# Patient Record
Sex: MALE | Race: WHITE | Employment: OTHER | URBAN - NONMETROPOLITAN AREA
[De-identification: names, ages, dates, MRNs, and addresses within clinical notes are randomized per-mention and may not be internally consistent; named-entity substitution may affect disease eponyms.]

---

## 2017-01-09 RX ORDER — MELOXICAM 15 MG/1
TABLET ORAL
Qty: 90 TABLET | Refills: 1 | Status: SHIPPED | OUTPATIENT
Start: 2017-01-09 | End: 2017-07-08 | Stop reason: SDUPTHER

## 2017-01-17 ENCOUNTER — OFFICE VISIT (OUTPATIENT)
Dept: PODIATRY | Age: 82
End: 2017-01-17

## 2017-01-17 VITALS
SYSTOLIC BLOOD PRESSURE: 138 MMHG | HEIGHT: 66 IN | WEIGHT: 179.4 LBS | HEART RATE: 70 BPM | BODY MASS INDEX: 28.83 KG/M2 | DIASTOLIC BLOOD PRESSURE: 70 MMHG

## 2017-01-17 DIAGNOSIS — B35.1 DERMATOPHYTOSIS OF NAIL: Primary | ICD-10-CM

## 2017-01-17 PROCEDURE — 1036F TOBACCO NON-USER: CPT | Performed by: PODIATRIST

## 2017-01-17 PROCEDURE — MISCFOOT6 MISC NON COVERED FOOT SVC 6 OR MORE: Performed by: PODIATRIST

## 2017-02-24 ENCOUNTER — OFFICE VISIT (OUTPATIENT)
Dept: FAMILY MEDICINE CLINIC | Age: 82
End: 2017-02-24

## 2017-02-24 VITALS
WEIGHT: 181 LBS | HEIGHT: 66 IN | SYSTOLIC BLOOD PRESSURE: 130 MMHG | HEART RATE: 68 BPM | DIASTOLIC BLOOD PRESSURE: 72 MMHG | BODY MASS INDEX: 29.09 KG/M2

## 2017-02-24 DIAGNOSIS — Z23 NEED FOR PNEUMOCOCCAL VACCINATION: Primary | ICD-10-CM

## 2017-02-24 DIAGNOSIS — M17.11 PRIMARY OSTEOARTHRITIS OF RIGHT KNEE: ICD-10-CM

## 2017-02-24 DIAGNOSIS — I87.2 VENOUS INSUFFICIENCY: ICD-10-CM

## 2017-02-24 DIAGNOSIS — I25.10 ATHEROSCLEROSIS OF NATIVE CORONARY ARTERY WITHOUT ANGINA PECTORIS, UNSPECIFIED WHETHER NATIVE OR TRANSPLANTED HEART: ICD-10-CM

## 2017-02-24 DIAGNOSIS — R97.20 ELEVATED PSA: ICD-10-CM

## 2017-02-24 DIAGNOSIS — N20.0 KIDNEY STONE: ICD-10-CM

## 2017-02-24 DIAGNOSIS — K22.70 BARRETT'S ESOPHAGUS WITHOUT DYSPLASIA: ICD-10-CM

## 2017-02-24 DIAGNOSIS — E78.5 HYPERLIPIDEMIA, UNSPECIFIED HYPERLIPIDEMIA TYPE: ICD-10-CM

## 2017-02-24 DIAGNOSIS — I10 ESSENTIAL HYPERTENSION, BENIGN: ICD-10-CM

## 2017-02-24 PROCEDURE — 4040F PNEUMOC VAC/ADMIN/RCVD: CPT | Performed by: FAMILY MEDICINE

## 2017-02-24 PROCEDURE — 99214 OFFICE O/P EST MOD 30 MIN: CPT | Performed by: FAMILY MEDICINE

## 2017-02-24 PROCEDURE — G8484 FLU IMMUNIZE NO ADMIN: HCPCS | Performed by: FAMILY MEDICINE

## 2017-02-24 PROCEDURE — 1123F ACP DISCUSS/DSCN MKR DOCD: CPT | Performed by: FAMILY MEDICINE

## 2017-02-24 PROCEDURE — G8427 DOCREV CUR MEDS BY ELIG CLIN: HCPCS | Performed by: FAMILY MEDICINE

## 2017-02-24 PROCEDURE — G8420 CALC BMI NORM PARAMETERS: HCPCS | Performed by: FAMILY MEDICINE

## 2017-02-24 PROCEDURE — G8598 ASA/ANTIPLAT THER USED: HCPCS | Performed by: FAMILY MEDICINE

## 2017-02-24 PROCEDURE — 1036F TOBACCO NON-USER: CPT | Performed by: FAMILY MEDICINE

## 2017-02-24 ASSESSMENT — PATIENT HEALTH QUESTIONNAIRE - PHQ9
SUM OF ALL RESPONSES TO PHQ9 QUESTIONS 1 & 2: 0
1. LITTLE INTEREST OR PLEASURE IN DOING THINGS: 0
SUM OF ALL RESPONSES TO PHQ QUESTIONS 1-9: 0
2. FEELING DOWN, DEPRESSED OR HOPELESS: 0

## 2017-02-24 ASSESSMENT — ENCOUNTER SYMPTOMS
GASTROINTESTINAL NEGATIVE: 1
EYES NEGATIVE: 1
ALLERGIC/IMMUNOLOGIC NEGATIVE: 1
RESPIRATORY NEGATIVE: 1

## 2017-03-07 ENCOUNTER — OFFICE VISIT (OUTPATIENT)
Dept: CARDIOLOGY | Age: 82
End: 2017-03-07

## 2017-03-07 VITALS
HEIGHT: 66 IN | DIASTOLIC BLOOD PRESSURE: 66 MMHG | WEIGHT: 180.4 LBS | HEART RATE: 65 BPM | BODY MASS INDEX: 28.99 KG/M2 | SYSTOLIC BLOOD PRESSURE: 124 MMHG

## 2017-03-07 DIAGNOSIS — I10 ESSENTIAL HYPERTENSION, BENIGN: Primary | ICD-10-CM

## 2017-03-07 DIAGNOSIS — I49.3 PVC (PREMATURE VENTRICULAR CONTRACTION): ICD-10-CM

## 2017-03-07 DIAGNOSIS — E66.01 MORBID OBESITY DUE TO EXCESS CALORIES (HCC): ICD-10-CM

## 2017-03-07 DIAGNOSIS — E78.5 HYPERLIPIDEMIA, UNSPECIFIED HYPERLIPIDEMIA TYPE: ICD-10-CM

## 2017-03-07 DIAGNOSIS — I25.10 CORONARY ARTERY DISEASE INVOLVING NATIVE CORONARY ARTERY OF NATIVE HEART WITHOUT ANGINA PECTORIS: ICD-10-CM

## 2017-03-07 DIAGNOSIS — I25.10 ATHEROSCLEROSIS OF NATIVE CORONARY ARTERY WITHOUT ANGINA PECTORIS, UNSPECIFIED WHETHER NATIVE OR TRANSPLANTED HEART: ICD-10-CM

## 2017-03-07 PROCEDURE — 1036F TOBACCO NON-USER: CPT | Performed by: INTERNAL MEDICINE

## 2017-03-07 PROCEDURE — G8420 CALC BMI NORM PARAMETERS: HCPCS | Performed by: INTERNAL MEDICINE

## 2017-03-07 PROCEDURE — 4040F PNEUMOC VAC/ADMIN/RCVD: CPT | Performed by: INTERNAL MEDICINE

## 2017-03-07 PROCEDURE — 1123F ACP DISCUSS/DSCN MKR DOCD: CPT | Performed by: INTERNAL MEDICINE

## 2017-03-07 PROCEDURE — G8598 ASA/ANTIPLAT THER USED: HCPCS | Performed by: INTERNAL MEDICINE

## 2017-03-07 PROCEDURE — G8427 DOCREV CUR MEDS BY ELIG CLIN: HCPCS | Performed by: INTERNAL MEDICINE

## 2017-03-07 PROCEDURE — G8484 FLU IMMUNIZE NO ADMIN: HCPCS | Performed by: INTERNAL MEDICINE

## 2017-03-07 PROCEDURE — 99213 OFFICE O/P EST LOW 20 MIN: CPT | Performed by: INTERNAL MEDICINE

## 2017-03-07 PROCEDURE — 93000 ELECTROCARDIOGRAM COMPLETE: CPT | Performed by: INTERNAL MEDICINE

## 2017-03-20 ENCOUNTER — HOSPITAL ENCOUNTER (INPATIENT)
Age: 82
LOS: 3 days | Discharge: NURSING, REHAB OR LONG-TERM CARE | DRG: 194 | End: 2017-03-23
Attending: EMERGENCY MEDICINE | Admitting: FAMILY MEDICINE
Payer: MEDICARE

## 2017-03-20 ENCOUNTER — APPOINTMENT (OUTPATIENT)
Dept: GENERAL RADIOLOGY | Age: 82
DRG: 194 | End: 2017-03-20
Payer: MEDICARE

## 2017-03-20 DIAGNOSIS — J18.9 PNEUMONIA DUE TO ORGANISM: Primary | ICD-10-CM

## 2017-03-20 LAB
-: ABNORMAL
ABSOLUTE EOS #: 0 K/UL (ref 0–0.4)
ABSOLUTE EOS #: 0.1 K/UL (ref 0–0.4)
ABSOLUTE LYMPH #: 0.4 K/UL (ref 1–4.8)
ABSOLUTE LYMPH #: 0.8 K/UL (ref 1–4.8)
ABSOLUTE MONO #: 0.7 K/UL (ref 0.1–1.2)
ABSOLUTE MONO #: 1.9 K/UL (ref 0.1–1.2)
AMORPHOUS: ABNORMAL
ANION GAP SERPL CALCULATED.3IONS-SCNC: 11 MMOL/L (ref 9–17)
ANION GAP SERPL CALCULATED.3IONS-SCNC: 13 MMOL/L (ref 9–17)
BACTERIA: ABNORMAL
BASOPHILS # BLD: 0 % (ref 0–2)
BASOPHILS # BLD: 1 % (ref 0–2)
BASOPHILS ABSOLUTE: 0 K/UL (ref 0–0.2)
BASOPHILS ABSOLUTE: 0.1 K/UL (ref 0–0.2)
BILIRUBIN URINE: NEGATIVE
BUN BLDV-MCNC: 25 MG/DL (ref 8–23)
BUN BLDV-MCNC: 26 MG/DL (ref 8–23)
BUN/CREAT BLD: 23 (ref 9–20)
BUN/CREAT BLD: 26 (ref 9–20)
CALCIUM SERPL-MCNC: 8.4 MG/DL (ref 8.6–10.4)
CALCIUM SERPL-MCNC: 8.8 MG/DL (ref 8.6–10.4)
CASTS UA: ABNORMAL /LPF (ref 0–2)
CHLORIDE BLD-SCNC: 103 MMOL/L (ref 98–107)
CHLORIDE BLD-SCNC: 104 MMOL/L (ref 98–107)
CO2: 23 MMOL/L (ref 20–31)
CO2: 25 MMOL/L (ref 20–31)
COLOR: ABNORMAL
COMMENT UA: ABNORMAL
CREAT SERPL-MCNC: 0.96 MG/DL (ref 0.7–1.2)
CREAT SERPL-MCNC: 1.13 MG/DL (ref 0.7–1.2)
CRYSTALS, UA: ABNORMAL /HPF
DIFFERENTIAL TYPE: ABNORMAL
DIFFERENTIAL TYPE: ABNORMAL
DIRECT EXAM: NORMAL
EKG ATRIAL RATE: 109 BPM
EKG P AXIS: 49 DEGREES
EKG P-R INTERVAL: 150 MS
EKG Q-T INTERVAL: 360 MS
EKG QRS DURATION: 130 MS
EKG QTC CALCULATION (BAZETT): 484 MS
EKG R AXIS: -55 DEGREES
EKG T AXIS: 15 DEGREES
EKG VENTRICULAR RATE: 109 BPM
EOSINOPHILS RELATIVE PERCENT: 0 % (ref 1–4)
EOSINOPHILS RELATIVE PERCENT: 1 % (ref 1–4)
EPITHELIAL CELLS UA: ABNORMAL /HPF (ref 0–5)
GFR AFRICAN AMERICAN: >60 ML/MIN
GFR AFRICAN AMERICAN: >60 ML/MIN
GFR NON-AFRICAN AMERICAN: >60 ML/MIN
GFR NON-AFRICAN AMERICAN: >60 ML/MIN
GFR SERPL CREATININE-BSD FRML MDRD: ABNORMAL ML/MIN/{1.73_M2}
GLUCOSE BLD-MCNC: 125 MG/DL (ref 70–99)
GLUCOSE BLD-MCNC: 132 MG/DL (ref 70–99)
GLUCOSE URINE: NEGATIVE
HCT VFR BLD CALC: 39.4 % (ref 41–53)
HCT VFR BLD CALC: 42.8 % (ref 41–53)
HEMOGLOBIN: 12.6 G/DL (ref 13.5–17.5)
HEMOGLOBIN: 13.8 G/DL (ref 13.5–17.5)
KETONES, URINE: ABNORMAL
LACTIC ACID: 1.5 MMOL/L (ref 0.5–2.2)
LEUKOCYTE ESTERASE, URINE: NEGATIVE
LIPASE: 28 U/L (ref 13–60)
LYMPHOCYTES # BLD: 4 % (ref 24–44)
LYMPHOCYTES # BLD: 5 % (ref 24–44)
Lab: NORMAL
MCH RBC QN AUTO: 28.4 PG (ref 26–34)
MCH RBC QN AUTO: 28.6 PG (ref 26–34)
MCHC RBC AUTO-ENTMCNC: 32 G/DL (ref 31–37)
MCHC RBC AUTO-ENTMCNC: 32.2 G/DL (ref 31–37)
MCV RBC AUTO: 88.8 FL (ref 80–100)
MCV RBC AUTO: 89 FL (ref 80–100)
MONOCYTES # BLD: 12 % (ref 1–7)
MONOCYTES # BLD: 8 % (ref 1–7)
MUCUS: ABNORMAL
NITRITE, URINE: NEGATIVE
OTHER OBSERVATIONS UA: ABNORMAL
PDW BLD-RTO: 15.5 % (ref 11–14.5)
PDW BLD-RTO: 15.6 % (ref 11–14.5)
PH UA: 6 (ref 5–6)
PLATELET # BLD: 155 K/UL (ref 140–450)
PLATELET # BLD: 158 K/UL (ref 140–450)
PLATELET ESTIMATE: ABNORMAL
PLATELET ESTIMATE: ABNORMAL
PMV BLD AUTO: 9.4 FL (ref 6–12)
PMV BLD AUTO: 9.4 FL (ref 6–12)
POC TROPONIN I: <0.02 NG/ML (ref 0–0.08)
POC TROPONIN INTERP: NORMAL
POTASSIUM SERPL-SCNC: 3.9 MMOL/L (ref 3.7–5.3)
POTASSIUM SERPL-SCNC: 4.4 MMOL/L (ref 3.7–5.3)
PROTEIN UA: NEGATIVE
RBC # BLD: 4.43 M/UL (ref 4.5–5.9)
RBC # BLD: 4.82 M/UL (ref 4.5–5.9)
RBC # BLD: ABNORMAL 10*6/UL
RBC # BLD: ABNORMAL 10*6/UL
RBC UA: ABNORMAL /HPF (ref 0–4)
RENAL EPITHELIAL, UA: ABNORMAL /HPF
SEG NEUTROPHILS: 83 % (ref 36–66)
SEG NEUTROPHILS: 86 % (ref 36–66)
SEGMENTED NEUTROPHILS ABSOLUTE COUNT: 12.7 K/UL (ref 1.8–7.7)
SEGMENTED NEUTROPHILS ABSOLUTE COUNT: 8.1 K/UL (ref 1.8–7.7)
SODIUM BLD-SCNC: 139 MMOL/L (ref 135–144)
SODIUM BLD-SCNC: 140 MMOL/L (ref 135–144)
SPECIFIC GRAVITY UA: 1.01 (ref 1.01–1.02)
SPECIMEN DESCRIPTION: NORMAL
STATUS: NORMAL
TRICHOMONAS: ABNORMAL
TURBIDITY: ABNORMAL
URINE HGB: ABNORMAL
UROBILINOGEN, URINE: NORMAL
WBC # BLD: 15.4 K/UL (ref 3.5–11)
WBC # BLD: 9.3 K/UL (ref 3.5–11)
WBC # BLD: ABNORMAL 10*3/UL
WBC # BLD: ABNORMAL 10*3/UL
WBC UA: ABNORMAL /HPF (ref 0–4)
YEAST: ABNORMAL

## 2017-03-20 PROCEDURE — 6360000002 HC RX W HCPCS: Performed by: EMERGENCY MEDICINE

## 2017-03-20 PROCEDURE — 71010 XR CHEST PORTABLE: CPT

## 2017-03-20 PROCEDURE — 87804 INFLUENZA ASSAY W/OPTIC: CPT

## 2017-03-20 PROCEDURE — 6370000000 HC RX 637 (ALT 250 FOR IP)

## 2017-03-20 PROCEDURE — 99285 EMERGENCY DEPT VISIT HI MDM: CPT

## 2017-03-20 PROCEDURE — 94640 AIRWAY INHALATION TREATMENT: CPT

## 2017-03-20 PROCEDURE — G8987 SELF CARE CURRENT STATUS: HCPCS | Performed by: OCCUPATIONAL THERAPIST

## 2017-03-20 PROCEDURE — 6370000000 HC RX 637 (ALT 250 FOR IP): Performed by: EMERGENCY MEDICINE

## 2017-03-20 PROCEDURE — 6360000002 HC RX W HCPCS: Performed by: INTERNAL MEDICINE

## 2017-03-20 PROCEDURE — 6360000002 HC RX W HCPCS

## 2017-03-20 PROCEDURE — 80048 BASIC METABOLIC PNL TOTAL CA: CPT

## 2017-03-20 PROCEDURE — 81001 URINALYSIS AUTO W/SCOPE: CPT

## 2017-03-20 PROCEDURE — 84484 ASSAY OF TROPONIN QUANT: CPT

## 2017-03-20 PROCEDURE — 2580000003 HC RX 258: Performed by: FAMILY MEDICINE

## 2017-03-20 PROCEDURE — G8979 MOBILITY GOAL STATUS: HCPCS | Performed by: PHYSICAL THERAPIST

## 2017-03-20 PROCEDURE — 6370000000 HC RX 637 (ALT 250 FOR IP): Performed by: INTERNAL MEDICINE

## 2017-03-20 PROCEDURE — 2060000000 HC ICU INTERMEDIATE R&B

## 2017-03-20 PROCEDURE — 97161 PT EVAL LOW COMPLEX 20 MIN: CPT | Performed by: PHYSICAL THERAPIST

## 2017-03-20 PROCEDURE — 85025 COMPLETE CBC W/AUTO DIFF WBC: CPT

## 2017-03-20 PROCEDURE — 36415 COLL VENOUS BLD VENIPUNCTURE: CPT

## 2017-03-20 PROCEDURE — 94761 N-INVAS EAR/PLS OXIMETRY MLT: CPT

## 2017-03-20 PROCEDURE — 71010 XR CHEST PORTABLE: CPT | Performed by: RADIOLOGY

## 2017-03-20 PROCEDURE — G8980 MOBILITY D/C STATUS: HCPCS | Performed by: PHYSICAL THERAPIST

## 2017-03-20 PROCEDURE — 2580000003 HC RX 258: Performed by: EMERGENCY MEDICINE

## 2017-03-20 PROCEDURE — 93005 ELECTROCARDIOGRAM TRACING: CPT

## 2017-03-20 PROCEDURE — 97165 OT EVAL LOW COMPLEX 30 MIN: CPT | Performed by: OCCUPATIONAL THERAPIST

## 2017-03-20 PROCEDURE — 94150 VITAL CAPACITY TEST: CPT

## 2017-03-20 PROCEDURE — 96374 THER/PROPH/DIAG INJ IV PUSH: CPT

## 2017-03-20 PROCEDURE — 83605 ASSAY OF LACTIC ACID: CPT

## 2017-03-20 PROCEDURE — 87086 URINE CULTURE/COLONY COUNT: CPT

## 2017-03-20 PROCEDURE — G8989 SELF CARE D/C STATUS: HCPCS | Performed by: OCCUPATIONAL THERAPIST

## 2017-03-20 PROCEDURE — 87040 BLOOD CULTURE FOR BACTERIA: CPT

## 2017-03-20 PROCEDURE — 99223 1ST HOSP IP/OBS HIGH 75: CPT | Performed by: INTERNAL MEDICINE

## 2017-03-20 PROCEDURE — G8978 MOBILITY CURRENT STATUS: HCPCS | Performed by: PHYSICAL THERAPIST

## 2017-03-20 PROCEDURE — 83690 ASSAY OF LIPASE: CPT

## 2017-03-20 RX ORDER — ACETAMINOPHEN 500 MG
1000 TABLET ORAL ONCE
Status: COMPLETED | OUTPATIENT
Start: 2017-03-20 | End: 2017-03-20

## 2017-03-20 RX ORDER — SIMVASTATIN 20 MG
20 TABLET ORAL NIGHTLY
Status: DISCONTINUED | OUTPATIENT
Start: 2017-03-20 | End: 2017-03-23 | Stop reason: HOSPADM

## 2017-03-20 RX ORDER — ASPIRIN/CALCIUM/MAG/ALUMINUM 325 MG
1 TABLET ORAL DAILY
Status: CANCELLED | OUTPATIENT
Start: 2017-03-20

## 2017-03-20 RX ORDER — ASPIRIN 325 MG
TABLET ORAL
Status: COMPLETED
Start: 2017-03-20 | End: 2017-03-20

## 2017-03-20 RX ORDER — MELOXICAM 15 MG/1
15 TABLET ORAL DAILY
Status: DISCONTINUED | OUTPATIENT
Start: 2017-03-21 | End: 2017-03-23 | Stop reason: HOSPADM

## 2017-03-20 RX ORDER — SUCRALFATE ORAL 1 G/10ML
1 SUSPENSION ORAL DAILY
Status: CANCELLED | OUTPATIENT
Start: 2017-03-20

## 2017-03-20 RX ORDER — METOPROLOL SUCCINATE 25 MG/1
25 TABLET, EXTENDED RELEASE ORAL DAILY
Status: CANCELLED | OUTPATIENT
Start: 2017-03-20

## 2017-03-20 RX ORDER — ONDANSETRON 2 MG/ML
4 INJECTION INTRAMUSCULAR; INTRAVENOUS ONCE
Status: COMPLETED | OUTPATIENT
Start: 2017-03-20 | End: 2017-03-20

## 2017-03-20 RX ORDER — SODIUM CHLORIDE 0.9 % (FLUSH) 0.9 %
10 SYRINGE (ML) INJECTION PRN
Status: DISCONTINUED | OUTPATIENT
Start: 2017-03-20 | End: 2017-03-23 | Stop reason: HOSPADM

## 2017-03-20 RX ORDER — ACETAMINOPHEN 325 MG/1
650 TABLET ORAL EVERY 4 HOURS PRN
Status: DISCONTINUED | OUTPATIENT
Start: 2017-03-20 | End: 2017-03-23 | Stop reason: HOSPADM

## 2017-03-20 RX ORDER — ALBUTEROL SULFATE 2.5 MG/3ML
2.5 SOLUTION RESPIRATORY (INHALATION) 4 TIMES DAILY
Status: DISCONTINUED | OUTPATIENT
Start: 2017-03-20 | End: 2017-03-23 | Stop reason: HOSPADM

## 2017-03-20 RX ORDER — PANTOPRAZOLE SODIUM 40 MG/1
1 TABLET, DELAYED RELEASE ORAL 2 TIMES DAILY
Status: CANCELLED | OUTPATIENT
Start: 2017-03-20

## 2017-03-20 RX ORDER — METOPROLOL SUCCINATE 25 MG/1
25 TABLET, EXTENDED RELEASE ORAL DAILY
Status: DISCONTINUED | OUTPATIENT
Start: 2017-03-20 | End: 2017-03-23 | Stop reason: HOSPADM

## 2017-03-20 RX ORDER — ALBUTEROL SULFATE 2.5 MG/3ML
2.5 SOLUTION RESPIRATORY (INHALATION)
Status: DISCONTINUED | OUTPATIENT
Start: 2017-03-20 | End: 2017-03-20 | Stop reason: SDUPTHER

## 2017-03-20 RX ORDER — SODIUM CHLORIDE FOR INHALATION 0.9 %
3 VIAL, NEBULIZER (ML) INHALATION
Status: DISCONTINUED | OUTPATIENT
Start: 2017-03-20 | End: 2017-03-23 | Stop reason: HOSPADM

## 2017-03-20 RX ORDER — LISINOPRIL 5 MG/1
5 TABLET ORAL DAILY
Status: DISCONTINUED | OUTPATIENT
Start: 2017-03-20 | End: 2017-03-23 | Stop reason: HOSPADM

## 2017-03-20 RX ORDER — LUTEIN 10 MG
1 TABLET ORAL 2 TIMES DAILY
Status: DISCONTINUED | OUTPATIENT
Start: 2017-03-20 | End: 2017-03-20 | Stop reason: RX

## 2017-03-20 RX ORDER — ALBUTEROL SULFATE 2.5 MG/3ML
2.5 SOLUTION RESPIRATORY (INHALATION)
Status: DISCONTINUED | OUTPATIENT
Start: 2017-03-20 | End: 2017-03-20

## 2017-03-20 RX ORDER — LISINOPRIL 5 MG/1
5 TABLET ORAL DAILY
Status: CANCELLED | OUTPATIENT
Start: 2017-03-20

## 2017-03-20 RX ORDER — SIMVASTATIN 20 MG
20 TABLET ORAL NIGHTLY
Status: CANCELLED | OUTPATIENT
Start: 2017-03-20

## 2017-03-20 RX ORDER — ALBUTEROL SULFATE 2.5 MG/3ML
2.5 SOLUTION RESPIRATORY (INHALATION) EVERY 6 HOURS PRN
Status: DISCONTINUED | OUTPATIENT
Start: 2017-03-20 | End: 2017-03-23 | Stop reason: HOSPADM

## 2017-03-20 RX ORDER — ONDANSETRON 2 MG/ML
4 INJECTION INTRAMUSCULAR; INTRAVENOUS EVERY 6 HOURS PRN
Status: DISCONTINUED | OUTPATIENT
Start: 2017-03-20 | End: 2017-03-23 | Stop reason: HOSPADM

## 2017-03-20 RX ORDER — SODIUM CHLORIDE 0.9 % (FLUSH) 0.9 %
10 SYRINGE (ML) INJECTION EVERY 12 HOURS SCHEDULED
Status: DISCONTINUED | OUTPATIENT
Start: 2017-03-20 | End: 2017-03-23 | Stop reason: HOSPADM

## 2017-03-20 RX ORDER — LUTEIN 10 MG
1 TABLET ORAL 2 TIMES DAILY
Status: CANCELLED | OUTPATIENT
Start: 2017-03-20

## 2017-03-20 RX ORDER — GUAIFENESIN 600 MG/1
600 TABLET, EXTENDED RELEASE ORAL 2 TIMES DAILY PRN
Status: DISCONTINUED | OUTPATIENT
Start: 2017-03-20 | End: 2017-03-22

## 2017-03-20 RX ORDER — ASPIRIN/CALCIUM/MAG/ALUMINUM 325 MG
1 TABLET ORAL DAILY
Status: DISCONTINUED | OUTPATIENT
Start: 2017-03-20 | End: 2017-03-22 | Stop reason: RX

## 2017-03-20 RX ORDER — PANTOPRAZOLE SODIUM 40 MG/1
40 TABLET, DELAYED RELEASE ORAL 2 TIMES DAILY
Status: DISCONTINUED | OUTPATIENT
Start: 2017-03-20 | End: 2017-03-23 | Stop reason: HOSPADM

## 2017-03-20 RX ORDER — SUCRALFATE ORAL 1 G/10ML
1 SUSPENSION ORAL DAILY
Status: DISCONTINUED | OUTPATIENT
Start: 2017-03-20 | End: 2017-03-23 | Stop reason: HOSPADM

## 2017-03-20 RX ORDER — MULTIVITAMIN WITH FOLIC ACID 400 MCG
1 TABLET ORAL DAILY
Status: DISCONTINUED | OUTPATIENT
Start: 2017-03-20 | End: 2017-03-23 | Stop reason: HOSPADM

## 2017-03-20 RX ADMIN — ASPIRIN 325 MG ORAL TABLET 325 MG: 325 PILL ORAL at 11:20

## 2017-03-20 RX ADMIN — ENOXAPARIN SODIUM 40 MG: 80 INJECTION SUBCUTANEOUS at 11:20

## 2017-03-20 RX ADMIN — AZITHROMYCIN MONOHYDRATE 500 MG: 500 INJECTION, POWDER, LYOPHILIZED, FOR SOLUTION INTRAVENOUS at 03:24

## 2017-03-20 RX ADMIN — CEFTRIAXONE 1 G: 1 INJECTION, POWDER, FOR SOLUTION INTRAMUSCULAR; INTRAVENOUS at 02:43

## 2017-03-20 RX ADMIN — SIMVASTATIN 20 MG: 20 TABLET, FILM COATED ORAL at 20:43

## 2017-03-20 RX ADMIN — ALBUTEROL SULFATE 2.5 MG: 2.5 SOLUTION RESPIRATORY (INHALATION) at 16:10

## 2017-03-20 RX ADMIN — Medication 10 ML: at 11:21

## 2017-03-20 RX ADMIN — LISINOPRIL 5 MG: 5 TABLET ORAL at 11:19

## 2017-03-20 RX ADMIN — METOPROLOL SUCCINATE 25 MG: 25 TABLET, EXTENDED RELEASE ORAL at 11:19

## 2017-03-20 RX ADMIN — THERA TABS 1 TABLET: TAB at 11:19

## 2017-03-20 RX ADMIN — ALBUTEROL SULFATE 2.5 MG: 2.5 SOLUTION RESPIRATORY (INHALATION) at 01:22

## 2017-03-20 RX ADMIN — PANTOPRAZOLE SODIUM 40 MG: 40 TABLET, DELAYED RELEASE ORAL at 20:43

## 2017-03-20 RX ADMIN — ENOXAPARIN SODIUM 40 MG: 40 INJECTION SUBCUTANEOUS at 11:20

## 2017-03-20 RX ADMIN — PANTOPRAZOLE SODIUM 40 MG: 40 TABLET, DELAYED RELEASE ORAL at 11:19

## 2017-03-20 RX ADMIN — Medication 1 G: at 11:19

## 2017-03-20 RX ADMIN — ACETAMINOPHEN 1000 MG: 500 TABLET ORAL at 01:17

## 2017-03-20 RX ADMIN — Medication 325 MG: at 11:21

## 2017-03-20 RX ADMIN — ONDANSETRON 4 MG: 2 INJECTION INTRAMUSCULAR; INTRAVENOUS at 01:17

## 2017-03-20 RX ADMIN — Medication 10 ML: at 20:43

## 2017-03-20 RX ADMIN — ALBUTEROL SULFATE 2.5 MG: 2.5 SOLUTION RESPIRATORY (INHALATION) at 19:55

## 2017-03-20 ASSESSMENT — ENCOUNTER SYMPTOMS
VOICE CHANGE: 0
CHEST TIGHTNESS: 1
RHINORRHEA: 0
ABDOMINAL PAIN: 0
SHORTNESS OF BREATH: 0
DIARRHEA: 0
BACK PAIN: 0
COLOR CHANGE: 0
COUGH: 1
NAUSEA: 1
TROUBLE SWALLOWING: 0
VOMITING: 0

## 2017-03-20 ASSESSMENT — PAIN SCALES - GENERAL
PAINLEVEL_OUTOF10: 0
PAINLEVEL_OUTOF10: 0

## 2017-03-21 ENCOUNTER — APPOINTMENT (OUTPATIENT)
Dept: GENERAL RADIOLOGY | Age: 82
DRG: 194 | End: 2017-03-21
Payer: MEDICARE

## 2017-03-21 LAB
ABSOLUTE EOS #: 0.2 K/UL (ref 0–0.4)
ABSOLUTE LYMPH #: 1.4 K/UL (ref 1–4.8)
ABSOLUTE MONO #: 0.9 K/UL (ref 0.1–1.2)
ANION GAP SERPL CALCULATED.3IONS-SCNC: 12 MMOL/L (ref 9–17)
BASOPHILS # BLD: 0 % (ref 0–2)
BASOPHILS ABSOLUTE: 0 K/UL (ref 0–0.2)
BUN BLDV-MCNC: 20 MG/DL (ref 8–23)
BUN/CREAT BLD: 24 (ref 9–20)
CALCIUM SERPL-MCNC: 8.6 MG/DL (ref 8.6–10.4)
CHLORIDE BLD-SCNC: 104 MMOL/L (ref 98–107)
CO2: 24 MMOL/L (ref 20–31)
CREAT SERPL-MCNC: 0.82 MG/DL (ref 0.7–1.2)
DIFFERENTIAL TYPE: ABNORMAL
EOSINOPHILS RELATIVE PERCENT: 3 % (ref 1–4)
GFR AFRICAN AMERICAN: >60 ML/MIN
GFR NON-AFRICAN AMERICAN: >60 ML/MIN
GFR SERPL CREATININE-BSD FRML MDRD: ABNORMAL ML/MIN/{1.73_M2}
GFR SERPL CREATININE-BSD FRML MDRD: ABNORMAL ML/MIN/{1.73_M2}
GLUCOSE BLD-MCNC: 108 MG/DL (ref 70–99)
HCT VFR BLD CALC: 37.6 % (ref 41–53)
HEMOGLOBIN: 12.3 G/DL (ref 13.5–17.5)
LYMPHOCYTES # BLD: 18 % (ref 24–44)
MCH RBC QN AUTO: 28.7 PG (ref 26–34)
MCHC RBC AUTO-ENTMCNC: 32.6 G/DL (ref 31–37)
MCV RBC AUTO: 88.2 FL (ref 80–100)
MONOCYTES # BLD: 13 % (ref 1–7)
PDW BLD-RTO: 15.9 % (ref 11–14.5)
PLATELET # BLD: 150 K/UL (ref 140–450)
PLATELET ESTIMATE: ABNORMAL
PMV BLD AUTO: 10 FL (ref 6–12)
POTASSIUM SERPL-SCNC: 3.8 MMOL/L (ref 3.7–5.3)
RBC # BLD: 4.27 M/UL (ref 4.5–5.9)
RBC # BLD: ABNORMAL 10*6/UL
SEG NEUTROPHILS: 66 % (ref 36–66)
SEGMENTED NEUTROPHILS ABSOLUTE COUNT: 5 K/UL (ref 1.8–7.7)
SODIUM BLD-SCNC: 140 MMOL/L (ref 135–144)
WBC # BLD: 7.6 K/UL (ref 3.5–11)
WBC # BLD: ABNORMAL 10*3/UL

## 2017-03-21 PROCEDURE — 94640 AIRWAY INHALATION TREATMENT: CPT

## 2017-03-21 PROCEDURE — 71020 XR CHEST STANDARD TWO VW: CPT | Performed by: RADIOLOGY

## 2017-03-21 PROCEDURE — 6370000000 HC RX 637 (ALT 250 FOR IP): Performed by: INTERNAL MEDICINE

## 2017-03-21 PROCEDURE — 80048 BASIC METABOLIC PNL TOTAL CA: CPT

## 2017-03-21 PROCEDURE — 2580000003 HC RX 258: Performed by: FAMILY MEDICINE

## 2017-03-21 PROCEDURE — 71020 XR CHEST STANDARD TWO VW: CPT

## 2017-03-21 PROCEDURE — 2060000000 HC ICU INTERMEDIATE R&B

## 2017-03-21 PROCEDURE — 6360000002 HC RX W HCPCS: Performed by: INTERNAL MEDICINE

## 2017-03-21 PROCEDURE — 36415 COLL VENOUS BLD VENIPUNCTURE: CPT

## 2017-03-21 PROCEDURE — 85025 COMPLETE CBC W/AUTO DIFF WBC: CPT

## 2017-03-21 PROCEDURE — 6370000000 HC RX 637 (ALT 250 FOR IP): Performed by: NURSE PRACTITIONER

## 2017-03-21 PROCEDURE — 6360000002 HC RX W HCPCS: Performed by: FAMILY MEDICINE

## 2017-03-21 PROCEDURE — 99232 SBSQ HOSP IP/OBS MODERATE 35: CPT | Performed by: INTERNAL MEDICINE

## 2017-03-21 RX ORDER — SENNA PLUS 8.6 MG/1
1 TABLET ORAL NIGHTLY PRN
Status: DISCONTINUED | OUTPATIENT
Start: 2017-03-21 | End: 2017-03-23 | Stop reason: HOSPADM

## 2017-03-21 RX ORDER — AZITHROMYCIN 250 MG/1
500 TABLET, FILM COATED ORAL DAILY
Status: DISCONTINUED | OUTPATIENT
Start: 2017-03-22 | End: 2017-03-23 | Stop reason: HOSPADM

## 2017-03-21 RX ADMIN — CEFTRIAXONE 1 G: 1 INJECTION, POWDER, FOR SOLUTION INTRAMUSCULAR; INTRAVENOUS at 02:51

## 2017-03-21 RX ADMIN — SIMVASTATIN 20 MG: 20 TABLET, FILM COATED ORAL at 20:49

## 2017-03-21 RX ADMIN — Medication 1 G: at 08:58

## 2017-03-21 RX ADMIN — ALBUTEROL SULFATE 2.5 MG: 2.5 SOLUTION RESPIRATORY (INHALATION) at 07:42

## 2017-03-21 RX ADMIN — THERA TABS 1 TABLET: TAB at 08:58

## 2017-03-21 RX ADMIN — ALBUTEROL SULFATE 2.5 MG: 2.5 SOLUTION RESPIRATORY (INHALATION) at 20:23

## 2017-03-21 RX ADMIN — PANTOPRAZOLE SODIUM 40 MG: 40 TABLET, DELAYED RELEASE ORAL at 08:58

## 2017-03-21 RX ADMIN — METOPROLOL SUCCINATE 25 MG: 25 TABLET, EXTENDED RELEASE ORAL at 08:58

## 2017-03-21 RX ADMIN — ALBUTEROL SULFATE 2.5 MG: 2.5 SOLUTION RESPIRATORY (INHALATION) at 11:41

## 2017-03-21 RX ADMIN — PANTOPRAZOLE SODIUM 40 MG: 40 TABLET, DELAYED RELEASE ORAL at 20:49

## 2017-03-21 RX ADMIN — Medication 10 ML: at 20:49

## 2017-03-21 RX ADMIN — LISINOPRIL 5 MG: 5 TABLET ORAL at 08:58

## 2017-03-21 RX ADMIN — ENOXAPARIN SODIUM 40 MG: 40 INJECTION SUBCUTANEOUS at 08:58

## 2017-03-21 RX ADMIN — AZITHROMYCIN MONOHYDRATE 500 MG: 500 INJECTION, POWDER, LYOPHILIZED, FOR SOLUTION INTRAVENOUS at 03:38

## 2017-03-21 RX ADMIN — GUAIFENESIN 600 MG: 600 TABLET, EXTENDED RELEASE ORAL at 11:03

## 2017-03-21 RX ADMIN — Medication 10 ML: at 08:58

## 2017-03-21 RX ADMIN — ALBUTEROL SULFATE 2.5 MG: 2.5 SOLUTION RESPIRATORY (INHALATION) at 15:38

## 2017-03-22 LAB
ABSOLUTE EOS #: 0.3 K/UL (ref 0–0.4)
ABSOLUTE LYMPH #: 1.4 K/UL (ref 1–4.8)
ABSOLUTE MONO #: 0.9 K/UL (ref 0.1–1.2)
ANION GAP SERPL CALCULATED.3IONS-SCNC: 13 MMOL/L (ref 9–17)
BASOPHILS # BLD: 0 % (ref 0–2)
BASOPHILS ABSOLUTE: 0 K/UL (ref 0–0.2)
BUN BLDV-MCNC: 19 MG/DL (ref 8–23)
BUN/CREAT BLD: 22 (ref 9–20)
CALCIUM SERPL-MCNC: 8.7 MG/DL (ref 8.6–10.4)
CHLORIDE BLD-SCNC: 105 MMOL/L (ref 98–107)
CO2: 26 MMOL/L (ref 20–31)
CREAT SERPL-MCNC: 0.86 MG/DL (ref 0.7–1.2)
DIFFERENTIAL TYPE: ABNORMAL
EOSINOPHILS RELATIVE PERCENT: 4 % (ref 1–4)
GFR AFRICAN AMERICAN: >60 ML/MIN
GFR NON-AFRICAN AMERICAN: >60 ML/MIN
GFR SERPL CREATININE-BSD FRML MDRD: ABNORMAL ML/MIN/{1.73_M2}
GFR SERPL CREATININE-BSD FRML MDRD: ABNORMAL ML/MIN/{1.73_M2}
GLUCOSE BLD-MCNC: 101 MG/DL (ref 70–99)
HCT VFR BLD CALC: 38.6 % (ref 41–53)
HEMOGLOBIN: 12.8 G/DL (ref 13.5–17.5)
LYMPHOCYTES # BLD: 17 % (ref 24–44)
MCH RBC QN AUTO: 29.2 PG (ref 26–34)
MCHC RBC AUTO-ENTMCNC: 33.1 G/DL (ref 31–37)
MCV RBC AUTO: 88 FL (ref 80–100)
MONOCYTES # BLD: 11 % (ref 1–7)
PDW BLD-RTO: 15.6 % (ref 11–14.5)
PLATELET # BLD: 160 K/UL (ref 140–450)
PLATELET ESTIMATE: ABNORMAL
PMV BLD AUTO: 9.8 FL (ref 6–12)
POTASSIUM SERPL-SCNC: 4.5 MMOL/L (ref 3.7–5.3)
RBC # BLD: 4.38 M/UL (ref 4.5–5.9)
RBC # BLD: ABNORMAL 10*6/UL
SEG NEUTROPHILS: 68 % (ref 36–66)
SEGMENTED NEUTROPHILS ABSOLUTE COUNT: 5.8 K/UL (ref 1.8–7.7)
SODIUM BLD-SCNC: 144 MMOL/L (ref 135–144)
WBC # BLD: 8.5 K/UL (ref 3.5–11)
WBC # BLD: ABNORMAL 10*3/UL

## 2017-03-22 PROCEDURE — 6360000002 HC RX W HCPCS: Performed by: FAMILY MEDICINE

## 2017-03-22 PROCEDURE — 6370000000 HC RX 637 (ALT 250 FOR IP): Performed by: NURSE PRACTITIONER

## 2017-03-22 PROCEDURE — 94760 N-INVAS EAR/PLS OXIMETRY 1: CPT

## 2017-03-22 PROCEDURE — 80048 BASIC METABOLIC PNL TOTAL CA: CPT

## 2017-03-22 PROCEDURE — 6370000000 HC RX 637 (ALT 250 FOR IP): Performed by: INTERNAL MEDICINE

## 2017-03-22 PROCEDURE — 36415 COLL VENOUS BLD VENIPUNCTURE: CPT

## 2017-03-22 PROCEDURE — 6360000002 HC RX W HCPCS: Performed by: INTERNAL MEDICINE

## 2017-03-22 PROCEDURE — 85025 COMPLETE CBC W/AUTO DIFF WBC: CPT

## 2017-03-22 PROCEDURE — 99232 SBSQ HOSP IP/OBS MODERATE 35: CPT | Performed by: INTERNAL MEDICINE

## 2017-03-22 PROCEDURE — 94640 AIRWAY INHALATION TREATMENT: CPT

## 2017-03-22 PROCEDURE — 2060000000 HC ICU INTERMEDIATE R&B

## 2017-03-22 PROCEDURE — 2580000003 HC RX 258: Performed by: FAMILY MEDICINE

## 2017-03-22 PROCEDURE — 6370000000 HC RX 637 (ALT 250 FOR IP)

## 2017-03-22 RX ORDER — GUAIFENESIN 600 MG/1
600 TABLET, EXTENDED RELEASE ORAL 2 TIMES DAILY
Status: DISCONTINUED | OUTPATIENT
Start: 2017-03-22 | End: 2017-03-23 | Stop reason: HOSPADM

## 2017-03-22 RX ORDER — ASPIRIN 325 MG
TABLET ORAL
Status: DISPENSED
Start: 2017-03-22 | End: 2017-03-22

## 2017-03-22 RX ORDER — DIPHENHYDRAMINE HCL 25 MG
50 TABLET ORAL NIGHTLY PRN
Status: DISCONTINUED | OUTPATIENT
Start: 2017-03-22 | End: 2017-03-23 | Stop reason: HOSPADM

## 2017-03-22 RX ADMIN — THERA TABS 1 TABLET: TAB at 08:38

## 2017-03-22 RX ADMIN — DIPHENHYDRAMINE HCL 50 MG: 25 TABLET ORAL at 02:30

## 2017-03-22 RX ADMIN — CEFTRIAXONE 1 G: 1 INJECTION, POWDER, FOR SOLUTION INTRAMUSCULAR; INTRAVENOUS at 01:47

## 2017-03-22 RX ADMIN — SIMVASTATIN 20 MG: 20 TABLET, FILM COATED ORAL at 20:18

## 2017-03-22 RX ADMIN — ENOXAPARIN SODIUM 40 MG: 40 INJECTION SUBCUTANEOUS at 08:38

## 2017-03-22 RX ADMIN — ASPIRIN 325 MG: 325 TABLET, DELAYED RELEASE ORAL at 10:08

## 2017-03-22 RX ADMIN — LISINOPRIL 5 MG: 5 TABLET ORAL at 08:38

## 2017-03-22 RX ADMIN — PANTOPRAZOLE SODIUM 40 MG: 40 TABLET, DELAYED RELEASE ORAL at 20:18

## 2017-03-22 RX ADMIN — ALBUTEROL SULFATE 2.5 MG: 2.5 SOLUTION RESPIRATORY (INHALATION) at 16:34

## 2017-03-22 RX ADMIN — GUAIFENESIN 600 MG: 600 TABLET, EXTENDED RELEASE ORAL at 20:18

## 2017-03-22 RX ADMIN — Medication 10 ML: at 20:18

## 2017-03-22 RX ADMIN — ALBUTEROL SULFATE 2.5 MG: 2.5 SOLUTION RESPIRATORY (INHALATION) at 19:50

## 2017-03-22 RX ADMIN — PANTOPRAZOLE SODIUM 40 MG: 40 TABLET, DELAYED RELEASE ORAL at 08:38

## 2017-03-22 RX ADMIN — Medication 10 ML: at 08:38

## 2017-03-22 RX ADMIN — Medication 1 G: at 08:38

## 2017-03-22 RX ADMIN — METOPROLOL SUCCINATE 25 MG: 25 TABLET, EXTENDED RELEASE ORAL at 08:38

## 2017-03-22 RX ADMIN — AZITHROMYCIN 500 MG: 250 TABLET, FILM COATED ORAL at 08:38

## 2017-03-22 RX ADMIN — ALBUTEROL SULFATE 2.5 MG: 2.5 SOLUTION RESPIRATORY (INHALATION) at 08:26

## 2017-03-22 RX ADMIN — ALBUTEROL SULFATE 2.5 MG: 2.5 SOLUTION RESPIRATORY (INHALATION) at 12:41

## 2017-03-22 ASSESSMENT — PAIN SCALES - GENERAL
PAINLEVEL_OUTOF10: 0

## 2017-03-23 VITALS
OXYGEN SATURATION: 94 % | DIASTOLIC BLOOD PRESSURE: 70 MMHG | RESPIRATION RATE: 20 BRPM | HEIGHT: 66 IN | BODY MASS INDEX: 27.88 KG/M2 | WEIGHT: 173.5 LBS | HEART RATE: 74 BPM | TEMPERATURE: 97.9 F | SYSTOLIC BLOOD PRESSURE: 142 MMHG

## 2017-03-23 LAB
ABSOLUTE EOS #: 0.3 K/UL (ref 0–0.4)
ABSOLUTE LYMPH #: 1.4 K/UL (ref 1–4.8)
ABSOLUTE MONO #: 1 K/UL (ref 0.1–1.2)
ANION GAP SERPL CALCULATED.3IONS-SCNC: 11 MMOL/L (ref 9–17)
BASOPHILS # BLD: 0 % (ref 0–2)
BASOPHILS ABSOLUTE: 0 K/UL (ref 0–0.2)
BUN BLDV-MCNC: 19 MG/DL (ref 8–23)
BUN/CREAT BLD: 21 (ref 9–20)
CALCIUM SERPL-MCNC: 9.2 MG/DL (ref 8.6–10.4)
CHLORIDE BLD-SCNC: 105 MMOL/L (ref 98–107)
CO2: 27 MMOL/L (ref 20–31)
CREAT SERPL-MCNC: 0.89 MG/DL (ref 0.7–1.2)
CULTURE: NORMAL
DIFFERENTIAL TYPE: ABNORMAL
EOSINOPHILS RELATIVE PERCENT: 4 % (ref 1–4)
GFR AFRICAN AMERICAN: >60 ML/MIN
GFR NON-AFRICAN AMERICAN: >60 ML/MIN
GFR SERPL CREATININE-BSD FRML MDRD: ABNORMAL ML/MIN/{1.73_M2}
GFR SERPL CREATININE-BSD FRML MDRD: ABNORMAL ML/MIN/{1.73_M2}
GLUCOSE BLD-MCNC: 104 MG/DL (ref 70–99)
HCT VFR BLD CALC: 39.2 % (ref 41–53)
HEMOGLOBIN: 12.7 G/DL (ref 13.5–17.5)
LYMPHOCYTES # BLD: 16 % (ref 24–44)
Lab: NORMAL
MCH RBC QN AUTO: 28.8 PG (ref 26–34)
MCHC RBC AUTO-ENTMCNC: 32.4 G/DL (ref 31–37)
MCV RBC AUTO: 88.7 FL (ref 80–100)
MONOCYTES # BLD: 12 % (ref 1–7)
PDW BLD-RTO: 15.7 % (ref 11–14.5)
PLATELET # BLD: 181 K/UL (ref 140–450)
PLATELET ESTIMATE: ABNORMAL
PMV BLD AUTO: 9.4 FL (ref 6–12)
POTASSIUM SERPL-SCNC: 4.3 MMOL/L (ref 3.7–5.3)
RBC # BLD: 4.42 M/UL (ref 4.5–5.9)
RBC # BLD: ABNORMAL 10*6/UL
SEG NEUTROPHILS: 68 % (ref 36–66)
SEGMENTED NEUTROPHILS ABSOLUTE COUNT: 5.9 K/UL (ref 1.8–7.7)
SODIUM BLD-SCNC: 143 MMOL/L (ref 135–144)
SPECIMEN DESCRIPTION: NORMAL
STATUS: NORMAL
WBC # BLD: 8.6 K/UL (ref 3.5–11)
WBC # BLD: ABNORMAL 10*3/UL

## 2017-03-23 PROCEDURE — 6360000002 HC RX W HCPCS: Performed by: INTERNAL MEDICINE

## 2017-03-23 PROCEDURE — 99238 HOSP IP/OBS DSCHRG MGMT 30/<: CPT | Performed by: INTERNAL MEDICINE

## 2017-03-23 PROCEDURE — 6360000002 HC RX W HCPCS: Performed by: FAMILY MEDICINE

## 2017-03-23 PROCEDURE — 36415 COLL VENOUS BLD VENIPUNCTURE: CPT

## 2017-03-23 PROCEDURE — 2580000003 HC RX 258: Performed by: FAMILY MEDICINE

## 2017-03-23 PROCEDURE — 6370000000 HC RX 637 (ALT 250 FOR IP): Performed by: INTERNAL MEDICINE

## 2017-03-23 PROCEDURE — 94760 N-INVAS EAR/PLS OXIMETRY 1: CPT

## 2017-03-23 PROCEDURE — 94640 AIRWAY INHALATION TREATMENT: CPT

## 2017-03-23 PROCEDURE — 80048 BASIC METABOLIC PNL TOTAL CA: CPT

## 2017-03-23 PROCEDURE — 85025 COMPLETE CBC W/AUTO DIFF WBC: CPT

## 2017-03-23 RX ORDER — CEFUROXIME AXETIL 500 MG/1
500 TABLET ORAL 2 TIMES DAILY
Qty: 8 TABLET | Refills: 0 | Status: SHIPPED | OUTPATIENT
Start: 2017-03-23 | End: 2017-03-27

## 2017-03-23 RX ORDER — IPRATROPIUM BROMIDE AND ALBUTEROL SULFATE 2.5; .5 MG/3ML; MG/3ML
1 SOLUTION RESPIRATORY (INHALATION) EVERY 4 HOURS
Qty: 360 ML | Refills: 0 | Status: SHIPPED | OUTPATIENT
Start: 2017-03-23 | End: 2017-09-12

## 2017-03-23 RX ORDER — AZITHROMYCIN 500 MG/1
500 TABLET, FILM COATED ORAL DAILY
Qty: 4 TABLET | Refills: 0 | Status: SHIPPED | OUTPATIENT
Start: 2017-03-23 | End: 2017-03-27

## 2017-03-23 RX ORDER — ACETAMINOPHEN 325 MG/1
650 TABLET ORAL EVERY 4 HOURS PRN
Qty: 120 TABLET | Refills: 0 | Status: ON HOLD | COMMUNITY
Start: 2017-03-23 | End: 2018-06-09

## 2017-03-23 RX ORDER — GUAIFENESIN 600 MG/1
600 TABLET, EXTENDED RELEASE ORAL 2 TIMES DAILY
Qty: 28 TABLET | Refills: 0 | Status: SHIPPED | OUTPATIENT
Start: 2017-03-23 | End: 2017-04-06

## 2017-03-23 RX ORDER — SENNA PLUS 8.6 MG/1
1 TABLET ORAL NIGHTLY PRN
Refills: 0 | COMMUNITY
Start: 2017-03-23 | End: 2017-04-22

## 2017-03-23 RX ADMIN — CEFTRIAXONE 1 G: 1 INJECTION, POWDER, FOR SOLUTION INTRAMUSCULAR; INTRAVENOUS at 02:08

## 2017-03-23 RX ADMIN — ENOXAPARIN SODIUM 40 MG: 40 INJECTION SUBCUTANEOUS at 09:02

## 2017-03-23 RX ADMIN — Medication 1 G: at 09:02

## 2017-03-23 RX ADMIN — AZITHROMYCIN 500 MG: 250 TABLET, FILM COATED ORAL at 09:02

## 2017-03-23 RX ADMIN — ALBUTEROL SULFATE 2.5 MG: 2.5 SOLUTION RESPIRATORY (INHALATION) at 12:18

## 2017-03-23 RX ADMIN — PANTOPRAZOLE SODIUM 40 MG: 40 TABLET, DELAYED RELEASE ORAL at 09:02

## 2017-03-23 RX ADMIN — THERA TABS 1 TABLET: TAB at 09:02

## 2017-03-23 RX ADMIN — METOPROLOL SUCCINATE 25 MG: 25 TABLET, EXTENDED RELEASE ORAL at 09:02

## 2017-03-23 RX ADMIN — LISINOPRIL 5 MG: 5 TABLET ORAL at 09:02

## 2017-03-23 RX ADMIN — Medication 10 ML: at 09:02

## 2017-03-23 RX ADMIN — ASPIRIN 325 MG: 325 TABLET, DELAYED RELEASE ORAL at 09:02

## 2017-03-23 RX ADMIN — GUAIFENESIN 600 MG: 600 TABLET, EXTENDED RELEASE ORAL at 09:02

## 2017-03-23 ASSESSMENT — PAIN SCALES - GENERAL: PAINLEVEL_OUTOF10: 0

## 2017-03-24 ENCOUNTER — CARE COORDINATION (OUTPATIENT)
Dept: FAMILY MEDICINE CLINIC | Age: 82
End: 2017-03-24

## 2017-03-26 LAB
CULTURE: NORMAL
Lab: NORMAL
Lab: NORMAL
SPECIMEN DESCRIPTION: NORMAL
STATUS: NORMAL
STATUS: NORMAL

## 2017-04-04 ENCOUNTER — OFFICE VISIT (OUTPATIENT)
Dept: PODIATRY | Age: 82
End: 2017-04-04
Payer: MEDICARE

## 2017-04-04 VITALS
BODY MASS INDEX: 28.73 KG/M2 | SYSTOLIC BLOOD PRESSURE: 122 MMHG | HEART RATE: 68 BPM | DIASTOLIC BLOOD PRESSURE: 70 MMHG | WEIGHT: 178 LBS

## 2017-04-04 DIAGNOSIS — B35.1 DERMATOPHYTOSIS OF NAIL: Primary | ICD-10-CM

## 2017-04-04 PROCEDURE — 1036F TOBACCO NON-USER: CPT | Performed by: PODIATRIST

## 2017-04-04 PROCEDURE — MISCFOOT6 MISC NON COVERED FOOT SVC 6 OR MORE: Performed by: PODIATRIST

## 2017-04-07 ENCOUNTER — TELEPHONE (OUTPATIENT)
Dept: FAMILY MEDICINE CLINIC | Age: 82
End: 2017-04-07

## 2017-04-10 RX ORDER — PANTOPRAZOLE SODIUM 40 MG/1
TABLET, DELAYED RELEASE ORAL
Qty: 180 TABLET | Refills: 1 | Status: SHIPPED | OUTPATIENT
Start: 2017-04-10 | End: 2017-10-07 | Stop reason: SDUPTHER

## 2017-04-11 ENCOUNTER — CARE COORDINATION (OUTPATIENT)
Dept: FAMILY MEDICINE CLINIC | Age: 82
End: 2017-04-11

## 2017-04-11 ENCOUNTER — CARE COORDINATION (OUTPATIENT)
Dept: CARE COORDINATION | Age: 82
End: 2017-04-11

## 2017-04-19 ENCOUNTER — OFFICE VISIT (OUTPATIENT)
Dept: FAMILY MEDICINE CLINIC | Age: 82
End: 2017-04-19
Payer: MEDICARE

## 2017-04-19 VITALS
HEART RATE: 56 BPM | RESPIRATION RATE: 16 BRPM | HEIGHT: 66 IN | DIASTOLIC BLOOD PRESSURE: 64 MMHG | WEIGHT: 176.2 LBS | BODY MASS INDEX: 28.32 KG/M2 | SYSTOLIC BLOOD PRESSURE: 126 MMHG

## 2017-04-19 DIAGNOSIS — J18.9 PNEUMONIA DUE TO ORGANISM: Primary | ICD-10-CM

## 2017-04-19 PROCEDURE — 4040F PNEUMOC VAC/ADMIN/RCVD: CPT | Performed by: FAMILY MEDICINE

## 2017-04-19 PROCEDURE — G8427 DOCREV CUR MEDS BY ELIG CLIN: HCPCS | Performed by: FAMILY MEDICINE

## 2017-04-19 PROCEDURE — 99214 OFFICE O/P EST MOD 30 MIN: CPT | Performed by: FAMILY MEDICINE

## 2017-04-19 PROCEDURE — G8420 CALC BMI NORM PARAMETERS: HCPCS | Performed by: FAMILY MEDICINE

## 2017-04-19 PROCEDURE — 1123F ACP DISCUSS/DSCN MKR DOCD: CPT | Performed by: FAMILY MEDICINE

## 2017-04-19 PROCEDURE — 1036F TOBACCO NON-USER: CPT | Performed by: FAMILY MEDICINE

## 2017-04-19 PROCEDURE — 1111F DSCHRG MED/CURRENT MED MERGE: CPT | Performed by: FAMILY MEDICINE

## 2017-04-19 PROCEDURE — G8598 ASA/ANTIPLAT THER USED: HCPCS | Performed by: FAMILY MEDICINE

## 2017-04-19 ASSESSMENT — ENCOUNTER SYMPTOMS
GASTROINTESTINAL NEGATIVE: 1
COUGH: 0
SHORTNESS OF BREATH: 0
RESPIRATORY NEGATIVE: 1
ALLERGIC/IMMUNOLOGIC NEGATIVE: 1
EYES NEGATIVE: 1

## 2017-04-27 ENCOUNTER — CARE COORDINATION (OUTPATIENT)
Dept: CARE COORDINATION | Age: 82
End: 2017-04-27

## 2017-05-09 RX ORDER — SIMVASTATIN 20 MG
TABLET ORAL
Qty: 90 TABLET | Refills: 3 | Status: SHIPPED | OUTPATIENT
Start: 2017-05-09 | End: 2017-09-12 | Stop reason: SDUPTHER

## 2017-05-15 ENCOUNTER — NURSE ONLY (OUTPATIENT)
Dept: SURGERY | Age: 82
End: 2017-05-15

## 2017-05-15 ENCOUNTER — TELEPHONE (OUTPATIENT)
Dept: SURGERY | Age: 82
End: 2017-05-15

## 2017-05-15 VITALS
SYSTOLIC BLOOD PRESSURE: 126 MMHG | DIASTOLIC BLOOD PRESSURE: 88 MMHG | WEIGHT: 180 LBS | HEART RATE: 58 BPM | BODY MASS INDEX: 28.93 KG/M2 | HEIGHT: 66 IN

## 2017-05-15 DIAGNOSIS — K22.70 BARRETT'S ESOPHAGUS WITHOUT DYSPLASIA: Primary | ICD-10-CM

## 2017-06-08 ENCOUNTER — CARE COORDINATION (OUTPATIENT)
Dept: CARE COORDINATION | Age: 82
End: 2017-06-08

## 2017-06-13 ENCOUNTER — OFFICE VISIT (OUTPATIENT)
Dept: PODIATRY | Age: 82
End: 2017-06-13

## 2017-06-13 VITALS
BODY MASS INDEX: 28.7 KG/M2 | HEART RATE: 68 BPM | SYSTOLIC BLOOD PRESSURE: 132 MMHG | DIASTOLIC BLOOD PRESSURE: 74 MMHG | HEIGHT: 66 IN | WEIGHT: 178.6 LBS

## 2017-06-13 DIAGNOSIS — B35.1 DERMATOPHYTOSIS OF NAIL: Primary | ICD-10-CM

## 2017-06-13 PROCEDURE — 1036F TOBACCO NON-USER: CPT | Performed by: PODIATRIST

## 2017-06-13 PROCEDURE — MISCFOOT6 MISC NON COVERED FOOT SVC 6 OR MORE: Performed by: PODIATRIST

## 2017-07-03 ENCOUNTER — ANESTHESIA (OUTPATIENT)
Dept: OPERATING ROOM | Age: 82
End: 2017-07-03
Payer: MEDICARE

## 2017-07-03 ENCOUNTER — ANESTHESIA EVENT (OUTPATIENT)
Dept: OPERATING ROOM | Age: 82
End: 2017-07-03
Payer: MEDICARE

## 2017-07-03 ENCOUNTER — HOSPITAL ENCOUNTER (OUTPATIENT)
Age: 82
Setting detail: OUTPATIENT SURGERY
Discharge: HOME | End: 2017-07-03
Attending: SURGERY | Admitting: SURGERY
Payer: MEDICARE

## 2017-07-03 VITALS
OXYGEN SATURATION: 99 % | SYSTOLIC BLOOD PRESSURE: 179 MMHG | DIASTOLIC BLOOD PRESSURE: 93 MMHG | RESPIRATION RATE: 19 BRPM

## 2017-07-03 VITALS
SYSTOLIC BLOOD PRESSURE: 151 MMHG | WEIGHT: 173 LBS | RESPIRATION RATE: 16 BRPM | TEMPERATURE: 97 F | OXYGEN SATURATION: 98 % | DIASTOLIC BLOOD PRESSURE: 75 MMHG | HEIGHT: 66 IN | HEART RATE: 59 BPM | BODY MASS INDEX: 27.8 KG/M2

## 2017-07-03 PROCEDURE — 7100000010 HC PHASE II RECOVERY - FIRST 15 MIN: Performed by: SURGERY

## 2017-07-03 PROCEDURE — 2580000003 HC RX 258: Performed by: SURGERY

## 2017-07-03 PROCEDURE — 3700000001 HC ADD 15 MINUTES (ANESTHESIA): Performed by: SURGERY

## 2017-07-03 PROCEDURE — 43239 EGD BIOPSY SINGLE/MULTIPLE: CPT | Performed by: SURGERY

## 2017-07-03 PROCEDURE — 7100000011 HC PHASE II RECOVERY - ADDTL 15 MIN: Performed by: SURGERY

## 2017-07-03 PROCEDURE — 88305 TISSUE EXAM BY PATHOLOGIST: CPT

## 2017-07-03 PROCEDURE — 6360000002 HC RX W HCPCS: Performed by: NURSE ANESTHETIST, CERTIFIED REGISTERED

## 2017-07-03 PROCEDURE — 00740 PR ANESTH,UGI ENDOSCOPY: CPT | Performed by: NURSE ANESTHETIST, CERTIFIED REGISTERED

## 2017-07-03 PROCEDURE — 3609012400 HC EGD TRANSORAL BIOPSY SINGLE/MULTIPLE: Performed by: SURGERY

## 2017-07-03 PROCEDURE — 3700000000 HC ANESTHESIA ATTENDED CARE: Performed by: SURGERY

## 2017-07-03 RX ORDER — SODIUM CHLORIDE, SODIUM LACTATE, POTASSIUM CHLORIDE, CALCIUM CHLORIDE 600; 310; 30; 20 MG/100ML; MG/100ML; MG/100ML; MG/100ML
INJECTION, SOLUTION INTRAVENOUS CONTINUOUS
Status: DISCONTINUED | OUTPATIENT
Start: 2017-07-03 | End: 2017-07-03 | Stop reason: HOSPADM

## 2017-07-03 RX ORDER — PROPOFOL 10 MG/ML
INJECTION, EMULSION INTRAVENOUS PRN
Status: DISCONTINUED | OUTPATIENT
Start: 2017-07-03 | End: 2017-07-03 | Stop reason: SDUPTHER

## 2017-07-03 RX ADMIN — PROPOFOL 200 MG: 10 INJECTION, EMULSION INTRAVENOUS at 10:20

## 2017-07-03 RX ADMIN — SODIUM CHLORIDE, POTASSIUM CHLORIDE, SODIUM LACTATE AND CALCIUM CHLORIDE: 600; 310; 30; 20 INJECTION, SOLUTION INTRAVENOUS at 08:33

## 2017-07-03 ASSESSMENT — PAIN SCALES - GENERAL
PAINLEVEL_OUTOF10: 0

## 2017-07-03 ASSESSMENT — PAIN - FUNCTIONAL ASSESSMENT: PAIN_FUNCTIONAL_ASSESSMENT: 0-10

## 2017-07-06 LAB — SURGICAL PATHOLOGY REPORT: NORMAL

## 2017-07-10 ENCOUNTER — CARE COORDINATION (OUTPATIENT)
Dept: CARE COORDINATION | Age: 82
End: 2017-07-10

## 2017-07-10 RX ORDER — MELOXICAM 15 MG/1
TABLET ORAL
Qty: 90 TABLET | Refills: 1 | Status: SHIPPED | OUTPATIENT
Start: 2017-07-10 | End: 2018-01-06 | Stop reason: SDUPTHER

## 2017-07-27 ENCOUNTER — CARE COORDINATION (OUTPATIENT)
Dept: CARE COORDINATION | Age: 82
End: 2017-07-27

## 2017-08-02 ENCOUNTER — TELEPHONE (OUTPATIENT)
Dept: SURGERY | Age: 82
End: 2017-08-02

## 2017-08-15 RX ORDER — METOPROLOL SUCCINATE 25 MG/1
25 TABLET, EXTENDED RELEASE ORAL DAILY
Qty: 90 TABLET | Refills: 3 | Status: SHIPPED | OUTPATIENT
Start: 2017-08-15 | End: 2018-04-09 | Stop reason: SDUPTHER

## 2017-08-17 ENCOUNTER — HOSPITAL ENCOUNTER (OUTPATIENT)
Dept: LAB | Age: 82
Discharge: HOME | End: 2017-08-17
Payer: MEDICARE

## 2017-08-17 DIAGNOSIS — I10 ESSENTIAL HYPERTENSION, BENIGN: ICD-10-CM

## 2017-08-17 DIAGNOSIS — E78.00 PURE HYPERCHOLESTEROLEMIA: ICD-10-CM

## 2017-08-17 DIAGNOSIS — E78.5 HYPERLIPIDEMIA, UNSPECIFIED HYPERLIPIDEMIA TYPE: ICD-10-CM

## 2017-08-17 LAB
ABSOLUTE EOS #: 0.3 K/UL (ref 0–0.4)
ABSOLUTE LYMPH #: 1.7 K/UL (ref 1–4.8)
ABSOLUTE MONO #: 1 K/UL (ref 0.1–1.2)
ANION GAP SERPL CALCULATED.3IONS-SCNC: 10 MMOL/L (ref 9–17)
BASOPHILS # BLD: 1 %
BASOPHILS ABSOLUTE: 0 K/UL (ref 0–0.2)
BUN BLDV-MCNC: 28 MG/DL (ref 8–23)
BUN/CREAT BLD: 31 (ref 9–20)
CALCIUM SERPL-MCNC: 9.3 MG/DL (ref 8.6–10.4)
CHLORIDE BLD-SCNC: 105 MMOL/L (ref 98–107)
CHOLESTEROL/HDL RATIO: 3.1
CHOLESTEROL: 121 MG/DL
CO2: 27 MMOL/L (ref 20–31)
CREAT SERPL-MCNC: 0.89 MG/DL (ref 0.7–1.2)
DIFFERENTIAL TYPE: ABNORMAL
EOSINOPHILS RELATIVE PERCENT: 4 %
GFR AFRICAN AMERICAN: >60 ML/MIN
GFR NON-AFRICAN AMERICAN: >60 ML/MIN
GFR SERPL CREATININE-BSD FRML MDRD: ABNORMAL ML/MIN/{1.73_M2}
GFR SERPL CREATININE-BSD FRML MDRD: ABNORMAL ML/MIN/{1.73_M2}
GLUCOSE BLD-MCNC: 100 MG/DL (ref 70–99)
HCT VFR BLD CALC: 42.8 % (ref 41–53)
HDLC SERPL-MCNC: 39 MG/DL
HEMOGLOBIN: 13.8 G/DL (ref 13.5–17.5)
LDL CHOLESTEROL: 53 MG/DL (ref 0–130)
LYMPHOCYTES # BLD: 25 %
MCH RBC QN AUTO: 28.9 PG (ref 26–34)
MCHC RBC AUTO-ENTMCNC: 32.4 G/DL (ref 31–37)
MCV RBC AUTO: 89.3 FL (ref 80–100)
MONOCYTES # BLD: 14 %
PDW BLD-RTO: 15.2 % (ref 11–14.5)
PLATELET # BLD: 182 K/UL (ref 140–450)
PLATELET ESTIMATE: ABNORMAL
PMV BLD AUTO: 8.9 FL (ref 6–12)
POTASSIUM SERPL-SCNC: 4.6 MMOL/L (ref 3.7–5.3)
RBC # BLD: 4.79 M/UL (ref 4.5–5.9)
RBC # BLD: ABNORMAL 10*6/UL
SEG NEUTROPHILS: 56 %
SEGMENTED NEUTROPHILS ABSOLUTE COUNT: 4.1 K/UL (ref 1.8–7.7)
SODIUM BLD-SCNC: 142 MMOL/L (ref 135–144)
TRIGL SERPL-MCNC: 147 MG/DL
VLDLC SERPL CALC-MCNC: ABNORMAL MG/DL (ref 1–30)
WBC # BLD: 7.1 K/UL (ref 3.5–11)
WBC # BLD: ABNORMAL 10*3/UL

## 2017-08-17 PROCEDURE — 85025 COMPLETE CBC W/AUTO DIFF WBC: CPT

## 2017-08-17 PROCEDURE — 80048 BASIC METABOLIC PNL TOTAL CA: CPT

## 2017-08-17 PROCEDURE — 36415 COLL VENOUS BLD VENIPUNCTURE: CPT

## 2017-08-17 PROCEDURE — 80061 LIPID PANEL: CPT

## 2017-08-22 ENCOUNTER — OFFICE VISIT (OUTPATIENT)
Dept: PODIATRY | Age: 82
End: 2017-08-22

## 2017-08-22 VITALS
BODY MASS INDEX: 28.96 KG/M2 | SYSTOLIC BLOOD PRESSURE: 128 MMHG | HEIGHT: 66 IN | DIASTOLIC BLOOD PRESSURE: 70 MMHG | RESPIRATION RATE: 20 BRPM | WEIGHT: 180.2 LBS | HEART RATE: 76 BPM

## 2017-08-22 DIAGNOSIS — B35.1 DERMATOPHYTOSIS OF NAIL: Primary | ICD-10-CM

## 2017-08-22 PROCEDURE — MISCFOOT6 MISC NON COVERED FOOT SVC 6 OR MORE: Performed by: PODIATRIST

## 2017-08-22 PROCEDURE — 99999 PR OFFICE/OUTPT VISIT,PROCEDURE ONLY: CPT | Performed by: PODIATRIST

## 2017-08-22 PROCEDURE — 1036F TOBACCO NON-USER: CPT | Performed by: PODIATRIST

## 2017-08-24 ENCOUNTER — CARE COORDINATOR VISIT (OUTPATIENT)
Dept: CARE COORDINATION | Age: 82
End: 2017-08-24

## 2017-08-24 ENCOUNTER — OFFICE VISIT (OUTPATIENT)
Dept: FAMILY MEDICINE CLINIC | Age: 82
End: 2017-08-24
Payer: MEDICARE

## 2017-08-24 VITALS
WEIGHT: 180 LBS | HEIGHT: 66 IN | HEART RATE: 72 BPM | BODY MASS INDEX: 28.93 KG/M2 | DIASTOLIC BLOOD PRESSURE: 68 MMHG | SYSTOLIC BLOOD PRESSURE: 138 MMHG

## 2017-08-24 DIAGNOSIS — I87.2 VENOUS INSUFFICIENCY: ICD-10-CM

## 2017-08-24 DIAGNOSIS — Z23 NEED FOR PNEUMOCOCCAL VACCINATION: Primary | ICD-10-CM

## 2017-08-24 DIAGNOSIS — I10 ESSENTIAL HYPERTENSION, BENIGN: ICD-10-CM

## 2017-08-24 DIAGNOSIS — E78.5 HYPERLIPIDEMIA, UNSPECIFIED HYPERLIPIDEMIA TYPE: ICD-10-CM

## 2017-08-24 DIAGNOSIS — K63.5 POLYP OF COLON, UNSPECIFIED PART OF COLON, UNSPECIFIED TYPE: ICD-10-CM

## 2017-08-24 DIAGNOSIS — M17.11 PRIMARY OSTEOARTHRITIS OF RIGHT KNEE: ICD-10-CM

## 2017-08-24 DIAGNOSIS — R97.20 ELEVATED PSA: ICD-10-CM

## 2017-08-24 DIAGNOSIS — I25.10 CORONARY ARTERY DISEASE INVOLVING NATIVE CORONARY ARTERY OF NATIVE HEART WITHOUT ANGINA PECTORIS: ICD-10-CM

## 2017-08-24 DIAGNOSIS — K22.70 BARRETT'S ESOPHAGUS WITHOUT DYSPLASIA: ICD-10-CM

## 2017-08-24 DIAGNOSIS — N20.0 KIDNEY STONE: ICD-10-CM

## 2017-08-24 PROCEDURE — 1036F TOBACCO NON-USER: CPT | Performed by: FAMILY MEDICINE

## 2017-08-24 PROCEDURE — G8598 ASA/ANTIPLAT THER USED: HCPCS | Performed by: FAMILY MEDICINE

## 2017-08-24 PROCEDURE — 4040F PNEUMOC VAC/ADMIN/RCVD: CPT | Performed by: FAMILY MEDICINE

## 2017-08-24 PROCEDURE — 1123F ACP DISCUSS/DSCN MKR DOCD: CPT | Performed by: FAMILY MEDICINE

## 2017-08-24 PROCEDURE — 99214 OFFICE O/P EST MOD 30 MIN: CPT | Performed by: FAMILY MEDICINE

## 2017-08-24 PROCEDURE — G8427 DOCREV CUR MEDS BY ELIG CLIN: HCPCS | Performed by: FAMILY MEDICINE

## 2017-08-24 PROCEDURE — G8419 CALC BMI OUT NRM PARAM NOF/U: HCPCS | Performed by: FAMILY MEDICINE

## 2017-08-24 ASSESSMENT — ENCOUNTER SYMPTOMS
RESPIRATORY NEGATIVE: 1
ALLERGIC/IMMUNOLOGIC NEGATIVE: 1
GASTROINTESTINAL NEGATIVE: 1
EYES NEGATIVE: 1

## 2017-08-29 ENCOUNTER — NURSE ONLY (OUTPATIENT)
Dept: LAB | Age: 82
End: 2017-08-29
Payer: MEDICARE

## 2017-08-29 DIAGNOSIS — Z23 NEED FOR VACCINATION: Primary | ICD-10-CM

## 2017-08-29 PROCEDURE — 90670 PCV13 VACCINE IM: CPT | Performed by: FAMILY MEDICINE

## 2017-08-29 PROCEDURE — G0009 ADMIN PNEUMOCOCCAL VACCINE: HCPCS | Performed by: FAMILY MEDICINE

## 2017-08-29 PROCEDURE — 99999 PR OFFICE/OUTPT VISIT,PROCEDURE ONLY: CPT | Performed by: FAMILY MEDICINE

## 2017-09-12 ENCOUNTER — OFFICE VISIT (OUTPATIENT)
Dept: CARDIOLOGY | Age: 82
End: 2017-09-12
Payer: MEDICARE

## 2017-09-12 VITALS
DIASTOLIC BLOOD PRESSURE: 62 MMHG | HEIGHT: 66 IN | WEIGHT: 180 LBS | HEART RATE: 73 BPM | SYSTOLIC BLOOD PRESSURE: 122 MMHG | BODY MASS INDEX: 28.93 KG/M2

## 2017-09-12 DIAGNOSIS — I10 ESSENTIAL HYPERTENSION, BENIGN: ICD-10-CM

## 2017-09-12 DIAGNOSIS — E78.5 HYPERLIPIDEMIA, UNSPECIFIED HYPERLIPIDEMIA TYPE: ICD-10-CM

## 2017-09-12 DIAGNOSIS — I25.10 ATHEROSCLEROSIS OF NATIVE CORONARY ARTERY WITHOUT ANGINA PECTORIS, UNSPECIFIED WHETHER NATIVE OR TRANSPLANTED HEART: Primary | ICD-10-CM

## 2017-09-12 DIAGNOSIS — I49.3 PVC (PREMATURE VENTRICULAR CONTRACTION): ICD-10-CM

## 2017-09-12 PROCEDURE — 99213 OFFICE O/P EST LOW 20 MIN: CPT | Performed by: INTERNAL MEDICINE

## 2017-09-12 PROCEDURE — 1123F ACP DISCUSS/DSCN MKR DOCD: CPT | Performed by: INTERNAL MEDICINE

## 2017-09-12 PROCEDURE — 1036F TOBACCO NON-USER: CPT | Performed by: INTERNAL MEDICINE

## 2017-09-12 PROCEDURE — G8427 DOCREV CUR MEDS BY ELIG CLIN: HCPCS | Performed by: INTERNAL MEDICINE

## 2017-09-12 PROCEDURE — 4040F PNEUMOC VAC/ADMIN/RCVD: CPT | Performed by: INTERNAL MEDICINE

## 2017-09-12 PROCEDURE — G8598 ASA/ANTIPLAT THER USED: HCPCS | Performed by: INTERNAL MEDICINE

## 2017-09-12 PROCEDURE — G8417 CALC BMI ABV UP PARAM F/U: HCPCS | Performed by: INTERNAL MEDICINE

## 2017-09-12 PROCEDURE — 93000 ELECTROCARDIOGRAM COMPLETE: CPT | Performed by: INTERNAL MEDICINE

## 2017-09-12 RX ORDER — SIMVASTATIN 20 MG
TABLET ORAL
Qty: 90 TABLET | Refills: 3 | Status: SHIPPED | OUTPATIENT
Start: 2017-09-12 | End: 2018-04-09 | Stop reason: SDUPTHER

## 2017-10-02 RX ORDER — LISINOPRIL 5 MG/1
TABLET ORAL
Qty: 90 TABLET | Refills: 3 | Status: SHIPPED | OUTPATIENT
Start: 2017-10-02 | End: 2018-04-09 | Stop reason: SDUPTHER

## 2017-10-03 ENCOUNTER — HOSPITAL ENCOUNTER (OUTPATIENT)
Dept: LAB | Age: 82
Setting detail: SPECIMEN
Discharge: HOME | End: 2017-10-03
Payer: MEDICARE

## 2017-10-03 DIAGNOSIS — N40.0 BENIGN NON-NODULAR PROSTATIC HYPERPLASIA WITHOUT LOWER URINARY TRACT SYMPTOMS: ICD-10-CM

## 2017-10-03 DIAGNOSIS — R97.20 ABNORMAL PSA: ICD-10-CM

## 2017-10-03 LAB — PROSTATE SPECIFIC ANTIGEN: 18.72 UG/L

## 2017-10-03 PROCEDURE — 36415 COLL VENOUS BLD VENIPUNCTURE: CPT

## 2017-10-03 PROCEDURE — 84153 ASSAY OF PSA TOTAL: CPT

## 2017-10-09 RX ORDER — PANTOPRAZOLE SODIUM 40 MG/1
TABLET, DELAYED RELEASE ORAL
Qty: 180 TABLET | Refills: 1 | Status: SHIPPED | OUTPATIENT
Start: 2017-10-09 | End: 2018-04-07 | Stop reason: SDUPTHER

## 2017-10-18 ENCOUNTER — TELEPHONE (OUTPATIENT)
Dept: UROLOGY | Age: 82
End: 2017-10-18

## 2017-10-18 NOTE — TELEPHONE ENCOUNTER
Called pt re:no show yesterday with Dr. Emory Vega yearly ck.  Pt forgot about appt and resched to 10-24-17

## 2017-10-24 ENCOUNTER — OFFICE VISIT (OUTPATIENT)
Dept: UROLOGY | Age: 82
End: 2017-10-24
Payer: MEDICARE

## 2017-10-24 VITALS
DIASTOLIC BLOOD PRESSURE: 80 MMHG | BODY MASS INDEX: 28.89 KG/M2 | HEART RATE: 64 BPM | WEIGHT: 179 LBS | SYSTOLIC BLOOD PRESSURE: 138 MMHG

## 2017-10-24 DIAGNOSIS — R97.20 ABNORMAL PSA: Primary | ICD-10-CM

## 2017-10-24 PROCEDURE — G8417 CALC BMI ABV UP PARAM F/U: HCPCS | Performed by: UROLOGY

## 2017-10-24 PROCEDURE — 4040F PNEUMOC VAC/ADMIN/RCVD: CPT | Performed by: UROLOGY

## 2017-10-24 PROCEDURE — 99213 OFFICE O/P EST LOW 20 MIN: CPT | Performed by: UROLOGY

## 2017-10-24 PROCEDURE — 1036F TOBACCO NON-USER: CPT | Performed by: UROLOGY

## 2017-10-24 PROCEDURE — G8484 FLU IMMUNIZE NO ADMIN: HCPCS | Performed by: UROLOGY

## 2017-10-24 PROCEDURE — G8427 DOCREV CUR MEDS BY ELIG CLIN: HCPCS | Performed by: UROLOGY

## 2017-10-24 PROCEDURE — G8598 ASA/ANTIPLAT THER USED: HCPCS | Performed by: UROLOGY

## 2017-10-24 PROCEDURE — 1123F ACP DISCUSS/DSCN MKR DOCD: CPT | Performed by: UROLOGY

## 2017-10-24 NOTE — PROGRESS NOTES
HPI:    Elevated PSA:  Patient is here today for history of an elevated PSA. His last several PSA values are as follows:   Lab Results   Component Value Date    PSA 18.72 (H) 10/03/2017    PSA 16.73 (H) 09/30/2016    PSA 14.61 (H) 10/02/2015     Overall the PSA level is: gradually increasing. Doubling time ~ 4 years  Recent history of urinary tract infection/prostatitis? no  Previous prostate biopsy? 2009 NEG  Lower urinary tract symptoms: no        PHYSICAL EXAM:    Patient is a 80 y.o. male in no acute distress. He is alert and oriented to person, place, and time. CONSTITUTIONAL: General appearance is a well nourished, well developed male in no distress. EYES:. Pupils are equal, round and react to light and accommodation. Inspection of the lids and conjunctiva are normal.        NOSE: The external appearance of the nose is normal        EARS:  The patient has adequate hearing, and the ears appear normal to external inspection. NECK:  The trachea is midline           RECTAL: normal anal tone. The prostate is 2+ firm on L      MUSCULOSKELETAL: normal gait        NEUROLOGIC: There is normal sensation to touch        LYMPHATICS: I do not appreciate any lymphadenopathy        SKIN: there are no obvious rashes noted        PSYCHIATRIC:  The patient is oriented in all spheres and has good insight and judgement as related to his current health issues. He has normal mood and affect. Impression: Abn PSA, prostate nodule    Plan: PSA 1 year per pt.

## 2017-10-31 ENCOUNTER — OFFICE VISIT (OUTPATIENT)
Dept: PODIATRY | Age: 82
End: 2017-10-31
Payer: MEDICARE

## 2017-10-31 VITALS
BODY MASS INDEX: 28.93 KG/M2 | SYSTOLIC BLOOD PRESSURE: 130 MMHG | WEIGHT: 180 LBS | HEIGHT: 66 IN | HEART RATE: 74 BPM | DIASTOLIC BLOOD PRESSURE: 80 MMHG

## 2017-10-31 DIAGNOSIS — B35.1 DERMATOPHYTOSIS OF NAIL: Primary | ICD-10-CM

## 2017-10-31 PROCEDURE — 11721 DEBRIDE NAIL 6 OR MORE: CPT | Performed by: PODIATRIST

## 2017-10-31 NOTE — PROGRESS NOTES
Subjective:  Patient presents to [facility] today for routine foot care. Patient denies any new problems with their feet. Patient denies any pain. Objective:  Vascular: DP and PT pulses palpable 2/4, bilateral.  CFT <3 seconds, bilateral.  Hair growth present to the level of the digits, bilateral.  Edema present, bilateral.  Varicosities absent, bilateral.    Neurological: Protective sensation intact. Integument:  Nails left 1, 2, 3, 4, 5 and right 1, 2, 3, 4, 5 thickened, dystrophic, crumbly, and discolored with subungual debris. Hyperkeratotic tissue is absent. Assessment:   Onychomycosis     Plan:  Nails as mentioned above debrided in length and thickness. Patient advised about OTC treatments for nails and callous. Patient will follow up in 10 weeks for routine foot care or PRN if any further problems arise.

## 2017-12-19 ENCOUNTER — NURSE ONLY (OUTPATIENT)
Dept: LAB | Age: 82
End: 2017-12-19
Payer: MEDICARE

## 2017-12-19 DIAGNOSIS — Z23 NEED FOR VACCINATION: Primary | ICD-10-CM

## 2017-12-19 PROCEDURE — G0008 ADMIN INFLUENZA VIRUS VAC: HCPCS | Performed by: FAMILY MEDICINE

## 2017-12-19 PROCEDURE — 90662 IIV NO PRSV INCREASED AG IM: CPT | Performed by: FAMILY MEDICINE

## 2017-12-19 NOTE — PROGRESS NOTES
Have you had an allergic reaction to the flu (influenza) shot? no  Are you allergic to eggs or any component of the flu vaccine? no  Do you have a history of Guillain-[place] Syndrome (GBS), which is paralysis after receiving the flu vaccine? no  Are you feeling well today? yes  Flu vaccine given as ordered. Patient tolerated it well. No questions re: VIS information.

## 2018-01-08 RX ORDER — MELOXICAM 15 MG/1
TABLET ORAL
Qty: 90 TABLET | Refills: 1 | Status: SHIPPED | OUTPATIENT
Start: 2018-01-08 | End: 2018-03-21

## 2018-01-10 ENCOUNTER — OFFICE VISIT (OUTPATIENT)
Dept: PODIATRY | Age: 83
End: 2018-01-10

## 2018-01-10 VITALS
SYSTOLIC BLOOD PRESSURE: 118 MMHG | HEIGHT: 66 IN | DIASTOLIC BLOOD PRESSURE: 74 MMHG | WEIGHT: 177.6 LBS | BODY MASS INDEX: 28.54 KG/M2 | HEART RATE: 78 BPM

## 2018-01-10 DIAGNOSIS — B35.1 DERMATOPHYTOSIS OF NAIL: Primary | ICD-10-CM

## 2018-01-10 PROCEDURE — MISCFOOT6 MISC NON COVERED FOOT SVC 6 OR MORE: Performed by: PODIATRIST

## 2018-01-10 NOTE — PROGRESS NOTES
Subjective:  Patient presents to [facility] today for routine foot care. Patient denies any new problems with their feet. Patient denies any pain. Last seen by PCP, 8/24/17    Objective:  Vascular: DP and PT pulses palpable 2/4, bilateral.  CFT <3 seconds, bilateral.  Hair growth present to the level of the digits, bilateral.  Edema present, bilateral.  Varicosities absent, bilateral.    Neurological: Protective sensation intact. Integument:  Nails left 1, 2, 3, 4, 5 and right 1, 2, 3, 4, 5 thickened, dystrophic, crumbly, and discolored with subungual debris. Hyperkeratotic tissue is absent. Assessment:   Onychomycosis     Plan:  Nails as mentioned above debrided in length and thickness. Patient advised about OTC treatments for nails and callous. Patient will follow up in 10 weeks for routine foot care or PRN if any further problems arise.

## 2018-01-11 DIAGNOSIS — M25.561 RIGHT KNEE PAIN, UNSPECIFIED CHRONICITY: Primary | ICD-10-CM

## 2018-01-16 ENCOUNTER — HOSPITAL ENCOUNTER (OUTPATIENT)
Dept: GENERAL RADIOLOGY | Age: 83
Discharge: HOME | End: 2018-01-16
Payer: MEDICARE

## 2018-01-16 ENCOUNTER — OFFICE VISIT (OUTPATIENT)
Dept: ORTHOPEDIC SURGERY | Age: 83
End: 2018-01-16
Payer: MEDICARE

## 2018-01-16 VITALS
DIASTOLIC BLOOD PRESSURE: 87 MMHG | WEIGHT: 177 LBS | SYSTOLIC BLOOD PRESSURE: 143 MMHG | HEIGHT: 66 IN | HEART RATE: 68 BPM | BODY MASS INDEX: 28.45 KG/M2

## 2018-01-16 DIAGNOSIS — M25.561 RIGHT KNEE PAIN, UNSPECIFIED CHRONICITY: Primary | ICD-10-CM

## 2018-01-16 DIAGNOSIS — M25.561 RIGHT KNEE PAIN, UNSPECIFIED CHRONICITY: ICD-10-CM

## 2018-01-16 PROCEDURE — 73562 X-RAY EXAM OF KNEE 3: CPT

## 2018-01-16 PROCEDURE — 99202 OFFICE O/P NEW SF 15 MIN: CPT | Performed by: PHYSICIAN ASSISTANT

## 2018-01-25 ENCOUNTER — TELEPHONE (OUTPATIENT)
Dept: ORTHOPEDIC SURGERY | Age: 83
End: 2018-01-25

## 2018-01-25 DIAGNOSIS — M17.11 PRIMARY OSTEOARTHRITIS OF RIGHT KNEE: Primary | ICD-10-CM

## 2018-01-25 NOTE — TELEPHONE ENCOUNTER
[employer] called and stated Georgie Cordova needs to go through physical therapy before they will approve visco supplementation. Order for therapy sent to patient. He wants to go to the [id number] [facility]. We will re submit once he completes therapy.

## 2018-02-13 DIAGNOSIS — M17.11 PRIMARY OSTEOARTHRITIS OF RIGHT KNEE: Primary | ICD-10-CM

## 2018-02-13 RX ORDER — TRAMADOL HYDROCHLORIDE 50 MG/1
50 TABLET ORAL EVERY 8 HOURS PRN
Qty: 50 TABLET | Refills: 0 | Status: SHIPPED | OUTPATIENT
Start: 2018-02-13 | End: 2018-03-13

## 2018-02-20 ENCOUNTER — TELEPHONE (OUTPATIENT)
Dept: FAMILY MEDICINE CLINIC | Age: 83
End: 2018-02-20

## 2018-02-20 NOTE — TELEPHONE ENCOUNTER
Pt. Came to window to change his appointment he has with Dr. Patrick Miller next Wednesday Feb. 28 th. His schedule has changed. Wanted Thurs or Friday same week. Also needs to know if the labs he is to get done is fasting. Please call him at [phone].

## 2018-02-22 ENCOUNTER — HOSPITAL ENCOUNTER (OUTPATIENT)
Dept: LAB | Age: 83
Setting detail: SPECIMEN
Discharge: HOME | End: 2018-02-22
Payer: MEDICARE

## 2018-02-22 DIAGNOSIS — I10 ESSENTIAL HYPERTENSION, BENIGN: ICD-10-CM

## 2018-02-22 DIAGNOSIS — E78.5 HYPERLIPIDEMIA, UNSPECIFIED HYPERLIPIDEMIA TYPE: ICD-10-CM

## 2018-02-22 LAB
ABSOLUTE EOS #: 0.3 K/UL (ref 0–0.4)
ABSOLUTE IMMATURE GRANULOCYTE: ABNORMAL K/UL (ref 0–0.3)
ABSOLUTE LYMPH #: 1.6 K/UL (ref 1–4.8)
ABSOLUTE MONO #: 0.8 K/UL (ref 0.1–1.2)
ANION GAP SERPL CALCULATED.3IONS-SCNC: 12 MMOL/L (ref 9–17)
BASOPHILS # BLD: 0 % (ref 0–2)
BASOPHILS ABSOLUTE: 0 K/UL (ref 0–0.2)
BUN BLDV-MCNC: 24 MG/DL (ref 8–23)
BUN/CREAT BLD: 26 (ref 9–20)
CALCIUM SERPL-MCNC: 9.5 MG/DL (ref 8.6–10.4)
CHLORIDE BLD-SCNC: 102 MMOL/L (ref 98–107)
CHOLESTEROL/HDL RATIO: 2.8
CHOLESTEROL: 117 MG/DL
CO2: 27 MMOL/L (ref 20–31)
CREAT SERPL-MCNC: 0.93 MG/DL (ref 0.7–1.2)
DIFFERENTIAL TYPE: ABNORMAL
EOSINOPHILS RELATIVE PERCENT: 4 % (ref 1–8)
GFR AFRICAN AMERICAN: >60 ML/MIN
GFR NON-AFRICAN AMERICAN: >60 ML/MIN
GFR SERPL CREATININE-BSD FRML MDRD: ABNORMAL ML/MIN/{1.73_M2}
GFR SERPL CREATININE-BSD FRML MDRD: ABNORMAL ML/MIN/{1.73_M2}
GLUCOSE BLD-MCNC: 108 MG/DL (ref 70–99)
HCT VFR BLD CALC: 42.3 % (ref 41–53)
HDLC SERPL-MCNC: 42 MG/DL
HEMOGLOBIN: 14 G/DL (ref 13.5–17.5)
IMMATURE GRANULOCYTES: ABNORMAL %
LDL CHOLESTEROL: 53 MG/DL (ref 0–130)
LYMPHOCYTES # BLD: 23 % (ref 15–43)
MCH RBC QN AUTO: 29.4 PG (ref 26–34)
MCHC RBC AUTO-ENTMCNC: 33 G/DL (ref 31–37)
MCV RBC AUTO: 88.9 FL (ref 80–100)
MONOCYTES # BLD: 12 % (ref 6–14)
NRBC AUTOMATED: ABNORMAL PER 100 WBC
PDW BLD-RTO: 15.2 % (ref 11–14.5)
PLATELET # BLD: 208 K/UL (ref 140–450)
PLATELET ESTIMATE: ABNORMAL
PMV BLD AUTO: 9.5 FL (ref 6–12)
POTASSIUM SERPL-SCNC: 4.4 MMOL/L (ref 3.7–5.3)
RBC # BLD: 4.76 M/UL (ref 4.5–5.9)
RBC # BLD: ABNORMAL 10*6/UL
SEG NEUTROPHILS: 61 % (ref 44–74)
SEGMENTED NEUTROPHILS ABSOLUTE COUNT: 4.2 K/UL (ref 1.8–7.7)
SODIUM BLD-SCNC: 141 MMOL/L (ref 135–144)
TRIGL SERPL-MCNC: 110 MG/DL
VLDLC SERPL CALC-MCNC: NORMAL MG/DL (ref 1–30)
WBC # BLD: 6.9 K/UL (ref 3.5–11)
WBC # BLD: ABNORMAL 10*3/UL

## 2018-02-22 PROCEDURE — 80048 BASIC METABOLIC PNL TOTAL CA: CPT

## 2018-02-22 PROCEDURE — 36415 COLL VENOUS BLD VENIPUNCTURE: CPT

## 2018-02-22 PROCEDURE — 85025 COMPLETE CBC W/AUTO DIFF WBC: CPT

## 2018-02-22 PROCEDURE — 80061 LIPID PANEL: CPT

## 2018-03-13 RX ORDER — SUCRALFATE 1 G/10ML
SUSPENSION ORAL
Qty: 1200 ML | Refills: 3 | Status: ON HOLD | OUTPATIENT
Start: 2018-03-13 | End: 2018-11-25

## 2018-03-20 ENCOUNTER — OFFICE VISIT (OUTPATIENT)
Dept: CARDIOLOGY | Age: 83
End: 2018-03-20
Payer: MEDICARE

## 2018-03-20 VITALS
HEART RATE: 61 BPM | HEIGHT: 66 IN | SYSTOLIC BLOOD PRESSURE: 136 MMHG | BODY MASS INDEX: 28.45 KG/M2 | DIASTOLIC BLOOD PRESSURE: 68 MMHG | WEIGHT: 177 LBS

## 2018-03-20 DIAGNOSIS — M54.9 OTHER CHRONIC BACK PAIN: ICD-10-CM

## 2018-03-20 DIAGNOSIS — I25.10 CORONARY ARTERY DISEASE INVOLVING NATIVE CORONARY ARTERY OF NATIVE HEART WITHOUT ANGINA PECTORIS: ICD-10-CM

## 2018-03-20 DIAGNOSIS — E78.5 HYPERLIPIDEMIA, UNSPECIFIED HYPERLIPIDEMIA TYPE: ICD-10-CM

## 2018-03-20 DIAGNOSIS — I10 ESSENTIAL HYPERTENSION, BENIGN: Primary | ICD-10-CM

## 2018-03-20 DIAGNOSIS — I49.3 PVC (PREMATURE VENTRICULAR CONTRACTION): ICD-10-CM

## 2018-03-20 DIAGNOSIS — G89.29 OTHER CHRONIC BACK PAIN: ICD-10-CM

## 2018-03-20 DIAGNOSIS — I25.10 ATHEROSCLEROSIS OF NATIVE CORONARY ARTERY WITHOUT ANGINA PECTORIS, UNSPECIFIED WHETHER NATIVE OR TRANSPLANTED HEART: ICD-10-CM

## 2018-03-20 PROCEDURE — 93000 ELECTROCARDIOGRAM COMPLETE: CPT | Performed by: INTERNAL MEDICINE

## 2018-03-20 PROCEDURE — 99212 OFFICE O/P EST SF 10 MIN: CPT | Performed by: INTERNAL MEDICINE

## 2018-03-20 RX ORDER — TRAMADOL HYDROCHLORIDE 50 MG/1
50 TABLET ORAL NIGHTLY
COMMUNITY
End: 2018-03-21

## 2018-03-20 NOTE — PROGRESS NOTES
Cardiology Consultation  [facility]    Patient is here for follow up:    HPI and Chief Complaint:  Henry Snell  is doing well from a cardiac standpoint. Good functional capacity with no significant change in functional capacity. No chest pain, no dyspnea, no PND, no syncope or pre-syncope, no orthopnea. No symptoms of CHF or angina/chest pain. Here for follow up regarding the above chronic stable cardiovascular conditions. REVIEW OF SYSTEMS:    · Constitutional: there has been no unanticipated weight loss. There's been No change in energy level, No change in activity level. · Eyes: No visual changes or diplopia. No scleral icterus. · ENT: No Headaches, hearing loss or vertigo. No mouth sores or sore throat. · Cardiovascular: No chest pain, no dyspnea, no chf like symptoms  · Respiratory: No previous pulmonary problems  · Gastrointestinal: No abdominal pain, appetite loss, blood in stools. No change in bowel or bladder habits. · Genitourinary: No dysuria, trouble voiding, or hematuria. · Musculoskeletal:  No gait disturbance, No weakness or joint complaints. · Integumentary: No rash or pruritis. · Neurological: No headache, diplopia, change in muscle strength, numbness or tingling. No change in gait, balance, coordination, mood, affect, memory, mentation, behavior. · Psychiatric: No new anxiety or depression. · Endocrine: No temperature intolerance. No excessive thirst, fluid intake, or urination. No tremor. · Hematologic/Lymphatic: No abnormal bruising or bleeding, blood clots or swollen lymph nodes. · Allergic/Immunologic: No nasal congestion or hives. Physical Exam:   Vitals: /68   Pulse 61   Ht 5' 6\" (1.676 m)   Wt 177 lb (80.3 kg)   BMI 28.57 kg/m²     General appearance: alert and cooperative with exam  HEENT: Head: Normocephalic, no lesions, without obvious abnormality.   Neck: no carotid bruit, no JVD  Lungs: clear to auscultation bilaterally  Heart: regular rate and rhythm, S1, S2 normal, no murmur, click, rub or gallop  Abdomen: soft, non-tender; bowel sounds normal; no masses,  no organomegaly  Extremities: extremities normal, atraumatic, no cyanosis or edema  Neurologic: Mental status: Alert, oriented, thought content appropriate      EKG: Normal Sinus Rhythm with no ischemic changes. Last Echo: 7/2016  1. Left ventricular dimension is at the upper limit of normal.  2. Borderline concentric left ventricular hypertrophy. 3. Normal systolic left ventricular function. Ejection fraction is 50 to 55%. 4. Grade I diastolic dysfunction. 5. Biatrial enlargement. 6. Mildly dilated right ventricle with normal function. 7. Thickened mitral valve leaflets with moderate mitral regurgitation. 8. Sclerotic aortic valve with mild aortic regurgitation. 9. Mild tricuspid regurgitation. RVSP is 46 mm Hg. 10. No pericardial effusion. Assessment and Plan:    1. Two KRISTEN to mid LAD Nov 2012.  2. Back pain - refer to pain management  3. Hx of Bleeding ulcer in stomach. Dr. Gamal Oconnor did EGD, healed ulcers. Per Dr. Gamal Oconnor ok to be back on aspirin. DC plavix. 4. CAD- LDL goal < 70. Check lipids, ck, cmp every 6 months and optimize medical therapy. 5. HTN- [place] controlled. Other bp readings have been higher. If low again, will DC lisinopril on next visit. 6. Former Tobacco abuse. Quit 18 years ago. 7. Hyperlipidemia- as above. LDL goal < 70. Optimize therapy. 8. Echo Aug 27, 2012 Normal LVEF 55-60%, MAC, mild LVH, Mild LA size, Mild AR/TR. RVSP 41. Diastolic Dysf. 9. Asymptomatic PVCs. 10. Previous normal stress on Aug 2012. Thank you for allowing me to participate in the care of this patient, please do not hesitate to call if you have any questions. Lupis Sierra, [id number] [facility]  [facility]. [facility]  [phone]

## 2018-03-21 ENCOUNTER — OFFICE VISIT (OUTPATIENT)
Dept: PAIN MANAGEMENT | Age: 83
End: 2018-03-21
Payer: MEDICARE

## 2018-03-21 VITALS
DIASTOLIC BLOOD PRESSURE: 84 MMHG | BODY MASS INDEX: 28.19 KG/M2 | SYSTOLIC BLOOD PRESSURE: 160 MMHG | WEIGHT: 175.4 LBS | HEIGHT: 66 IN

## 2018-03-21 DIAGNOSIS — G89.29 ENCOUNTER FOR CHRONIC PAIN MANAGEMENT: ICD-10-CM

## 2018-03-21 DIAGNOSIS — M25.561 CHRONIC PAIN OF RIGHT KNEE: ICD-10-CM

## 2018-03-21 DIAGNOSIS — M17.11 PRIMARY OSTEOARTHRITIS OF RIGHT KNEE: Primary | ICD-10-CM

## 2018-03-21 DIAGNOSIS — G89.29 CHRONIC PAIN OF RIGHT KNEE: ICD-10-CM

## 2018-03-21 PROCEDURE — 99204 OFFICE O/P NEW MOD 45 MIN: CPT | Performed by: NURSE PRACTITIONER

## 2018-03-21 PROCEDURE — 20610 DRAIN/INJ JOINT/BURSA W/O US: CPT | Performed by: NURSE PRACTITIONER

## 2018-03-21 NOTE — PROGRESS NOTES
tablet by mouth daily       Lutein 10 MG TABS Take 20 mg by mouth 2 times daily otc eye vitamin       Multiple Vitamins-Minerals (MULTIVITAMIN PO) Take 1 tablet by mouth daily. 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 2/2/15    EGD with biopsies    CAD (coronary artery disease)     Elevated PSA     Hyperlipidemia     Hypertension     Kidney stone     Osteoarthritis of knee     Venous insufficiency        Past Surgical History:   Procedure Laterality Date    APPENDECTOMY      CHOLECYSTECTOMY  01/12/1983    COLONOSCOPY  08/29/12    COLONOSCOPY  05/27/09    COLONOSCOPY  04/30/2008    CYSTOSCOPY  11/15/2007    with left ureteroscopy and dorsal slit     KNEE FUSION Left     LIPOMA RESECTION      AR EGD TRANSORAL BIOPSY SINGLE/MULTIPLE N/A 7/3/2017    EGD BIOPSY performed by Farhana Car MD at [facility] 47 Bilateral 06/23/2009    Benign    UPPER GASTROINTESTINAL ENDOSCOPY  02/2/2015    Sepulveda's (Dr. Benjamin Olmedo)    UPPER GASTROINTESTINAL ENDOSCOPY  04/25/2016    Lg hiatal hernia, polyp at duodenum, gastric polyp, Barretts Esophagus, GE 28    UPPER GASTROINTESTINAL ENDOSCOPY  07/03/2017    mild gastritis, Sepulveda's, Dr. Janace Sandifer 3 years       Family History   Problem Relation Age of Onset    Cancer Father      stomach cancer    Heart Disease Paternal Grandfather        Social History     Social History    Marital status:      Spouse name: N/A    Number of children: N/A    Years of education: N/A     Social History Main Topics    Smoking status: Former Smoker     Packs/day: 1.00     Types: Cigars     Quit date: 1/1/1998    Smokeless tobacco: Never Used      Comment: Former smoker (quit 1998)- [facility] 3/20/17    Alcohol use No    Drug use: No    Sexual activity: Not on file     Other Topics Concern    Not on file     Social History Narrative    No narrative on file     Review of Systems   Constitutional: Positive for

## 2018-03-22 ENCOUNTER — TELEPHONE (OUTPATIENT)
Dept: PAIN MANAGEMENT | Age: 83
End: 2018-03-22

## 2018-03-22 ENCOUNTER — OFFICE VISIT (OUTPATIENT)
Dept: PODIATRY | Age: 83
End: 2018-03-22
Payer: MEDICARE

## 2018-03-22 VITALS
RESPIRATION RATE: 20 BRPM | WEIGHT: 173 LBS | DIASTOLIC BLOOD PRESSURE: 78 MMHG | SYSTOLIC BLOOD PRESSURE: 132 MMHG | HEART RATE: 76 BPM | HEIGHT: 66 IN | BODY MASS INDEX: 27.8 KG/M2

## 2018-03-22 DIAGNOSIS — B35.1 DERMATOPHYTOSIS OF NAIL: Primary | ICD-10-CM

## 2018-03-22 PROBLEM — G89.29 CHRONIC PAIN OF RIGHT KNEE: Status: ACTIVE | Noted: 2018-03-22

## 2018-03-22 PROBLEM — G89.29 ENCOUNTER FOR CHRONIC PAIN MANAGEMENT: Status: ACTIVE | Noted: 2018-03-22

## 2018-03-22 PROBLEM — M25.561 CHRONIC PAIN OF RIGHT KNEE: Status: ACTIVE | Noted: 2018-03-22

## 2018-03-22 PROCEDURE — 11721 DEBRIDE NAIL 6 OR MORE: CPT | Performed by: PODIATRIST

## 2018-03-22 RX ORDER — HYDROCODONE BITARTRATE AND ACETAMINOPHEN 5; 325 MG/1; MG/1
1 TABLET ORAL 2 TIMES DAILY PRN
Qty: 28 TABLET | Refills: 0 | Status: SHIPPED | OUTPATIENT
Start: 2018-03-22 | End: 2018-04-04

## 2018-03-22 RX ORDER — CELECOXIB 200 MG/1
200 CAPSULE ORAL DAILY
Qty: 30 CAPSULE | Refills: 3 | Status: SHIPPED | OUTPATIENT
Start: 2018-03-22 | End: 2018-04-09 | Stop reason: ALTCHOICE

## 2018-03-22 RX ORDER — TRIAMCINOLONE ACETONIDE 40 MG/ML
40 INJECTION, SUSPENSION INTRA-ARTICULAR; INTRAMUSCULAR ONCE
Status: COMPLETED | OUTPATIENT
Start: 2018-03-22 | End: 2018-03-22

## 2018-03-22 RX ADMIN — TRIAMCINOLONE ACETONIDE 40 MG: 40 INJECTION, SUSPENSION INTRA-ARTICULAR; INTRAMUSCULAR at 11:30

## 2018-03-22 ASSESSMENT — ENCOUNTER SYMPTOMS
BACK PAIN: 0
GASTROINTESTINAL NEGATIVE: 1
RESPIRATORY NEGATIVE: 1

## 2018-03-22 NOTE — TELEPHONE ENCOUNTER
Pt stopped after having knee injections yesterday and stated his knee feels really good. Pt asked about an appt he has on 4/3/18 w/ Dr Summer Oliveira office for Griselda Boettcher series. Does he need to keep those or are the steroid injection ok for now. Pt would like a call to know if he has to do the euflexxa or not.     [phone]

## 2018-04-03 ENCOUNTER — OFFICE VISIT (OUTPATIENT)
Dept: ORTHOPEDIC SURGERY | Age: 83
End: 2018-04-03
Payer: MEDICARE

## 2018-04-03 VITALS
HEART RATE: 60 BPM | SYSTOLIC BLOOD PRESSURE: 166 MMHG | BODY MASS INDEX: 28.45 KG/M2 | DIASTOLIC BLOOD PRESSURE: 72 MMHG | WEIGHT: 177 LBS | HEIGHT: 66 IN

## 2018-04-03 DIAGNOSIS — M17.11 PRIMARY OSTEOARTHRITIS OF RIGHT KNEE: Primary | ICD-10-CM

## 2018-04-03 PROCEDURE — 20610 DRAIN/INJ JOINT/BURSA W/O US: CPT | Performed by: NURSE PRACTITIONER

## 2018-04-03 RX ORDER — HYALURONATE SODIUM 10 MG/ML
20 SYRINGE (ML) INTRAARTICULAR ONCE
Status: COMPLETED | OUTPATIENT
Start: 2018-04-03 | End: 2018-04-03

## 2018-04-03 RX ADMIN — Medication 20 MG: at 08:27

## 2018-04-04 ENCOUNTER — OFFICE VISIT (OUTPATIENT)
Dept: PAIN MANAGEMENT | Age: 83
End: 2018-04-04
Payer: MEDICARE

## 2018-04-04 ENCOUNTER — HOSPITAL ENCOUNTER (OUTPATIENT)
Age: 83
Setting detail: SPECIMEN
Discharge: HOME | End: 2018-04-04
Payer: MEDICARE

## 2018-04-04 ENCOUNTER — TELEPHONE (OUTPATIENT)
Dept: PAIN MANAGEMENT | Age: 83
End: 2018-04-04

## 2018-04-04 VITALS
HEIGHT: 66 IN | HEART RATE: 70 BPM | SYSTOLIC BLOOD PRESSURE: 136 MMHG | WEIGHT: 173.4 LBS | DIASTOLIC BLOOD PRESSURE: 82 MMHG | BODY MASS INDEX: 27.87 KG/M2

## 2018-04-04 DIAGNOSIS — Z02.83 ENCOUNTER FOR DRUG SCREENING: ICD-10-CM

## 2018-04-04 DIAGNOSIS — Z79.891 ENCOUNTER FOR LONG-TERM OPIATE ANALGESIC USE: ICD-10-CM

## 2018-04-04 DIAGNOSIS — M25.561 CHRONIC PAIN OF RIGHT KNEE: ICD-10-CM

## 2018-04-04 DIAGNOSIS — M17.11 PRIMARY OSTEOARTHRITIS OF RIGHT KNEE: ICD-10-CM

## 2018-04-04 DIAGNOSIS — G89.29 CHRONIC PAIN OF RIGHT KNEE: ICD-10-CM

## 2018-04-04 DIAGNOSIS — M17.11 PRIMARY OSTEOARTHRITIS OF RIGHT KNEE: Primary | ICD-10-CM

## 2018-04-04 PROCEDURE — 99214 OFFICE O/P EST MOD 30 MIN: CPT | Performed by: NURSE PRACTITIONER

## 2018-04-04 PROCEDURE — 80307 DRUG TEST PRSMV CHEM ANLYZR: CPT

## 2018-04-04 RX ORDER — HYDROCODONE BITARTRATE AND ACETAMINOPHEN 7.5; 325 MG/1; MG/1
1 TABLET ORAL 2 TIMES DAILY PRN
Qty: 60 TABLET | Refills: 0 | Status: SHIPPED | OUTPATIENT
Start: 2018-04-04 | End: 2018-04-04 | Stop reason: SDUPTHER

## 2018-04-04 RX ORDER — HYDROCODONE BITARTRATE AND ACETAMINOPHEN 7.5; 325 MG/1; MG/1
1 TABLET ORAL 2 TIMES DAILY PRN
Qty: 60 TABLET | Refills: 0 | Status: SHIPPED | OUTPATIENT
Start: 2018-04-04 | End: 2018-05-04

## 2018-04-04 ASSESSMENT — ENCOUNTER SYMPTOMS
GASTROINTESTINAL NEGATIVE: 1
BACK PAIN: 0
RESPIRATORY NEGATIVE: 1

## 2018-04-08 LAB
6-ACETYLMORPHINE, UR: NOT DETECTED
7-AMINOCLONAZEPAM, URINE: NOT DETECTED
ALPHA-OH-ALPRAZ, URINE: NOT DETECTED
ALPRAZOLAM, URINE: NOT DETECTED
AMPHETAMINES, URINE: NOT DETECTED
BARBITURATES, URINE: NOT DETECTED
BENZOYLECGONINE, UR: NOT DETECTED
BUPRENORPHINE URINE: NOT DETECTED
CARISOPRODOL, UR: NOT DETECTED
CLONAZEPAM, URINE: NOT DETECTED
CODEINE, URINE: NOT DETECTED
CREATININE URINE: 70.2 MG/DL (ref 20–400)
DIAZEPAM, URINE: NOT DETECTED
EER PAIN MGT DRUG PANEL, HIGH RES/EMIT U: NORMAL
ETHYL GLUCURONIDE UR: NOT DETECTED
FENTANYL URINE: NOT DETECTED
HYDROCODONE, URINE: PRESENT
HYDROMORPHONE, URINE: NOT DETECTED
LORAZEPAM, URINE: NOT DETECTED
MARIJUANA METAB, UR: NOT DETECTED
MDA, UR: NOT DETECTED
MDEA, EVE, UR: NOT DETECTED
MDMA URINE: NOT DETECTED
MEPERIDINE METAB, UR: NOT DETECTED
METHADONE, URINE: NOT DETECTED
METHAMPHETAMINE, URINE: NOT DETECTED
METHYLPHENIDATE: NOT DETECTED
MIDAZOLAM, URINE: NOT DETECTED
MORPHINE URINE: NOT DETECTED
NORBUPRENORPHINE, URINE: NOT DETECTED
NORDIAZEPAM, URINE: NOT DETECTED
NORFENTANYL, URINE: NOT DETECTED
NORHYDROCODONE, URINE: PRESENT
NOROXYCODONE, URINE: NOT DETECTED
NOROXYMORPHONE, URINE: NOT DETECTED
OXAZEPAM, URINE: NOT DETECTED
OXYCODONE URINE: NOT DETECTED
OXYMORPHONE, URINE: NOT DETECTED
PAIN MGT DRUG PANEL, HI RES, UR: NORMAL
PCP,URINE: NOT DETECTED
PHENTERMINE, UR: NOT DETECTED
PROPOXYPHENE, URINE: NOT DETECTED
TAPENTADOL, URINE: NOT DETECTED
TAPENTADOL-O-SULFATE, URINE: NOT DETECTED
TEMAZEPAM, URINE: NOT DETECTED
TRAMADOL, URINE: NOT DETECTED
ZOLPIDEM, URINE: NOT DETECTED

## 2018-04-09 ENCOUNTER — OFFICE VISIT (OUTPATIENT)
Dept: FAMILY MEDICINE CLINIC | Age: 83
End: 2018-04-09
Payer: MEDICARE

## 2018-04-09 VITALS
BODY MASS INDEX: 28.25 KG/M2 | HEART RATE: 72 BPM | DIASTOLIC BLOOD PRESSURE: 80 MMHG | WEIGHT: 175 LBS | SYSTOLIC BLOOD PRESSURE: 136 MMHG

## 2018-04-09 DIAGNOSIS — I87.2 VENOUS INSUFFICIENCY: ICD-10-CM

## 2018-04-09 DIAGNOSIS — I25.10 CORONARY ARTERY DISEASE INVOLVING NATIVE CORONARY ARTERY OF NATIVE HEART WITHOUT ANGINA PECTORIS: Primary | ICD-10-CM

## 2018-04-09 DIAGNOSIS — E78.5 HYPERLIPIDEMIA, UNSPECIFIED HYPERLIPIDEMIA TYPE: ICD-10-CM

## 2018-04-09 DIAGNOSIS — R97.20 ELEVATED PSA: ICD-10-CM

## 2018-04-09 DIAGNOSIS — K22.70 BARRETT'S ESOPHAGUS WITHOUT DYSPLASIA: ICD-10-CM

## 2018-04-09 DIAGNOSIS — M17.11 PRIMARY OSTEOARTHRITIS OF RIGHT KNEE: ICD-10-CM

## 2018-04-09 DIAGNOSIS — H61.23 CERUMEN DEBRIS ON TYMPANIC MEMBRANE, BILATERAL: ICD-10-CM

## 2018-04-09 DIAGNOSIS — N20.0 KIDNEY STONE: ICD-10-CM

## 2018-04-09 DIAGNOSIS — I10 ESSENTIAL HYPERTENSION, BENIGN: ICD-10-CM

## 2018-04-09 DIAGNOSIS — K63.5 POLYP OF COLON, UNSPECIFIED PART OF COLON, UNSPECIFIED TYPE: ICD-10-CM

## 2018-04-09 PROCEDURE — 69209 REMOVE IMPACTED EAR WAX UNI: CPT | Performed by: FAMILY MEDICINE

## 2018-04-09 PROCEDURE — 99214 OFFICE O/P EST MOD 30 MIN: CPT | Performed by: FAMILY MEDICINE

## 2018-04-09 RX ORDER — LACTULOSE 10 G/15ML
20 SOLUTION ORAL NIGHTLY
Qty: 2700 ML | Refills: 0 | Status: ON HOLD | OUTPATIENT
Start: 2018-04-09 | End: 2018-06-12

## 2018-04-09 RX ORDER — PANTOPRAZOLE SODIUM 40 MG/1
TABLET, DELAYED RELEASE ORAL
Qty: 180 TABLET | Refills: 3 | Status: SHIPPED | OUTPATIENT
Start: 2018-04-09 | End: 2018-10-04 | Stop reason: SDUPTHER

## 2018-04-09 RX ORDER — LISINOPRIL 5 MG/1
TABLET ORAL
Qty: 90 TABLET | Refills: 3 | Status: ON HOLD | OUTPATIENT
Start: 2018-04-09 | End: 2018-11-25

## 2018-04-09 RX ORDER — DOCUSATE SODIUM 100 MG/1
100 CAPSULE, LIQUID FILLED ORAL 2 TIMES DAILY
Qty: 180 CAPSULE | Refills: 5 | Status: ON HOLD | OUTPATIENT
Start: 2018-04-09 | End: 2018-06-09

## 2018-04-09 RX ORDER — CELECOXIB 200 MG/1
200 CAPSULE ORAL DAILY
COMMUNITY
End: 2018-06-09

## 2018-04-09 RX ORDER — SIMVASTATIN 20 MG
TABLET ORAL
Qty: 90 TABLET | Refills: 3 | Status: ON HOLD | OUTPATIENT
Start: 2018-04-09 | End: 2018-11-25

## 2018-04-09 RX ORDER — MELOXICAM 15 MG/1
15 TABLET ORAL DAILY
Status: ON HOLD | COMMUNITY
End: 2018-06-12 | Stop reason: HOSPADM

## 2018-04-09 RX ORDER — METOPROLOL SUCCINATE 25 MG/1
25 TABLET, EXTENDED RELEASE ORAL DAILY
Qty: 90 TABLET | Refills: 3 | Status: ON HOLD | OUTPATIENT
Start: 2018-04-09 | End: 2018-11-25

## 2018-04-09 RX ORDER — PANTOPRAZOLE SODIUM 40 MG/1
TABLET, DELAYED RELEASE ORAL
Qty: 180 TABLET | Refills: 1 | Status: SHIPPED | OUTPATIENT
Start: 2018-04-09 | End: 2018-04-09 | Stop reason: SDUPTHER

## 2018-04-09 ASSESSMENT — ENCOUNTER SYMPTOMS
GASTROINTESTINAL NEGATIVE: 1
EYES NEGATIVE: 1
RESPIRATORY NEGATIVE: 1
ALLERGIC/IMMUNOLOGIC NEGATIVE: 1

## 2018-04-09 ASSESSMENT — PATIENT HEALTH QUESTIONNAIRE - PHQ9
1. LITTLE INTEREST OR PLEASURE IN DOING THINGS: 0
SUM OF ALL RESPONSES TO PHQ QUESTIONS 1-9: 0
2. FEELING DOWN, DEPRESSED OR HOPELESS: 0
SUM OF ALL RESPONSES TO PHQ9 QUESTIONS 1 & 2: 0

## 2018-04-10 ENCOUNTER — OFFICE VISIT (OUTPATIENT)
Dept: ORTHOPEDIC SURGERY | Age: 83
End: 2018-04-10
Payer: MEDICARE

## 2018-04-10 VITALS
HEIGHT: 66 IN | DIASTOLIC BLOOD PRESSURE: 66 MMHG | WEIGHT: 173 LBS | BODY MASS INDEX: 27.8 KG/M2 | HEART RATE: 59 BPM | SYSTOLIC BLOOD PRESSURE: 133 MMHG

## 2018-04-10 DIAGNOSIS — M17.11 PRIMARY OSTEOARTHRITIS OF RIGHT KNEE: Primary | ICD-10-CM

## 2018-04-10 PROCEDURE — 20610 DRAIN/INJ JOINT/BURSA W/O US: CPT | Performed by: PHYSICIAN ASSISTANT

## 2018-04-10 RX ORDER — HYALURONATE SODIUM 10 MG/ML
20 SYRINGE (ML) INTRAARTICULAR ONCE
Status: COMPLETED | OUTPATIENT
Start: 2018-04-10 | End: 2018-04-10

## 2018-04-10 RX ADMIN — Medication 20 MG: at 08:22

## 2018-04-17 ENCOUNTER — OFFICE VISIT (OUTPATIENT)
Dept: ORTHOPEDIC SURGERY | Age: 83
End: 2018-04-17
Payer: MEDICARE

## 2018-04-17 VITALS
BODY MASS INDEX: 27.81 KG/M2 | WEIGHT: 173.06 LBS | HEART RATE: 62 BPM | DIASTOLIC BLOOD PRESSURE: 96 MMHG | SYSTOLIC BLOOD PRESSURE: 172 MMHG | HEIGHT: 66 IN

## 2018-04-17 DIAGNOSIS — M17.11 PRIMARY OSTEOARTHRITIS OF RIGHT KNEE: Primary | ICD-10-CM

## 2018-04-17 PROCEDURE — 20610 DRAIN/INJ JOINT/BURSA W/O US: CPT | Performed by: PHYSICIAN ASSISTANT

## 2018-04-17 RX ORDER — HYALURONATE SODIUM 10 MG/ML
20 SYRINGE (ML) INTRAARTICULAR ONCE
Status: COMPLETED | OUTPATIENT
Start: 2018-04-17 | End: 2018-04-17

## 2018-04-17 RX ADMIN — Medication 20 MG: at 09:11

## 2018-05-02 ENCOUNTER — OFFICE VISIT (OUTPATIENT)
Dept: PAIN MANAGEMENT | Age: 83
End: 2018-05-02
Payer: MEDICARE

## 2018-05-02 VITALS
SYSTOLIC BLOOD PRESSURE: 172 MMHG | WEIGHT: 171.2 LBS | DIASTOLIC BLOOD PRESSURE: 80 MMHG | BODY MASS INDEX: 27.51 KG/M2 | RESPIRATION RATE: 16 BRPM | HEIGHT: 66 IN

## 2018-05-02 DIAGNOSIS — M17.11 PRIMARY OSTEOARTHRITIS OF RIGHT KNEE: ICD-10-CM

## 2018-05-02 DIAGNOSIS — G89.29 CHRONIC PAIN OF RIGHT KNEE: Primary | ICD-10-CM

## 2018-05-02 DIAGNOSIS — G89.29 ENCOUNTER FOR CHRONIC PAIN MANAGEMENT: ICD-10-CM

## 2018-05-02 DIAGNOSIS — M25.561 CHRONIC PAIN OF RIGHT KNEE: Primary | ICD-10-CM

## 2018-05-02 PROCEDURE — 99214 OFFICE O/P EST MOD 30 MIN: CPT | Performed by: NURSE PRACTITIONER

## 2018-05-02 RX ORDER — OXYCODONE HYDROCHLORIDE AND ACETAMINOPHEN 5; 325 MG/1; MG/1
1 TABLET ORAL 2 TIMES DAILY PRN
Qty: 60 TABLET | Refills: 0 | Status: SHIPPED | OUTPATIENT
Start: 2018-05-02 | End: 2018-05-31 | Stop reason: SDUPTHER

## 2018-05-03 ASSESSMENT — ENCOUNTER SYMPTOMS
BACK PAIN: 0
RESPIRATORY NEGATIVE: 1
GASTROINTESTINAL NEGATIVE: 1

## 2018-05-31 ENCOUNTER — OFFICE VISIT (OUTPATIENT)
Dept: PODIATRY | Age: 83
End: 2018-05-31
Payer: MEDICARE

## 2018-05-31 VITALS
WEIGHT: 170 LBS | HEART RATE: 60 BPM | BODY MASS INDEX: 28.32 KG/M2 | SYSTOLIC BLOOD PRESSURE: 122 MMHG | DIASTOLIC BLOOD PRESSURE: 70 MMHG | HEIGHT: 65 IN

## 2018-05-31 DIAGNOSIS — G89.29 CHRONIC PAIN OF RIGHT KNEE: ICD-10-CM

## 2018-05-31 DIAGNOSIS — B35.1 DERMATOPHYTOSIS OF NAIL: Primary | ICD-10-CM

## 2018-05-31 DIAGNOSIS — G89.29 ENCOUNTER FOR CHRONIC PAIN MANAGEMENT: ICD-10-CM

## 2018-05-31 DIAGNOSIS — M17.11 PRIMARY OSTEOARTHRITIS OF RIGHT KNEE: ICD-10-CM

## 2018-05-31 DIAGNOSIS — M25.561 CHRONIC PAIN OF RIGHT KNEE: ICD-10-CM

## 2018-05-31 PROCEDURE — 11721 DEBRIDE NAIL 6 OR MORE: CPT | Performed by: PODIATRIST

## 2018-05-31 PROCEDURE — 99999 PR OFFICE/OUTPT VISIT,PROCEDURE ONLY: CPT | Performed by: PODIATRIST

## 2018-05-31 RX ORDER — OXYCODONE HYDROCHLORIDE AND ACETAMINOPHEN 5; 325 MG/1; MG/1
1 TABLET ORAL 2 TIMES DAILY PRN
Qty: 60 TABLET | Refills: 0 | Status: SHIPPED | OUTPATIENT
Start: 2018-06-01 | End: 2018-06-09

## 2018-06-06 ENCOUNTER — OFFICE VISIT (OUTPATIENT)
Dept: PAIN MANAGEMENT | Age: 83
End: 2018-06-06
Payer: MEDICARE

## 2018-06-06 VITALS
DIASTOLIC BLOOD PRESSURE: 80 MMHG | HEIGHT: 66 IN | SYSTOLIC BLOOD PRESSURE: 162 MMHG | HEART RATE: 56 BPM | WEIGHT: 171 LBS | BODY MASS INDEX: 27.48 KG/M2

## 2018-06-06 DIAGNOSIS — G89.29 ENCOUNTER FOR CHRONIC PAIN MANAGEMENT: ICD-10-CM

## 2018-06-06 DIAGNOSIS — M17.11 PRIMARY OSTEOARTHRITIS OF RIGHT KNEE: ICD-10-CM

## 2018-06-06 DIAGNOSIS — M25.561 CHRONIC PAIN OF RIGHT KNEE: Primary | ICD-10-CM

## 2018-06-06 DIAGNOSIS — G89.29 CHRONIC PAIN OF RIGHT KNEE: Primary | ICD-10-CM

## 2018-06-06 PROCEDURE — 99214 OFFICE O/P EST MOD 30 MIN: CPT | Performed by: NURSE PRACTITIONER

## 2018-06-06 RX ORDER — MORPHINE SULFATE 15 MG/1
15 TABLET ORAL EVERY 6 HOURS PRN
Qty: 45 TABLET | Refills: 0 | Status: ON HOLD | OUTPATIENT
Start: 2018-06-06 | End: 2018-06-12 | Stop reason: HOSPADM

## 2018-06-06 ASSESSMENT — ENCOUNTER SYMPTOMS
GASTROINTESTINAL NEGATIVE: 1
BACK PAIN: 0
RESPIRATORY NEGATIVE: 1

## 2018-06-09 ENCOUNTER — HOSPITAL ENCOUNTER (INPATIENT)
Age: 83
LOS: 3 days | Discharge: NURSING, REHAB OR LONG-TERM CARE | DRG: 392 | End: 2018-06-12
Attending: EMERGENCY MEDICINE | Admitting: FAMILY MEDICINE
Payer: MEDICARE

## 2018-06-09 ENCOUNTER — APPOINTMENT (OUTPATIENT)
Dept: CT IMAGING | Age: 83
DRG: 392 | End: 2018-06-09
Payer: MEDICARE

## 2018-06-09 DIAGNOSIS — M17.11 PRIMARY OSTEOARTHRITIS OF RIGHT KNEE: ICD-10-CM

## 2018-06-09 DIAGNOSIS — K52.9 COLITIS: Primary | ICD-10-CM

## 2018-06-09 DIAGNOSIS — K59.03 DRUG-INDUCED CONSTIPATION: ICD-10-CM

## 2018-06-09 LAB
ABSOLUTE EOS #: 0.2 K/UL (ref 0–0.4)
ABSOLUTE IMMATURE GRANULOCYTE: ABNORMAL K/UL (ref 0–0.3)
ABSOLUTE LYMPH #: 1.4 K/UL (ref 1–4.8)
ABSOLUTE MONO #: 1.4 K/UL (ref 0.1–1.2)
ALBUMIN SERPL-MCNC: 3.9 G/DL (ref 3.5–5.2)
ALBUMIN/GLOBULIN RATIO: 1.6 (ref 1–2.5)
ALP BLD-CCNC: 134 U/L (ref 40–129)
ALT SERPL-CCNC: 68 U/L (ref 5–41)
ANION GAP SERPL CALCULATED.3IONS-SCNC: 14 MMOL/L (ref 9–17)
AST SERPL-CCNC: 40 U/L
BASOPHILS # BLD: 0 % (ref 0–2)
BASOPHILS ABSOLUTE: 0 K/UL (ref 0–0.2)
BILIRUB SERPL-MCNC: 0.7 MG/DL (ref 0.3–1.2)
BUN BLDV-MCNC: 34 MG/DL (ref 8–23)
BUN/CREAT BLD: 37 (ref 9–20)
CALCIUM SERPL-MCNC: 9.2 MG/DL (ref 8.6–10.4)
CHLORIDE BLD-SCNC: 102 MMOL/L (ref 98–107)
CO2: 24 MMOL/L (ref 20–31)
CREAT SERPL-MCNC: 0.92 MG/DL (ref 0.7–1.2)
DIFFERENTIAL TYPE: ABNORMAL
EOSINOPHILS RELATIVE PERCENT: 2 % (ref 1–8)
GFR AFRICAN AMERICAN: >60 ML/MIN
GFR NON-AFRICAN AMERICAN: >60 ML/MIN
GFR SERPL CREATININE-BSD FRML MDRD: ABNORMAL ML/MIN/{1.73_M2}
GFR SERPL CREATININE-BSD FRML MDRD: ABNORMAL ML/MIN/{1.73_M2}
GLUCOSE BLD-MCNC: 102 MG/DL (ref 70–99)
HCT VFR BLD CALC: 40.7 % (ref 41–53)
HEMOGLOBIN: 13.5 G/DL (ref 13.5–17.5)
IMMATURE GRANULOCYTES: ABNORMAL %
LACTIC ACID: 0.7 MMOL/L (ref 0.5–2.2)
LIPASE: 58 U/L (ref 13–60)
LYMPHOCYTES # BLD: 13 % (ref 15–43)
MCH RBC QN AUTO: 31 PG (ref 26–34)
MCHC RBC AUTO-ENTMCNC: 33.2 G/DL (ref 31–37)
MCV RBC AUTO: 93.5 FL (ref 80–100)
MONOCYTES # BLD: 13 % (ref 6–14)
NRBC AUTOMATED: ABNORMAL PER 100 WBC
PDW BLD-RTO: 15.1 % (ref 11–14.5)
PLATELET # BLD: 181 K/UL (ref 140–450)
PLATELET ESTIMATE: ABNORMAL
PMV BLD AUTO: 9.8 FL (ref 6–12)
POTASSIUM SERPL-SCNC: 4.4 MMOL/L (ref 3.7–5.3)
RBC # BLD: 4.36 M/UL (ref 4.5–5.9)
RBC # BLD: ABNORMAL 10*6/UL
SEG NEUTROPHILS: 72 % (ref 44–74)
SEGMENTED NEUTROPHILS ABSOLUTE COUNT: 7.4 K/UL (ref 1.8–7.7)
SODIUM BLD-SCNC: 140 MMOL/L (ref 135–144)
TOTAL PROTEIN: 6.4 G/DL (ref 6.4–8.3)
WBC # BLD: 10.3 K/UL (ref 3.5–11)
WBC # BLD: ABNORMAL 10*3/UL

## 2018-06-09 PROCEDURE — 83605 ASSAY OF LACTIC ACID: CPT

## 2018-06-09 PROCEDURE — 6360000004 HC RX CONTRAST MEDICATION: Performed by: EMERGENCY MEDICINE

## 2018-06-09 PROCEDURE — 99285 EMERGENCY DEPT VISIT HI MDM: CPT

## 2018-06-09 PROCEDURE — 6360000002 HC RX W HCPCS: Performed by: EMERGENCY MEDICINE

## 2018-06-09 PROCEDURE — 74177 CT ABD & PELVIS W/CONTRAST: CPT

## 2018-06-09 PROCEDURE — 6360000002 HC RX W HCPCS: Performed by: FAMILY MEDICINE

## 2018-06-09 PROCEDURE — 6370000000 HC RX 637 (ALT 250 FOR IP): Performed by: FAMILY MEDICINE

## 2018-06-09 PROCEDURE — 94760 N-INVAS EAR/PLS OXIMETRY 1: CPT

## 2018-06-09 PROCEDURE — 80053 COMPREHEN METABOLIC PANEL: CPT

## 2018-06-09 PROCEDURE — 6370000000 HC RX 637 (ALT 250 FOR IP): Performed by: PHYSICIAN ASSISTANT

## 2018-06-09 PROCEDURE — 2500000003 HC RX 250 WO HCPCS

## 2018-06-09 PROCEDURE — 1200000000 HC SEMI PRIVATE

## 2018-06-09 PROCEDURE — 83690 ASSAY OF LIPASE: CPT

## 2018-06-09 PROCEDURE — 2580000003 HC RX 258: Performed by: FAMILY MEDICINE

## 2018-06-09 PROCEDURE — 2500000003 HC RX 250 WO HCPCS: Performed by: EMERGENCY MEDICINE

## 2018-06-09 PROCEDURE — 85025 COMPLETE CBC W/AUTO DIFF WBC: CPT

## 2018-06-09 PROCEDURE — 36415 COLL VENOUS BLD VENIPUNCTURE: CPT

## 2018-06-09 RX ORDER — POTASSIUM CHLORIDE 20 MEQ/1
40 TABLET, EXTENDED RELEASE ORAL PRN
Status: DISCONTINUED | OUTPATIENT
Start: 2018-06-09 | End: 2018-06-12 | Stop reason: HOSPADM

## 2018-06-09 RX ORDER — MORPHINE SULFATE 15 MG/1
15 TABLET ORAL EVERY 6 HOURS PRN
Status: DISCONTINUED | OUTPATIENT
Start: 2018-06-09 | End: 2018-06-12 | Stop reason: HOSPADM

## 2018-06-09 RX ORDER — CIPROFLOXACIN 2 MG/ML
INJECTION, SOLUTION INTRAVENOUS
Status: DISPENSED
Start: 2018-06-09 | End: 2018-06-10

## 2018-06-09 RX ORDER — SODIUM CHLORIDE 9 MG/ML
75 INJECTION, SOLUTION INTRAVENOUS CONTINUOUS
Status: DISCONTINUED | OUTPATIENT
Start: 2018-06-09 | End: 2018-06-12 | Stop reason: HOSPADM

## 2018-06-09 RX ORDER — ONDANSETRON 2 MG/ML
4 INJECTION INTRAMUSCULAR; INTRAVENOUS EVERY 6 HOURS PRN
Status: DISCONTINUED | OUTPATIENT
Start: 2018-06-09 | End: 2018-06-12 | Stop reason: HOSPADM

## 2018-06-09 RX ORDER — MAGNESIUM SULFATE 1 G/100ML
1 INJECTION INTRAVENOUS PRN
Status: DISCONTINUED | OUTPATIENT
Start: 2018-06-09 | End: 2018-06-12 | Stop reason: HOSPADM

## 2018-06-09 RX ORDER — ACETAMINOPHEN 325 MG/1
650 TABLET ORAL EVERY 4 HOURS PRN
Status: DISCONTINUED | OUTPATIENT
Start: 2018-06-09 | End: 2018-06-12 | Stop reason: HOSPADM

## 2018-06-09 RX ORDER — CIPROFLOXACIN 2 MG/ML
400 INJECTION, SOLUTION INTRAVENOUS ONCE
Status: COMPLETED | OUTPATIENT
Start: 2018-06-09 | End: 2018-06-09

## 2018-06-09 RX ORDER — POTASSIUM CHLORIDE 20MEQ/15ML
40 LIQUID (ML) ORAL PRN
Status: DISCONTINUED | OUTPATIENT
Start: 2018-06-09 | End: 2018-06-12 | Stop reason: HOSPADM

## 2018-06-09 RX ORDER — METRONIDAZOLE 250 MG/1
500 TABLET ORAL EVERY 8 HOURS SCHEDULED
Status: DISCONTINUED | OUTPATIENT
Start: 2018-06-09 | End: 2018-06-12 | Stop reason: HOSPADM

## 2018-06-09 RX ORDER — POTASSIUM CHLORIDE 7.45 MG/ML
10 INJECTION INTRAVENOUS PRN
Status: DISCONTINUED | OUTPATIENT
Start: 2018-06-09 | End: 2018-06-12 | Stop reason: HOSPADM

## 2018-06-09 RX ORDER — PANTOPRAZOLE SODIUM 40 MG/1
40 TABLET, DELAYED RELEASE ORAL 2 TIMES DAILY
Status: DISCONTINUED | OUTPATIENT
Start: 2018-06-10 | End: 2018-06-10 | Stop reason: CLARIF

## 2018-06-09 RX ORDER — LISINOPRIL 5 MG/1
5 TABLET ORAL DAILY
Status: DISCONTINUED | OUTPATIENT
Start: 2018-06-10 | End: 2018-06-12 | Stop reason: HOSPADM

## 2018-06-09 RX ORDER — LACTULOSE 10 G/15ML
10 SOLUTION ORAL 3 TIMES DAILY
Status: DISCONTINUED | OUTPATIENT
Start: 2018-06-09 | End: 2018-06-12 | Stop reason: HOSPADM

## 2018-06-09 RX ORDER — SIMVASTATIN 10 MG
20 TABLET ORAL NIGHTLY
Status: DISCONTINUED | OUTPATIENT
Start: 2018-06-09 | End: 2018-06-12 | Stop reason: HOSPADM

## 2018-06-09 RX ORDER — LACTULOSE 10 G/15ML
SOLUTION ORAL
Status: DISPENSED
Start: 2018-06-09 | End: 2018-06-10

## 2018-06-09 RX ORDER — METOPROLOL SUCCINATE 25 MG/1
25 TABLET, EXTENDED RELEASE ORAL DAILY
Status: DISCONTINUED | OUTPATIENT
Start: 2018-06-10 | End: 2018-06-12 | Stop reason: HOSPADM

## 2018-06-09 RX ORDER — CIPROFLOXACIN 2 MG/ML
400 INJECTION, SOLUTION INTRAVENOUS EVERY 12 HOURS
Status: DISCONTINUED | OUTPATIENT
Start: 2018-06-10 | End: 2018-06-12 | Stop reason: HOSPADM

## 2018-06-09 RX ADMIN — METRONIDAZOLE 500 MG: 250 TABLET ORAL at 23:13

## 2018-06-09 RX ADMIN — LACTULOSE 10 G: 10 SOLUTION ORAL at 22:16

## 2018-06-09 RX ADMIN — ENOXAPARIN SODIUM 40 MG: 40 INJECTION SUBCUTANEOUS at 18:03

## 2018-06-09 RX ADMIN — CIPROFLOXACIN 400 MG: 2 INJECTION, SOLUTION INTRAVENOUS at 16:41

## 2018-06-09 RX ADMIN — SIMVASTATIN 20 MG: 10 TABLET, FILM COATED ORAL at 22:09

## 2018-06-09 RX ADMIN — METRONIDAZOLE 500 MG: 500 INJECTION, SOLUTION INTRAVENOUS at 15:26

## 2018-06-09 RX ADMIN — IOPAMIDOL 100 ML: 755 INJECTION, SOLUTION INTRAVENOUS at 13:54

## 2018-06-09 RX ADMIN — MORPHINE SULFATE 15 MG: 15 TABLET ORAL at 23:11

## 2018-06-09 RX ADMIN — SODIUM CHLORIDE 75 ML/HR: 9 INJECTION, SOLUTION INTRAVENOUS at 16:41

## 2018-06-09 ASSESSMENT — PAIN DESCRIPTION - ONSET: ONSET: PROGRESSIVE

## 2018-06-09 ASSESSMENT — PAIN SCALES - GENERAL
PAINLEVEL_OUTOF10: 8
PAINLEVEL_OUTOF10: 0
PAINLEVEL_OUTOF10: 0
PAINLEVEL_OUTOF10: 8
PAINLEVEL_OUTOF10: 3
PAINLEVEL_OUTOF10: 3

## 2018-06-09 ASSESSMENT — PAIN DESCRIPTION - ORIENTATION
ORIENTATION: RIGHT;LOWER
ORIENTATION: RIGHT;LOWER

## 2018-06-09 ASSESSMENT — PAIN DESCRIPTION - PAIN TYPE
TYPE: ACUTE PAIN

## 2018-06-09 ASSESSMENT — PAIN SCALES - WONG BAKER: WONGBAKER_NUMERICALRESPONSE: 2

## 2018-06-09 ASSESSMENT — PAIN DESCRIPTION - PROGRESSION: CLINICAL_PROGRESSION: GRADUALLY WORSENING

## 2018-06-09 ASSESSMENT — PAIN DESCRIPTION - LOCATION
LOCATION: ABDOMEN
LOCATION: ABDOMEN

## 2018-06-09 ASSESSMENT — PAIN DESCRIPTION - FREQUENCY: FREQUENCY: CONTINUOUS

## 2018-06-09 ASSESSMENT — PAIN DESCRIPTION - DESCRIPTORS
DESCRIPTORS: ACHING;SHARP
DESCRIPTORS: DULL

## 2018-06-10 ENCOUNTER — APPOINTMENT (OUTPATIENT)
Dept: GENERAL RADIOLOGY | Age: 83
DRG: 392 | End: 2018-06-10
Payer: MEDICARE

## 2018-06-10 LAB
-: ABNORMAL
AMORPHOUS: ABNORMAL
ANION GAP SERPL CALCULATED.3IONS-SCNC: 11 MMOL/L (ref 9–17)
BACTERIA: ABNORMAL
BILIRUBIN URINE: NEGATIVE
BUN BLDV-MCNC: 25 MG/DL (ref 8–23)
BUN/CREAT BLD: 28 (ref 9–20)
CALCIUM SERPL-MCNC: 9.1 MG/DL (ref 8.6–10.4)
CASTS UA: ABNORMAL /LPF (ref 0–2)
CHLORIDE BLD-SCNC: 103 MMOL/L (ref 98–107)
CO2: 26 MMOL/L (ref 20–31)
COLOR: ABNORMAL
COMMENT UA: ABNORMAL
CREAT SERPL-MCNC: 0.88 MG/DL (ref 0.7–1.2)
CRYSTALS, UA: ABNORMAL /HPF
EPITHELIAL CELLS UA: ABNORMAL /HPF (ref 0–5)
GFR AFRICAN AMERICAN: >60 ML/MIN
GFR NON-AFRICAN AMERICAN: >60 ML/MIN
GFR SERPL CREATININE-BSD FRML MDRD: ABNORMAL ML/MIN/{1.73_M2}
GFR SERPL CREATININE-BSD FRML MDRD: ABNORMAL ML/MIN/{1.73_M2}
GLUCOSE BLD-MCNC: 98 MG/DL (ref 70–99)
GLUCOSE URINE: NEGATIVE
HCT VFR BLD CALC: 38.3 % (ref 41–53)
HEMOGLOBIN: 13 G/DL (ref 13.5–17.5)
KETONES, URINE: NEGATIVE
LEUKOCYTE ESTERASE, URINE: NEGATIVE
MCH RBC QN AUTO: 31.5 PG (ref 26–34)
MCHC RBC AUTO-ENTMCNC: 33.9 G/DL (ref 31–37)
MCV RBC AUTO: 93.1 FL (ref 80–100)
MUCUS: ABNORMAL
NITRITE, URINE: NEGATIVE
NRBC AUTOMATED: ABNORMAL PER 100 WBC
OTHER OBSERVATIONS UA: ABNORMAL
PDW BLD-RTO: 14.9 % (ref 11–14.5)
PH UA: 5.5 (ref 5–6)
PLATELET # BLD: 169 K/UL (ref 140–450)
PMV BLD AUTO: 9.8 FL (ref 6–12)
POTASSIUM SERPL-SCNC: 4 MMOL/L (ref 3.7–5.3)
PROTEIN UA: NEGATIVE
RBC # BLD: 4.11 M/UL (ref 4.5–5.9)
RBC UA: ABNORMAL /HPF (ref 0–4)
RENAL EPITHELIAL, UA: ABNORMAL /HPF
SODIUM BLD-SCNC: 140 MMOL/L (ref 135–144)
SPECIFIC GRAVITY UA: 1.02 (ref 1.01–1.02)
TRICHOMONAS: ABNORMAL
TURBIDITY: ABNORMAL
URINE HGB: ABNORMAL
UROBILINOGEN, URINE: NORMAL
WBC # BLD: 6.6 K/UL (ref 3.5–11)
WBC UA: ABNORMAL /HPF (ref 0–4)
YEAST: ABNORMAL

## 2018-06-10 PROCEDURE — 80048 BASIC METABOLIC PNL TOTAL CA: CPT

## 2018-06-10 PROCEDURE — 71045 X-RAY EXAM CHEST 1 VIEW: CPT

## 2018-06-10 PROCEDURE — 99232 SBSQ HOSP IP/OBS MODERATE 35: CPT | Performed by: INTERNAL MEDICINE

## 2018-06-10 PROCEDURE — 6360000002 HC RX W HCPCS: Performed by: FAMILY MEDICINE

## 2018-06-10 PROCEDURE — C9113 INJ PANTOPRAZOLE SODIUM, VIA: HCPCS | Performed by: FAMILY MEDICINE

## 2018-06-10 PROCEDURE — 51798 US URINE CAPACITY MEASURE: CPT

## 2018-06-10 PROCEDURE — 85027 COMPLETE CBC AUTOMATED: CPT

## 2018-06-10 PROCEDURE — 99232 SBSQ HOSP IP/OBS MODERATE 35: CPT | Performed by: FAMILY MEDICINE

## 2018-06-10 PROCEDURE — 1200000000 HC SEMI PRIVATE

## 2018-06-10 PROCEDURE — 99221 1ST HOSP IP/OBS SF/LOW 40: CPT | Performed by: SURGERY

## 2018-06-10 PROCEDURE — 94760 N-INVAS EAR/PLS OXIMETRY 1: CPT

## 2018-06-10 PROCEDURE — 6370000000 HC RX 637 (ALT 250 FOR IP): Performed by: PHYSICIAN ASSISTANT

## 2018-06-10 PROCEDURE — 36415 COLL VENOUS BLD VENIPUNCTURE: CPT

## 2018-06-10 PROCEDURE — 6360000002 HC RX W HCPCS: Performed by: PHYSICIAN ASSISTANT

## 2018-06-10 PROCEDURE — 81001 URINALYSIS AUTO W/SCOPE: CPT

## 2018-06-10 PROCEDURE — 51702 INSERT TEMP BLADDER CATH: CPT

## 2018-06-10 PROCEDURE — 2580000003 HC RX 258: Performed by: FAMILY MEDICINE

## 2018-06-10 PROCEDURE — 6370000000 HC RX 637 (ALT 250 FOR IP): Performed by: FAMILY MEDICINE

## 2018-06-10 RX ORDER — 0.9 % SODIUM CHLORIDE 0.9 %
10 VIAL (ML) INJECTION DAILY
Status: DISCONTINUED | OUTPATIENT
Start: 2018-06-10 | End: 2018-06-12 | Stop reason: HOSPADM

## 2018-06-10 RX ORDER — PANTOPRAZOLE SODIUM 40 MG/10ML
40 INJECTION, POWDER, LYOPHILIZED, FOR SOLUTION INTRAVENOUS DAILY
Status: DISCONTINUED | OUTPATIENT
Start: 2018-06-10 | End: 2018-06-12 | Stop reason: HOSPADM

## 2018-06-10 RX ADMIN — LACTULOSE 10 G: 10 SOLUTION ORAL at 08:43

## 2018-06-10 RX ADMIN — SODIUM CHLORIDE 75 ML/HR: 9 INJECTION, SOLUTION INTRAVENOUS at 18:41

## 2018-06-10 RX ADMIN — SODIUM CHLORIDE 75 ML/HR: 9 INJECTION, SOLUTION INTRAVENOUS at 06:59

## 2018-06-10 RX ADMIN — SIMVASTATIN 20 MG: 10 TABLET, FILM COATED ORAL at 21:30

## 2018-06-10 RX ADMIN — MORPHINE SULFATE 15 MG: 15 TABLET ORAL at 06:58

## 2018-06-10 RX ADMIN — METRONIDAZOLE 500 MG: 250 TABLET ORAL at 21:30

## 2018-06-10 RX ADMIN — METRONIDAZOLE 500 MG: 250 TABLET ORAL at 05:28

## 2018-06-10 RX ADMIN — LACTULOSE 10 G: 10 SOLUTION ORAL at 14:42

## 2018-06-10 RX ADMIN — METOPROLOL SUCCINATE 25 MG: 25 TABLET, EXTENDED RELEASE ORAL at 08:43

## 2018-06-10 RX ADMIN — METRONIDAZOLE 500 MG: 250 TABLET ORAL at 14:42

## 2018-06-10 RX ADMIN — SODIUM CHLORIDE 75 ML/HR: 9 INJECTION, SOLUTION INTRAVENOUS at 04:28

## 2018-06-10 RX ADMIN — ENOXAPARIN SODIUM 40 MG: 40 INJECTION SUBCUTANEOUS at 14:44

## 2018-06-10 RX ADMIN — LISINOPRIL 5 MG: 5 TABLET ORAL at 08:43

## 2018-06-10 RX ADMIN — PANTOPRAZOLE SODIUM 40 MG: 40 INJECTION, POWDER, FOR SOLUTION INTRAVENOUS at 08:43

## 2018-06-10 RX ADMIN — LACTULOSE 10 G: 10 SOLUTION ORAL at 21:29

## 2018-06-10 RX ADMIN — CIPROFLOXACIN 400 MG: 2 INJECTION, SOLUTION INTRAVENOUS at 03:41

## 2018-06-10 RX ADMIN — Medication 10 ML: at 08:43

## 2018-06-10 RX ADMIN — CIPROFLOXACIN 400 MG: 2 INJECTION, SOLUTION INTRAVENOUS at 14:43

## 2018-06-10 ASSESSMENT — PAIN DESCRIPTION - LOCATION: LOCATION: ABDOMEN

## 2018-06-10 ASSESSMENT — PAIN DESCRIPTION - ORIENTATION: ORIENTATION: RIGHT;LOWER

## 2018-06-10 ASSESSMENT — PAIN SCALES - GENERAL
PAINLEVEL_OUTOF10: 0
PAINLEVEL_OUTOF10: 4
PAINLEVEL_OUTOF10: 0
PAINLEVEL_OUTOF10: 9
PAINLEVEL_OUTOF10: 5
PAINLEVEL_OUTOF10: 4
PAINLEVEL_OUTOF10: 0
PAINLEVEL_OUTOF10: 4

## 2018-06-10 ASSESSMENT — PAIN DESCRIPTION - PAIN TYPE: TYPE: ACUTE PAIN

## 2018-06-10 ASSESSMENT — PAIN DESCRIPTION - DESCRIPTORS: DESCRIPTORS: ACHING

## 2018-06-11 ENCOUNTER — APPOINTMENT (OUTPATIENT)
Dept: GENERAL RADIOLOGY | Age: 83
DRG: 392 | End: 2018-06-11
Payer: MEDICARE

## 2018-06-11 LAB
ABSOLUTE EOS #: 0.4 K/UL (ref 0–0.4)
ABSOLUTE IMMATURE GRANULOCYTE: ABNORMAL K/UL (ref 0–0.3)
ABSOLUTE LYMPH #: 1 K/UL (ref 1–4.8)
ABSOLUTE MONO #: 1 K/UL (ref 0.1–1.2)
ANION GAP SERPL CALCULATED.3IONS-SCNC: 10 MMOL/L (ref 9–17)
BASOPHILS # BLD: 0 % (ref 0–2)
BASOPHILS ABSOLUTE: 0 K/UL (ref 0–0.2)
BUN BLDV-MCNC: 24 MG/DL (ref 8–23)
BUN/CREAT BLD: 26 (ref 9–20)
CALCIUM SERPL-MCNC: 8.6 MG/DL (ref 8.6–10.4)
CHLORIDE BLD-SCNC: 107 MMOL/L (ref 98–107)
CO2: 24 MMOL/L (ref 20–31)
CREAT SERPL-MCNC: 0.93 MG/DL (ref 0.7–1.2)
DIFFERENTIAL TYPE: ABNORMAL
EOSINOPHILS RELATIVE PERCENT: 5 % (ref 1–8)
GFR AFRICAN AMERICAN: >60 ML/MIN
GFR NON-AFRICAN AMERICAN: >60 ML/MIN
GFR SERPL CREATININE-BSD FRML MDRD: ABNORMAL ML/MIN/{1.73_M2}
GFR SERPL CREATININE-BSD FRML MDRD: ABNORMAL ML/MIN/{1.73_M2}
GLUCOSE BLD-MCNC: 123 MG/DL (ref 70–99)
HCT VFR BLD CALC: 35.8 % (ref 41–53)
HEMOGLOBIN: 12 G/DL (ref 13.5–17.5)
IMMATURE GRANULOCYTES: ABNORMAL %
LYMPHOCYTES # BLD: 13 % (ref 15–43)
MCH RBC QN AUTO: 31.3 PG (ref 26–34)
MCHC RBC AUTO-ENTMCNC: 33.4 G/DL (ref 31–37)
MCV RBC AUTO: 93.6 FL (ref 80–100)
MONOCYTES # BLD: 13 % (ref 6–14)
NRBC AUTOMATED: ABNORMAL PER 100 WBC
PDW BLD-RTO: 14.9 % (ref 11–14.5)
PLATELET # BLD: 166 K/UL (ref 140–450)
PLATELET ESTIMATE: ABNORMAL
PMV BLD AUTO: 9.2 FL (ref 6–12)
POTASSIUM SERPL-SCNC: 3.9 MMOL/L (ref 3.7–5.3)
RBC # BLD: 3.83 M/UL (ref 4.5–5.9)
RBC # BLD: ABNORMAL 10*6/UL
SEG NEUTROPHILS: 69 % (ref 44–74)
SEGMENTED NEUTROPHILS ABSOLUTE COUNT: 5.2 K/UL (ref 1.8–7.7)
SODIUM BLD-SCNC: 141 MMOL/L (ref 135–144)
WBC # BLD: 7.7 K/UL (ref 3.5–11)
WBC # BLD: ABNORMAL 10*3/UL

## 2018-06-11 PROCEDURE — C9113 INJ PANTOPRAZOLE SODIUM, VIA: HCPCS | Performed by: FAMILY MEDICINE

## 2018-06-11 PROCEDURE — 2580000003 HC RX 258: Performed by: FAMILY MEDICINE

## 2018-06-11 PROCEDURE — 97112 NEUROMUSCULAR REEDUCATION: CPT | Performed by: PHYSICAL THERAPIST

## 2018-06-11 PROCEDURE — 6370000000 HC RX 637 (ALT 250 FOR IP): Performed by: INTERNAL MEDICINE

## 2018-06-11 PROCEDURE — 94760 N-INVAS EAR/PLS OXIMETRY 1: CPT

## 2018-06-11 PROCEDURE — 85025 COMPLETE CBC W/AUTO DIFF WBC: CPT

## 2018-06-11 PROCEDURE — G8979 MOBILITY GOAL STATUS: HCPCS | Performed by: PHYSICAL THERAPIST

## 2018-06-11 PROCEDURE — G8978 MOBILITY CURRENT STATUS: HCPCS | Performed by: PHYSICAL THERAPIST

## 2018-06-11 PROCEDURE — 6370000000 HC RX 637 (ALT 250 FOR IP): Performed by: FAMILY MEDICINE

## 2018-06-11 PROCEDURE — 36415 COLL VENOUS BLD VENIPUNCTURE: CPT

## 2018-06-11 PROCEDURE — 97162 PT EVAL MOD COMPLEX 30 MIN: CPT | Performed by: PHYSICAL THERAPIST

## 2018-06-11 PROCEDURE — 6370000000 HC RX 637 (ALT 250 FOR IP)

## 2018-06-11 PROCEDURE — 6370000000 HC RX 637 (ALT 250 FOR IP): Performed by: PHYSICIAN ASSISTANT

## 2018-06-11 PROCEDURE — 74022 RADEX COMPL AQT ABD SERIES: CPT

## 2018-06-11 PROCEDURE — 99232 SBSQ HOSP IP/OBS MODERATE 35: CPT | Performed by: INTERNAL MEDICINE

## 2018-06-11 PROCEDURE — G8987 SELF CARE CURRENT STATUS: HCPCS | Performed by: OCCUPATIONAL THERAPIST

## 2018-06-11 PROCEDURE — 80048 BASIC METABOLIC PNL TOTAL CA: CPT

## 2018-06-11 PROCEDURE — 97165 OT EVAL LOW COMPLEX 30 MIN: CPT | Performed by: OCCUPATIONAL THERAPIST

## 2018-06-11 PROCEDURE — 6360000002 HC RX W HCPCS: Performed by: FAMILY MEDICINE

## 2018-06-11 PROCEDURE — 6360000002 HC RX W HCPCS: Performed by: PHYSICIAN ASSISTANT

## 2018-06-11 PROCEDURE — G8989 SELF CARE D/C STATUS: HCPCS | Performed by: OCCUPATIONAL THERAPIST

## 2018-06-11 PROCEDURE — G8988 SELF CARE GOAL STATUS: HCPCS | Performed by: OCCUPATIONAL THERAPIST

## 2018-06-11 PROCEDURE — 1200000000 HC SEMI PRIVATE

## 2018-06-11 RX ORDER — TRAMADOL HYDROCHLORIDE 50 MG/1
50 TABLET ORAL EVERY 6 HOURS PRN
Status: DISCONTINUED | OUTPATIENT
Start: 2018-06-11 | End: 2018-06-12 | Stop reason: HOSPADM

## 2018-06-11 RX ORDER — TRAMADOL HYDROCHLORIDE 50 MG/1
TABLET ORAL
Status: COMPLETED
Start: 2018-06-11 | End: 2018-06-11

## 2018-06-11 RX ORDER — BETHANECHOL CHLORIDE 25 MG/1
25 TABLET ORAL 4 TIMES DAILY
Status: DISCONTINUED | OUTPATIENT
Start: 2018-06-11 | End: 2018-06-12 | Stop reason: HOSPADM

## 2018-06-11 RX ORDER — TAMSULOSIN HYDROCHLORIDE 0.4 MG/1
0.4 CAPSULE ORAL DAILY
Status: DISCONTINUED | OUTPATIENT
Start: 2018-06-11 | End: 2018-06-12 | Stop reason: HOSPADM

## 2018-06-11 RX ADMIN — SIMVASTATIN 20 MG: 10 TABLET, FILM COATED ORAL at 21:07

## 2018-06-11 RX ADMIN — BETHANECHOL CHLORIDE 25 MG: 25 TABLET ORAL at 21:07

## 2018-06-11 RX ADMIN — TRAMADOL HYDROCHLORIDE 50 MG: 50 TABLET, FILM COATED ORAL at 09:31

## 2018-06-11 RX ADMIN — METRONIDAZOLE 500 MG: 250 TABLET ORAL at 05:25

## 2018-06-11 RX ADMIN — PANTOPRAZOLE SODIUM 40 MG: 40 INJECTION, POWDER, FOR SOLUTION INTRAVENOUS at 08:35

## 2018-06-11 RX ADMIN — LACTULOSE 10 G: 10 SOLUTION ORAL at 14:03

## 2018-06-11 RX ADMIN — TAMSULOSIN HYDROCHLORIDE 0.4 MG: 0.4 CAPSULE ORAL at 10:54

## 2018-06-11 RX ADMIN — LISINOPRIL 5 MG: 5 TABLET ORAL at 08:35

## 2018-06-11 RX ADMIN — LACTULOSE 10 G: 10 SOLUTION ORAL at 21:07

## 2018-06-11 RX ADMIN — BETHANECHOL CHLORIDE 25 MG: 25 TABLET ORAL at 17:10

## 2018-06-11 RX ADMIN — SODIUM CHLORIDE 75 ML/HR: 9 INJECTION, SOLUTION INTRAVENOUS at 12:26

## 2018-06-11 RX ADMIN — CIPROFLOXACIN 400 MG: 2 INJECTION, SOLUTION INTRAVENOUS at 04:19

## 2018-06-11 RX ADMIN — Medication 10 ML: at 08:35

## 2018-06-11 RX ADMIN — CIPROFLOXACIN 400 MG: 2 INJECTION, SOLUTION INTRAVENOUS at 17:11

## 2018-06-11 RX ADMIN — BETHANECHOL CHLORIDE 25 MG: 25 TABLET ORAL at 14:04

## 2018-06-11 RX ADMIN — METRONIDAZOLE 500 MG: 250 TABLET ORAL at 14:04

## 2018-06-11 RX ADMIN — METOPROLOL SUCCINATE 25 MG: 25 TABLET, EXTENDED RELEASE ORAL at 08:35

## 2018-06-11 RX ADMIN — ENOXAPARIN SODIUM 40 MG: 40 INJECTION SUBCUTANEOUS at 17:11

## 2018-06-11 RX ADMIN — LACTULOSE 10 G: 10 SOLUTION ORAL at 08:35

## 2018-06-11 RX ADMIN — METRONIDAZOLE 500 MG: 250 TABLET ORAL at 21:07

## 2018-06-11 ASSESSMENT — PAIN SCALES - GENERAL
PAINLEVEL_OUTOF10: 0
PAINLEVEL_OUTOF10: 0
PAINLEVEL_OUTOF10: 6
PAINLEVEL_OUTOF10: 0
PAINLEVEL_OUTOF10: 0

## 2018-06-11 ASSESSMENT — PAIN DESCRIPTION - DESCRIPTORS: DESCRIPTORS: DULL;CRAMPING

## 2018-06-11 ASSESSMENT — PAIN DESCRIPTION - PROGRESSION: CLINICAL_PROGRESSION: GRADUALLY WORSENING

## 2018-06-11 ASSESSMENT — PAIN DESCRIPTION - LOCATION: LOCATION: ABDOMEN

## 2018-06-11 ASSESSMENT — PAIN DESCRIPTION - FREQUENCY: FREQUENCY: INTERMITTENT

## 2018-06-11 ASSESSMENT — PAIN DESCRIPTION - DIRECTION: RADIATING_TOWARDS: 0

## 2018-06-11 ASSESSMENT — PAIN DESCRIPTION - ONSET: ONSET: GRADUAL

## 2018-06-11 ASSESSMENT — PAIN DESCRIPTION - PAIN TYPE: TYPE: ACUTE PAIN

## 2018-06-11 ASSESSMENT — PAIN DESCRIPTION - ORIENTATION: ORIENTATION: LOWER

## 2018-06-12 VITALS
SYSTOLIC BLOOD PRESSURE: 182 MMHG | OXYGEN SATURATION: 98 % | DIASTOLIC BLOOD PRESSURE: 79 MMHG | HEART RATE: 66 BPM | TEMPERATURE: 97.6 F | WEIGHT: 171.1 LBS | RESPIRATION RATE: 18 BRPM | BODY MASS INDEX: 27.5 KG/M2 | HEIGHT: 66 IN

## 2018-06-12 PROBLEM — N40.0 BPH (BENIGN PROSTATIC HYPERPLASIA): Status: ACTIVE | Noted: 2018-06-12

## 2018-06-12 PROBLEM — R33.9 URINARY RETENTION: Status: ACTIVE | Noted: 2018-06-12

## 2018-06-12 LAB
ABSOLUTE EOS #: 0.3 K/UL (ref 0–0.4)
ABSOLUTE IMMATURE GRANULOCYTE: ABNORMAL K/UL (ref 0–0.3)
ABSOLUTE LYMPH #: 1.2 K/UL (ref 1–4.8)
ABSOLUTE MONO #: 0.8 K/UL (ref 0.1–1.2)
ANION GAP SERPL CALCULATED.3IONS-SCNC: 9 MMOL/L (ref 9–17)
BASOPHILS # BLD: 0 % (ref 0–2)
BASOPHILS ABSOLUTE: 0 K/UL (ref 0–0.2)
BUN BLDV-MCNC: 17 MG/DL (ref 8–23)
BUN/CREAT BLD: 22 (ref 9–20)
CALCIUM SERPL-MCNC: 8.9 MG/DL (ref 8.6–10.4)
CHLORIDE BLD-SCNC: 108 MMOL/L (ref 98–107)
CO2: 25 MMOL/L (ref 20–31)
CREAT SERPL-MCNC: 0.78 MG/DL (ref 0.7–1.2)
DIFFERENTIAL TYPE: ABNORMAL
EOSINOPHILS RELATIVE PERCENT: 5 % (ref 1–8)
GFR AFRICAN AMERICAN: >60 ML/MIN
GFR NON-AFRICAN AMERICAN: >60 ML/MIN
GFR SERPL CREATININE-BSD FRML MDRD: ABNORMAL ML/MIN/{1.73_M2}
GFR SERPL CREATININE-BSD FRML MDRD: ABNORMAL ML/MIN/{1.73_M2}
GLUCOSE BLD-MCNC: 116 MG/DL (ref 70–99)
HCT VFR BLD CALC: 37.1 % (ref 41–53)
HEMOGLOBIN: 12.4 G/DL (ref 13.5–17.5)
IMMATURE GRANULOCYTES: ABNORMAL %
LYMPHOCYTES # BLD: 17 % (ref 15–43)
MCH RBC QN AUTO: 31.4 PG (ref 26–34)
MCHC RBC AUTO-ENTMCNC: 33.4 G/DL (ref 31–37)
MCV RBC AUTO: 93.8 FL (ref 80–100)
MONOCYTES # BLD: 12 % (ref 6–14)
NRBC AUTOMATED: ABNORMAL PER 100 WBC
PDW BLD-RTO: 14.7 % (ref 11–14.5)
PLATELET # BLD: 161 K/UL (ref 140–450)
PLATELET ESTIMATE: ABNORMAL
PMV BLD AUTO: 9.8 FL (ref 6–12)
POTASSIUM SERPL-SCNC: 4 MMOL/L (ref 3.7–5.3)
RBC # BLD: 3.95 M/UL (ref 4.5–5.9)
RBC # BLD: ABNORMAL 10*6/UL
SEG NEUTROPHILS: 66 % (ref 44–74)
SEGMENTED NEUTROPHILS ABSOLUTE COUNT: 4.4 K/UL (ref 1.8–7.7)
SODIUM BLD-SCNC: 142 MMOL/L (ref 135–144)
WBC # BLD: 6.6 K/UL (ref 3.5–11)
WBC # BLD: ABNORMAL 10*3/UL

## 2018-06-12 PROCEDURE — C9113 INJ PANTOPRAZOLE SODIUM, VIA: HCPCS | Performed by: FAMILY MEDICINE

## 2018-06-12 PROCEDURE — 97110 THERAPEUTIC EXERCISES: CPT

## 2018-06-12 PROCEDURE — 94760 N-INVAS EAR/PLS OXIMETRY 1: CPT

## 2018-06-12 PROCEDURE — 6360000002 HC RX W HCPCS: Performed by: PHYSICIAN ASSISTANT

## 2018-06-12 PROCEDURE — 36415 COLL VENOUS BLD VENIPUNCTURE: CPT

## 2018-06-12 PROCEDURE — 80048 BASIC METABOLIC PNL TOTAL CA: CPT

## 2018-06-12 PROCEDURE — 97116 GAIT TRAINING THERAPY: CPT

## 2018-06-12 PROCEDURE — 85025 COMPLETE CBC W/AUTO DIFF WBC: CPT

## 2018-06-12 PROCEDURE — 2580000003 HC RX 258: Performed by: FAMILY MEDICINE

## 2018-06-12 PROCEDURE — 99238 HOSP IP/OBS DSCHRG MGMT 30/<: CPT | Performed by: INTERNAL MEDICINE

## 2018-06-12 PROCEDURE — 6370000000 HC RX 637 (ALT 250 FOR IP): Performed by: FAMILY MEDICINE

## 2018-06-12 PROCEDURE — 6370000000 HC RX 637 (ALT 250 FOR IP): Performed by: INTERNAL MEDICINE

## 2018-06-12 PROCEDURE — 6370000000 HC RX 637 (ALT 250 FOR IP): Performed by: PHYSICIAN ASSISTANT

## 2018-06-12 PROCEDURE — 6360000002 HC RX W HCPCS: Performed by: FAMILY MEDICINE

## 2018-06-12 RX ORDER — METRONIDAZOLE 500 MG/1
500 TABLET ORAL 4 TIMES DAILY
Qty: 16 TABLET | Refills: 0 | Status: SHIPPED | OUTPATIENT
Start: 2018-06-12 | End: 2018-06-16

## 2018-06-12 RX ORDER — SACCHAROMYCES BOULARDII 250 MG
250 CAPSULE ORAL 2 TIMES DAILY
Qty: 60 CAPSULE | Refills: 0 | COMMUNITY
Start: 2018-06-12 | End: 2018-07-12

## 2018-06-12 RX ORDER — LACTULOSE 10 G/15ML
20 SOLUTION ORAL 3 TIMES DAILY
Qty: 8100 ML | Refills: 0
Start: 2018-06-12 | End: 2018-07-24 | Stop reason: SDUPTHER

## 2018-06-12 RX ORDER — BETHANECHOL CHLORIDE 25 MG/1
25 TABLET ORAL 4 TIMES DAILY
Qty: 90 TABLET | Refills: 0
Start: 2018-06-12 | End: 2018-07-16 | Stop reason: SDUPTHER

## 2018-06-12 RX ORDER — DOCUSATE SODIUM 100 MG/1
100 CAPSULE, LIQUID FILLED ORAL 2 TIMES DAILY
Qty: 60 CAPSULE | Refills: 0 | COMMUNITY
Start: 2018-06-12 | End: 2018-10-10

## 2018-06-12 RX ORDER — ACETAMINOPHEN 325 MG/1
650 TABLET ORAL EVERY 4 HOURS PRN
Qty: 120 TABLET | Refills: 0 | COMMUNITY
Start: 2018-06-12

## 2018-06-12 RX ORDER — GREEN TEA/HOODIA GORDONII 315-12.5MG
1 CAPSULE ORAL 3 TIMES DAILY
Qty: 90 TABLET | Refills: 0 | Status: ON HOLD | COMMUNITY
Start: 2018-06-12 | End: 2018-11-25

## 2018-06-12 RX ORDER — TAMSULOSIN HYDROCHLORIDE 0.4 MG/1
0.4 CAPSULE ORAL DAILY
Qty: 30 CAPSULE | Refills: 0
Start: 2018-06-13 | End: 2018-06-13 | Stop reason: SDUPTHER

## 2018-06-12 RX ORDER — TRAMADOL HYDROCHLORIDE 50 MG/1
50 TABLET ORAL EVERY 6 HOURS PRN
Qty: 5 TABLET | Refills: 0 | Status: SHIPPED | OUTPATIENT
Start: 2018-06-12 | End: 2018-06-19

## 2018-06-12 RX ORDER — CIPROFLOXACIN 500 MG/1
500 TABLET, FILM COATED ORAL 2 TIMES DAILY
Qty: 8 TABLET | Refills: 0
Start: 2018-06-12 | End: 2018-06-16

## 2018-06-12 RX ADMIN — Medication 10 ML: at 08:12

## 2018-06-12 RX ADMIN — BETHANECHOL CHLORIDE 25 MG: 25 TABLET ORAL at 08:12

## 2018-06-12 RX ADMIN — LISINOPRIL 5 MG: 5 TABLET ORAL at 08:12

## 2018-06-12 RX ADMIN — METRONIDAZOLE 500 MG: 250 TABLET ORAL at 06:13

## 2018-06-12 RX ADMIN — METOPROLOL SUCCINATE 25 MG: 25 TABLET, EXTENDED RELEASE ORAL at 08:12

## 2018-06-12 RX ADMIN — SODIUM CHLORIDE 75 ML/HR: 9 INJECTION, SOLUTION INTRAVENOUS at 01:48

## 2018-06-12 RX ADMIN — LACTULOSE 10 G: 10 SOLUTION ORAL at 08:12

## 2018-06-12 RX ADMIN — BETHANECHOL CHLORIDE 25 MG: 25 TABLET ORAL at 12:11

## 2018-06-12 RX ADMIN — PANTOPRAZOLE SODIUM 40 MG: 40 INJECTION, POWDER, FOR SOLUTION INTRAVENOUS at 08:12

## 2018-06-12 RX ADMIN — CIPROFLOXACIN 400 MG: 2 INJECTION, SOLUTION INTRAVENOUS at 04:26

## 2018-06-12 RX ADMIN — TAMSULOSIN HYDROCHLORIDE 0.4 MG: 0.4 CAPSULE ORAL at 08:12

## 2018-06-12 ASSESSMENT — PAIN SCALES - GENERAL
PAINLEVEL_OUTOF10: 0

## 2018-06-13 ENCOUNTER — OFFICE VISIT (OUTPATIENT)
Dept: UROLOGY | Age: 83
End: 2018-06-13
Payer: MEDICARE

## 2018-06-13 ENCOUNTER — HOSPITAL ENCOUNTER (OUTPATIENT)
Dept: LAB | Age: 83
Setting detail: SPECIMEN
Discharge: HOME | End: 2018-06-13
Payer: MEDICARE

## 2018-06-13 ENCOUNTER — OFFICE VISIT (OUTPATIENT)
Dept: PAIN MANAGEMENT | Age: 83
End: 2018-06-13
Payer: MEDICARE

## 2018-06-13 VITALS
SYSTOLIC BLOOD PRESSURE: 128 MMHG | BODY MASS INDEX: 27.49 KG/M2 | HEART RATE: 78 BPM | WEIGHT: 171.08 LBS | DIASTOLIC BLOOD PRESSURE: 70 MMHG | HEIGHT: 66 IN

## 2018-06-13 VITALS
WEIGHT: 171 LBS | HEIGHT: 66 IN | DIASTOLIC BLOOD PRESSURE: 68 MMHG | BODY MASS INDEX: 27.48 KG/M2 | HEART RATE: 80 BPM | SYSTOLIC BLOOD PRESSURE: 120 MMHG

## 2018-06-13 DIAGNOSIS — N13.8 BPH WITH OBSTRUCTION/LOWER URINARY TRACT SYMPTOMS: ICD-10-CM

## 2018-06-13 DIAGNOSIS — M17.11 PRIMARY OSTEOARTHRITIS OF RIGHT KNEE: Primary | ICD-10-CM

## 2018-06-13 DIAGNOSIS — R33.9 URINARY RETENTION: ICD-10-CM

## 2018-06-13 DIAGNOSIS — M25.561 CHRONIC PAIN OF RIGHT KNEE: ICD-10-CM

## 2018-06-13 DIAGNOSIS — G89.29 CHRONIC PAIN OF RIGHT KNEE: ICD-10-CM

## 2018-06-13 DIAGNOSIS — R39.12 WEAK URINARY STREAM: ICD-10-CM

## 2018-06-13 DIAGNOSIS — N40.1 BPH WITH OBSTRUCTION/LOWER URINARY TRACT SYMPTOMS: ICD-10-CM

## 2018-06-13 LAB — PROSTATE SPECIFIC ANTIGEN: 17.5 UG/L

## 2018-06-13 PROCEDURE — 51798 US URINE CAPACITY MEASURE: CPT | Performed by: UROLOGY

## 2018-06-13 PROCEDURE — 51702 INSERT TEMP BLADDER CATH: CPT | Performed by: UROLOGY

## 2018-06-13 PROCEDURE — 99214 OFFICE O/P EST MOD 30 MIN: CPT | Performed by: UROLOGY

## 2018-06-13 PROCEDURE — 99214 OFFICE O/P EST MOD 30 MIN: CPT | Performed by: NURSE PRACTITIONER

## 2018-06-13 PROCEDURE — 84153 ASSAY OF PSA TOTAL: CPT

## 2018-06-13 PROCEDURE — 36415 COLL VENOUS BLD VENIPUNCTURE: CPT

## 2018-06-13 RX ORDER — TAMSULOSIN HYDROCHLORIDE 0.4 MG/1
0.4 CAPSULE ORAL DAILY
Qty: 30 CAPSULE | Refills: 0 | Status: SHIPPED | OUTPATIENT
Start: 2018-06-13 | End: 2018-07-26 | Stop reason: SDUPTHER

## 2018-06-13 ASSESSMENT — ENCOUNTER SYMPTOMS
RESPIRATORY NEGATIVE: 1
GASTROINTESTINAL NEGATIVE: 1
BACK PAIN: 0

## 2018-06-14 ENCOUNTER — HOSPITAL ENCOUNTER (OUTPATIENT)
Age: 83
Setting detail: SPECIMEN
Discharge: HOME | End: 2018-06-14
Payer: MEDICARE

## 2018-06-14 LAB
ANION GAP SERPL CALCULATED.3IONS-SCNC: 10 MMOL/L (ref 9–17)
BUN BLDV-MCNC: 10 MG/DL (ref 8–23)
BUN/CREAT BLD: 13 (ref 9–20)
CALCIUM SERPL-MCNC: 9.5 MG/DL (ref 8.6–10.4)
CHLORIDE BLD-SCNC: 106 MMOL/L (ref 98–107)
CO2: 27 MMOL/L (ref 20–31)
CREAT SERPL-MCNC: 0.75 MG/DL (ref 0.7–1.2)
GFR AFRICAN AMERICAN: >60 ML/MIN
GFR NON-AFRICAN AMERICAN: >60 ML/MIN
GFR SERPL CREATININE-BSD FRML MDRD: ABNORMAL ML/MIN/{1.73_M2}
GFR SERPL CREATININE-BSD FRML MDRD: ABNORMAL ML/MIN/{1.73_M2}
GLUCOSE BLD-MCNC: 114 MG/DL (ref 70–99)
HCT VFR BLD CALC: 41.9 % (ref 41–53)
HEMOGLOBIN: 14 G/DL (ref 13.5–17.5)
MCH RBC QN AUTO: 31.2 PG (ref 26–34)
MCHC RBC AUTO-ENTMCNC: 33.5 G/DL (ref 31–37)
MCV RBC AUTO: 93.2 FL (ref 80–100)
NRBC AUTOMATED: ABNORMAL PER 100 WBC
PDW BLD-RTO: 15.1 % (ref 11–14.5)
PLATELET # BLD: 211 K/UL (ref 140–450)
PMV BLD AUTO: 10.1 FL (ref 6–12)
POTASSIUM SERPL-SCNC: 3.7 MMOL/L (ref 3.7–5.3)
RBC # BLD: 4.5 M/UL (ref 4.5–5.9)
SODIUM BLD-SCNC: 143 MMOL/L (ref 135–144)
WBC # BLD: 7.2 K/UL (ref 3.5–11)

## 2018-06-14 PROCEDURE — 85027 COMPLETE CBC AUTOMATED: CPT

## 2018-06-14 PROCEDURE — 80048 BASIC METABOLIC PNL TOTAL CA: CPT

## 2018-06-14 PROCEDURE — 36415 COLL VENOUS BLD VENIPUNCTURE: CPT

## 2018-06-21 ENCOUNTER — OUTSIDE SERVICES (OUTPATIENT)
Dept: FAMILY MEDICINE CLINIC | Age: 83
End: 2018-06-21
Payer: MEDICARE

## 2018-06-21 DIAGNOSIS — B35.1 DERMATOPHYTOSIS OF NAIL: Primary | ICD-10-CM

## 2018-06-21 DIAGNOSIS — I49.3 PVC (PREMATURE VENTRICULAR CONTRACTION): ICD-10-CM

## 2018-06-21 DIAGNOSIS — M17.11 PRIMARY OSTEOARTHRITIS OF RIGHT KNEE: ICD-10-CM

## 2018-06-21 DIAGNOSIS — I25.10 CORONARY ARTERY DISEASE INVOLVING NATIVE CORONARY ARTERY OF NATIVE HEART WITHOUT ANGINA PECTORIS: ICD-10-CM

## 2018-06-21 DIAGNOSIS — K26.9 DUODENAL ULCER: ICD-10-CM

## 2018-06-21 DIAGNOSIS — E78.5 HYPERLIPIDEMIA, UNSPECIFIED HYPERLIPIDEMIA TYPE: ICD-10-CM

## 2018-06-21 DIAGNOSIS — I25.10 ATHEROSCLEROSIS OF NATIVE CORONARY ARTERY WITHOUT ANGINA PECTORIS, UNSPECIFIED WHETHER NATIVE OR TRANSPLANTED HEART: ICD-10-CM

## 2018-06-21 DIAGNOSIS — N20.0 KIDNEY STONE: ICD-10-CM

## 2018-06-21 DIAGNOSIS — I87.2 VENOUS INSUFFICIENCY: ICD-10-CM

## 2018-06-21 DIAGNOSIS — R97.20 ELEVATED PSA: ICD-10-CM

## 2018-06-21 DIAGNOSIS — I10 ESSENTIAL HYPERTENSION, BENIGN: ICD-10-CM

## 2018-06-21 PROCEDURE — 99305 1ST NF CARE MODERATE MDM 35: CPT | Performed by: FAMILY MEDICINE

## 2018-06-22 RX ORDER — LACTULOSE 10 G/15ML
SOLUTION ORAL
Qty: 2700 ML | Refills: 0 | Status: SHIPPED | OUTPATIENT
Start: 2018-06-22 | End: 2018-10-10 | Stop reason: SDUPTHER

## 2018-06-25 ENCOUNTER — HOSPITAL ENCOUNTER (OUTPATIENT)
Age: 83
Setting detail: SPECIMEN
Discharge: HOME | End: 2018-06-25
Payer: MEDICARE

## 2018-06-25 ENCOUNTER — PROCEDURE VISIT (OUTPATIENT)
Dept: UROLOGY | Age: 83
End: 2018-06-25
Payer: MEDICARE

## 2018-06-25 VITALS
BODY MASS INDEX: 27.48 KG/M2 | SYSTOLIC BLOOD PRESSURE: 132 MMHG | WEIGHT: 171 LBS | DIASTOLIC BLOOD PRESSURE: 60 MMHG | HEART RATE: 72 BPM | HEIGHT: 66 IN

## 2018-06-25 DIAGNOSIS — R33.9 URINARY RETENTION: Primary | ICD-10-CM

## 2018-06-25 DIAGNOSIS — R33.9 URINARY RETENTION: ICD-10-CM

## 2018-06-25 PROCEDURE — 87086 URINE CULTURE/COLONY COUNT: CPT

## 2018-06-25 PROCEDURE — 52000 CYSTOURETHROSCOPY: CPT | Performed by: UROLOGY

## 2018-06-25 PROCEDURE — 87186 SC STD MICRODIL/AGAR DIL: CPT

## 2018-06-25 PROCEDURE — 87077 CULTURE AEROBIC IDENTIFY: CPT

## 2018-06-26 ENCOUNTER — OUTSIDE SERVICES (OUTPATIENT)
Dept: INTERNAL MEDICINE | Age: 83
End: 2018-06-26
Payer: MEDICARE

## 2018-06-26 DIAGNOSIS — N40.1 BENIGN PROSTATIC HYPERPLASIA WITH INCOMPLETE BLADDER EMPTYING: ICD-10-CM

## 2018-06-26 DIAGNOSIS — R33.9 URINARY RETENTION: ICD-10-CM

## 2018-06-26 DIAGNOSIS — K52.9 COLITIS: Primary | ICD-10-CM

## 2018-06-26 DIAGNOSIS — I25.10 ATHEROSCLEROSIS OF NATIVE CORONARY ARTERY WITHOUT ANGINA PECTORIS, UNSPECIFIED WHETHER NATIVE OR TRANSPLANTED HEART: ICD-10-CM

## 2018-06-26 DIAGNOSIS — I10 ESSENTIAL HYPERTENSION: ICD-10-CM

## 2018-06-26 DIAGNOSIS — E78.5 HYPERLIPIDEMIA, UNSPECIFIED HYPERLIPIDEMIA TYPE: ICD-10-CM

## 2018-06-26 DIAGNOSIS — K22.70 BARRETT'S ESOPHAGUS WITHOUT DYSPLASIA: ICD-10-CM

## 2018-06-26 DIAGNOSIS — R39.14 BENIGN PROSTATIC HYPERPLASIA WITH INCOMPLETE BLADDER EMPTYING: ICD-10-CM

## 2018-06-26 PROCEDURE — 99308 SBSQ NF CARE LOW MDM 20: CPT | Performed by: NURSE PRACTITIONER

## 2018-06-26 ASSESSMENT — ENCOUNTER SYMPTOMS
VOMITING: 0
COUGH: 0
SORE THROAT: 0
ORTHOPNEA: 0
EYE DISCHARGE: 0
NAUSEA: 0
ABDOMINAL PAIN: 0
EYE PAIN: 0
SPUTUM PRODUCTION: 0
WHEEZING: 0
DIARRHEA: 0
BLOOD IN STOOL: 0
CONSTIPATION: 0
SHORTNESS OF BREATH: 0
EYE REDNESS: 0

## 2018-06-27 LAB
CULTURE: ABNORMAL
Lab: ABNORMAL
ORGANISM: ABNORMAL
SPECIMEN DESCRIPTION: ABNORMAL
STATUS: ABNORMAL

## 2018-06-29 ENCOUNTER — TELEPHONE (OUTPATIENT)
Dept: INTERNAL MEDICINE | Age: 83
End: 2018-06-29

## 2018-07-05 DIAGNOSIS — N39.0 URINARY TRACT INFECTION ASSOCIATED WITH INDWELLING URETHRAL CATHETER, INITIAL ENCOUNTER (HCC): Primary | ICD-10-CM

## 2018-07-05 DIAGNOSIS — T83.511A URINARY TRACT INFECTION ASSOCIATED WITH INDWELLING URETHRAL CATHETER, INITIAL ENCOUNTER (HCC): Primary | ICD-10-CM

## 2018-07-05 RX ORDER — MELOXICAM 15 MG/1
TABLET ORAL
Qty: 90 TABLET | Refills: 1 | Status: SHIPPED | OUTPATIENT
Start: 2018-07-05 | End: 2018-07-24 | Stop reason: ALTCHOICE

## 2018-07-05 RX ORDER — SULFAMETHOXAZOLE AND TRIMETHOPRIM 800; 160 MG/1; MG/1
1 TABLET ORAL 2 TIMES DAILY
Qty: 14 TABLET | Refills: 0 | Status: SHIPPED | OUTPATIENT
Start: 2018-07-05 | End: 2018-07-12

## 2018-07-06 DIAGNOSIS — I25.10 CORONARY ARTERY DISEASE INVOLVING NATIVE CORONARY ARTERY OF NATIVE HEART WITHOUT ANGINA PECTORIS: ICD-10-CM

## 2018-07-06 DIAGNOSIS — M17.11 PRIMARY OSTEOARTHRITIS OF RIGHT KNEE: ICD-10-CM

## 2018-07-06 DIAGNOSIS — Z78.9 IMPAIRED MOBILITY AND ADLS: Primary | ICD-10-CM

## 2018-07-06 DIAGNOSIS — Z74.09 IMPAIRED MOBILITY AND ADLS: Primary | ICD-10-CM

## 2018-07-10 ENCOUNTER — OUTSIDE SERVICES (OUTPATIENT)
Dept: INTERNAL MEDICINE | Age: 83
End: 2018-07-10
Payer: MEDICARE

## 2018-07-10 DIAGNOSIS — M17.11 PRIMARY OSTEOARTHRITIS OF RIGHT KNEE: ICD-10-CM

## 2018-07-10 DIAGNOSIS — R26.81 GAIT INSTABILITY: Primary | ICD-10-CM

## 2018-07-10 PROCEDURE — 99308 SBSQ NF CARE LOW MDM 20: CPT | Performed by: NURSE PRACTITIONER

## 2018-07-10 ASSESSMENT — ENCOUNTER SYMPTOMS: BACK PAIN: 0

## 2018-07-13 ENCOUNTER — TELEPHONE (OUTPATIENT)
Dept: FAMILY MEDICINE CLINIC | Age: 83
End: 2018-07-13

## 2018-07-16 RX ORDER — BETHANECHOL CHLORIDE 25 MG/1
25 TABLET ORAL 4 TIMES DAILY
Qty: 120 TABLET | Refills: 0 | Status: SHIPPED | OUTPATIENT
Start: 2018-07-16 | End: 2018-08-07 | Stop reason: SDUPTHER

## 2018-07-17 ENCOUNTER — OFFICE VISIT (OUTPATIENT)
Dept: CARDIOLOGY | Age: 83
End: 2018-07-17
Payer: MEDICARE

## 2018-07-17 ENCOUNTER — TELEPHONE (OUTPATIENT)
Dept: FAMILY MEDICINE CLINIC | Age: 83
End: 2018-07-17

## 2018-07-17 VITALS
HEART RATE: 93 BPM | WEIGHT: 164 LBS | SYSTOLIC BLOOD PRESSURE: 100 MMHG | HEIGHT: 66 IN | BODY MASS INDEX: 26.36 KG/M2 | DIASTOLIC BLOOD PRESSURE: 54 MMHG

## 2018-07-17 DIAGNOSIS — I10 ESSENTIAL HYPERTENSION, BENIGN: ICD-10-CM

## 2018-07-17 DIAGNOSIS — R06.09 DYSPNEA ON EXERTION: ICD-10-CM

## 2018-07-17 DIAGNOSIS — R94.31 ABNORMAL ECG: ICD-10-CM

## 2018-07-17 DIAGNOSIS — I25.119 CORONARY ARTERY DISEASE INVOLVING NATIVE CORONARY ARTERY OF NATIVE HEART WITH ANGINA PECTORIS (HCC): Primary | ICD-10-CM

## 2018-07-17 PROCEDURE — 93000 ELECTROCARDIOGRAM COMPLETE: CPT | Performed by: INTERNAL MEDICINE

## 2018-07-17 PROCEDURE — 99213 OFFICE O/P EST LOW 20 MIN: CPT | Performed by: INTERNAL MEDICINE

## 2018-07-17 NOTE — TELEPHONE ENCOUNTER
Ana Paula [facility] Transitions Initial Follow Up Call    Call within 2 business days of discharge: Yes     Patient: Shalom Carrion Patient : 1927   MRN: [id number]  Reason for Admission: Rehab for gait instability  Discharge Date: 18 RARS: Readmission Risk Score: 12     Spoke with: patient    Discharge department/facility: [facility]    Non-face-to-face services provided:  none    Follow Up  Future Appointments  Date Time Provider Kira Fischer   2018 7:45 AM [facility]   [facility] 9[facility]20 AM [facility]   2018 8:30 AM SCHEDULE, ECHO [facility] ECHO [place]   2018 9:40 AM SCHEDULE, IV STARTS [facility] CARDIOLOGY MD STRESS None   2018 10:40 AM MD NM ROOM MDHZ NUC MED STV [place]   2018 10:55 AM SCHEDULE, STRESS [facility] CARDIOLOGY MD STRESS None   2018 11:55 AM MD NM ROOM MDHZ NUC MED STV [place]   2018 8:30 AM Marie Medina DPM DPOD DPP   10/3/2018 8:30 AM SCHEDULE, Khoa Gilmore 112 LAB MD LAB [place]   10/10/2018 2:00 PM [facility]   10/23/2018 8:10 AM SCHEDULE, Khoa Choudharymar 112 LAB MDHZ LAB [place]   2019 1:15 PM DO DANIEL Cordero DPP       Carol Soares LPN

## 2018-07-17 NOTE — PROGRESS NOTES
Biatrial enlargement. 6. Mildly dilated right ventricle with normal function. 7. Thickened mitral valve leaflets with moderate mitral regurgitation. 8. Sclerotic aortic valve with mild aortic regurgitation. 9. Mild tricuspid regurgitation. RVSP is 46 mm Hg. 10. No pericardial effusion.     Assessment and Plan:    -Abnormal ECG, h/o CAD, dyspnea: Obtain lexiscan stress test and obtain TTE  -CAD- KRISTEN to LAD in 2012. TTE 7/2016 showed LVEF 50-55%, Moderate MR, Mild AI/TR. Continue ASA. -H/o bleeding ulcer with EGD showing healed ulcers. Per Dr. Samia Moreno ok to be on ASA  -Asymptomatic PVCs  -HTN- failry well controlled at this time. Continue current therapy.   -Obesity - encouraged diet, exercise, and discussed weight loss extensively. -Hyperlipidemia- continue statin.   -RTC 6 months

## 2018-07-20 ENCOUNTER — CARE COORDINATION (OUTPATIENT)
Dept: CASE MANAGEMENT | Age: 83
End: 2018-07-20

## 2018-07-24 ENCOUNTER — OFFICE VISIT (OUTPATIENT)
Dept: FAMILY MEDICINE CLINIC | Age: 83
End: 2018-07-24
Payer: MEDICARE

## 2018-07-24 ENCOUNTER — OFFICE VISIT (OUTPATIENT)
Dept: ORTHOPEDIC SURGERY | Age: 83
End: 2018-07-24
Payer: MEDICARE

## 2018-07-24 VITALS
DIASTOLIC BLOOD PRESSURE: 68 MMHG | BODY MASS INDEX: 26.68 KG/M2 | WEIGHT: 166 LBS | HEART RATE: 68 BPM | HEIGHT: 66 IN | SYSTOLIC BLOOD PRESSURE: 132 MMHG

## 2018-07-24 VITALS — BODY MASS INDEX: 28.82 KG/M2 | WEIGHT: 173 LBS | HEIGHT: 65 IN

## 2018-07-24 DIAGNOSIS — R33.9 URINARY RETENTION: ICD-10-CM

## 2018-07-24 DIAGNOSIS — N40.1 BENIGN PROSTATIC HYPERPLASIA WITH INCOMPLETE BLADDER EMPTYING: ICD-10-CM

## 2018-07-24 DIAGNOSIS — M17.11 PRIMARY OSTEOARTHRITIS OF RIGHT KNEE: Primary | ICD-10-CM

## 2018-07-24 DIAGNOSIS — K52.9 COLITIS: ICD-10-CM

## 2018-07-24 DIAGNOSIS — M25.561 RIGHT KNEE PAIN, UNSPECIFIED CHRONICITY: Primary | ICD-10-CM

## 2018-07-24 DIAGNOSIS — R39.14 BENIGN PROSTATIC HYPERPLASIA WITH INCOMPLETE BLADDER EMPTYING: ICD-10-CM

## 2018-07-24 DIAGNOSIS — I25.10 CORONARY ARTERY DISEASE INVOLVING NATIVE CORONARY ARTERY OF NATIVE HEART WITHOUT ANGINA PECTORIS: ICD-10-CM

## 2018-07-24 DIAGNOSIS — K59.03 DRUG-INDUCED CONSTIPATION: ICD-10-CM

## 2018-07-24 PROCEDURE — 99496 TRANSJ CARE MGMT HIGH F2F 7D: CPT | Performed by: FAMILY MEDICINE

## 2018-07-24 PROCEDURE — 1111F DSCHRG MED/CURRENT MED MERGE: CPT | Performed by: FAMILY MEDICINE

## 2018-07-24 PROCEDURE — 20610 DRAIN/INJ JOINT/BURSA W/O US: CPT | Performed by: PHYSICIAN ASSISTANT

## 2018-07-24 RX ORDER — TRIAMCINOLONE ACETONIDE 40 MG/ML
80 INJECTION, SUSPENSION INTRA-ARTICULAR; INTRAMUSCULAR ONCE
Status: COMPLETED | OUTPATIENT
Start: 2018-07-24 | End: 2018-07-24

## 2018-07-24 RX ORDER — BUPIVACAINE HYDROCHLORIDE 5 MG/ML
5 INJECTION, SOLUTION PERINEURAL ONCE
Status: COMPLETED | OUTPATIENT
Start: 2018-07-24 | End: 2018-07-24

## 2018-07-24 RX ORDER — TRAMADOL HYDROCHLORIDE 50 MG/1
50 TABLET ORAL EVERY 6 HOURS PRN
COMMUNITY
End: 2018-07-30 | Stop reason: SDUPTHER

## 2018-07-24 RX ADMIN — TRIAMCINOLONE ACETONIDE 80 MG: 40 INJECTION, SUSPENSION INTRA-ARTICULAR; INTRAMUSCULAR at 08:55

## 2018-07-24 RX ADMIN — BUPIVACAINE HYDROCHLORIDE 25 MG: 5 INJECTION, SOLUTION PERINEURAL at 08:54

## 2018-07-24 ASSESSMENT — ENCOUNTER SYMPTOMS
EYES NEGATIVE: 1
CONSTIPATION: 1
RESPIRATORY NEGATIVE: 1
ALLERGIC/IMMUNOLOGIC NEGATIVE: 1

## 2018-07-24 NOTE — PROGRESS NOTES
Post-Discharge Transitional Care Management Services      Dany Navarrete   YOB: 1927    Date of Office Visit:  7/24/2018  Date of Hospital Admission: 6/9/18  Date of Hospital Discharge: 6/12/18  Readmission Risk Score [risk of hospital readmission >=10  medium risk (chance of readmission ~ 12%) >14  high risk (chance of readmission ~18%)]:Readmission Risk Score: 12    Care management risk score Rising risk (score 2-5) and Complex Care (Scores >=6): 1       Patient Active Problem List   Diagnosis    Dermatophytosis of nail    Coronary atherosclerosis of native coronary artery    Essential hypertension, benign    Obesity    Hyperlipidemia    PVC (premature ventricular contraction)    Osteoarthritis of knee    Duodenal ulcer    CAD (coronary artery disease)    Venous insufficiency    Kidney stone    Elevated PSA    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    Chronic pain of right knee    Encounter for chronic pain management    Colitis    Urinary retention    BPH (benign prostatic hyperplasia)    Gait instability       Allergies   Allergen Reactions    Codeine Nausea And Vomiting       Medications listed as ordered at the time of discharge from hospital   [place], [facility] Medication Instructions GUILLE:    Printed on:07/24/18 1014   Medication Information                      acetaminophen (TYLENOL) 325 MG tablet  Take 2 tablets by mouth every 4 hours as needed for Pain or Fever             Aspirin Buf,PoDrn-UkKte-ShIex, (BUFFERED ASPIRIN) 325 MG TABS    Take 1 tablet by mouth daily              bethanechol (URECHOLINE) 25 MG tablet  Take 1 tablet by mouth 4 times daily             CARAFATE 1 GM/10ML suspension  take 10 milliliters by mouth four times a day BEFORE MEALS AND NIGHTLY             docusate sodium (COLACE) 100 MG capsule  Take 1 capsule by mouth 2 times daily             Lactobacillus (PROBIOTIC ACIDOPHILUS) TABS  Take 1 tablet by mouth 3 times daily present. Still on urecholine and flomax. Right knee pain ongoing. Injection today through ortho. Using tramadol for pain control. Seems to be satisfied with pain control at present. Review of Systems   Constitutional: Negative. HENT: Positive for hearing loss. Eyes: Negative. Respiratory: Negative. Cardiovascular: Negative. Gastrointestinal: Positive for constipation (improved now with treatment). Endocrine: Negative. Genitourinary: Positive for difficulty urinating (improved with stanton). Musculoskeletal: Positive for arthralgias (right knee). Skin: Negative. Allergic/Immunologic: Negative. Neurological: Negative. Hematological: Negative. Psychiatric/Behavioral: Negative. All other systems reviewed and are negative. Non face to face  following discharge, date last encounter closed (first attempt may have been earlier): 7/17/2018  1:28 PM 7/17/2018  1:28 PM    Call initiated 2 business days of discharge: Yes     Interval history/Current status: improved        Physical Exam   Constitutional: He is oriented to person, place, and time. He appears well-developed and well-nourished. No distress. HENT:   Head: Normocephalic and atraumatic. Right Ear: External ear normal. No foreign bodies. Decreased hearing is noted. Left Ear: External ear normal. No foreign bodies (bilateral cerumen impaction. ). Decreased hearing (cerumen impacted bilaterally) is noted. Mouth/Throat: Oropharynx is clear and moist. He does not have dentures. Normal dentition. No dental caries. No oropharyngeal exudate. Eyes: Conjunctivae and EOM are normal. No scleral icterus. Neck: Neck supple. No thyromegaly present. Cardiovascular: Normal rate, regular rhythm, normal heart sounds and intact distal pulses. No murmur heard. Pulmonary/Chest: Effort normal and breath sounds normal. No respiratory distress. He has no wheezes. Abdominal: Soft.  Bowel sounds are normal. He exhibits no

## 2018-07-26 RX ORDER — TAMSULOSIN HYDROCHLORIDE 0.4 MG/1
0.4 CAPSULE ORAL DAILY
Qty: 30 CAPSULE | Refills: 0 | Status: SHIPPED | OUTPATIENT
Start: 2018-07-26 | End: 2018-09-12 | Stop reason: SDUPTHER

## 2018-07-30 DIAGNOSIS — G89.29 CHRONIC PAIN OF RIGHT KNEE: Primary | ICD-10-CM

## 2018-07-30 DIAGNOSIS — G89.29 ENCOUNTER FOR CHRONIC PAIN MANAGEMENT: ICD-10-CM

## 2018-07-30 DIAGNOSIS — M25.561 CHRONIC PAIN OF RIGHT KNEE: Primary | ICD-10-CM

## 2018-07-30 RX ORDER — TRAMADOL HYDROCHLORIDE 50 MG/1
50 TABLET ORAL EVERY 6 HOURS PRN
Qty: 30 TABLET | Refills: 0 | Status: SHIPPED | OUTPATIENT
Start: 2018-07-30 | End: 2018-09-11 | Stop reason: SDUPTHER

## 2018-07-31 ENCOUNTER — HOSPITAL ENCOUNTER (OUTPATIENT)
Dept: NON INVASIVE DIAGNOSTICS | Age: 83
Discharge: HOME | End: 2018-07-31
Payer: MEDICARE

## 2018-07-31 ENCOUNTER — HOSPITAL ENCOUNTER (OUTPATIENT)
Dept: NUCLEAR MEDICINE | Age: 83
Discharge: HOME | End: 2018-08-02
Payer: MEDICARE

## 2018-07-31 DIAGNOSIS — I25.119 CORONARY ARTERY DISEASE INVOLVING NATIVE CORONARY ARTERY OF NATIVE HEART WITH ANGINA PECTORIS (HCC): ICD-10-CM

## 2018-07-31 DIAGNOSIS — R94.31 ABNORMAL ECG: ICD-10-CM

## 2018-07-31 DIAGNOSIS — R06.09 DYSPNEA ON EXERTION: ICD-10-CM

## 2018-07-31 LAB
LV EF: 60 %
LVEF MODALITY: NORMAL

## 2018-07-31 PROCEDURE — 93306 TTE W/DOPPLER COMPLETE: CPT

## 2018-08-03 ENCOUNTER — TELEPHONE (OUTPATIENT)
Dept: CARDIOLOGY | Age: 83
End: 2018-08-03

## 2018-08-08 RX ORDER — BETHANECHOL CHLORIDE 25 MG/1
TABLET ORAL
Qty: 120 TABLET | Refills: 0 | Status: SHIPPED | OUTPATIENT
Start: 2018-08-08 | End: 2018-08-15 | Stop reason: SDUPTHER

## 2018-08-09 ENCOUNTER — OFFICE VISIT (OUTPATIENT)
Dept: PODIATRY | Age: 83
End: 2018-08-09
Payer: MEDICARE

## 2018-08-09 ENCOUNTER — TELEPHONE (OUTPATIENT)
Dept: FAMILY MEDICINE CLINIC | Age: 83
End: 2018-08-09
Payer: MEDICARE

## 2018-08-09 VITALS
SYSTOLIC BLOOD PRESSURE: 120 MMHG | WEIGHT: 162 LBS | HEART RATE: 78 BPM | BODY MASS INDEX: 26.99 KG/M2 | DIASTOLIC BLOOD PRESSURE: 68 MMHG | HEIGHT: 65 IN

## 2018-08-09 DIAGNOSIS — K22.70 BARRETT'S ESOPHAGUS WITHOUT DYSPLASIA: ICD-10-CM

## 2018-08-09 DIAGNOSIS — K26.9 DUODENAL ULCER: ICD-10-CM

## 2018-08-09 DIAGNOSIS — E78.5 HYPERLIPIDEMIA, UNSPECIFIED HYPERLIPIDEMIA TYPE: ICD-10-CM

## 2018-08-09 DIAGNOSIS — B35.1 DERMATOPHYTOSIS OF NAIL: Primary | ICD-10-CM

## 2018-08-09 DIAGNOSIS — R26.81 GAIT INSTABILITY: Primary | ICD-10-CM

## 2018-08-09 DIAGNOSIS — I10 ESSENTIAL HYPERTENSION, BENIGN: ICD-10-CM

## 2018-08-09 DIAGNOSIS — I87.2 VENOUS INSUFFICIENCY: ICD-10-CM

## 2018-08-09 DIAGNOSIS — K52.9 COLITIS: ICD-10-CM

## 2018-08-09 DIAGNOSIS — I25.10 ATHEROSCLEROSIS OF NATIVE CORONARY ARTERY WITHOUT ANGINA PECTORIS, UNSPECIFIED WHETHER NATIVE OR TRANSPLANTED HEART: ICD-10-CM

## 2018-08-09 PROCEDURE — G0180 MD CERTIFICATION HHA PATIENT: HCPCS | Performed by: FAMILY MEDICINE

## 2018-08-09 PROCEDURE — 11721 DEBRIDE NAIL 6 OR MORE: CPT | Performed by: PODIATRIST

## 2018-08-09 PROCEDURE — 99999 PR OFFICE/OUTPT VISIT,PROCEDURE ONLY: CPT | Performed by: PODIATRIST

## 2018-08-15 RX ORDER — BETHANECHOL CHLORIDE 25 MG/1
25 TABLET ORAL 4 TIMES DAILY
Qty: 120 TABLET | Refills: 2 | Status: ON HOLD | OUTPATIENT
Start: 2018-08-15 | End: 2018-11-19 | Stop reason: HOSPADM

## 2018-08-15 NOTE — TELEPHONE ENCOUNTER
Wayne Forbes at [facility] calling to question if pt is to continue on this medication , if so please refill, please david to inform either way at [phone]

## 2018-08-21 ENCOUNTER — APPOINTMENT (OUTPATIENT)
Dept: NON INVASIVE DIAGNOSTICS | Age: 83
End: 2018-08-21
Payer: MEDICARE

## 2018-08-21 ENCOUNTER — APPOINTMENT (OUTPATIENT)
Dept: NUCLEAR MEDICINE | Age: 83
End: 2018-08-21
Payer: MEDICARE

## 2018-09-11 ENCOUNTER — HOSPITAL ENCOUNTER (OUTPATIENT)
Dept: NON INVASIVE DIAGNOSTICS | Age: 83
Discharge: HOME | End: 2018-09-11
Payer: MEDICARE

## 2018-09-11 ENCOUNTER — HOSPITAL ENCOUNTER (OUTPATIENT)
Dept: NUCLEAR MEDICINE | Age: 83
Discharge: HOME | End: 2018-09-13
Payer: MEDICARE

## 2018-09-11 ENCOUNTER — APPOINTMENT (OUTPATIENT)
Dept: NUCLEAR MEDICINE | Age: 83
End: 2018-09-11
Payer: MEDICARE

## 2018-09-11 DIAGNOSIS — G89.29 ENCOUNTER FOR CHRONIC PAIN MANAGEMENT: ICD-10-CM

## 2018-09-11 DIAGNOSIS — G89.29 CHRONIC PAIN OF RIGHT KNEE: ICD-10-CM

## 2018-09-11 DIAGNOSIS — M25.561 CHRONIC PAIN OF RIGHT KNEE: ICD-10-CM

## 2018-09-11 PROCEDURE — 3430000000 HC RX DIAGNOSTIC RADIOPHARMACEUTICAL: Performed by: INTERNAL MEDICINE

## 2018-09-11 PROCEDURE — 78452 HT MUSCLE IMAGE SPECT MULT: CPT

## 2018-09-11 PROCEDURE — 6360000002 HC RX W HCPCS: Performed by: INTERNAL MEDICINE

## 2018-09-11 PROCEDURE — 93017 CV STRESS TEST TRACING ONLY: CPT

## 2018-09-11 PROCEDURE — A9500 TC99M SESTAMIBI: HCPCS | Performed by: INTERNAL MEDICINE

## 2018-09-11 RX ORDER — TRAMADOL HYDROCHLORIDE 50 MG/1
TABLET ORAL
Qty: 30 TABLET | Refills: 0 | Status: SHIPPED | OUTPATIENT
Start: 2018-09-11 | End: 2018-10-10 | Stop reason: SDUPTHER

## 2018-09-11 RX ADMIN — TETRAKIS(2-METHOXYISOBUTYLISOCYANIDE)COPPER(I) TETRAFLUOROBORATE 10 MILLICURIE: 1 INJECTION, POWDER, LYOPHILIZED, FOR SOLUTION INTRAVENOUS at 11:30

## 2018-09-11 RX ADMIN — REGADENOSON 0.4 MG: 0.08 INJECTION, SOLUTION INTRAVENOUS at 10:29

## 2018-09-11 RX ADMIN — TETRAKIS(2-METHOXYISOBUTYLISOCYANIDE)COPPER(I) TETRAFLUOROBORATE 30 MILLICURIE: 1 INJECTION, POWDER, LYOPHILIZED, FOR SOLUTION INTRAVENOUS at 11:31

## 2018-09-11 NOTE — PROGRESS NOTES
Patient Name:  Aida Dominguez MRN:  [id number]   :  1927  Age:  80 y.o. Sex: male     Ordering Provider: Tiffanie Caraballo MD  Referring Provider: Jessica Tavarez MD  Primary Care Provider:  Jessica Tavarez MD     Indications: CAD, SOB, Abnormal EKG     Medications:     Current Outpatient Prescriptions:     bethanechol (URECHOLINE) 25 MG tablet, Take 1 tablet by mouth 4 times daily, Disp: 120 tablet, Rfl: 2    tamsulosin (FLOMAX) 0.4 MG capsule, Take 1 capsule by mouth daily, Disp: 30 capsule, Rfl: 0    lactulose (CHRONULAC) 10 GM/15ML solution, TAKE 30 ML NIGHTLY (Patient taking differently: 30ml TID), Disp: 2700 mL, Rfl: 0    acetaminophen (TYLENOL) 325 MG tablet, Take 2 tablets by mouth every 4 hours as needed for Pain or Fever, Disp: 120 tablet, Rfl: 0    docusate sodium (COLACE) 100 MG capsule, Take 1 capsule by mouth 2 times daily, Disp: 60 capsule, Rfl: 0    Lactobacillus (PROBIOTIC ACIDOPHILUS) TABS, Take 1 tablet by mouth 3 times daily, Disp: 90 tablet, Rfl: 0    pantoprazole (PROTONIX) 40 MG tablet, TAKE 1 TABLET TWICE A DAY, Disp: 180 tablet, Rfl: 3    lisinopril (PRINIVIL;ZESTRIL) 5 MG tablet, TAKE 1 TABLET DAILY, Disp: 90 tablet, Rfl: 3    metoprolol succinate (TOPROL XL) 25 MG extended release tablet, Take 1 tablet by mouth daily Take one tab daily, Disp: 90 tablet, Rfl: 3    simvastatin (ZOCOR) 20 MG tablet, TAKE 1 TABLET NIGHTLY, Disp: 90 tablet, Rfl: 3    CARAFATE 1 GM/10ML suspension, take 10 milliliters by mouth four times a day BEFORE MEALS AND NIGHTLY, Disp: 1200 mL, Rfl: 3    Aspirin Buf,YkHwe-LaQtc-VaEmy, (BUFFERED ASPIRIN) 325 MG TABS,  Take 1 tablet by mouth daily , Disp: , Rfl:     Lutein 10 MG TABS, Take 20 mg by mouth 2 times daily otc eye vitamin , Disp: , Rfl:     Multiple Vitamins-Minerals (MULTIVITAMIN PO), Take 1 tablet by mouth daily. , Disp: , Rfl:     Current Facility-Administered Medications:     regadenoson (LEXISCAN) injection 0.4 mg, 0.4 mg, Intravenous, Once, Odilia Hernandez MD    Facility-Administered Medications Ordered in Other Encounters:     technetium sestamibi (CARDIOLITE) injection 10 millicurie, 10 millicurie, Intravenous, ONCE PRNGuanaco MD    technetium sestamibi (CARDIOLITE) injection 30 millicurie, 30 millicurie, Intravenous, ONCE PRN, Guanaco Grace MD      ----------------------------------------------------------------------------------------------------------                Marimar Banco 0.4 mg injected over 10 seconds. IV Cardiolite injected 20 seconds post Lexiscan injection. Heart Rate:  54  Resting Blood Pressure:  168/72   ----------------------------------------------------------------------------------------------------------     HR BP   1 min 75 119/57   2 min     3 min 80 119/68   4 min     5 min 76 168/79   6 min     7 min 76 166/78   8 min     9 min 77 162/79   10 min       Symptoms:  Chest Pain:  Yes      Nausea:  No     Headache:  No    Shortness of Breath:  No     Other:  No      Electronically signed by Milton Nix RN on 9/11/18 at 10:16 AM    ----------------------------------------------------------------------------------------------------------  Resting EKG:  Sinus bradycardia, RBBB    Arrhythmias:  PVCs     EKG Changes:  No ischemic changes     Interpretation:  Abnormal baseline ECG. No ischemic changes. Cardiolite results to follow.        Supervising Physician:  Odilia Hernandez MD

## 2018-09-11 NOTE — TELEPHONE ENCOUNTER
Last Appt:  7/24/2018  Next Appt:   10/10/2018  Med verified in [address] filled 07/30/2018

## 2018-09-12 ENCOUNTER — TELEPHONE (OUTPATIENT)
Dept: CARDIOLOGY | Age: 83
End: 2018-09-12

## 2018-09-12 PROCEDURE — 93018 CV STRESS TEST I&R ONLY: CPT | Performed by: INTERNAL MEDICINE

## 2018-09-12 RX ORDER — TAMSULOSIN HYDROCHLORIDE 0.4 MG/1
0.4 CAPSULE ORAL DAILY
Qty: 30 CAPSULE | Refills: 0 | Status: SHIPPED | OUTPATIENT
Start: 2018-09-12 | End: 2018-10-10 | Stop reason: SDUPTHER

## 2018-09-12 NOTE — PROCEDURES
[address]                   [address] [place] [id number]                                CARDIAC STRESS TEST    PATIENT NAME: Boyd Aguirre                   :        1927  MED REC NO:   [id number]                             ROOM:  ACCOUNT NO:   [de-identified]                           ADMIT DATE: 2018  PROVIDER:     Basia Dailey    DATE OF STUDY:  2018    STRESS TEST    Ordering Provider: Magnolia Eagle MD    Primary Care Provider: José Antonio Tubbs MD    Patient's Age: 80     Height: 5 feet 5 inches  Weight: 162 pounds    INDICATION:  CAD, dyspnea on exertion, abnormal ECG    Lexiscan 0.4 mg injected over 10 seconds. IV Cardiolite injected 20 seconds post Lexiscan injection. Heart Rate: 54 Resting blood pressure:  168/72              HR   BP  1 min          75   119/57  2 min  3 min          80   119/68  4 min  5 min          76   168/79  6 min  7 min          76   166/78  8 min  9 min          77   162/79  10 min    Symptoms:  Chest Pain: Yes  Nausea: No  Headache:  No  Shortness of breath: No  Other: No    Resting EKG:  Sinus bradycardia, right bundle branch block    Arrhythmias: PVCs    EKG changes:  No ischemic changes. INTERPRETATION:  1. Abnormal baseline ECG. 2. No ischemic changes. 3. Cardiolite results to follow. Nuclear Myocardial Perfusion Imaging (SPECT)    TESTING METHOD  STRESS:   Lexiscan  AGENT:    Cardiolite  REST:     Injection Date:  2018  Time:  855  Amount:  11.4 mCi  STRESS:   Injection Date:  2018  Time:  1020  Amount:  34.1 mCi  IMAGE TIME:    Rest:  930  Stress:  1120    EF:  60%  TID:  0.75  LHR:  0.32    FINDINGS:  Ischemia (Reversible Defect): No       Inferior defect in rest imaging but no defect in       stress imaging. Infarction (Irreversible Defect):  No  Ejection fraction Calculated: Normal  Segmental wall motion:   Normal  Diastolic LV Compliance (Stiffness):    IMPRESSION:  1.  No

## 2018-10-03 ENCOUNTER — HOSPITAL ENCOUNTER (OUTPATIENT)
Dept: LAB | Age: 83
Setting detail: SPECIMEN
Discharge: HOME | End: 2018-10-03
Payer: MEDICARE

## 2018-10-03 ENCOUNTER — TELEPHONE (OUTPATIENT)
Dept: UROLOGY | Age: 83
End: 2018-10-03

## 2018-10-03 DIAGNOSIS — E78.5 HYPERLIPIDEMIA, UNSPECIFIED HYPERLIPIDEMIA TYPE: ICD-10-CM

## 2018-10-03 DIAGNOSIS — R97.20 ABNORMAL PSA: ICD-10-CM

## 2018-10-03 DIAGNOSIS — I10 ESSENTIAL HYPERTENSION, BENIGN: ICD-10-CM

## 2018-10-03 LAB
ABSOLUTE EOS #: 0.2 K/UL (ref 0–0.4)
ABSOLUTE IMMATURE GRANULOCYTE: ABNORMAL K/UL (ref 0–0.3)
ABSOLUTE LYMPH #: 1.5 K/UL (ref 1–4.8)
ABSOLUTE MONO #: 0.9 K/UL (ref 0.1–1.2)
ANION GAP SERPL CALCULATED.3IONS-SCNC: 9 MMOL/L (ref 9–17)
BASOPHILS # BLD: 0 % (ref 0–2)
BASOPHILS ABSOLUTE: 0 K/UL (ref 0–0.2)
BUN BLDV-MCNC: 18 MG/DL (ref 8–23)
BUN/CREAT BLD: 20 (ref 9–20)
CALCIUM SERPL-MCNC: 9.4 MG/DL (ref 8.6–10.4)
CHLORIDE BLD-SCNC: 104 MMOL/L (ref 98–107)
CHOLESTEROL/HDL RATIO: 2.8
CHOLESTEROL: 118 MG/DL
CO2: 28 MMOL/L (ref 20–31)
CREAT SERPL-MCNC: 0.89 MG/DL (ref 0.7–1.2)
DIFFERENTIAL TYPE: ABNORMAL
EOSINOPHILS RELATIVE PERCENT: 3 % (ref 1–8)
GFR AFRICAN AMERICAN: >60 ML/MIN
GFR NON-AFRICAN AMERICAN: >60 ML/MIN
GFR SERPL CREATININE-BSD FRML MDRD: NORMAL ML/MIN/{1.73_M2}
GFR SERPL CREATININE-BSD FRML MDRD: NORMAL ML/MIN/{1.73_M2}
GLUCOSE BLD-MCNC: 75 MG/DL (ref 70–99)
HCT VFR BLD CALC: 44.7 % (ref 41–53)
HDLC SERPL-MCNC: 42 MG/DL
HEMOGLOBIN: 14.4 G/DL (ref 13.5–17.5)
IMMATURE GRANULOCYTES: ABNORMAL %
LDL CHOLESTEROL: 49 MG/DL (ref 0–130)
LYMPHOCYTES # BLD: 20 % (ref 15–43)
MCH RBC QN AUTO: 29.4 PG (ref 26–34)
MCHC RBC AUTO-ENTMCNC: 32.2 G/DL (ref 31–37)
MCV RBC AUTO: 91.4 FL (ref 80–100)
MONOCYTES # BLD: 13 % (ref 6–14)
NRBC AUTOMATED: ABNORMAL PER 100 WBC
PDW BLD-RTO: 15.3 % (ref 11–14.5)
PLATELET # BLD: 248 K/UL (ref 140–450)
PLATELET ESTIMATE: ABNORMAL
PMV BLD AUTO: 8.9 FL (ref 6–12)
POTASSIUM SERPL-SCNC: 4.4 MMOL/L (ref 3.7–5.3)
RBC # BLD: 4.89 M/UL (ref 4.5–5.9)
RBC # BLD: ABNORMAL 10*6/UL
SEG NEUTROPHILS: 64 % (ref 44–74)
SEGMENTED NEUTROPHILS ABSOLUTE COUNT: 4.6 K/UL (ref 1.8–7.7)
SODIUM BLD-SCNC: 141 MMOL/L (ref 135–144)
TRIGL SERPL-MCNC: 137 MG/DL
VLDLC SERPL CALC-MCNC: NORMAL MG/DL (ref 1–30)
WBC # BLD: 7.2 K/UL (ref 3.5–11)
WBC # BLD: ABNORMAL 10*3/UL

## 2018-10-03 PROCEDURE — 80048 BASIC METABOLIC PNL TOTAL CA: CPT

## 2018-10-03 PROCEDURE — 85025 COMPLETE CBC W/AUTO DIFF WBC: CPT

## 2018-10-03 PROCEDURE — 36415 COLL VENOUS BLD VENIPUNCTURE: CPT

## 2018-10-03 PROCEDURE — 84154 ASSAY OF PSA FREE: CPT

## 2018-10-03 PROCEDURE — 80061 LIPID PANEL: CPT

## 2018-10-04 LAB
PROSTATE SPECIFIC ANTIGEN FREE: 2.2 UG/L
PROSTATE SPECIFIC ANTIGEN PERCENT FREE: 11.2 %
PROSTATE SPECIFIC ANTIGEN: 19.6 UG/L (ref 0–4)

## 2018-10-04 RX ORDER — PANTOPRAZOLE SODIUM 40 MG/1
TABLET, DELAYED RELEASE ORAL
Qty: 180 TABLET | Refills: 1 | Status: ON HOLD | OUTPATIENT
Start: 2018-10-04 | End: 2018-11-25

## 2018-10-10 ENCOUNTER — OFFICE VISIT (OUTPATIENT)
Dept: FAMILY MEDICINE CLINIC | Age: 83
End: 2018-10-10
Payer: MEDICARE

## 2018-10-10 VITALS
SYSTOLIC BLOOD PRESSURE: 130 MMHG | WEIGHT: 162.04 LBS | HEIGHT: 65 IN | DIASTOLIC BLOOD PRESSURE: 70 MMHG | HEART RATE: 68 BPM | BODY MASS INDEX: 27 KG/M2

## 2018-10-10 DIAGNOSIS — I87.2 VENOUS INSUFFICIENCY: ICD-10-CM

## 2018-10-10 DIAGNOSIS — K63.5 POLYP OF COLON, UNSPECIFIED PART OF COLON, UNSPECIFIED TYPE: ICD-10-CM

## 2018-10-10 DIAGNOSIS — N20.0 KIDNEY STONE: ICD-10-CM

## 2018-10-10 DIAGNOSIS — R39.14 BENIGN PROSTATIC HYPERPLASIA WITH INCOMPLETE BLADDER EMPTYING: ICD-10-CM

## 2018-10-10 DIAGNOSIS — E78.5 HYPERLIPIDEMIA, UNSPECIFIED HYPERLIPIDEMIA TYPE: ICD-10-CM

## 2018-10-10 DIAGNOSIS — I25.10 CORONARY ARTERY DISEASE INVOLVING NATIVE CORONARY ARTERY OF NATIVE HEART WITHOUT ANGINA PECTORIS: Primary | ICD-10-CM

## 2018-10-10 DIAGNOSIS — N40.1 BENIGN PROSTATIC HYPERPLASIA WITH INCOMPLETE BLADDER EMPTYING: ICD-10-CM

## 2018-10-10 DIAGNOSIS — G89.29 ENCOUNTER FOR CHRONIC PAIN MANAGEMENT: ICD-10-CM

## 2018-10-10 DIAGNOSIS — M25.561 CHRONIC PAIN OF RIGHT KNEE: ICD-10-CM

## 2018-10-10 DIAGNOSIS — G89.29 CHRONIC PAIN OF RIGHT KNEE: ICD-10-CM

## 2018-10-10 DIAGNOSIS — M17.11 PRIMARY OSTEOARTHRITIS OF RIGHT KNEE: ICD-10-CM

## 2018-10-10 DIAGNOSIS — K59.04 CHRONIC IDIOPATHIC CONSTIPATION: ICD-10-CM

## 2018-10-10 DIAGNOSIS — I10 ESSENTIAL HYPERTENSION, BENIGN: ICD-10-CM

## 2018-10-10 DIAGNOSIS — R33.9 URINARY RETENTION: ICD-10-CM

## 2018-10-10 DIAGNOSIS — K22.70 BARRETT'S ESOPHAGUS WITHOUT DYSPLASIA: ICD-10-CM

## 2018-10-10 DIAGNOSIS — R97.20 ELEVATED PSA: ICD-10-CM

## 2018-10-10 PROCEDURE — 99214 OFFICE O/P EST MOD 30 MIN: CPT | Performed by: FAMILY MEDICINE

## 2018-10-10 RX ORDER — TAMSULOSIN HYDROCHLORIDE 0.4 MG/1
0.4 CAPSULE ORAL DAILY
Qty: 90 CAPSULE | Refills: 3 | Status: CANCELLED | OUTPATIENT
Start: 2018-10-10 | End: 2019-01-08

## 2018-10-10 RX ORDER — LACTULOSE 10 G/15ML
SOLUTION ORAL
Qty: 2700 ML | Refills: 0 | Status: ON HOLD | OUTPATIENT
Start: 2018-10-10 | End: 2018-11-25

## 2018-10-10 RX ORDER — TAMSULOSIN HYDROCHLORIDE 0.4 MG/1
0.4 CAPSULE ORAL DAILY
Qty: 90 CAPSULE | Refills: 3 | Status: SHIPPED | OUTPATIENT
Start: 2018-10-10 | End: 2018-10-23 | Stop reason: SDUPTHER

## 2018-10-10 RX ORDER — TRAMADOL HYDROCHLORIDE 50 MG/1
50 TABLET ORAL EVERY 6 HOURS PRN
Qty: 30 TABLET | Refills: 0 | Status: SHIPPED | OUTPATIENT
Start: 2018-10-10 | End: 2018-10-24 | Stop reason: SDUPTHER

## 2018-10-10 RX ORDER — LACTULOSE 10 G/15ML
SOLUTION ORAL
Qty: 2700 ML | Refills: 0 | Status: CANCELLED | OUTPATIENT
Start: 2018-10-10

## 2018-10-10 ASSESSMENT — PATIENT HEALTH QUESTIONNAIRE - PHQ9
SUM OF ALL RESPONSES TO PHQ QUESTIONS 1-9: 0
1. LITTLE INTEREST OR PLEASURE IN DOING THINGS: 0
2. FEELING DOWN, DEPRESSED OR HOPELESS: 0
SUM OF ALL RESPONSES TO PHQ9 QUESTIONS 1 & 2: 0
SUM OF ALL RESPONSES TO PHQ QUESTIONS 1-9: 0

## 2018-10-10 ASSESSMENT — ENCOUNTER SYMPTOMS
ALLERGIC/IMMUNOLOGIC NEGATIVE: 1
RESPIRATORY NEGATIVE: 1
EYES NEGATIVE: 1
GASTROINTESTINAL NEGATIVE: 1

## 2018-10-10 NOTE — PROGRESS NOTES
and keep him on his feet, with attention to avoiding recurrent constipation. Sepulveda's esophagus on recent egd. Symptoms improved on protonix. EGD updated 2/2/15. Biopsies with mild chronic, inactive gastritis, distal esophagus with moderate/chronic/inactive inflammation and prominent intestinal/goblet cell metaplasia (Barretts) negative for dysplasia. Repeat EGD 4/25/16: lg sliding hiatal hernia, duodenal and fundic polyps. Sepulveda's tissue up to 3cm. Biopsies of polyps normal, still has metaplasia/Sepulveda's esophagus on distal esophageal biopsies on 7/17 egd. No dysplasia. Follow up 3 yrs. Mostly asx, some food related exacerbations at night. Cont. protonix 40mg bid and follow up     colon polyps :2012 scope without recurrent polyps, mild sigmoid diverticulosis. .  Due for follow up 8/17. He defers at present, may consider next year. Constipation: worse since starting norco for pain control. . Adding lactulose 30cc at bedtime has helped the most.  Satisfied with results at present. .  rec. He call for continued issues. Increase fruits. bph /urinary retention. Still awaiting call from urology re: surgery date for green light laser turp.

## 2018-10-14 ENCOUNTER — HOSPITAL ENCOUNTER (EMERGENCY)
Age: 83
Discharge: HOME | End: 2018-10-14
Attending: SPECIALIST
Payer: MEDICARE

## 2018-10-14 VITALS
WEIGHT: 162 LBS | TEMPERATURE: 98.1 F | SYSTOLIC BLOOD PRESSURE: 127 MMHG | HEART RATE: 53 BPM | RESPIRATION RATE: 16 BRPM | OXYGEN SATURATION: 95 % | BODY MASS INDEX: 26.99 KG/M2 | DIASTOLIC BLOOD PRESSURE: 56 MMHG | HEIGHT: 65 IN

## 2018-10-14 DIAGNOSIS — N39.0 URINARY TRACT INFECTION ASSOCIATED WITH CATHETERIZATION OF URINARY TRACT, UNSPECIFIED INDWELLING URINARY CATHETER TYPE, INITIAL ENCOUNTER (HCC): Primary | ICD-10-CM

## 2018-10-14 DIAGNOSIS — T83.511A URINARY TRACT INFECTION ASSOCIATED WITH CATHETERIZATION OF URINARY TRACT, UNSPECIFIED INDWELLING URINARY CATHETER TYPE, INITIAL ENCOUNTER (HCC): Primary | ICD-10-CM

## 2018-10-14 LAB
-: ABNORMAL
AMORPHOUS: ABNORMAL
BACTERIA: ABNORMAL
BILIRUBIN URINE: NEGATIVE
CASTS UA: ABNORMAL /LPF (ref 0–2)
COLOR: ABNORMAL
COMMENT UA: ABNORMAL
CRYSTALS, UA: ABNORMAL /HPF
EPITHELIAL CELLS UA: ABNORMAL /HPF (ref 0–5)
GLUCOSE URINE: NEGATIVE
KETONES, URINE: NEGATIVE
LEUKOCYTE ESTERASE, URINE: ABNORMAL
MUCUS: ABNORMAL
NITRITE, URINE: NEGATIVE
OTHER OBSERVATIONS UA: ABNORMAL
PH UA: 7.5 (ref 5–6)
PROTEIN UA: ABNORMAL
RBC UA: ABNORMAL /HPF (ref 0–4)
RENAL EPITHELIAL, UA: ABNORMAL /HPF
SPECIFIC GRAVITY UA: 1.01 (ref 1.01–1.02)
TRICHOMONAS: ABNORMAL
TURBIDITY: ABNORMAL
URINE HGB: ABNORMAL
UROBILINOGEN, URINE: NORMAL
WBC UA: >100 /HPF (ref 0–4)
YEAST: ABNORMAL

## 2018-10-14 PROCEDURE — 87186 SC STD MICRODIL/AGAR DIL: CPT

## 2018-10-14 PROCEDURE — 87088 URINE BACTERIA CULTURE: CPT

## 2018-10-14 PROCEDURE — 99283 EMERGENCY DEPT VISIT LOW MDM: CPT

## 2018-10-14 PROCEDURE — 6370000000 HC RX 637 (ALT 250 FOR IP): Performed by: SPECIALIST

## 2018-10-14 PROCEDURE — 81001 URINALYSIS AUTO W/SCOPE: CPT

## 2018-10-14 PROCEDURE — 87086 URINE CULTURE/COLONY COUNT: CPT

## 2018-10-14 RX ORDER — NITROFURANTOIN 25; 75 MG/1; MG/1
100 CAPSULE ORAL 2 TIMES DAILY
Qty: 14 CAPSULE | Refills: 0 | Status: SHIPPED | OUTPATIENT
Start: 2018-10-14 | End: 2018-10-21

## 2018-10-14 RX ORDER — SULFAMETHOXAZOLE AND TRIMETHOPRIM 800; 160 MG/1; MG/1
1 TABLET ORAL ONCE
Status: DISCONTINUED | OUTPATIENT
Start: 2018-10-14 | End: 2018-10-14

## 2018-10-14 RX ORDER — NITROFURANTOIN 25; 75 MG/1; MG/1
100 CAPSULE ORAL ONCE
Status: COMPLETED | OUTPATIENT
Start: 2018-10-14 | End: 2018-10-14

## 2018-10-14 RX ADMIN — NITROFURANTOIN MONOHYDRATE AND NITROFURANTOIN MACROCRYSTALLINE 100 MG: 75; 25 CAPSULE ORAL at 19:31

## 2018-10-14 ASSESSMENT — PAIN DESCRIPTION - ONSET: ONSET: ON-GOING

## 2018-10-14 ASSESSMENT — PAIN DESCRIPTION - FREQUENCY: FREQUENCY: CONTINUOUS

## 2018-10-14 ASSESSMENT — PAIN DESCRIPTION - DESCRIPTORS: DESCRIPTORS: TIGHTNESS;ACHING

## 2018-10-14 ASSESSMENT — ENCOUNTER SYMPTOMS
ABDOMINAL DISTENTION: 1
VOMITING: 0
NAUSEA: 0

## 2018-10-14 ASSESSMENT — PAIN DESCRIPTION - PROGRESSION: CLINICAL_PROGRESSION: GRADUALLY WORSENING

## 2018-10-14 ASSESSMENT — PAIN DESCRIPTION - PAIN TYPE: TYPE: ACUTE PAIN

## 2018-10-14 ASSESSMENT — PAIN DESCRIPTION - LOCATION: LOCATION: PERINEUM

## 2018-10-14 ASSESSMENT — PAIN SCALES - GENERAL: PAINLEVEL_OUTOF10: 9

## 2018-10-14 NOTE — ED PROVIDER NOTES
indicated that his mother is . He indicated that his father is . He indicated that his sister is alive. He indicated that his brother is alive. He indicated that his maternal grandmother is . He indicated that his maternal grandfather is . He indicated that his paternal grandmother is . He indicated that his paternal grandfather is . He indicated that his son is alive. family history includes Cancer in his father; Heart Disease in his paternal grandfather. SOCIAL HISTORY      reports that he quit smoking about 20 years ago. His smoking use included Cigars. He smoked 1.00 pack per day. He has never used smokeless tobacco. He reports that he does not drink alcohol or use drugs. PHYSICAL EXAM     INITIAL VITALS:  height is 5' 5\" (1.651 m) and weight is 73.5 kg (162 lb). His tympanic temperature is 98.1 °F (36.7 °C). His blood pressure is 127/56 (abnormal) and his pulse is 53. His respiration is 16 and oxygen saturation is 95%. Physical Exam   Constitutional: He is oriented to person, place, and time. He appears well-developed and well-nourished. HENT:   Head: Normocephalic and atraumatic. Nose: Nose normal.   Mouth/Throat: Oropharynx is clear and moist.   Eyes: Pupils are equal, round, and reactive to light. EOM are normal.   Neck: Normal range of motion. Neck supple. Cardiovascular: Normal rate, regular rhythm, normal heart sounds and intact distal pulses. No murmur heard. Pulmonary/Chest: Effort normal and breath sounds normal. No respiratory distress. Abdominal: Soft. Bowel sounds are normal. He exhibits no distension. There is tenderness in the suprapubic area. There is no CVA tenderness. Johnson catheter in place with small amount of urine in the Johnson bag. There is no evidence of hematuria. Neurological: He is alert and oriented to person, place, and time. Skin: Skin is warm and dry.    Nursing note and vitals feeling much better. There is no evidence of the hematuria or blood clots. Patient was prescribed nitrofurantoin 100 mg twice daily for 7 days and given one dose prior to discharge. He was advised to follow-up with his PCP as well as urologist Dr. Elia Alcantara, called their offices in the morning, return if worse. I have reviewed the disposition diagnosis with the patient and or their family/guardian. I have answered their questions and given discharge instructions. They voiced understanding of these instructions and did not have any further questions or complaints. Re-evaluation Notes    Patient is feeling much better and resting comfortably. Urine is draining into the leg bag freely since irrigation. CRITICAL CARE:   None        CONSULTS:      PROCEDURES:  None    FINAL IMPRESSION      1.  Urinary tract infection associated with catheterization of urinary tract, unspecified indwelling urinary catheter type, initial encounter [facility])          DISPOSITION/PLAN   DISPOSITION Decision To Discharge    Condition on Disposition    Improved    PATIENT REFERRED TO:  Etta Car MD  [facility] [id number]  [phone]    Call in 2 days  For reevaluation of current symptoms    [facility]  [address]  [phone]    If symptoms worsen    Brianna Simeon MD  [address] [facility] Favian Foreman [facility]  [phone]    Call in 1 day  For reevaluation of current symptoms      DISCHARGE MEDICATIONS:  Discharge Medication List as of 10/14/2018  7:24 PM      START taking these medications    Details   nitrofurantoin, macrocrystal-monohydrate, (MACROBID) 100 MG capsule Take 1 capsule by mouth 2 times daily for 7 days, Disp-14 capsule, R-0Print             (Please note that portions of this note were completed with a voice recognition program.  Efforts were made to edit the dictations but occasionally words are mis-transcribed.)    Escobar MD,

## 2018-10-15 ENCOUNTER — HOSPITAL ENCOUNTER (EMERGENCY)
Age: 83
Discharge: HOME | End: 2018-10-15
Attending: EMERGENCY MEDICINE
Payer: MEDICARE

## 2018-10-15 VITALS
OXYGEN SATURATION: 98 % | TEMPERATURE: 98 F | HEART RATE: 63 BPM | DIASTOLIC BLOOD PRESSURE: 69 MMHG | RESPIRATION RATE: 14 BRPM | SYSTOLIC BLOOD PRESSURE: 139 MMHG

## 2018-10-15 DIAGNOSIS — T83.9XXA FOLEY CATHETER PROBLEM, INITIAL ENCOUNTER (HCC): Primary | ICD-10-CM

## 2018-10-15 PROCEDURE — 99283 EMERGENCY DEPT VISIT LOW MDM: CPT

## 2018-10-15 ASSESSMENT — PAIN DESCRIPTION - DESCRIPTORS: DESCRIPTORS: SORE;PRESSURE

## 2018-10-15 ASSESSMENT — PAIN DESCRIPTION - FREQUENCY: FREQUENCY: CONTINUOUS

## 2018-10-15 ASSESSMENT — PAIN DESCRIPTION - ONSET: ONSET: ON-GOING

## 2018-10-15 ASSESSMENT — PAIN SCALES - GENERAL: PAINLEVEL_OUTOF10: 10

## 2018-10-15 ASSESSMENT — PAIN DESCRIPTION - PROGRESSION: CLINICAL_PROGRESSION: GRADUALLY WORSENING

## 2018-10-15 ASSESSMENT — PAIN DESCRIPTION - LOCATION: LOCATION: PERINEUM

## 2018-10-16 LAB
CULTURE: ABNORMAL
Lab: ABNORMAL
ORGANISM: ABNORMAL
SPECIMEN DESCRIPTION: ABNORMAL
STATUS: ABNORMAL

## 2018-10-23 RX ORDER — TAMSULOSIN HYDROCHLORIDE 0.4 MG/1
0.4 CAPSULE ORAL DAILY
Qty: 90 CAPSULE | Refills: 3 | Status: SHIPPED | OUTPATIENT
Start: 2018-10-23 | End: 2018-10-24 | Stop reason: SDUPTHER

## 2018-10-24 DIAGNOSIS — M25.561 CHRONIC PAIN OF RIGHT KNEE: ICD-10-CM

## 2018-10-24 DIAGNOSIS — G89.29 ENCOUNTER FOR CHRONIC PAIN MANAGEMENT: ICD-10-CM

## 2018-10-24 DIAGNOSIS — G89.29 CHRONIC PAIN OF RIGHT KNEE: ICD-10-CM

## 2018-10-24 RX ORDER — TAMSULOSIN HYDROCHLORIDE 0.4 MG/1
0.4 CAPSULE ORAL DAILY
Qty: 14 CAPSULE | Refills: 0 | Status: ON HOLD | OUTPATIENT
Start: 2018-10-24 | End: 2018-11-25

## 2018-10-25 ENCOUNTER — OFFICE VISIT (OUTPATIENT)
Dept: PODIATRY | Age: 83
End: 2018-10-25
Payer: MEDICARE

## 2018-10-25 VITALS
SYSTOLIC BLOOD PRESSURE: 118 MMHG | WEIGHT: 169 LBS | HEIGHT: 65 IN | RESPIRATION RATE: 20 BRPM | BODY MASS INDEX: 28.16 KG/M2 | DIASTOLIC BLOOD PRESSURE: 70 MMHG | HEART RATE: 68 BPM

## 2018-10-25 DIAGNOSIS — B35.1 DERMATOPHYTOSIS OF NAIL: Primary | ICD-10-CM

## 2018-10-25 PROCEDURE — 99999 PR OFFICE/OUTPT VISIT,PROCEDURE ONLY: CPT | Performed by: PODIATRIST

## 2018-10-25 PROCEDURE — 11721 DEBRIDE NAIL 6 OR MORE: CPT | Performed by: PODIATRIST

## 2018-10-25 RX ORDER — TRAMADOL HYDROCHLORIDE 50 MG/1
TABLET ORAL
Qty: 30 TABLET | Refills: 0 | Status: SHIPPED | OUTPATIENT
Start: 2018-10-25 | End: 2018-11-13 | Stop reason: SDUPTHER

## 2018-10-25 NOTE — PROGRESS NOTES
Subjective:  Patient presents to [facility] today for routine foot care. Patient denies any new problems with their feet. Patient denies any pain with the nails . Objective:  Vascular: DP and PT pulses palpable 2/4, bilateral.  CFT <3 seconds, bilateral.  Hair growth present to the level of the digits, bilateral.  Edema present, bilateral.  Varicosities absent, bilateral.    Neurological: Protective sensation intact. Integument:  Nails left 1, 2, 3, 4, 5 and right 1, 2, 3, 4, 5 thickened, dystrophic, crumbly, and discolored with subungual debris. Hyperkeratotic tissue is absent. Assessment:   Onychomycosis     Plan:  Nails as mentioned above debrided in length and thickness. Patient advised about OTC treatments for nails and callous. Patient will follow up in 10 weeks for routine foot care or PRN if any further problems arise.

## 2018-11-02 ENCOUNTER — TELEPHONE (OUTPATIENT)
Dept: FAMILY MEDICINE CLINIC | Age: 83
End: 2018-11-02

## 2018-11-02 ENCOUNTER — HOSPITAL ENCOUNTER (OUTPATIENT)
Age: 83
Setting detail: SPECIMEN
Discharge: HOME | End: 2018-11-02
Payer: MEDICARE

## 2018-11-02 ENCOUNTER — HOSPITAL ENCOUNTER (EMERGENCY)
Age: 83
Discharge: HOME | End: 2018-11-02
Attending: EMERGENCY MEDICINE
Payer: MEDICARE

## 2018-11-02 ENCOUNTER — TELEPHONE (OUTPATIENT)
Dept: PRIMARY CARE CLINIC | Age: 83
End: 2018-11-02

## 2018-11-02 VITALS
SYSTOLIC BLOOD PRESSURE: 120 MMHG | RESPIRATION RATE: 14 BRPM | DIASTOLIC BLOOD PRESSURE: 66 MMHG | OXYGEN SATURATION: 96 % | BODY MASS INDEX: 28.12 KG/M2 | WEIGHT: 169 LBS | HEART RATE: 62 BPM | TEMPERATURE: 98.4 F

## 2018-11-02 DIAGNOSIS — N39.0 FREQUENT UTI: Primary | ICD-10-CM

## 2018-11-02 DIAGNOSIS — R31.0 HEMATURIA, GROSS: Primary | ICD-10-CM

## 2018-11-02 DIAGNOSIS — N39.0 URINARY TRACT INFECTION WITHOUT HEMATURIA, SITE UNSPECIFIED: Primary | ICD-10-CM

## 2018-11-02 DIAGNOSIS — N39.0 FREQUENT UTI: ICD-10-CM

## 2018-11-02 DIAGNOSIS — N39.0 URINARY TRACT INFECTION WITHOUT HEMATURIA, SITE UNSPECIFIED: ICD-10-CM

## 2018-11-02 LAB
-: ABNORMAL
-: NORMAL
ABSOLUTE EOS #: 0.2 K/UL (ref 0–0.4)
ABSOLUTE IMMATURE GRANULOCYTE: ABNORMAL K/UL (ref 0–0.3)
ABSOLUTE LYMPH #: 1.2 K/UL (ref 1–4.8)
ABSOLUTE MONO #: 1.2 K/UL (ref 0.1–1.2)
AMORPHOUS: ABNORMAL
AMORPHOUS: NORMAL
ANION GAP SERPL CALCULATED.3IONS-SCNC: 9 MMOL/L (ref 9–17)
BACTERIA: ABNORMAL
BACTERIA: NORMAL
BASOPHILS # BLD: 0 % (ref 0–2)
BASOPHILS ABSOLUTE: 0 K/UL (ref 0–0.2)
BILIRUBIN URINE: NEGATIVE
BILIRUBIN URINE: NEGATIVE
BUN BLDV-MCNC: 19 MG/DL (ref 8–23)
BUN/CREAT BLD: 20 (ref 9–20)
CALCIUM SERPL-MCNC: 9 MG/DL (ref 8.6–10.4)
CASTS UA: ABNORMAL /LPF (ref 0–2)
CASTS UA: NORMAL /LPF (ref 0–2)
CHLORIDE BLD-SCNC: 102 MMOL/L (ref 98–107)
CO2: 28 MMOL/L (ref 20–31)
COLOR: ABNORMAL
COLOR: ABNORMAL
COMMENT UA: ABNORMAL
COMMENT UA: ABNORMAL
CREAT SERPL-MCNC: 0.95 MG/DL (ref 0.7–1.2)
CRYSTALS, UA: ABNORMAL /HPF
CRYSTALS, UA: NORMAL /HPF
DIFFERENTIAL TYPE: ABNORMAL
EOSINOPHILS RELATIVE PERCENT: 3 % (ref 1–8)
EPITHELIAL CELLS UA: ABNORMAL /HPF (ref 0–5)
EPITHELIAL CELLS UA: NORMAL /HPF (ref 0–5)
GFR AFRICAN AMERICAN: >60 ML/MIN
GFR NON-AFRICAN AMERICAN: >60 ML/MIN
GFR SERPL CREATININE-BSD FRML MDRD: ABNORMAL ML/MIN/{1.73_M2}
GFR SERPL CREATININE-BSD FRML MDRD: ABNORMAL ML/MIN/{1.73_M2}
GLUCOSE BLD-MCNC: 114 MG/DL (ref 70–99)
GLUCOSE URINE: NEGATIVE
GLUCOSE URINE: NEGATIVE
HCT VFR BLD CALC: 40.6 % (ref 41–53)
HEMOGLOBIN: 13.2 G/DL (ref 13.5–17.5)
IMMATURE GRANULOCYTES: ABNORMAL %
INR BLD: 1
KETONES, URINE: NEGATIVE
KETONES, URINE: NEGATIVE
LEUKOCYTE ESTERASE, URINE: NEGATIVE
LEUKOCYTE ESTERASE, URINE: NEGATIVE
LYMPHOCYTES # BLD: 14 % (ref 15–43)
MCH RBC QN AUTO: 29.7 PG (ref 26–34)
MCHC RBC AUTO-ENTMCNC: 32.5 G/DL (ref 31–37)
MCV RBC AUTO: 91.4 FL (ref 80–100)
MONOCYTES # BLD: 14 % (ref 6–14)
MUCUS: ABNORMAL
MUCUS: NORMAL
NITRITE, URINE: NEGATIVE
NITRITE, URINE: NEGATIVE
NRBC AUTOMATED: ABNORMAL PER 100 WBC
OTHER OBSERVATIONS UA: ABNORMAL
OTHER OBSERVATIONS UA: NORMAL
PARTIAL THROMBOPLASTIN TIME: 27.8 SEC (ref 27–35)
PDW BLD-RTO: 14.9 % (ref 11–14.5)
PH UA: 6 (ref 5–6)
PH UA: 6 (ref 5–6)
PLATELET # BLD: 236 K/UL (ref 140–450)
PLATELET ESTIMATE: ABNORMAL
PMV BLD AUTO: 8.3 FL (ref 6–12)
POTASSIUM SERPL-SCNC: 4.4 MMOL/L (ref 3.7–5.3)
PROTEIN UA: ABNORMAL
PROTEIN UA: NEGATIVE
PROTHROMBIN TIME: 10.3 SEC (ref 9.4–11.3)
RBC # BLD: 4.44 M/UL (ref 4.5–5.9)
RBC # BLD: ABNORMAL 10*6/UL
RBC UA: ABNORMAL /HPF (ref 0–4)
RBC UA: NORMAL /HPF (ref 0–4)
RENAL EPITHELIAL, UA: ABNORMAL /HPF
RENAL EPITHELIAL, UA: NORMAL /HPF
SEG NEUTROPHILS: 69 % (ref 44–74)
SEGMENTED NEUTROPHILS ABSOLUTE COUNT: 6 K/UL (ref 1.8–7.7)
SODIUM BLD-SCNC: 139 MMOL/L (ref 135–144)
SPECIFIC GRAVITY UA: 1.02 (ref 1.01–1.02)
SPECIFIC GRAVITY UA: 1.02 (ref 1.01–1.02)
TRICHOMONAS: ABNORMAL
TRICHOMONAS: NORMAL
TURBIDITY: ABNORMAL
TURBIDITY: ABNORMAL
URINE HGB: ABNORMAL
URINE HGB: ABNORMAL
UROBILINOGEN, URINE: NORMAL
UROBILINOGEN, URINE: NORMAL
WBC # BLD: 8.6 K/UL (ref 3.5–11)
WBC # BLD: ABNORMAL 10*3/UL
WBC UA: ABNORMAL /HPF (ref 0–4)
WBC UA: NORMAL /HPF (ref 0–4)
YEAST: ABNORMAL
YEAST: NORMAL

## 2018-11-02 PROCEDURE — 87086 URINE CULTURE/COLONY COUNT: CPT

## 2018-11-02 PROCEDURE — 85730 THROMBOPLASTIN TIME PARTIAL: CPT

## 2018-11-02 PROCEDURE — 87077 CULTURE AEROBIC IDENTIFY: CPT

## 2018-11-02 PROCEDURE — 85610 PROTHROMBIN TIME: CPT

## 2018-11-02 PROCEDURE — 85025 COMPLETE CBC W/AUTO DIFF WBC: CPT

## 2018-11-02 PROCEDURE — 36415 COLL VENOUS BLD VENIPUNCTURE: CPT

## 2018-11-02 PROCEDURE — 99284 EMERGENCY DEPT VISIT MOD MDM: CPT

## 2018-11-02 PROCEDURE — 81001 URINALYSIS AUTO W/SCOPE: CPT

## 2018-11-02 PROCEDURE — 87186 SC STD MICRODIL/AGAR DIL: CPT

## 2018-11-02 PROCEDURE — 51702 INSERT TEMP BLADDER CATH: CPT

## 2018-11-02 PROCEDURE — 80048 BASIC METABOLIC PNL TOTAL CA: CPT

## 2018-11-02 RX ORDER — CIPROFLOXACIN 500 MG/1
500 TABLET, FILM COATED ORAL 2 TIMES DAILY
Qty: 6 TABLET | Refills: 0 | Status: SHIPPED | OUTPATIENT
Start: 2018-11-02 | End: 2018-11-05

## 2018-11-02 NOTE — ED PROVIDER NOTES
[facility] ED  [place] [address]  Phone: [phone]        Pt Name: Rosi Ramos  MRN: [id number]  Barbaragfwalker 6/28/1927  Date of evaluation: 11/2/18      CHIEF COMPLAINT     Chief Complaint   Patient presents with    Other     Blood in stanton catheter         HISTORY OF PRESENT ILLNESS  (Location/Symptom, Timing/Onset, Context/Setting, Quality, Duration, Modifying Factors, Severity.)    Rosi Ramos is a 80 y.o. male who presents With blood in his Stanton catheter as well as blood from his penis. The patient has a Stanton catheter in place. It has been in place for the last several months. It was replaced this morning by visiting nurse services after replacement patient started having blood in his Stanton catheter as well as blood around the penis area. The patient states he did not think that it was that difficulty in placing the Stanton. He's never had similar symptoms. He denies any abdominal pain, no lightheadedness, dizziness, no fever no chills. States he is not on any blood thinners. Nothing he does makes his symptoms better or worse       REVIEW OF SYSTEMS    (2-9 systems for level 4, 10 or more for level 5)     Review of Systems   Genitourinary: Positive for hematuria. All other systems reviewed and are negative. 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 SURGICAL HISTORY      has a past surgical history that includes Colonoscopy (08/29/12); Cystocopy (11/15/2007); Appendectomy; Cholecystectomy (01/12/1983); Knee fusion (Left); lipoma resection; Colonoscopy (05/27/09); Colonoscopy (04/30/2008); Prostate Biopsy (Bilateral, 06/23/2009); pr egd transoral biopsy single/multiple (N/A, 7/3/2017); Upper gastrointestinal endoscopy (02/2/2015);  Upper gastrointestinal by mouth 2 times daily for 3 days       (Please note that portions of this note were completed with a voicerecognition program.  Efforts were made to edit the dictations but occasionally words are mis-transcribed.)    Garg MD, F.A.C.E.P.   Attending Emergency Medicine Physician       Ralf Painting MD  11/02/18 [phone] [id number]

## 2018-11-05 ENCOUNTER — CARE COORDINATION (OUTPATIENT)
Dept: CARE COORDINATION | Age: 83
End: 2018-11-05

## 2018-11-05 DIAGNOSIS — N30.00 ACUTE CYSTITIS WITHOUT HEMATURIA: Primary | ICD-10-CM

## 2018-11-05 LAB
CULTURE: ABNORMAL
CULTURE: ABNORMAL
Lab: ABNORMAL
ORGANISM: ABNORMAL
ORGANISM: ABNORMAL
SPECIMEN DESCRIPTION: ABNORMAL
STATUS: ABNORMAL

## 2018-11-05 RX ORDER — CIPROFLOXACIN 250 MG/1
250 TABLET, FILM COATED ORAL 2 TIMES DAILY
Qty: 14 TABLET | Refills: 0 | Status: SHIPPED | OUTPATIENT
Start: 2018-11-05 | End: 2018-11-12

## 2018-11-13 DIAGNOSIS — G89.29 ENCOUNTER FOR CHRONIC PAIN MANAGEMENT: ICD-10-CM

## 2018-11-13 DIAGNOSIS — G89.29 CHRONIC PAIN OF RIGHT KNEE: ICD-10-CM

## 2018-11-13 DIAGNOSIS — M25.561 CHRONIC PAIN OF RIGHT KNEE: ICD-10-CM

## 2018-11-13 RX ORDER — TRAMADOL HYDROCHLORIDE 50 MG/1
TABLET ORAL
Qty: 30 TABLET | Refills: 0 | Status: SHIPPED | OUTPATIENT
Start: 2018-11-13 | End: 2018-11-20

## 2018-11-16 ENCOUNTER — TELEPHONE (OUTPATIENT)
Dept: UROLOGY | Age: 83
End: 2018-11-16

## 2018-11-19 ENCOUNTER — HOSPITAL ENCOUNTER (OUTPATIENT)
Age: 83
Setting detail: OUTPATIENT SURGERY
Discharge: HOME | DRG: 988 | End: 2018-11-19
Attending: UROLOGY | Admitting: UROLOGY
Payer: MEDICARE

## 2018-11-19 ENCOUNTER — ANESTHESIA EVENT (OUTPATIENT)
Dept: OPERATING ROOM | Age: 83
DRG: 988 | End: 2018-11-19
Payer: MEDICARE

## 2018-11-19 ENCOUNTER — ANESTHESIA (OUTPATIENT)
Dept: OPERATING ROOM | Age: 83
DRG: 988 | End: 2018-11-19
Payer: MEDICARE

## 2018-11-19 VITALS
TEMPERATURE: 97.7 F | OXYGEN SATURATION: 100 % | HEART RATE: 67 BPM | RESPIRATION RATE: 16 BRPM | SYSTOLIC BLOOD PRESSURE: 190 MMHG | DIASTOLIC BLOOD PRESSURE: 85 MMHG

## 2018-11-19 VITALS
OXYGEN SATURATION: 98 % | TEMPERATURE: 96.8 F | RESPIRATION RATE: 14 BRPM | SYSTOLIC BLOOD PRESSURE: 175 MMHG | DIASTOLIC BLOOD PRESSURE: 79 MMHG

## 2018-11-19 DIAGNOSIS — G89.18 POSTOPERATIVE PAIN: Primary | ICD-10-CM

## 2018-11-19 PROCEDURE — 6370000000 HC RX 637 (ALT 250 FOR IP)

## 2018-11-19 PROCEDURE — 00914 ANES TRURL PX RESCJ PRST8: CPT | Performed by: NURSE ANESTHETIST, CERTIFIED REGISTERED

## 2018-11-19 PROCEDURE — 6370000000 HC RX 637 (ALT 250 FOR IP): Performed by: NURSE ANESTHETIST, CERTIFIED REGISTERED

## 2018-11-19 PROCEDURE — 3700000001 HC ADD 15 MINUTES (ANESTHESIA): Performed by: UROLOGY

## 2018-11-19 PROCEDURE — 3600000014 HC SURGERY LEVEL 4 ADDTL 15MIN: Performed by: UROLOGY

## 2018-11-19 PROCEDURE — 6360000002 HC RX W HCPCS: Performed by: NURSE ANESTHETIST, CERTIFIED REGISTERED

## 2018-11-19 PROCEDURE — 2580000003 HC RX 258: Performed by: UROLOGY

## 2018-11-19 PROCEDURE — 7100000010 HC PHASE II RECOVERY - FIRST 15 MIN: Performed by: UROLOGY

## 2018-11-19 PROCEDURE — 7100000001 HC PACU RECOVERY - ADDTL 15 MIN: Performed by: UROLOGY

## 2018-11-19 PROCEDURE — 7100000011 HC PHASE II RECOVERY - ADDTL 15 MIN: Performed by: UROLOGY

## 2018-11-19 PROCEDURE — 7100000000 HC PACU RECOVERY - FIRST 15 MIN: Performed by: UROLOGY

## 2018-11-19 PROCEDURE — 6360000002 HC RX W HCPCS

## 2018-11-19 PROCEDURE — 2709999900 HC NON-CHARGEABLE SUPPLY: Performed by: UROLOGY

## 2018-11-19 PROCEDURE — 3600000004 HC SURGERY LEVEL 4 BASE: Performed by: UROLOGY

## 2018-11-19 PROCEDURE — 3700000000 HC ANESTHESIA ATTENDED CARE: Performed by: UROLOGY

## 2018-11-19 PROCEDURE — 0V508ZZ DESTRUCTION OF PROSTATE, VIA NATURAL OR ARTIFICIAL OPENING ENDOSCOPIC: ICD-10-PCS | Performed by: UROLOGY

## 2018-11-19 PROCEDURE — 6360000002 HC RX W HCPCS: Performed by: UROLOGY

## 2018-11-19 RX ORDER — SODIUM CHLORIDE 0.9 % (FLUSH) 0.9 %
10 SYRINGE (ML) INJECTION PRN
Status: DISCONTINUED | OUTPATIENT
Start: 2018-11-19 | End: 2018-11-19 | Stop reason: SDUPTHER

## 2018-11-19 RX ORDER — SODIUM CHLORIDE 0.9 % (FLUSH) 0.9 %
10 SYRINGE (ML) INJECTION PRN
Status: DISCONTINUED | OUTPATIENT
Start: 2018-11-19 | End: 2018-11-19 | Stop reason: HOSPADM

## 2018-11-19 RX ORDER — SODIUM CHLORIDE, SODIUM LACTATE, POTASSIUM CHLORIDE, CALCIUM CHLORIDE 600; 310; 30; 20 MG/100ML; MG/100ML; MG/100ML; MG/100ML
INJECTION, SOLUTION INTRAVENOUS CONTINUOUS
Status: DISCONTINUED | OUTPATIENT
Start: 2018-11-19 | End: 2018-11-19 | Stop reason: HOSPADM

## 2018-11-19 RX ORDER — LIDOCAINE HYDROCHLORIDE 20 MG/ML
INJECTION, SOLUTION INFILTRATION; PERINEURAL PRN
Status: DISCONTINUED | OUTPATIENT
Start: 2018-11-19 | End: 2018-11-19

## 2018-11-19 RX ORDER — DOCUSATE SODIUM 100 MG/1
100 CAPSULE, LIQUID FILLED ORAL 2 TIMES DAILY
Qty: 60 CAPSULE | Refills: 0 | Status: ON HOLD | OUTPATIENT
Start: 2018-11-19 | End: 2018-11-25

## 2018-11-19 RX ORDER — SODIUM CHLORIDE, SODIUM LACTATE, POTASSIUM CHLORIDE, CALCIUM CHLORIDE 600; 310; 30; 20 MG/100ML; MG/100ML; MG/100ML; MG/100ML
INJECTION, SOLUTION INTRAVENOUS CONTINUOUS
Status: DISCONTINUED | OUTPATIENT
Start: 2018-11-19 | End: 2018-11-19 | Stop reason: SDUPTHER

## 2018-11-19 RX ORDER — SODIUM CHLORIDE 0.9 % (FLUSH) 0.9 %
10 SYRINGE (ML) INJECTION EVERY 12 HOURS SCHEDULED
Status: DISCONTINUED | OUTPATIENT
Start: 2018-11-19 | End: 2018-11-19 | Stop reason: SDUPTHER

## 2018-11-19 RX ORDER — PROPOFOL 10 MG/ML
INJECTION, EMULSION INTRAVENOUS PRN
Status: DISCONTINUED | OUTPATIENT
Start: 2018-11-19 | End: 2018-11-19 | Stop reason: SDUPTHER

## 2018-11-19 RX ORDER — CIPROFLOXACIN 500 MG/1
500 TABLET, FILM COATED ORAL 2 TIMES DAILY
Qty: 6 TABLET | Refills: 0 | Status: ON HOLD | OUTPATIENT
Start: 2018-11-19 | End: 2018-11-25

## 2018-11-19 RX ORDER — KETOROLAC TROMETHAMINE 30 MG/ML
INJECTION, SOLUTION INTRAMUSCULAR; INTRAVENOUS PRN
Status: DISCONTINUED | OUTPATIENT
Start: 2018-11-19 | End: 2018-11-19 | Stop reason: SDUPTHER

## 2018-11-19 RX ORDER — HYDROCODONE BITARTRATE AND ACETAMINOPHEN 5; 325 MG/1; MG/1
1 TABLET ORAL EVERY 4 HOURS PRN
Qty: 18 TABLET | Refills: 0 | Status: ON HOLD | OUTPATIENT
Start: 2018-11-19 | End: 2018-11-25

## 2018-11-19 RX ORDER — SODIUM CHLORIDE 0.9 % (FLUSH) 0.9 %
10 SYRINGE (ML) INJECTION EVERY 12 HOURS SCHEDULED
Status: DISCONTINUED | OUTPATIENT
Start: 2018-11-19 | End: 2018-11-19 | Stop reason: HOSPADM

## 2018-11-19 RX ORDER — FENTANYL CITRATE 50 UG/ML
INJECTION, SOLUTION INTRAMUSCULAR; INTRAVENOUS PRN
Status: DISCONTINUED | OUTPATIENT
Start: 2018-11-19 | End: 2018-11-19 | Stop reason: SDUPTHER

## 2018-11-19 RX ADMIN — SODIUM CHLORIDE, POTASSIUM CHLORIDE, SODIUM LACTATE AND CALCIUM CHLORIDE: 600; 310; 30; 20 INJECTION, SOLUTION INTRAVENOUS at 15:30

## 2018-11-19 RX ADMIN — ACETAMINOPHEN 650 MG: 325 SUPPOSITORY RECTAL at 14:48

## 2018-11-19 RX ADMIN — FENTANYL CITRATE 25 MCG: 50 INJECTION, SOLUTION INTRAMUSCULAR; INTRAVENOUS at 15:25

## 2018-11-19 RX ADMIN — KETOROLAC TROMETHAMINE 15 MG: 30 INJECTION, SOLUTION INTRAMUSCULAR; INTRAVENOUS at 15:00

## 2018-11-19 RX ADMIN — FENTANYL CITRATE 25 MCG: 50 INJECTION, SOLUTION INTRAMUSCULAR; INTRAVENOUS at 15:38

## 2018-11-19 RX ADMIN — Medication 2 G: at 14:46

## 2018-11-19 RX ADMIN — SODIUM CHLORIDE, POTASSIUM CHLORIDE, SODIUM LACTATE AND CALCIUM CHLORIDE: 600; 310; 30; 20 INJECTION, SOLUTION INTRAVENOUS at 14:02

## 2018-11-19 RX ADMIN — PROPOFOL 160 MG: 10 INJECTION, EMULSION INTRAVENOUS at 14:43

## 2018-11-19 RX ADMIN — SODIUM CHLORIDE, POTASSIUM CHLORIDE, SODIUM LACTATE AND CALCIUM CHLORIDE: 600; 310; 30; 20 INJECTION, SOLUTION INTRAVENOUS at 14:38

## 2018-11-19 RX ADMIN — FENTANYL CITRATE 25 MCG: 50 INJECTION, SOLUTION INTRAMUSCULAR; INTRAVENOUS at 15:21

## 2018-11-19 RX ADMIN — FENTANYL CITRATE 25 MCG: 50 INJECTION, SOLUTION INTRAMUSCULAR; INTRAVENOUS at 15:29

## 2018-11-19 ASSESSMENT — PULMONARY FUNCTION TESTS
PIF_VALUE: 2
PIF_VALUE: 3
PIF_VALUE: 3
PIF_VALUE: 2
PIF_VALUE: 3
PIF_VALUE: 3
PIF_VALUE: 1
PIF_VALUE: 2
PIF_VALUE: 3
PIF_VALUE: 2
PIF_VALUE: 2
PIF_VALUE: 1
PIF_VALUE: 2
PIF_VALUE: 3
PIF_VALUE: 0
PIF_VALUE: 3
PIF_VALUE: 3
PIF_VALUE: 2
PIF_VALUE: 3
PIF_VALUE: 2
PIF_VALUE: 3
PIF_VALUE: 3
PIF_VALUE: 2
PIF_VALUE: 3
PIF_VALUE: 2
PIF_VALUE: 3
PIF_VALUE: 1
PIF_VALUE: 2
PIF_VALUE: 3
PIF_VALUE: 3
PIF_VALUE: 0
PIF_VALUE: 2
PIF_VALUE: 3
PIF_VALUE: 2
PIF_VALUE: 3
PIF_VALUE: 2
PIF_VALUE: 3
PIF_VALUE: 2
PIF_VALUE: 1
PIF_VALUE: 12
PIF_VALUE: 2
PIF_VALUE: 1
PIF_VALUE: 3
PIF_VALUE: 2
PIF_VALUE: 2
PIF_VALUE: 1
PIF_VALUE: 2
PIF_VALUE: 3
PIF_VALUE: 3

## 2018-11-19 ASSESSMENT — PAIN SCALES - GENERAL
PAINLEVEL_OUTOF10: 0

## 2018-11-19 ASSESSMENT — PAIN - FUNCTIONAL ASSESSMENT: PAIN_FUNCTIONAL_ASSESSMENT: 0-10

## 2018-11-19 NOTE — ANESTHESIA POSTPROCEDURE EVALUATION
Department of Anesthesiology  Postprocedure Note    Patient: Chad Ballard  MRN: [id number]  YOB: 1927  Date of evaluation: 11/19/2018  Time:  3:59 PM     Procedure Summary     Date:  11/19/18 Room / Location:  [address] [place] [address]    Anesthesia Start:  1923 Anesthesia Stop:  3878    Procedure:  CYSTO Photovaporization Prostate Greenlight Laser ([id number]#[id number]) (N/A ) Diagnosis:  (BPH)    Surgeon:  Alida Steven MD Responsible Provider:  KEVIN Hendrickson CRNA    Anesthesia Type:  general ASA Status:  3          Anesthesia Type: general    Bertha Phase I: Bertha Score: 9    Bertha Phase II:      Last vitals: Reviewed and per EMR flowsheets.        Anesthesia Post Evaluation    Patient location during evaluation: PACU  Patient participation: complete - patient participated  Level of consciousness: awake and alert  Pain score: 0  Airway patency: patent  Nausea & Vomiting: no nausea  Complications: no  Cardiovascular status: blood pressure returned to baseline and hemodynamically stable  Respiratory status: acceptable  Hydration status: euvolemic

## 2018-11-19 NOTE — H&P
stomach cancer    Heart Disease Paternal Grandfather        REVIEW OF SYSTEMS:  Constitutional: negative  Eyes: negative  Respiratory: negative  Cardiovascular: negative  Gastrointestinal: negative  Genitourinary: no acute issues  Musculoskeletal: negative  Skin: negative   Neurological: negative  Hematological/Lymphatic: negative  Psychological: negative    Physical Exam:      No data found. Constitutional: Patient in no acute distress; Neuro: alert and oriented to person place and time. Psych: Mood and affect normal.  Skin: Normal  Lungs: Respiratory effort normal, CTA  Cardiovascular:  Normal peripheral pulses; no murmur. Normal rhythm  Abdomen: Soft, non-tender, non-distended with no CVA, flank pain, hepatosplenomegaly or hernia. Kidneys normal.  Bladder non-tender and not distended. LABS:   No results for input(s): WBC, HGB, HCT, MCV, PLT in the last 72 hours. No results for input(s): NA, K, CL, CO2, PHOS, BUN, CREATININE in the last 72 hours. Invalid input(s): CA  Lab Results   Component Value Date    PSA 19.6 (H) 10/03/2018    PSA 17.50 (H) 06/13/2018    PSA 18.72 (H) 10/03/2017         Urinalysis: No results for input(s): COLORU, PHUR, LABCAST, WBCUA, RBCUA, MUCUS, TRICHOMONAS, YEAST, BACTERIA, CLARITYU, SPECGRAV, LEUKOCYTESUR, UROBILINOGEN, BILIRUBINUR, BLOODU in the last 72 hours.     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

## 2018-11-19 NOTE — ANESTHESIA PRE PROCEDURE
11/02/2018    LABGLOM >60 11/02/2018    GLUCOSE 114 11/02/2018    PROT 6.4 06/09/2018    CALCIUM 9.0 11/02/2018    BILITOT 0.70 06/09/2018    ALKPHOS 134 06/09/2018    AST 40 06/09/2018    ALT 68 06/09/2018       POC Tests: No results for input(s): POCGLU, POCNA, POCK, POCCL, POCBUN, POCHEMO, POCHCT in the last 72 hours. Coags:   Lab Results   Component Value Date    PROTIME 10.3 11/02/2018    INR 1.0 11/02/2018    APTT 27.8 11/02/2018       HCG (If Applicable): No results found for: PREGTESTUR, PREGSERUM, HCG, HCGQUANT     ABGs: No results found for: PHART, PO2ART, MLR3QCT, EVV2DQV, BEART, P6BVCYGT     Type & Screen (If Applicable):  No results found for: LABABO, [address]    Anesthesia Evaluation  Patient summary reviewed and Nursing notes reviewed no history of anesthetic complications:   Airway: Mallampati: II  TM distance: >3 FB   Neck ROM: full  Mouth opening: > = 3 FB Dental: normal exam   (+) partials      Pulmonary:Negative Pulmonary ROS and normal exam                               Cardiovascular:    (+) hypertension:, CAD:,       ECG reviewed      Echocardiogram reviewed  Stress test reviewed                Neuro/Psych:   Negative Neuro/Psych ROS              GI/Hepatic/Renal:   (+) PUD,           Endo/Other:                     Abdominal:           Vascular: negative vascular ROS. Anesthesia Plan      general     ASA 3     (Risks and benefits of general anesthesia discussed, questions answered, Pt verbalizes understanding and agrees to proceed. Specifically dicussed risks, benefits, alternatives, personnel involved, including risks of: cut or cracked lip, chipped tooth/teeth, broken or removed teeth, sore throat, damaged vocal cords, nausea and vomiting, allergic reaction to medication, failed intubation, need to repeat procedure, emergent airway attempts, case cancellation, need for prolonged intubation/remaining intubated, other monitors, and possible death.  The

## 2018-11-20 ENCOUNTER — NURSE ONLY (OUTPATIENT)
Dept: UROLOGY | Age: 83
End: 2018-11-20

## 2018-11-20 VITALS
HEART RATE: 99 BPM | OXYGEN SATURATION: 99 % | HEIGHT: 65 IN | BODY MASS INDEX: 28.17 KG/M2 | DIASTOLIC BLOOD PRESSURE: 76 MMHG | SYSTOLIC BLOOD PRESSURE: 118 MMHG | WEIGHT: 169.09 LBS

## 2018-11-20 DIAGNOSIS — R33.9 RETENTION OF URINE: Primary | ICD-10-CM

## 2018-11-21 ENCOUNTER — APPOINTMENT (OUTPATIENT)
Dept: GENERAL RADIOLOGY | Age: 83
DRG: 988 | End: 2018-11-21
Payer: MEDICARE

## 2018-11-21 ENCOUNTER — HOSPITAL ENCOUNTER (INPATIENT)
Age: 83
LOS: 1 days | Discharge: ANOTHER HOSPITAL | DRG: 988 | End: 2018-11-21
Attending: EMERGENCY MEDICINE | Admitting: INTERNAL MEDICINE
Payer: MEDICARE

## 2018-11-21 VITALS
SYSTOLIC BLOOD PRESSURE: 96 MMHG | RESPIRATION RATE: 47 BRPM | BODY MASS INDEX: 27.74 KG/M2 | HEIGHT: 65 IN | WEIGHT: 166.5 LBS | HEART RATE: 135 BPM | TEMPERATURE: 101 F | DIASTOLIC BLOOD PRESSURE: 53 MMHG | OXYGEN SATURATION: 96 %

## 2018-11-21 DIAGNOSIS — N12 PYELONEPHRITIS: Primary | ICD-10-CM

## 2018-11-21 PROBLEM — N39.0 UTI (URINARY TRACT INFECTION): Status: ACTIVE | Noted: 2018-11-21

## 2018-11-21 LAB
-: ABNORMAL
-: NORMAL
ABSOLUTE EOS #: 0 K/UL (ref 0–0.4)
ABSOLUTE IMMATURE GRANULOCYTE: ABNORMAL K/UL (ref 0–0.3)
ABSOLUTE LYMPH #: 0.4 K/UL (ref 1–4.8)
ABSOLUTE MONO #: 0.8 K/UL (ref 0.1–1.2)
AMORPHOUS: ABNORMAL
ANION GAP SERPL CALCULATED.3IONS-SCNC: 12 MMOL/L (ref 9–17)
ANION GAP SERPL CALCULATED.3IONS-SCNC: 14 MMOL/L (ref 9–17)
BACTERIA: ABNORMAL
BASOPHILS # BLD: 0 % (ref 0–2)
BASOPHILS ABSOLUTE: 0 K/UL (ref 0–0.2)
BILIRUBIN URINE: NEGATIVE
BUN BLDV-MCNC: 17 MG/DL (ref 8–23)
BUN BLDV-MCNC: 17 MG/DL (ref 8–23)
BUN/CREAT BLD: 16 (ref 9–20)
BUN/CREAT BLD: 18 (ref 9–20)
CALCIUM SERPL-MCNC: 8.8 MG/DL (ref 8.6–10.4)
CALCIUM SERPL-MCNC: 8.9 MG/DL (ref 8.6–10.4)
CASTS UA: ABNORMAL /LPF (ref 0–2)
CHLORIDE BLD-SCNC: 101 MMOL/L (ref 98–107)
CHLORIDE BLD-SCNC: 102 MMOL/L (ref 98–107)
CO2: 25 MMOL/L (ref 20–31)
CO2: 25 MMOL/L (ref 20–31)
COLOR: ABNORMAL
COMMENT UA: ABNORMAL
CREAT SERPL-MCNC: 0.94 MG/DL (ref 0.7–1.2)
CREAT SERPL-MCNC: 1.06 MG/DL (ref 0.7–1.2)
CRYSTALS, UA: ABNORMAL /HPF
DIFFERENTIAL TYPE: ABNORMAL
EKG ATRIAL RATE: 68 BPM
EKG Q-T INTERVAL: 288 MS
EKG QRS DURATION: 114 MS
EKG QTC CALCULATION (BAZETT): 430 MS
EKG R AXIS: -66 DEGREES
EKG T AXIS: 67 DEGREES
EKG VENTRICULAR RATE: 134 BPM
EOSINOPHILS RELATIVE PERCENT: 0 % (ref 1–8)
EPITHELIAL CELLS UA: ABNORMAL /HPF (ref 0–5)
GFR AFRICAN AMERICAN: >60 ML/MIN
GFR AFRICAN AMERICAN: >60 ML/MIN
GFR NON-AFRICAN AMERICAN: >60 ML/MIN
GFR NON-AFRICAN AMERICAN: >60 ML/MIN
GFR SERPL CREATININE-BSD FRML MDRD: ABNORMAL ML/MIN/{1.73_M2}
GLUCOSE BLD-MCNC: 136 MG/DL (ref 70–99)
GLUCOSE BLD-MCNC: 175 MG/DL (ref 70–99)
GLUCOSE URINE: NEGATIVE
HCT VFR BLD CALC: 38.3 % (ref 41–53)
HCT VFR BLD CALC: 44 % (ref 41–53)
HEMOGLOBIN: 12.7 G/DL (ref 13.5–17.5)
HEMOGLOBIN: 14.2 G/DL (ref 13.5–17.5)
IMMATURE GRANULOCYTES: ABNORMAL %
KETONES, URINE: ABNORMAL
LACTIC ACID: 1.9 MMOL/L (ref 0.5–2.2)
LACTIC ACID: 3 MMOL/L (ref 0.5–2.2)
LEUKOCYTE ESTERASE, URINE: ABNORMAL
LYMPHOCYTES # BLD: 4 % (ref 15–43)
MCH RBC QN AUTO: 29.5 PG (ref 26–34)
MCH RBC QN AUTO: 29.6 PG (ref 26–34)
MCHC RBC AUTO-ENTMCNC: 32.4 G/DL (ref 31–37)
MCHC RBC AUTO-ENTMCNC: 33.1 G/DL (ref 31–37)
MCV RBC AUTO: 89.2 FL (ref 80–100)
MCV RBC AUTO: 91.4 FL (ref 80–100)
MONOCYTES # BLD: 8 % (ref 6–14)
MUCUS: ABNORMAL
NITRITE, URINE: POSITIVE
NRBC AUTOMATED: ABNORMAL PER 100 WBC
NRBC AUTOMATED: ABNORMAL PER 100 WBC
OTHER OBSERVATIONS UA: ABNORMAL
PARTIAL THROMBOPLASTIN TIME: 26.8 SEC (ref 27–35)
PDW BLD-RTO: 14.3 % (ref 11–14.5)
PDW BLD-RTO: 14.8 % (ref 11–14.5)
PH UA: 6 (ref 5–6)
PLATELET # BLD: 222 K/UL (ref 140–450)
PLATELET # BLD: 227 K/UL (ref 140–450)
PLATELET ESTIMATE: ABNORMAL
PMV BLD AUTO: 10.2 FL (ref 6–12)
PMV BLD AUTO: 9.3 FL (ref 6–12)
POTASSIUM SERPL-SCNC: 3.5 MMOL/L (ref 3.7–5.3)
POTASSIUM SERPL-SCNC: 4.4 MMOL/L (ref 3.7–5.3)
PROTEIN UA: ABNORMAL
RBC # BLD: 4.29 M/UL (ref 4.5–5.9)
RBC # BLD: 4.81 M/UL (ref 4.5–5.9)
RBC # BLD: ABNORMAL 10*6/UL
RBC UA: ABNORMAL /HPF (ref 0–4)
REASON FOR REJECTION: NORMAL
RENAL EPITHELIAL, UA: ABNORMAL /HPF
SEG NEUTROPHILS: 88 % (ref 44–74)
SEGMENTED NEUTROPHILS ABSOLUTE COUNT: 9.1 K/UL (ref 1.8–7.7)
SODIUM BLD-SCNC: 139 MMOL/L (ref 135–144)
SODIUM BLD-SCNC: 140 MMOL/L (ref 135–144)
SPECIFIC GRAVITY UA: 1.03 (ref 1.01–1.02)
TRICHOMONAS: ABNORMAL
TROPONIN INTERP: NORMAL
TROPONIN T: <0.03 NG/ML
TURBIDITY: ABNORMAL
URINE HGB: ABNORMAL
UROBILINOGEN, URINE: NORMAL
WBC # BLD: 10.4 K/UL (ref 3.5–11)
WBC # BLD: 5.7 K/UL (ref 3.5–11)
WBC # BLD: ABNORMAL 10*3/UL
WBC UA: ABNORMAL /HPF (ref 0–4)
YEAST: ABNORMAL
ZZ NTE CLEAN UP: ORDERED TEST: NORMAL
ZZ NTE WITH NAME CLEAN UP: SPECIMEN SOURCE: NORMAL

## 2018-11-21 PROCEDURE — 94761 N-INVAS EAR/PLS OXIMETRY MLT: CPT

## 2018-11-21 PROCEDURE — 6370000000 HC RX 637 (ALT 250 FOR IP): Performed by: INTERNAL MEDICINE

## 2018-11-21 PROCEDURE — 94640 AIRWAY INHALATION TREATMENT: CPT

## 2018-11-21 PROCEDURE — 36415 COLL VENOUS BLD VENIPUNCTURE: CPT

## 2018-11-21 PROCEDURE — 87086 URINE CULTURE/COLONY COUNT: CPT

## 2018-11-21 PROCEDURE — 83605 ASSAY OF LACTIC ACID: CPT

## 2018-11-21 PROCEDURE — 2500000003 HC RX 250 WO HCPCS

## 2018-11-21 PROCEDURE — 87186 SC STD MICRODIL/AGAR DIL: CPT

## 2018-11-21 PROCEDURE — 6360000002 HC RX W HCPCS: Performed by: EMERGENCY MEDICINE

## 2018-11-21 PROCEDURE — 2580000003 HC RX 258: Performed by: EMERGENCY MEDICINE

## 2018-11-21 PROCEDURE — 93005 ELECTROCARDIOGRAM TRACING: CPT

## 2018-11-21 PROCEDURE — 2700000000 HC OXYGEN THERAPY PER DAY

## 2018-11-21 PROCEDURE — 71045 X-RAY EXAM CHEST 1 VIEW: CPT

## 2018-11-21 PROCEDURE — 94660 CPAP INITIATION&MGMT: CPT

## 2018-11-21 PROCEDURE — 85730 THROMBOPLASTIN TIME PARTIAL: CPT

## 2018-11-21 PROCEDURE — 84484 ASSAY OF TROPONIN QUANT: CPT

## 2018-11-21 PROCEDURE — 81001 URINALYSIS AUTO W/SCOPE: CPT

## 2018-11-21 PROCEDURE — 99285 EMERGENCY DEPT VISIT HI MDM: CPT

## 2018-11-21 PROCEDURE — 85025 COMPLETE CBC W/AUTO DIFF WBC: CPT

## 2018-11-21 PROCEDURE — 2580000003 HC RX 258: Performed by: NURSE PRACTITIONER

## 2018-11-21 PROCEDURE — 6360000002 HC RX W HCPCS

## 2018-11-21 PROCEDURE — 85027 COMPLETE CBC AUTOMATED: CPT

## 2018-11-21 PROCEDURE — 94664 DEMO&/EVAL PT USE INHALER: CPT

## 2018-11-21 PROCEDURE — 94150 VITAL CAPACITY TEST: CPT

## 2018-11-21 PROCEDURE — 80048 BASIC METABOLIC PNL TOTAL CA: CPT

## 2018-11-21 PROCEDURE — 6360000002 HC RX W HCPCS: Performed by: PHYSICIAN ASSISTANT

## 2018-11-21 PROCEDURE — 6370000000 HC RX 637 (ALT 250 FOR IP): Performed by: EMERGENCY MEDICINE

## 2018-11-21 PROCEDURE — 2060000000 HC ICU INTERMEDIATE R&B

## 2018-11-21 PROCEDURE — 87040 BLOOD CULTURE FOR BACTERIA: CPT

## 2018-11-21 PROCEDURE — 87077 CULTURE AEROBIC IDENTIFY: CPT

## 2018-11-21 RX ORDER — LORAZEPAM 2 MG/ML
0.5 INJECTION INTRAMUSCULAR ONCE
Status: COMPLETED | OUTPATIENT
Start: 2018-11-22 | End: 2018-11-21

## 2018-11-21 RX ORDER — SACCHAROMYCES BOULARDII 250 MG
250 CAPSULE ORAL 2 TIMES DAILY
Status: DISCONTINUED | OUTPATIENT
Start: 2018-11-21 | End: 2018-11-22 | Stop reason: HOSPADM

## 2018-11-21 RX ORDER — HYDROCODONE BITARTRATE AND ACETAMINOPHEN 5; 325 MG/1; MG/1
2 TABLET ORAL EVERY 4 HOURS PRN
Status: DISCONTINUED | OUTPATIENT
Start: 2018-11-21 | End: 2018-11-22 | Stop reason: HOSPADM

## 2018-11-21 RX ORDER — HYDROCODONE BITARTRATE AND ACETAMINOPHEN 5; 325 MG/1; MG/1
1 TABLET ORAL EVERY 4 HOURS PRN
Status: DISCONTINUED | OUTPATIENT
Start: 2018-11-21 | End: 2018-11-22 | Stop reason: HOSPADM

## 2018-11-21 RX ORDER — HEPARIN SODIUM 10000 [USP'U]/100ML
INJECTION, SOLUTION INTRAVENOUS
Status: COMPLETED
Start: 2018-11-21 | End: 2018-11-21

## 2018-11-21 RX ORDER — 0.9 % SODIUM CHLORIDE 0.9 %
500 INTRAVENOUS SOLUTION INTRAVENOUS ONCE
Status: COMPLETED | OUTPATIENT
Start: 2018-11-21 | End: 2018-11-21

## 2018-11-21 RX ORDER — FUROSEMIDE 10 MG/ML
INJECTION INTRAMUSCULAR; INTRAVENOUS
Status: COMPLETED
Start: 2018-11-21 | End: 2018-11-21

## 2018-11-21 RX ORDER — NITROGLYCERIN 20 MG/100ML
INJECTION INTRAVENOUS
Status: COMPLETED
Start: 2018-11-21 | End: 2018-11-21

## 2018-11-21 RX ORDER — SODIUM CHLORIDE 0.9 % (FLUSH) 0.9 %
10 SYRINGE (ML) INJECTION EVERY 12 HOURS SCHEDULED
Status: DISCONTINUED | OUTPATIENT
Start: 2018-11-21 | End: 2018-11-22 | Stop reason: HOSPADM

## 2018-11-21 RX ORDER — ALBUTEROL SULFATE 2.5 MG/3ML
2.5 SOLUTION RESPIRATORY (INHALATION)
Status: DISCONTINUED | OUTPATIENT
Start: 2018-11-21 | End: 2018-11-21 | Stop reason: SDUPTHER

## 2018-11-21 RX ORDER — FUROSEMIDE 10 MG/ML
40 INJECTION INTRAMUSCULAR; INTRAVENOUS ONCE
Status: DISCONTINUED | OUTPATIENT
Start: 2018-11-22 | End: 2018-11-21

## 2018-11-21 RX ORDER — LISINOPRIL 5 MG/1
5 TABLET ORAL DAILY
Status: DISCONTINUED | OUTPATIENT
Start: 2018-11-21 | End: 2018-11-22 | Stop reason: HOSPADM

## 2018-11-21 RX ORDER — ACETAMINOPHEN 500 MG
1000 TABLET ORAL ONCE
Status: COMPLETED | OUTPATIENT
Start: 2018-11-21 | End: 2018-11-21

## 2018-11-21 RX ORDER — SODIUM CHLORIDE FOR INHALATION 0.9 %
3 VIAL, NEBULIZER (ML) INHALATION
Status: DISCONTINUED | OUTPATIENT
Start: 2018-11-21 | End: 2018-11-21 | Stop reason: ALTCHOICE

## 2018-11-21 RX ORDER — ALBUTEROL SULFATE 2.5 MG/3ML
2.5 SOLUTION RESPIRATORY (INHALATION)
Status: DISCONTINUED | OUTPATIENT
Start: 2018-11-21 | End: 2018-11-21

## 2018-11-21 RX ORDER — DOCUSATE SODIUM 100 MG/1
100 CAPSULE, LIQUID FILLED ORAL 2 TIMES DAILY
Status: DISCONTINUED | OUTPATIENT
Start: 2018-11-21 | End: 2018-11-22 | Stop reason: HOSPADM

## 2018-11-21 RX ORDER — SODIUM CHLORIDE 9 MG/ML
INJECTION, SOLUTION INTRAVENOUS CONTINUOUS
Status: DISCONTINUED | OUTPATIENT
Start: 2018-11-21 | End: 2018-11-22 | Stop reason: HOSPADM

## 2018-11-21 RX ORDER — ASPIRIN 81 MG/1
TABLET, CHEWABLE ORAL
Status: DISCONTINUED
Start: 2018-11-21 | End: 2018-11-22 | Stop reason: HOSPADM

## 2018-11-21 RX ORDER — LUTEIN 10 MG
20 TABLET ORAL 2 TIMES DAILY
Status: DISCONTINUED | OUTPATIENT
Start: 2018-11-21 | End: 2018-11-22 | Stop reason: HOSPADM

## 2018-11-21 RX ORDER — ONDANSETRON 2 MG/ML
INJECTION INTRAMUSCULAR; INTRAVENOUS
Status: COMPLETED
Start: 2018-11-21 | End: 2018-11-21

## 2018-11-21 RX ORDER — LACTOBACILLUS RHAMNOSUS GG 10B CELL
1 CAPSULE ORAL 3 TIMES DAILY
Status: DISCONTINUED | OUTPATIENT
Start: 2018-11-21 | End: 2018-11-22 | Stop reason: HOSPADM

## 2018-11-21 RX ORDER — HEPARIN SODIUM 1000 [USP'U]/ML
4000 INJECTION, SOLUTION INTRAVENOUS; SUBCUTANEOUS ONCE
Status: COMPLETED | OUTPATIENT
Start: 2018-11-21 | End: 2018-11-21

## 2018-11-21 RX ORDER — ONDANSETRON 2 MG/ML
4 INJECTION INTRAMUSCULAR; INTRAVENOUS ONCE
Status: DISCONTINUED | OUTPATIENT
Start: 2018-11-22 | End: 2018-11-21

## 2018-11-21 RX ORDER — PANTOPRAZOLE SODIUM 40 MG/1
40 TABLET, DELAYED RELEASE ORAL 2 TIMES DAILY
Status: DISCONTINUED | OUTPATIENT
Start: 2018-11-21 | End: 2018-11-22 | Stop reason: HOSPADM

## 2018-11-21 RX ORDER — HEPARIN SODIUM 1000 [USP'U]/ML
4000 INJECTION, SOLUTION INTRAVENOUS; SUBCUTANEOUS PRN
Status: DISCONTINUED | OUTPATIENT
Start: 2018-11-21 | End: 2018-11-22 | Stop reason: HOSPADM

## 2018-11-21 RX ORDER — ONDANSETRON 2 MG/ML
4 INJECTION INTRAMUSCULAR; INTRAVENOUS ONCE
Status: COMPLETED | OUTPATIENT
Start: 2018-11-22 | End: 2018-11-21

## 2018-11-21 RX ORDER — LORAZEPAM 2 MG/ML
0.5 INJECTION INTRAMUSCULAR ONCE
Status: DISCONTINUED | OUTPATIENT
Start: 2018-11-22 | End: 2018-11-21

## 2018-11-21 RX ORDER — ALBUTEROL SULFATE 2.5 MG/3ML
2.5 SOLUTION RESPIRATORY (INHALATION) EVERY 6 HOURS PRN
Status: DISCONTINUED | OUTPATIENT
Start: 2018-11-21 | End: 2018-11-22 | Stop reason: HOSPADM

## 2018-11-21 RX ORDER — MULTIVITAMIN WITH FOLIC ACID 400 MCG
1 TABLET ORAL DAILY
Status: DISCONTINUED | OUTPATIENT
Start: 2018-11-21 | End: 2018-11-22 | Stop reason: HOSPADM

## 2018-11-21 RX ORDER — SUCRALFATE 1 G/1
1 TABLET ORAL
Status: DISCONTINUED | OUTPATIENT
Start: 2018-11-21 | End: 2018-11-22 | Stop reason: HOSPADM

## 2018-11-21 RX ORDER — HEPARIN SODIUM 1000 [USP'U]/ML
2000 INJECTION, SOLUTION INTRAVENOUS; SUBCUTANEOUS PRN
Status: DISCONTINUED | OUTPATIENT
Start: 2018-11-21 | End: 2018-11-22 | Stop reason: HOSPADM

## 2018-11-21 RX ORDER — SODIUM CHLORIDE 0.9 % (FLUSH) 0.9 %
10 SYRINGE (ML) INJECTION PRN
Status: DISCONTINUED | OUTPATIENT
Start: 2018-11-21 | End: 2018-11-22 | Stop reason: HOSPADM

## 2018-11-21 RX ORDER — SIMVASTATIN 10 MG
20 TABLET ORAL NIGHTLY
Status: DISCONTINUED | OUTPATIENT
Start: 2018-11-21 | End: 2018-11-22 | Stop reason: HOSPADM

## 2018-11-21 RX ORDER — FUROSEMIDE 10 MG/ML
40 INJECTION INTRAMUSCULAR; INTRAVENOUS ONCE
Status: COMPLETED | OUTPATIENT
Start: 2018-11-22 | End: 2018-11-21

## 2018-11-21 RX ORDER — LACTULOSE 10 G/15ML
20 SOLUTION ORAL 3 TIMES DAILY
Status: DISCONTINUED | OUTPATIENT
Start: 2018-11-21 | End: 2018-11-22 | Stop reason: HOSPADM

## 2018-11-21 RX ORDER — LORAZEPAM 2 MG/ML
INJECTION INTRAMUSCULAR
Status: COMPLETED
Start: 2018-11-21 | End: 2018-11-21

## 2018-11-21 RX ORDER — SULFAMETHOXAZOLE AND TRIMETHOPRIM 800; 160 MG/1; MG/1
1 TABLET ORAL EVERY 12 HOURS SCHEDULED
Status: DISCONTINUED | OUTPATIENT
Start: 2018-11-21 | End: 2018-11-22 | Stop reason: HOSPADM

## 2018-11-21 RX ORDER — ACETAMINOPHEN 325 MG/1
650 TABLET ORAL EVERY 4 HOURS PRN
Status: DISCONTINUED | OUTPATIENT
Start: 2018-11-21 | End: 2018-11-22 | Stop reason: HOSPADM

## 2018-11-21 RX ORDER — METOPROLOL SUCCINATE 25 MG/1
25 TABLET, EXTENDED RELEASE ORAL DAILY
Status: DISCONTINUED | OUTPATIENT
Start: 2018-11-21 | End: 2018-11-22 | Stop reason: HOSPADM

## 2018-11-21 RX ORDER — HEPARIN SODIUM 10000 [USP'U]/100ML
12 INJECTION, SOLUTION INTRAVENOUS CONTINUOUS
Status: DISCONTINUED | OUTPATIENT
Start: 2018-11-21 | End: 2018-11-22 | Stop reason: HOSPADM

## 2018-11-21 RX ADMIN — PANTOPRAZOLE SODIUM 40 MG: 40 TABLET, DELAYED RELEASE ORAL at 20:31

## 2018-11-21 RX ADMIN — SODIUM CHLORIDE: 9 INJECTION, SOLUTION INTRAVENOUS at 20:30

## 2018-11-21 RX ADMIN — ONDANSETRON 4 MG: 2 INJECTION INTRAMUSCULAR; INTRAVENOUS at 22:04

## 2018-11-21 RX ADMIN — LACTULOSE 20 G: 20 SOLUTION ORAL at 20:31

## 2018-11-21 RX ADMIN — DOCUSATE SODIUM 100 MG: 100 CAPSULE, LIQUID FILLED ORAL at 20:31

## 2018-11-21 RX ADMIN — FUROSEMIDE 40 MG: 10 INJECTION, SOLUTION INTRAVENOUS at 22:20

## 2018-11-21 RX ADMIN — LORAZEPAM 0.5 MG: 2 INJECTION INTRAMUSCULAR; INTRAVENOUS at 22:25

## 2018-11-21 RX ADMIN — SUCRALFATE 1 G: 1 TABLET ORAL at 20:31

## 2018-11-21 RX ADMIN — NITROGLYCERIN 10 MCG/MIN: 20 INJECTION INTRAVENOUS at 22:15

## 2018-11-21 RX ADMIN — LORAZEPAM 0.5 MG: 2 INJECTION INTRAMUSCULAR at 22:25

## 2018-11-21 RX ADMIN — HEPARIN SODIUM 9.1 ML/HR: 10000 INJECTION, SOLUTION INTRAVENOUS at 23:05

## 2018-11-21 RX ADMIN — CEFEPIME HYDROCHLORIDE 1 G: 1 INJECTION, POWDER, FOR SOLUTION INTRAMUSCULAR; INTRAVENOUS at 16:38

## 2018-11-21 RX ADMIN — HEPARIN SODIUM 4000 UNITS: 1000 INJECTION, SOLUTION INTRAVENOUS; SUBCUTANEOUS at 23:02

## 2018-11-21 RX ADMIN — Medication 4 MG: at 22:04

## 2018-11-21 RX ADMIN — HEPARIN SODIUM AND DEXTROSE 9.1 ML/HR: 10000; 5 INJECTION INTRAVENOUS at 23:05

## 2018-11-21 RX ADMIN — Medication 250 MG: at 20:30

## 2018-11-21 RX ADMIN — SIMVASTATIN 20 MG: 10 TABLET, FILM COATED ORAL at 20:30

## 2018-11-21 RX ADMIN — FUROSEMIDE 40 MG: 10 INJECTION INTRAMUSCULAR; INTRAVENOUS at 22:20

## 2018-11-21 RX ADMIN — SODIUM CHLORIDE 500 ML: 9 INJECTION, SOLUTION INTRAVENOUS at 14:22

## 2018-11-21 RX ADMIN — ACETAMINOPHEN 1000 MG: 500 TABLET ORAL at 14:16

## 2018-11-21 RX ADMIN — Medication 1 CAPSULE: at 20:30

## 2018-11-21 RX ADMIN — SULFAMETHOXAZOLE AND TRIMETHOPRIM 1 TABLET: 800; 160 TABLET ORAL at 16:38

## 2018-11-21 RX ADMIN — SODIUM CHLORIDE 500 ML: 9 INJECTION, SOLUTION INTRAVENOUS at 15:04

## 2018-11-21 RX ADMIN — ACETAMINOPHEN 650 MG: 325 TABLET ORAL at 21:45

## 2018-11-21 ASSESSMENT — PAIN SCALES - GENERAL
PAINLEVEL_OUTOF10: 0
PAINLEVEL_OUTOF10: 0

## 2018-11-21 ASSESSMENT — PAIN DESCRIPTION - PAIN TYPE
TYPE: CHRONIC PAIN
TYPE: PHANTOM PAIN

## 2018-11-21 ASSESSMENT — ENCOUNTER SYMPTOMS
BACK PAIN: 0
DIARRHEA: 0
CONSTIPATION: 0
BLOOD IN STOOL: 0
ABDOMINAL PAIN: 0
NAUSEA: 0
VOMITING: 0
SORE THROAT: 0
SHORTNESS OF BREATH: 0
TROUBLE SWALLOWING: 0
WHEEZING: 0

## 2018-11-21 ASSESSMENT — PAIN DESCRIPTION - ORIENTATION: ORIENTATION: RIGHT;LEFT

## 2018-11-21 ASSESSMENT — PAIN DESCRIPTION - LOCATION: LOCATION: LEG

## 2018-11-21 ASSESSMENT — PAIN DESCRIPTION - DESCRIPTORS: DESCRIPTORS: ACHING

## 2018-11-21 NOTE — PROGRESS NOTES
[id number]  60 62 64 66 68 70 72 74 76   25 337 352 366 381 396 411 426 441 25 447 548 247 502 375 878 260 655 982   38 007 888 677 308 728 803 837 167 05 793 953 439 370 994 030 020 284 590   50 909 596 122 641 116 463 031 527 04 055 814 044 632 591 905 036 253 637   25 258 909 223 423 433 609 444 463 21 240 783 011 079 654 458 983 814 470   34 776 711 368 334 572 961 519 368 92 040 735 201 103 887 459 532 769 960   44 346 777 253 937 423 678 339 676 79 752 300 919 920 874 485 912 269 941   70 172 002 079 588 421 127 553 118 81 346 187 535 200 107 704 104 276 123   56 168 971 122 779 783 282 875 355 38 064 417 464 822 694 674 169 727 605   65 277 292 306 321 336 351 366 381 65 363 392 421 449 478 507 535 564 594   70 269 284 299 314 329 344 359 374 70 353 382 410 439 468 496 525 554 583   75 261 274 289 305 319 334 348 364 75 344 372 400 429 458 487 515 544 573   80 253 266 282 296 312 327 342 356 80 335 362 390 419 448 [phone] 562

## 2018-11-21 NOTE — ED PROVIDER NOTES
urinary obstruction with a bladder scan    DIAGNOSTIC RESULTS     EKG: All EKG's are interpreted by the Emergency Department Physician who either signs or Co-signs this chart in the absence of a cardiologist.        RADIOLOGY:   I directly visualized the following  images and reviewed the radiologist interpretations:          ED BEDSIDE ULTRASOUND:       LABS:  Labs Reviewed   BASIC METABOLIC PANEL - Abnormal; Notable for the following:        Result Value    Glucose 136 (*)     All other components within normal limits   CBC WITH AUTO DIFFERENTIAL - Abnormal; Notable for the following:     RBC 4.29 (*)     Hemoglobin 12.7 (*)     Hematocrit 38.3 (*)     Seg Neutrophils 88 (*)     Lymphocytes 4 (*)     Eosinophils % 0 (*)     Segs Absolute 9.10 (*)     Absolute Lymph # 0.40 (*)     All other components within normal limits   URINALYSIS - Abnormal; Notable for the following:     Ketones, Urine TRACE (*)     Specific Gravity, UA 1.030 (*)     Urine Hgb 3+ (*)     Protein, UA 2+ (*)     Nitrite, Urine POSITIVE (*)     Leukocyte Esterase, Urine 1+ (*)     All other components within normal limits   MICROSCOPIC URINALYSIS - Abnormal; Notable for the following:     Bacteria, UA 4+ (*)     Other Observations UA Specimen Cultured (*)     All other components within normal limits   CULTURE BLOOD #1   CULTURE BLOOD #1   URINE CULTURE   LACTIC ACID       Events of urinary tract infection, previously urinalysis showed evidence of pseudomonas and other atypical gram-negative bacteria in the urine that was susceptible to Bactrim only bladder scan showed only 29 mL in the bladder so no evidence of retention currently    EMERGENCY DEPARTMENT COURSE:   Vitals:    Vitals:    11/21/18 1445 11/21/18 1457 11/21/18 1515 11/21/18 1542   BP: (!) 140/63  126/60 133/65   Pulse:    77   Resp:    16   Temp:  103.6 °F (39.8 °C)  102.1 °F (38.9 °C)   TempSrc:  Tympanic  Tympanic   SpO2: 93%  94% 96%   Weight:       Height:

## 2018-11-22 ENCOUNTER — HOSPITAL ENCOUNTER (INPATIENT)
Age: 83
LOS: 5 days | Discharge: NURSING, REHAB OR LONG-TERM CARE | DRG: 270 | End: 2018-11-27
Attending: EMERGENCY MEDICINE | Admitting: INTERNAL MEDICINE
Payer: MEDICARE

## 2018-11-22 ENCOUNTER — APPOINTMENT (OUTPATIENT)
Dept: GENERAL RADIOLOGY | Age: 83
DRG: 270 | End: 2018-11-22
Payer: MEDICARE

## 2018-11-22 DIAGNOSIS — N12 PYELONEPHRITIS: ICD-10-CM

## 2018-11-22 DIAGNOSIS — R41.82 ALTERED MENTAL STATUS, UNSPECIFIED ALTERED MENTAL STATUS TYPE: ICD-10-CM

## 2018-11-22 DIAGNOSIS — I21.3 ST ELEVATION MYOCARDIAL INFARCTION (STEMI), UNSPECIFIED ARTERY (HCC): Primary | ICD-10-CM

## 2018-11-22 LAB
ALLEN TEST: ABNORMAL
ANION GAP SERPL CALCULATED.3IONS-SCNC: 12 MMOL/L (ref 9–17)
ANION GAP SERPL CALCULATED.3IONS-SCNC: 13 MMOL/L (ref 9–17)
ANION GAP: 10 MMOL/L (ref 7–16)
ANION GAP: 14 MMOL/L (ref 7–16)
ANION GAP: 14 MMOL/L (ref 7–16)
BUN BLDV-MCNC: 19 MG/DL (ref 8–23)
BUN BLDV-MCNC: 20 MG/DL (ref 8–23)
BUN/CREAT BLD: ABNORMAL (ref 9–20)
BUN/CREAT BLD: ABNORMAL (ref 9–20)
CALCIUM SERPL-MCNC: 8.2 MG/DL (ref 8.6–10.4)
CALCIUM SERPL-MCNC: 8.3 MG/DL (ref 8.6–10.4)
CHLORIDE BLD-SCNC: 109 MMOL/L (ref 98–107)
CHLORIDE BLD-SCNC: 109 MMOL/L (ref 98–107)
CO2: 18 MMOL/L (ref 20–31)
CO2: 20 MMOL/L (ref 20–31)
CREAT SERPL-MCNC: 0.84 MG/DL (ref 0.7–1.2)
CREAT SERPL-MCNC: 0.91 MG/DL (ref 0.7–1.2)
FIO2: ABNORMAL
GFR AFRICAN AMERICAN: >60 ML/MIN
GFR AFRICAN AMERICAN: >60 ML/MIN
GFR NON-AFRICAN AMERICAN: >60 ML/MIN
GFR SERPL CREATININE-BSD FRML MDRD: >60 ML/MIN
GFR SERPL CREATININE-BSD FRML MDRD: ABNORMAL ML/MIN/{1.73_M2}
GFR SERPL CREATININE-BSD FRML MDRD: NORMAL ML/MIN/{1.73_M2}
GLUCOSE BLD-MCNC: 130 MG/DL (ref 70–99)
GLUCOSE BLD-MCNC: 130 MG/DL (ref 70–99)
GLUCOSE BLD-MCNC: 196 MG/DL (ref 74–100)
GLUCOSE BLD-MCNC: 211 MG/DL (ref 74–100)
GLUCOSE BLD-MCNC: 238 MG/DL (ref 74–100)
HCO3 VENOUS: 20.4 MMOL/L (ref 22–29)
HCT VFR BLD CALC: 36.8 % (ref 40.7–50.3)
HCT VFR BLD CALC: 39.7 % (ref 40.7–50.3)
HEMOGLOBIN: 11.7 G/DL (ref 13–17)
HEMOGLOBIN: 12.4 G/DL (ref 13–17)
MCH RBC QN AUTO: 29.5 PG (ref 25.2–33.5)
MCH RBC QN AUTO: 29.7 PG (ref 25.2–33.5)
MCHC RBC AUTO-ENTMCNC: 31.2 G/DL (ref 28.4–34.8)
MCHC RBC AUTO-ENTMCNC: 31.8 G/DL (ref 28.4–34.8)
MCV RBC AUTO: 92.7 FL (ref 82.6–102.9)
MCV RBC AUTO: 95.2 FL (ref 82.6–102.9)
MODE: ABNORMAL
NEGATIVE BASE EXCESS, ART: 4 (ref 0–2)
NEGATIVE BASE EXCESS, ART: 5 (ref 0–2)
NEGATIVE BASE EXCESS, VEN: 3 (ref 0–2)
NRBC AUTOMATED: 0 PER 100 WBC
NRBC AUTOMATED: 0 PER 100 WBC
O2 DEVICE/FLOW/%: ABNORMAL
O2 SAT, VEN: 86 % (ref 60–85)
PARTIAL THROMBOPLASTIN TIME: 45.1 SEC (ref 20.5–30.5)
PATIENT TEMP: ABNORMAL
PCO2, VEN: 30.3 MM HG (ref 41–51)
PDW BLD-RTO: 14.6 % (ref 11.8–14.4)
PDW BLD-RTO: 14.7 % (ref 11.8–14.4)
PH VENOUS: 7.44 (ref 7.32–7.43)
PLATELET # BLD: 163 K/UL (ref 138–453)
PLATELET # BLD: 168 K/UL (ref 138–453)
PMV BLD AUTO: 11.1 FL (ref 8.1–13.5)
PMV BLD AUTO: 11.3 FL (ref 8.1–13.5)
PO2, VEN: 48 MM HG (ref 30–50)
POC CHLORIDE: 107 MMOL/L (ref 98–107)
POC CHLORIDE: 107 MMOL/L (ref 98–107)
POC CHLORIDE: 108 MMOL/L (ref 98–107)
POC CREATININE: 0.93 MG/DL (ref 0.51–1.19)
POC CREATININE: 0.95 MG/DL (ref 0.51–1.19)
POC CREATININE: 1.11 MG/DL (ref 0.51–1.19)
POC HCO3: 20 MMOL/L (ref 21–28)
POC HCO3: 20 MMOL/L (ref 21–28)
POC HEMATOCRIT: 35 % (ref 41–53)
POC HEMATOCRIT: 37 % (ref 41–53)
POC HEMATOCRIT: 38 % (ref 41–53)
POC HEMOGLOBIN: 11.8 G/DL (ref 13.5–17.5)
POC HEMOGLOBIN: 12.4 G/DL (ref 13.5–17.5)
POC HEMOGLOBIN: 12.9 G/DL (ref 13.5–17.5)
POC IONIZED CALCIUM: 1.06 MMOL/L (ref 1.15–1.33)
POC IONIZED CALCIUM: 1.17 MMOL/L (ref 1.15–1.33)
POC IONIZED CALCIUM: 1.18 MMOL/L (ref 1.15–1.33)
POC LACTIC ACID: 1.19 MMOL/L (ref 0.56–1.39)
POC LACTIC ACID: 1.21 MMOL/L (ref 0.56–1.39)
POC LACTIC ACID: 2.31 MMOL/L (ref 0.56–1.39)
POC O2 SATURATION: 98 % (ref 94–98)
POC O2 SATURATION: 98 % (ref 94–98)
POC PCO2 TEMP: ABNORMAL MM HG
POC PCO2: 32.3 MM HG (ref 35–48)
POC PCO2: 35.2 MM HG (ref 35–48)
POC PH TEMP: ABNORMAL
POC PH: 7.36 (ref 7.35–7.45)
POC PH: 7.4 (ref 7.35–7.45)
POC PO2 TEMP: ABNORMAL MM HG
POC PO2: 116 MM HG (ref 83–108)
POC PO2: 95.3 MM HG (ref 83–108)
POC POTASSIUM: 3.6 MMOL/L (ref 3.5–4.5)
POC POTASSIUM: 3.7 MMOL/L (ref 3.5–4.5)
POC POTASSIUM: 3.7 MMOL/L (ref 3.5–4.5)
POC SODIUM: 138 MMOL/L (ref 138–146)
POC SODIUM: 141 MMOL/L (ref 138–146)
POC SODIUM: 141 MMOL/L (ref 138–146)
POC TROPONIN I: 2.64 NG/ML (ref 0–0.1)
POC TROPONIN INTERP: ABNORMAL
POSITIVE BASE EXCESS, ART: ABNORMAL (ref 0–3)
POSITIVE BASE EXCESS, ART: ABNORMAL (ref 0–3)
POSITIVE BASE EXCESS, VEN: ABNORMAL (ref 0–3)
POTASSIUM SERPL-SCNC: 4.2 MMOL/L (ref 3.7–5.3)
POTASSIUM SERPL-SCNC: 4.7 MMOL/L (ref 3.7–5.3)
RBC # BLD: 3.97 M/UL (ref 4.21–5.77)
RBC # BLD: 4.17 M/UL (ref 4.21–5.77)
SAMPLE SITE: ABNORMAL
SODIUM BLD-SCNC: 140 MMOL/L (ref 135–144)
SODIUM BLD-SCNC: 141 MMOL/L (ref 135–144)
TCO2 (CALC), ART: 21 MMOL/L (ref 22–29)
TCO2 (CALC), ART: 21 MMOL/L (ref 22–29)
TOTAL CO2, VENOUS: 21 MMOL/L (ref 23–30)
TROPONIN INTERP: ABNORMAL
TROPONIN INTERP: ABNORMAL
TROPONIN T: 0.9 NG/ML
TROPONIN T: 1.1 NG/ML
WBC # BLD: 10.2 K/UL (ref 3.5–11.3)
WBC # BLD: 11.4 K/UL (ref 3.5–11.3)

## 2018-11-22 PROCEDURE — 84132 ASSAY OF SERUM POTASSIUM: CPT

## 2018-11-22 PROCEDURE — 99285 EMERGENCY DEPT VISIT HI MDM: CPT

## 2018-11-22 PROCEDURE — 85730 THROMBOPLASTIN TIME PARTIAL: CPT

## 2018-11-22 PROCEDURE — 2500000003 HC RX 250 WO HCPCS

## 2018-11-22 PROCEDURE — C1894 INTRO/SHEATH, NON-LASER: HCPCS

## 2018-11-22 PROCEDURE — 82565 ASSAY OF CREATININE: CPT

## 2018-11-22 PROCEDURE — 2000000000 HC ICU R&B

## 2018-11-22 PROCEDURE — 2780000010 HC IMPLANT OTHER

## 2018-11-22 PROCEDURE — 36415 COLL VENOUS BLD VENIPUNCTURE: CPT

## 2018-11-22 PROCEDURE — 027034Z DILATION OF CORONARY ARTERY, ONE ARTERY WITH DRUG-ELUTING INTRALUMINAL DEVICE, PERCUTANEOUS APPROACH: ICD-10-PCS | Performed by: INTERNAL MEDICINE

## 2018-11-22 PROCEDURE — 82947 ASSAY GLUCOSE BLOOD QUANT: CPT

## 2018-11-22 PROCEDURE — 92941 PRQ TRLML REVSC TOT OCCL AMI: CPT | Performed by: INTERNAL MEDICINE

## 2018-11-22 PROCEDURE — 93458 L HRT ARTERY/VENTRICLE ANGIO: CPT | Performed by: INTERNAL MEDICINE

## 2018-11-22 PROCEDURE — C1874 STENT, COATED/COV W/DEL SYS: HCPCS

## 2018-11-22 PROCEDURE — 82330 ASSAY OF CALCIUM: CPT

## 2018-11-22 PROCEDURE — 6360000002 HC RX W HCPCS: Performed by: EMERGENCY MEDICINE

## 2018-11-22 PROCEDURE — 83036 HEMOGLOBIN GLYCOSYLATED A1C: CPT

## 2018-11-22 PROCEDURE — 31500 INSERT EMERGENCY AIRWAY: CPT

## 2018-11-22 PROCEDURE — 80048 BASIC METABOLIC PNL TOTAL CA: CPT

## 2018-11-22 PROCEDURE — 82803 BLOOD GASES ANY COMBINATION: CPT

## 2018-11-22 PROCEDURE — B2151ZZ FLUOROSCOPY OF LEFT HEART USING LOW OSMOLAR CONTRAST: ICD-10-PCS | Performed by: INTERNAL MEDICINE

## 2018-11-22 PROCEDURE — 94002 VENT MGMT INPAT INIT DAY: CPT

## 2018-11-22 PROCEDURE — 82435 ASSAY OF BLOOD CHLORIDE: CPT

## 2018-11-22 PROCEDURE — 2500000003 HC RX 250 WO HCPCS: Performed by: INTERNAL MEDICINE

## 2018-11-22 PROCEDURE — 6370000000 HC RX 637 (ALT 250 FOR IP)

## 2018-11-22 PROCEDURE — 2709999900 HC NON-CHARGEABLE SUPPLY

## 2018-11-22 PROCEDURE — B2111ZZ FLUOROSCOPY OF MULTIPLE CORONARY ARTERIES USING LOW OSMOLAR CONTRAST: ICD-10-PCS | Performed by: INTERNAL MEDICINE

## 2018-11-22 PROCEDURE — 0BH17EZ INSERTION OF ENDOTRACHEAL AIRWAY INTO TRACHEA, VIA NATURAL OR ARTIFICIAL OPENING: ICD-10-PCS | Performed by: EMERGENCY MEDICINE

## 2018-11-22 PROCEDURE — C1725 CATH, TRANSLUMIN NON-LASER: HCPCS

## 2018-11-22 PROCEDURE — 6360000002 HC RX W HCPCS: Performed by: STUDENT IN AN ORGANIZED HEALTH CARE EDUCATION/TRAINING PROGRAM

## 2018-11-22 PROCEDURE — C1769 GUIDE WIRE: HCPCS

## 2018-11-22 PROCEDURE — 6370000000 HC RX 637 (ALT 250 FOR IP): Performed by: STUDENT IN AN ORGANIZED HEALTH CARE EDUCATION/TRAINING PROGRAM

## 2018-11-22 PROCEDURE — 94762 N-INVAS EAR/PLS OXIMTRY CONT: CPT

## 2018-11-22 PROCEDURE — 2580000003 HC RX 258: Performed by: INTERNAL MEDICINE

## 2018-11-22 PROCEDURE — C1887 CATHETER, GUIDING: HCPCS

## 2018-11-22 PROCEDURE — 4A023N7 MEASUREMENT OF CARDIAC SAMPLING AND PRESSURE, LEFT HEART, PERCUTANEOUS APPROACH: ICD-10-PCS | Performed by: INTERNAL MEDICINE

## 2018-11-22 PROCEDURE — 94003 VENT MGMT INPAT SUBQ DAY: CPT

## 2018-11-22 PROCEDURE — 5A1945Z RESPIRATORY VENTILATION, 24-96 CONSECUTIVE HOURS: ICD-10-PCS | Performed by: EMERGENCY MEDICINE

## 2018-11-22 PROCEDURE — 93005 ELECTROCARDIOGRAM TRACING: CPT

## 2018-11-22 PROCEDURE — 2700000000 HC OXYGEN THERAPY PER DAY

## 2018-11-22 PROCEDURE — 5A02210 ASSISTANCE WITH CARDIAC OUTPUT USING BALLOON PUMP, CONTINUOUS: ICD-10-PCS | Performed by: INTERNAL MEDICINE

## 2018-11-22 PROCEDURE — 6360000002 HC RX W HCPCS

## 2018-11-22 PROCEDURE — 71045 X-RAY EXAM CHEST 1 VIEW: CPT

## 2018-11-22 PROCEDURE — 84484 ASSAY OF TROPONIN QUANT: CPT

## 2018-11-22 PROCEDURE — 33967 INSERT I-AORT PERCUT DEVICE: CPT | Performed by: INTERNAL MEDICINE

## 2018-11-22 PROCEDURE — 94660 CPAP INITIATION&MGMT: CPT

## 2018-11-22 PROCEDURE — 3E030XZ INTRODUCTION OF VASOPRESSOR INTO PERIPHERAL VEIN, OPEN APPROACH: ICD-10-PCS | Performed by: INTERNAL MEDICINE

## 2018-11-22 PROCEDURE — 6360000004 HC RX CONTRAST MEDICATION

## 2018-11-22 PROCEDURE — 85027 COMPLETE CBC AUTOMATED: CPT

## 2018-11-22 PROCEDURE — 84295 ASSAY OF SERUM SODIUM: CPT

## 2018-11-22 PROCEDURE — 85014 HEMATOCRIT: CPT

## 2018-11-22 PROCEDURE — 94770 HC ETCO2 MONITOR DAILY: CPT

## 2018-11-22 PROCEDURE — 83605 ASSAY OF LACTIC ACID: CPT

## 2018-11-22 RX ORDER — CLOPIDOGREL BISULFATE 75 MG/1
75 TABLET ORAL DAILY
Status: DISCONTINUED | OUTPATIENT
Start: 2018-11-23 | End: 2018-11-27 | Stop reason: HOSPADM

## 2018-11-22 RX ORDER — HEPARIN SODIUM 1000 [USP'U]/ML
2000 INJECTION, SOLUTION INTRAVENOUS; SUBCUTANEOUS PRN
Status: DISCONTINUED | OUTPATIENT
Start: 2018-11-22 | End: 2018-11-27 | Stop reason: HOSPADM

## 2018-11-22 RX ORDER — POTASSIUM CHLORIDE 20MEQ/15ML
40 LIQUID (ML) ORAL PRN
Status: DISCONTINUED | OUTPATIENT
Start: 2018-11-22 | End: 2018-11-27 | Stop reason: HOSPADM

## 2018-11-22 RX ORDER — MIDAZOLAM HYDROCHLORIDE 5 MG/ML
INJECTION INTRAMUSCULAR; INTRAVENOUS
Status: DISCONTINUED
Start: 2018-11-22 | End: 2018-11-22 | Stop reason: WASHOUT

## 2018-11-22 RX ORDER — NITROGLYCERIN 20 MG/100ML
5 INJECTION INTRAVENOUS CONTINUOUS
Status: DISCONTINUED | OUTPATIENT
Start: 2018-11-21 | End: 2018-11-22 | Stop reason: HOSPADM

## 2018-11-22 RX ORDER — SODIUM CHLORIDE 0.9 % (FLUSH) 0.9 %
10 SYRINGE (ML) INJECTION PRN
Status: DISCONTINUED | OUTPATIENT
Start: 2018-11-22 | End: 2018-11-27 | Stop reason: HOSPADM

## 2018-11-22 RX ORDER — MIDAZOLAM HYDROCHLORIDE 5 MG/ML
INJECTION INTRAMUSCULAR; INTRAVENOUS
Status: DISCONTINUED
Start: 2018-11-22 | End: 2018-11-22

## 2018-11-22 RX ORDER — ATORVASTATIN CALCIUM 80 MG/1
40 TABLET, FILM COATED ORAL NIGHTLY
Status: DISCONTINUED | OUTPATIENT
Start: 2018-11-22 | End: 2018-11-27 | Stop reason: HOSPADM

## 2018-11-22 RX ORDER — HEPARIN SODIUM 1000 [USP'U]/ML
4000 INJECTION, SOLUTION INTRAVENOUS; SUBCUTANEOUS PRN
Status: DISCONTINUED | OUTPATIENT
Start: 2018-11-22 | End: 2018-11-27 | Stop reason: HOSPADM

## 2018-11-22 RX ORDER — ASPIRIN 81 MG/1
81 TABLET, CHEWABLE ORAL DAILY
Status: DISCONTINUED | OUTPATIENT
Start: 2018-11-23 | End: 2018-11-27 | Stop reason: HOSPADM

## 2018-11-22 RX ORDER — HEPARIN SODIUM 1000 [USP'U]/ML
4000 INJECTION, SOLUTION INTRAVENOUS; SUBCUTANEOUS ONCE
Status: COMPLETED | OUTPATIENT
Start: 2018-11-22 | End: 2018-11-22

## 2018-11-22 RX ORDER — POTASSIUM CHLORIDE 7.45 MG/ML
10 INJECTION INTRAVENOUS
Status: DISPENSED | OUTPATIENT
Start: 2018-11-22 | End: 2018-11-22

## 2018-11-22 RX ORDER — HEPARIN SODIUM 10000 [USP'U]/100ML
INJECTION, SOLUTION INTRAVENOUS
Status: DISCONTINUED
Start: 2018-11-22 | End: 2018-11-22

## 2018-11-22 RX ORDER — ACETAMINOPHEN 325 MG/1
650 TABLET ORAL EVERY 4 HOURS PRN
Status: DISCONTINUED | OUTPATIENT
Start: 2018-11-22 | End: 2018-11-27 | Stop reason: HOSPADM

## 2018-11-22 RX ORDER — POTASSIUM CHLORIDE 29.8 MG/ML
20 INJECTION INTRAVENOUS PRN
Status: DISCONTINUED | OUTPATIENT
Start: 2018-11-22 | End: 2018-11-27 | Stop reason: HOSPADM

## 2018-11-22 RX ORDER — DOPAMINE HYDROCHLORIDE 160 MG/100ML
2.5 INJECTION, SOLUTION INTRAVENOUS CONTINUOUS
Status: DISCONTINUED | OUTPATIENT
Start: 2018-11-22 | End: 2018-11-27 | Stop reason: HOSPADM

## 2018-11-22 RX ORDER — FENTANYL CITRATE 50 UG/ML
25 INJECTION, SOLUTION INTRAMUSCULAR; INTRAVENOUS
Status: DISCONTINUED | OUTPATIENT
Start: 2018-11-22 | End: 2018-11-27 | Stop reason: HOSPADM

## 2018-11-22 RX ORDER — SODIUM CHLORIDE 0.9 % (FLUSH) 0.9 %
10 SYRINGE (ML) INJECTION EVERY 12 HOURS SCHEDULED
Status: DISCONTINUED | OUTPATIENT
Start: 2018-11-22 | End: 2018-11-27 | Stop reason: HOSPADM

## 2018-11-22 RX ORDER — HEPARIN SODIUM 10000 [USP'U]/100ML
12 INJECTION, SOLUTION INTRAVENOUS CONTINUOUS
Status: DISCONTINUED | OUTPATIENT
Start: 2018-11-22 | End: 2018-11-27 | Stop reason: HOSPADM

## 2018-11-22 RX ORDER — POTASSIUM CHLORIDE 7.45 MG/ML
10 INJECTION INTRAVENOUS PRN
Status: DISCONTINUED | OUTPATIENT
Start: 2018-11-22 | End: 2018-11-27 | Stop reason: HOSPADM

## 2018-11-22 RX ORDER — POTASSIUM CHLORIDE 20 MEQ/1
40 TABLET, EXTENDED RELEASE ORAL PRN
Status: DISCONTINUED | OUTPATIENT
Start: 2018-11-22 | End: 2018-11-27 | Stop reason: HOSPADM

## 2018-11-22 RX ADMIN — Medication 12 UNITS/KG/HR: at 11:44

## 2018-11-22 RX ADMIN — FENTANYL CITRATE 25 MCG: 50 INJECTION INTRAMUSCULAR; INTRAVENOUS at 23:09

## 2018-11-22 RX ADMIN — DEXMEDETOMIDINE HYDROCHLORIDE 0.7 MCG/KG/HR: 100 INJECTION, SOLUTION INTRAVENOUS at 23:06

## 2018-11-22 RX ADMIN — ATORVASTATIN CALCIUM 40 MG: 80 TABLET, FILM COATED ORAL at 20:45

## 2018-11-22 RX ADMIN — HEPARIN SODIUM 4000 UNITS: 1000 INJECTION, SOLUTION INTRAVENOUS; SUBCUTANEOUS at 11:43

## 2018-11-22 RX ADMIN — Medication 2 MG/HR: at 02:54

## 2018-11-22 RX ADMIN — POTASSIUM CHLORIDE 10 MEQ: 7.46 INJECTION, SOLUTION INTRAVENOUS at 09:45

## 2018-11-22 RX ADMIN — DEXMEDETOMIDINE HYDROCHLORIDE 0.4 MCG/KG/HR: 100 INJECTION, SOLUTION INTRAVENOUS at 13:56

## 2018-11-22 RX ADMIN — POTASSIUM CHLORIDE 10 MEQ: 7.46 INJECTION, SOLUTION INTRAVENOUS at 11:15

## 2018-11-22 RX ADMIN — POTASSIUM CHLORIDE 10 MEQ: 7.46 INJECTION, SOLUTION INTRAVENOUS at 08:37

## 2018-11-22 RX ADMIN — HEPARIN SODIUM 2000 UNITS: 1000 INJECTION, SOLUTION INTRAVENOUS; SUBCUTANEOUS at 20:16

## 2018-11-22 ASSESSMENT — PULMONARY FUNCTION TESTS
PIF_VALUE: 19
PIF_VALUE: 17
PIF_VALUE: 18
PIF_VALUE: 27
PIF_VALUE: 18
PIF_VALUE: 21
PIF_VALUE: 19
PIF_VALUE: 19

## 2018-11-22 NOTE — ED TRIAGE NOTES
Pt. Arrives to ED via [place] as transfer from inpatient unit at [facility]. Where pt. Was admitted for UTI. While inpatient pt. Received tylenol at 22:00 11/21/18 for temp of 101 and shortly after developed SOB and CP. Pt. Was transferred fro pulmonary edema, placed on BiPAP by flight and arrives tachypneic with BiPAP in place. Dr. Felisha Campo and Dr. Jenny Arango bedside on arrival.  Pt. Was on nitro drip per [place] but became hypotensive en flight so nitro drip was stopped. Pt. Was on heparin drip but heparin drip finished en route.   Pt. Received 0.5 mg when BiPAP was placed and 40mg lasix PTA

## 2018-11-22 NOTE — ED PROVIDER NOTES
elevation in the inferior leads, with T-wave inversion in aVL. Compared to EKG done at [place] yesterday evening at 2227, the inferior elevation has worsened. More so compared to a prior EKG from 3/20/17, block is similar however the inferior changes are completely new. 1:10 AM  Patient remained hypotensive, declining mental status. Given inferior MI, does need fluid despite heart failure. Decision was made to intubate preemptively to further resuscitative specialist patient was waiting in the Cath Lab. Patient intubated in a single attempt by resident with a video laryngoscope after preoxygenation. Patient was given 210 µg doses of posttussive epinephrine pre-intubation to optimize intubation conditions. We'll start on norepinephrine drip. Cath team here, going to Cath Lab    Critical Care     CRITICAL CARE: There was a high probability of clinically significant/life threatening deterioration in this patient's condition which required my urgent intervention. Total critical care time was 30 minutes.   This excludes any time for separately reportable procedures (intubation)      Jane Daugherty MD, Teddi Gitelman  Attending Emergency  Physician             Jane Daugherty MD  11/22/18 0111
to decline, patient became significant hypotensive compared to previous exam, necessitating the administration of levophed, and additional IV fluids as patient is likely having an inferior MI, and due to these 2 factors decision was made to intubate patient to continue to give IV fluids and due to worsening hypotension and altered mentation. See procedure note by Lindsay Lopez for intubation. Patient was given multiple doses of push dose epinephrine to assist blood pressure during rapid sequence intubation. Patient tolerated procedure well with first pass excess. OG was placed, ET tube placement was confirmed via x-ray, cardiac cath team arrived and patient was subsequently taken to the cardiac Cath Lab. RADIOLOGY:  Xr Chest Portable    Result Date: 11/22/2018  EXAMINATION: SINGLE XRAY VIEW OF THE CHEST 11/22/2018 1:21 am COMPARISON: 11/22/2018 HISTORY: ORDERING SYSTEM PROVIDED HISTORY: Post intubation TECHNOLOGIST PROVIDED HISTORY: Post intubation FINDINGS: Pulmonary findings are stable. Heart size is stable. No significant pleural effusion or pneumothorax. Well-positioned ET tube. Well-positioned NG tube. Well-positioned ET and NG tubes. Pulmonary findings are stable     Xr Chest Portable    Result Date: 11/22/2018  EXAMINATION: SINGLE XRAY VIEW OF THE CHEST 11/22/2018 12:59 am COMPARISON: 11/21/2018 HISTORY: ORDERING SYSTEM PROVIDED HISTORY: stat cxr, on bipap pulmonary edema TECHNOLOGIST PROVIDED HISTORY: stat cxr, on bipap pulmonary edema FINDINGS: Heart size is stable. Bilateral perihilar densities, greater on the right. Finding appears stable to slightly improved since comparison exam.  No significant pleural effusion or pneumothorax identified. Bilateral perihilar pulmonary opacities appear stable to slightly improved since comparison exam.     Xr Chest Portable    Result Date: 11/21/2018  EXAMINATION: SINGLE XRAY VIEW OF THE CHEST 11/21/2018 10:41 pm COMPARISON: None.  HISTORY: ORDERING

## 2018-11-22 NOTE — ED NOTES
Bed: 13  Expected date: 11/21/18  Expected time: 11:51 PM  Means of arrival: [place]  Comments:  LF 2 - [place] transfer  80s Male Mr LEONEL ROQUE [facility]   uti pulmonary edema, not stemi,  Dr Ana Espinal requested icu, no bed,   ntg & heparin gtt, asa and bipap, condition improved,   Dr. Emiliana Heaton accepted     Angeles Flores, RN  11/22/18 0913

## 2018-11-22 NOTE — ED NOTES
7.5 ett tube at 23 at the lip  Positive fog and breath sounds   No gurgling noted      Bethanie Perkins RN  11/22/18 3048

## 2018-11-22 NOTE — FLOWSHEET NOTE
[facility] [address]     Emergency/Trauma Note    PATIENT NAME: Johny Ganser    Shift date: 111/182  Shift day: Wednesday   Shift # 3    Room # MARVA/MARVA   Name: Johny Ganser            Age: 80 y.o. Gender: male          Christianity: [address] [employer]:     Trauma/Incident type: Stemi Alert  Admit Date & Time: 11/22/2018 12:19 AM        PATIENT/EVENT DESCRIPTION:  Johny Ganser is a 80 y.o. male who arrived via . [address] as a Stemi Alert. Patient came in was was somewhat responsive. He needed fluids and in order give them to him they had to protect his airway. And so patient was intubated. tfer being intubated patient was taken to the cathlab  . Pt to be admitted to MARVA/MARVA. SPIRITUAL ASSESSMENT/INTERVENTION:   got a home phone number for the patien and left a message that the patient was brought to  ED at [place] [address].  met with patient's brother when he came  Spoke to hime then informed the doctor taashely the patient's son Bernice Gonzalez arrived.  went with the doctor to talk to the son. After the patient was ready to be moved to the Cath lab  was asked to bring him over. Son is in the waiting room for the doctor to talk to him. PATIENT BELONGINGS:  No belongings noted    ANY BELONGINGS OF SIGNIFICANT VALUE NOTED:  none    REGISTRATION STAFF NOTIFIED?   No      WHAT IS YOUR SPIRITUAL CARE PLAN FOR THIS PATIENT?:   Spiritual Care is available to provide support to the patient's and their families        Electronically signed by Jude Concepcion, on 11/22/2018 at 1:43 AM.  [facility]  [phone]

## 2018-11-22 NOTE — H&P
MD   pantoprazole (PROTONIX) 40 MG tablet TAKE 1 TABLET TWICE A DAY 10/4/18   Etta Car MD   acetaminophen (TYLENOL) 325 MG tablet Take 2 tablets by mouth every 4 hours as needed for Pain or Fever 6/12/18   Renee [place]   Lactobacillus (PROBIOTIC ACIDOPHILUS) TABS Take 1 tablet by mouth 3 times daily 6/12/18   Renee [place]   lisinopril (PRINIVIL;ZESTRIL) 5 MG tablet TAKE 1 TABLET DAILY 4/9/18   Etta Car MD   metoprolol succinate (TOPROL XL) 25 MG extended release tablet Take 1 tablet by mouth daily Take one tab daily 4/9/18   Etta Car MD   simvastatin (ZOCOR) 20 MG tablet TAKE 1 TABLET NIGHTLY 4/9/18   Etta Car MD   CARAFATE 1 GM/10ML suspension take 10 milliliters by mouth four times a day BEFORE MEALS AND NIGHTLY 3/13/18   Etta Car MD   Lutein 10 MG TABS Take 20 mg by mouth 2 times daily otc eye vitamin     Historical Provider, MD   Multiple Vitamins-Minerals (MULTIVITAMIN PO) Take 1 tablet by mouth daily. Historical Provider, MD      Current Facility-Administered Medications: norepinephrine (LEVOPHED) 64 MCG/ML infusion SOLN, , ,   norepinephrine (LEVOPHED) 64 MCG/ML infusion SOLN, , ,   heparin (porcine) 100 UNIT/ML infusion, , ,   EPINEPHrine PF 1 MG/10ML injection, , ,   midazolam HCl (VERSED) 10 MG/2ML injection, , ,   midazolam (VERSED) 100 mg in dextrose 5% 100 mL infusion, 1 mg/hr, Intravenous, Continuous    Allergies:  Codeine and Morphine    Social History:   reports that he quit smoking about 20 years ago. His smoking use included Cigars. He smoked 1.00 pack per day. He has never used smokeless tobacco. He reports that he does not drink alcohol or use drugs. Family History: family history includes Cancer in his father; Heart Disease in his paternal grandfather. No h/o sudden cardiac death.   REVIEW OF SYSTEMS:    · Unable to obtain      PHYSICAL EXAM:      BP (!) 75/55   Pulse 110   Temp 98 °F (36.7 °C) (Oral)   Resp (!) 34   Wt 160 lb (72.6 kg)   SpO2 100% BMI 26.95 kg/m²    Constitutional and General Appearance: sedated and intubated  HEENT: ET tube in place. Respiratory:  ·  wheezing & basal rales  Cardiovascular:  · The apical impulse is not displaced  · Heart tones are crisp and normal. regular S1 and S2. Abdomen:   · No masses or tenderness  · Bowel sounds present  Extremities:  ·  No Cyanosis or Clubbing  ·  Lower extremity edema: No  ·  Skin: cold  Neurological:  · Sedated and intubated    DATA:    Diagnostics:    EKG:  ST elevation in V 4 TO V 6 with Inferior ST Elevation    Labs:     CBC:   Recent Labs      11/21/18   1330  11/21/18 2258   WBC  10.4  5.7   HGB  12.7*  14.2   HCT  38.3*  44.0   PLT  222  227     BMP:   Recent Labs      11/21/18   1330  11/21/18   2240  11/22/18   0041   NA  139  140   --    K  4.4  3.5*   --    CO2  25  25   --    BUN  17  17   --    CREATININE  0.94  1.06  1.11   LABGLOM  >60  >60  >60   GLUCOSE  136*  175*   --      BNP: No results for input(s): BNP in the last 72 hours. PT/INR: No results for input(s): PROTIME, INR in the last 72 hours. APTT:  Recent Labs      11/21/18 2258   APTT  26.8*     CARDIAC ENZYMES:  Recent Labs      11/22/18   0034   TROPONINI  2.64*     FASTING LIPID PANEL:  Lab Results   Component Value Date    HDL 42 10/03/2018    TRIG 137 10/03/2018     LIVER PROFILE:No results for input(s): AST, ALT, LABALBU in the last 72 hours.     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

## 2018-11-22 NOTE — PROGRESS NOTES
BILITOT, ALKPHOS in the last 72 hours. Troponin:   Recent Labs      18   0034   TROPONINI  2.64*              Recent Labs      18   2220   TROPONINT  <0.03     BNP: No results for input(s): PROBNP in the last 72 hours. No results for input(s): BNP in the last 72 hours. Lipids: No results for input(s): CHOL, HDL in the last 72 hours. Invalid input(s): LDLCALCU  INR: No results for input(s): INR in the last 72 hours. Objective:   Vitals: /75   Pulse 78   Temp 98 °F (36.7 °C) (Oral)   Resp 23   Wt 169 lb 15.6 oz (77.1 kg)   SpO2 100%   BMI 28.63 kg/m²      General appearance: Intubated and sedated     HEENT: Head: Normocephalic, atraumatic without any obvious abnormalities. Eyes: pupils equal and reactive, extraocular eye movements intact. mucous membranes moist, pharynx normal without lesions  Neck: JVD absent  Cardiovascular:  regular rate and rhythm, S1, S2 normal, no S3 or S4, no click and no rub  Respiratory:  clear to auscultation bilaterally, no wheezes, no rales and no rhonchi  Gastrointestinal:  soft, non-tender; bowel sounds normal; no masses,  no organomegaly  Extremities: No calf assymetry. Lower extremity edema: No   Musculoskeletal:  no joint tenderness, deformity or  swelling, no muscular tenderness noted  Skin:  warm and dry and anicteric  Neurologic: Unable to assess as patient is intubated and sedated   Psychiatric:  Unable to assess    Diagnostic Studies:     EK2018 ST elevation in V 3 TO V 6 with Inferior ST Depression    Coronary angiography 2018:   LMCA: Mild irregularities 10-20%. LAD: 90% ostial stenosis reduced to 0% using 3.25x12 mm Xience stent. Post  dilated using 3.5 mm NC balloon. Patent mid segment stent     LCx: Mild irregularities 10-20%.     RCA: 50% mid stenosis  LVEF by LV gram: 20%    Transthoracic echo 2018  Left ventricle is normal in size Global left ventricular systolic function  is normal Estimated ejection

## 2018-11-22 NOTE — ED NOTES
Verbal order from Dr. Indiana Tomlin prepare levo if needed, restart heparin drip at 12units/kg/hr.          Yosi Moore RN  11/22/18 6305

## 2018-11-23 ENCOUNTER — APPOINTMENT (OUTPATIENT)
Dept: GENERAL RADIOLOGY | Age: 83
DRG: 270 | End: 2018-11-23
Payer: MEDICARE

## 2018-11-23 LAB
ALBUMIN SERPL-MCNC: 2.5 G/DL (ref 3.5–5.2)
ALBUMIN/GLOBULIN RATIO: 0.9 (ref 1–2.5)
ALLEN TEST: ABNORMAL
ALLEN TEST: POSITIVE
ALP BLD-CCNC: 65 U/L (ref 40–129)
ALT SERPL-CCNC: 20 U/L (ref 5–41)
ANION GAP SERPL CALCULATED.3IONS-SCNC: 10 MMOL/L (ref 9–17)
ANION GAP SERPL CALCULATED.3IONS-SCNC: 15 MMOL/L (ref 9–17)
AST SERPL-CCNC: 59 U/L
BILIRUB SERPL-MCNC: 0.29 MG/DL (ref 0.3–1.2)
BILIRUBIN DIRECT: <0.08 MG/DL
BILIRUBIN, INDIRECT: ABNORMAL MG/DL (ref 0–1)
BUN BLDV-MCNC: 19 MG/DL (ref 8–23)
BUN/CREAT BLD: ABNORMAL (ref 9–20)
CALCIUM SERPL-MCNC: 8.4 MG/DL (ref 8.6–10.4)
CHLORIDE BLD-SCNC: 106 MMOL/L (ref 98–107)
CHLORIDE BLD-SCNC: 106 MMOL/L (ref 98–107)
CO2: 18 MMOL/L (ref 20–31)
CO2: 21 MMOL/L (ref 20–31)
CREAT SERPL-MCNC: 0.75 MG/DL (ref 0.7–1.2)
EKG ATRIAL RATE: 54 BPM
EKG ATRIAL RATE: 77 BPM
EKG P AXIS: 80 DEGREES
EKG P AXIS: 81 DEGREES
EKG P-R INTERVAL: 140 MS
EKG P-R INTERVAL: 148 MS
EKG Q-T INTERVAL: 392 MS
EKG Q-T INTERVAL: 512 MS
EKG QRS DURATION: 128 MS
EKG QRS DURATION: 134 MS
EKG QTC CALCULATION (BAZETT): 443 MS
EKG QTC CALCULATION (BAZETT): 485 MS
EKG R AXIS: -65 DEGREES
EKG R AXIS: -70 DEGREES
EKG T AXIS: -48 DEGREES
EKG T AXIS: 9 DEGREES
EKG VENTRICULAR RATE: 54 BPM
EKG VENTRICULAR RATE: 77 BPM
FIO2: 30
FIO2: 40
GFR AFRICAN AMERICAN: >60 ML/MIN
GFR NON-AFRICAN AMERICAN: >60 ML/MIN
GFR SERPL CREATININE-BSD FRML MDRD: ABNORMAL ML/MIN/{1.73_M2}
GFR SERPL CREATININE-BSD FRML MDRD: ABNORMAL ML/MIN/{1.73_M2}
GLOBULIN: ABNORMAL G/DL (ref 1.5–3.8)
GLUCOSE BLD-MCNC: 124 MG/DL (ref 70–99)
HCT VFR BLD CALC: 40.1 % (ref 40.7–50.3)
HEMOGLOBIN: 12.6 G/DL (ref 13–17)
LV EF: 40 %
LVEF MODALITY: NORMAL
MAGNESIUM: 1.9 MG/DL (ref 1.6–2.6)
MAGNESIUM: 2.3 MG/DL (ref 1.6–2.6)
MCH RBC QN AUTO: 29.6 PG (ref 25.2–33.5)
MCHC RBC AUTO-ENTMCNC: 31.4 G/DL (ref 28.4–34.8)
MCV RBC AUTO: 94.4 FL (ref 82.6–102.9)
MODE: ABNORMAL
MODE: ABNORMAL
NEGATIVE BASE EXCESS, ART: 2 (ref 0–2)
NEGATIVE BASE EXCESS, ART: 5 (ref 0–2)
NRBC AUTOMATED: 0 PER 100 WBC
O2 DEVICE/FLOW/%: ABNORMAL
O2 DEVICE/FLOW/%: ABNORMAL
PARTIAL THROMBOPLASTIN TIME: 44.6 SEC (ref 20.5–30.5)
PARTIAL THROMBOPLASTIN TIME: 52.4 SEC (ref 20.5–30.5)
PARTIAL THROMBOPLASTIN TIME: 61.1 SEC (ref 20.5–30.5)
PARTIAL THROMBOPLASTIN TIME: 85 SEC (ref 20.5–30.5)
PATIENT TEMP: ABNORMAL
PATIENT TEMP: ABNORMAL
PDW BLD-RTO: 14.8 % (ref 11.8–14.4)
PLATELET # BLD: 152 K/UL (ref 138–453)
PMV BLD AUTO: 11.6 FL (ref 8.1–13.5)
POC HCO3: 18.9 MMOL/L (ref 21–28)
POC HCO3: 22.3 MMOL/L (ref 21–28)
POC O2 SATURATION: 97 % (ref 94–98)
POC O2 SATURATION: 98 % (ref 94–98)
POC PCO2 TEMP: ABNORMAL MM HG
POC PCO2 TEMP: ABNORMAL MM HG
POC PCO2: 31.1 MM HG (ref 35–48)
POC PCO2: 37.2 MM HG (ref 35–48)
POC PH TEMP: ABNORMAL
POC PH TEMP: ABNORMAL
POC PH: 7.39 (ref 7.35–7.45)
POC PH: 7.39 (ref 7.35–7.45)
POC PO2 TEMP: ABNORMAL MM HG
POC PO2 TEMP: ABNORMAL MM HG
POC PO2: 108.5 MM HG (ref 83–108)
POC PO2: 87.1 MM HG (ref 83–108)
POSITIVE BASE EXCESS, ART: ABNORMAL (ref 0–3)
POSITIVE BASE EXCESS, ART: ABNORMAL (ref 0–3)
POTASSIUM SERPL-SCNC: 4.2 MMOL/L (ref 3.7–5.3)
POTASSIUM SERPL-SCNC: 4.5 MMOL/L (ref 3.7–5.3)
RBC # BLD: 4.25 M/UL (ref 4.21–5.77)
SAMPLE SITE: ABNORMAL
SAMPLE SITE: ABNORMAL
SODIUM BLD-SCNC: 137 MMOL/L (ref 135–144)
SODIUM BLD-SCNC: 139 MMOL/L (ref 135–144)
TCO2 (CALC), ART: 20 MMOL/L (ref 22–29)
TCO2 (CALC), ART: 23 MMOL/L (ref 22–29)
TOTAL PROTEIN: 5.4 G/DL (ref 6.4–8.3)
TROPONIN INTERP: ABNORMAL
TROPONIN T: 0.85 NG/ML
TROPONIN T: 0.85 NG/ML
TROPONIN T: 0.88 NG/ML
TROPONIN T: 0.9 NG/ML
TROPONIN T: 0.93 NG/ML
WBC # BLD: 10.9 K/UL (ref 3.5–11.3)

## 2018-11-23 PROCEDURE — 2500000003 HC RX 250 WO HCPCS: Performed by: INTERNAL MEDICINE

## 2018-11-23 PROCEDURE — 71045 X-RAY EXAM CHEST 1 VIEW: CPT

## 2018-11-23 PROCEDURE — 2580000003 HC RX 258: Performed by: INTERNAL MEDICINE

## 2018-11-23 PROCEDURE — 85027 COMPLETE CBC AUTOMATED: CPT

## 2018-11-23 PROCEDURE — 2000000000 HC ICU R&B

## 2018-11-23 PROCEDURE — 80076 HEPATIC FUNCTION PANEL: CPT

## 2018-11-23 PROCEDURE — 6360000002 HC RX W HCPCS: Performed by: STUDENT IN AN ORGANIZED HEALTH CARE EDUCATION/TRAINING PROGRAM

## 2018-11-23 PROCEDURE — 82803 BLOOD GASES ANY COMBINATION: CPT

## 2018-11-23 PROCEDURE — 83735 ASSAY OF MAGNESIUM: CPT

## 2018-11-23 PROCEDURE — 36415 COLL VENOUS BLD VENIPUNCTURE: CPT

## 2018-11-23 PROCEDURE — 99291 CRITICAL CARE FIRST HOUR: CPT | Performed by: INTERNAL MEDICINE

## 2018-11-23 PROCEDURE — 6360000002 HC RX W HCPCS

## 2018-11-23 PROCEDURE — 93306 TTE W/DOPPLER COMPLETE: CPT

## 2018-11-23 PROCEDURE — 94003 VENT MGMT INPAT SUBQ DAY: CPT

## 2018-11-23 PROCEDURE — 85730 THROMBOPLASTIN TIME PARTIAL: CPT

## 2018-11-23 PROCEDURE — 84484 ASSAY OF TROPONIN QUANT: CPT

## 2018-11-23 PROCEDURE — 6370000000 HC RX 637 (ALT 250 FOR IP): Performed by: STUDENT IN AN ORGANIZED HEALTH CARE EDUCATION/TRAINING PROGRAM

## 2018-11-23 PROCEDURE — 93005 ELECTROCARDIOGRAM TRACING: CPT

## 2018-11-23 PROCEDURE — 80048 BASIC METABOLIC PNL TOTAL CA: CPT

## 2018-11-23 PROCEDURE — 2700000000 HC OXYGEN THERAPY PER DAY

## 2018-11-23 PROCEDURE — 94762 N-INVAS EAR/PLS OXIMTRY CONT: CPT

## 2018-11-23 PROCEDURE — 36600 WITHDRAWAL OF ARTERIAL BLOOD: CPT

## 2018-11-23 PROCEDURE — 2580000003 HC RX 258: Performed by: STUDENT IN AN ORGANIZED HEALTH CARE EDUCATION/TRAINING PROGRAM

## 2018-11-23 PROCEDURE — 94770 HC ETCO2 MONITOR DAILY: CPT

## 2018-11-23 PROCEDURE — 80051 ELECTROLYTE PANEL: CPT

## 2018-11-23 RX ORDER — PANTOPRAZOLE SODIUM 40 MG/1
40 TABLET, DELAYED RELEASE ORAL
Status: DISCONTINUED | OUTPATIENT
Start: 2018-11-23 | End: 2018-11-27 | Stop reason: HOSPADM

## 2018-11-23 RX ORDER — MAGNESIUM SULFATE 1 G/100ML
INJECTION INTRAVENOUS
Status: COMPLETED
Start: 2018-11-23 | End: 2018-11-23

## 2018-11-23 RX ORDER — MIDAZOLAM HYDROCHLORIDE 1 MG/ML
1 INJECTION INTRAMUSCULAR; INTRAVENOUS ONCE
Status: DISCONTINUED | OUTPATIENT
Start: 2018-11-23 | End: 2018-11-27 | Stop reason: HOSPADM

## 2018-11-23 RX ORDER — FUROSEMIDE 10 MG/ML
20 INJECTION INTRAMUSCULAR; INTRAVENOUS ONCE
Status: COMPLETED | OUTPATIENT
Start: 2018-11-23 | End: 2018-11-23

## 2018-11-23 RX ORDER — FUROSEMIDE 10 MG/ML
20 INJECTION INTRAMUSCULAR; INTRAVENOUS 2 TIMES DAILY
Status: DISCONTINUED | OUTPATIENT
Start: 2018-11-23 | End: 2018-11-24

## 2018-11-23 RX ADMIN — FENTANYL CITRATE 25 MCG: 50 INJECTION INTRAMUSCULAR; INTRAVENOUS at 22:02

## 2018-11-23 RX ADMIN — Medication 2 MG/HR: at 14:17

## 2018-11-23 RX ADMIN — ATORVASTATIN CALCIUM 40 MG: 80 TABLET, FILM COATED ORAL at 20:22

## 2018-11-23 RX ADMIN — ASPIRIN 81 MG: 81 TABLET, CHEWABLE ORAL at 07:55

## 2018-11-23 RX ADMIN — PIPERACILLIN SODIUM,TAZOBACTAM SODIUM 2.25 G: 2; .25 INJECTION, POWDER, FOR SOLUTION INTRAVENOUS at 17:37

## 2018-11-23 RX ADMIN — FENTANYL CITRATE 25 MCG: 50 INJECTION INTRAMUSCULAR; INTRAVENOUS at 19:30

## 2018-11-23 RX ADMIN — FUROSEMIDE 20 MG: 10 INJECTION, SOLUTION INTRAMUSCULAR; INTRAVENOUS at 08:36

## 2018-11-23 RX ADMIN — DEXMEDETOMIDINE HYDROCHLORIDE 0.7 MCG/KG/HR: 100 INJECTION, SOLUTION INTRAVENOUS at 08:06

## 2018-11-23 RX ADMIN — FENTANYL CITRATE 25 MCG: 50 INJECTION INTRAMUSCULAR; INTRAVENOUS at 14:51

## 2018-11-23 RX ADMIN — PIPERACILLIN AND TAZOBACTAM 3.38 G: 3; .375 INJECTION, POWDER, LYOPHILIZED, FOR SOLUTION INTRAVENOUS; PARENTERAL at 14:36

## 2018-11-23 RX ADMIN — Medication 14 UNITS/KG/HR: at 08:37

## 2018-11-23 RX ADMIN — CLOPIDOGREL 75 MG: 75 TABLET, FILM COATED ORAL at 07:55

## 2018-11-23 RX ADMIN — Medication 10 ML: at 20:22

## 2018-11-23 RX ADMIN — FUROSEMIDE 20 MG: 10 INJECTION, SOLUTION INTRAMUSCULAR; INTRAVENOUS at 17:36

## 2018-11-23 RX ADMIN — HEPARIN SODIUM 2000 UNITS: 1000 INJECTION, SOLUTION INTRAVENOUS; SUBCUTANEOUS at 11:40

## 2018-11-23 RX ADMIN — Medication 10 ML: at 08:04

## 2018-11-23 RX ADMIN — MAGNESIUM SULFATE HEPTAHYDRATE 1 G: 1 INJECTION, SOLUTION INTRAVENOUS at 13:15

## 2018-11-23 ASSESSMENT — PULMONARY FUNCTION TESTS
PIF_VALUE: 19
PIF_VALUE: 18
PIF_VALUE: 12
PIF_VALUE: 14
PIF_VALUE: 13
PIF_VALUE: 14
PIF_VALUE: 16
PIF_VALUE: 11
PIF_VALUE: 15
PIF_VALUE: 14

## 2018-11-23 NOTE — CONSULTS
wean norepinephrine as tolerated  Continue antiplatelet, anticoagulation, statins, and beta blockers as per cardiology  Continue lung protective mechanical ventilation, appropriate changes made in ventilator settings  Patient has been switched to spontaneous mode,  Weaning as per protocol  Continue pulmonary toilet, aspiration precautions and bronchodilators  Continue with the diuresis  Continue to monitor I/O with a goal of even/negative fluid balance  Recommend to  Initiate tube feeds  Stress ulcer prophylaxis  Continue to monitor CBC, coagulation profile, transfuse as indicated  Chemical DVT prophylaxis  Glycemic control appropriate  Skin/wound care reviewed with the nursing staff  Physical/occupational/speech therapy    The patient is critically ill with illness/injury that acutely impairs one or more vital organ systems, such that there is a high probability of imminent or life threatening deterioration in the patients condition. Critical care time of greater than 35 minutes was spent; which included coordination of care during bedside rounds with nurse, respiratory therapist and discussion of patient care with them in detail. All concerns and questions were addressed, and recommendations were adopted in my plan. Feroz Kenny M.D. Pulmonary and Critical Care Medicine           11/23/2018, 1:52 PM    Please note that this chart was generated using voice recognition Dragon dictation software. Although every effort was made to ensure the accuracy of this automated transcription, some errors in transcription may have occurred.

## 2018-11-23 NOTE — PROGRESS NOTES
0354   NA  141  140  139   K  4.2  4.7  4.5   CL  109*  109*  106   CO2  20  18*  18*   BUN  19  20  19   CREATININE  0.91  0.84  0.75   GLUCOSE  130*  130*  124*     Hepatic: No results for input(s): AST, ALT, ALB, BILITOT, ALKPHOS in the last 72 hours. Troponin:   Recent Labs      18   0034   TROPONINI  2.64*              Recent Labs      18   1209  18   1819  18   0354   TROPONINT  1.10*  0.90*  0.85*     BNP: No results for input(s): PROBNP in the last 72 hours. No results for input(s): BNP in the last 72 hours. Lipids: No results for input(s): CHOL, HDL in the last 72 hours. Invalid input(s): LDLCALCU  INR: No results for input(s): INR in the last 72 hours. Objective:   Vitals: /70   Pulse 53   Temp 98.1 °F (36.7 °C) (Axillary)   Resp 20   Wt 169 lb 15.6 oz (77.1 kg)   SpO2 100%   BMI 28.63 kg/m²      General appearance: Intubated and sedated   HEENT: Head: Normocephalic, atraumatic without any obvious abnormalities. Eyes: pupils equal and reactive, extraocular eye movements intact. mucous membranes moist, pharynx normal without lesions  Neck: JVD absent  Cardiovascular:  regular rate and rhythm, S1, S2 normal, no S3 or S4, no click and no rub  Respiratory:  clear to auscultation bilaterally, no wheezes, no rales and no rhonchi  Gastrointestinal:  soft, non-tender; bowel sounds normal; no masses,  no organomegaly  Extremities: No calf assymetry. Lower extremity edema: No   Musculoskeletal:  no joint tenderness, deformity or  swelling, no muscular tenderness noted  Skin:  warm and dry and anicteric  Neurologic: Unable to assess as patient is intubated and sedated   Psychiatric:  Unable to assess    Diagnostic Studies:     EK2018 RBBB, LAFB ,ST elevation in V 3 TO V 6 with Inferior ST Depression    Coronary angiography 2018:   LMCA: Mild irregularities 10-20%. LAD: 90% ostial stenosis reduced to 0% using 3.25x12 mm Xience stent.  Post  dilated

## 2018-11-23 NOTE — DISCHARGE INSTR - COC
Continuity of Care Form    Patient Name: Preston Sauceda   :  1927  MRN:  [id number]     [address] [facility] date:  2018  Discharge date:  2018    Code Status Order: Full Code   Advance Directives:     Admitting Physician:  Nick Chow MD  PCP: Kerri Rader MD    Discharging Nurse: Anali Tan 23 Unit/Room#: 1010/1010-01  Discharging Unit Phone Number: [phone]    Emergency Contact:   Extended Emergency Contact Information  Primary Emergency Contact: Kenzie Brown  Address: --            [facility] [address] [facility] Favian Foreman [address] Phone: [phone]  Mobile Phone: [phone]  Relation: Child    Past Surgical History:  Past Surgical History:   Procedure Laterality Date    APPENDECTOMY      CHOLECYSTECTOMY  1983    COLONOSCOPY  12    COLONOSCOPY  09    COLONOSCOPY  2008    CYSTOSCOPY  11/15/2007    with left ureteroscopy and dorsal slit     KNEE FUSION Left     LIPOMA RESECTION      [place] CYSTOURETHROSCOPY N/A 2018    CYSTO Photovaporization Prostate Greenlight Laser ([id number]) performed by Vianey Cunningham MD at [address] [facility] EGD TRANSORAL BIOPSY SINGLE/MULTIPLE N/A 7/3/2017    EGD BIOPSY performed by Jace Anguiano MD at [address] Bilateral 2009    Benign    UPPER GASTROINTESTINAL ENDOSCOPY  2015    Sepulveda's (Dr. Barak Delgado)   [facility] ENDOSCOPY  2016    Lg hiatal hernia, polyp at duodenum, gastric polyp, Barretts Esophagus, GE 32    UPPER GASTROINTESTINAL ENDOSCOPY  2017    mild gastritis, Sepulveda's, Dr. Kim Weir 3 years       Immunization History:   Immunization History   Administered Date(s) Administered    Influenza Virus Vaccine 10/30/2012    Influenza, High Dose (Fluzone 65 yrs and older) 10/24/2013, 2014, 2015, 10/24/2016, 2017, 10/10/2018    Pneumococcal 13-valent Conjugate ([id number]) 2017    Pneumococcal Polysaccharide ([id number])

## 2018-11-23 NOTE — CONSULTS
Chest x-ray from 11/22/2018 HISTORY: ORDERING SYSTEM PROVIDED HISTORY: IABP verification TECHNOLOGIST PROVIDED HISTORY: IABP verification FINDINGS: Endotracheal tube extends to the mid thoracic trachea approximately 5.6 cm above the wali. Enteric tube courses below the diaphragm, distal tip not included. Intra-aortic balloon pump catheter marker extends to the T6 vertebral body somewhat inferior to the aortic knob. Similar patchy airspace opacity in the right hemithorax emanating from the right tmai. Left lung is clear. No pleural effusion or pneumothorax. The cardiac silhouette appears mildly enlarged, but there is no pulmonary vascular congestion. Visualized osseous structures appear moderately demineralized, but grossly intact, given the non dedicated imaging. 1. Intra-aortic balloon pump catheter marker is seen at the level of the T6 vertebral body slightly inferior to the aortic knob. Expected positions of remaining medical support devices. 2. Similar patchy airspace opacity emanating from the right tami. Continued imaging follow-up is recommended. IMPRESSION: 1. Expected positions of medical support devices. 2.    Xr Chest Portable    Result Date: 11/22/2018  EXAMINATION: SINGLE XRAY VIEW OF THE CHEST 11/22/2018 1:21 am COMPARISON: 11/22/2018 HISTORY: ORDERING SYSTEM PROVIDED HISTORY: Post intubation TECHNOLOGIST PROVIDED HISTORY: Post intubation FINDINGS: Pulmonary findings are stable. Heart size is stable. No significant pleural effusion or pneumothorax. Well-positioned ET tube. Well-positioned NG tube. Well-positioned ET and NG tubes. Pulmonary findings are stable     Xr Chest Portable    Result Date: 11/22/2018  EXAMINATION: SINGLE XRAY VIEW OF THE CHEST 11/22/2018 12:59 am COMPARISON: 11/21/2018 HISTORY: ORDERING SYSTEM PROVIDED HISTORY: stat cxr, on bipap pulmonary edema TECHNOLOGIST PROVIDED HISTORY: stat cxr, on bipap pulmonary edema FINDINGS: Heart size is stable.   Bilateral

## 2018-11-23 NOTE — PROGRESS NOTES
Inpatient Monitoring, Education Not Indicated    Nutrition Evaluation:   · Evaluation: Goals set   · Goals: meet 75% of estimated nutrient needs    · Monitoring: Nutrition Progression, Pertinent Labs      Electronically signed by Kvng Seals Dietetic Intern on 11/23/18 at 2:48 PM    Contact Number: [phone]

## 2018-11-24 ENCOUNTER — APPOINTMENT (OUTPATIENT)
Dept: GENERAL RADIOLOGY | Age: 83
DRG: 270 | End: 2018-11-24
Payer: MEDICARE

## 2018-11-24 LAB
ALLEN TEST: POSITIVE
ANION GAP SERPL CALCULATED.3IONS-SCNC: 14 MMOL/L (ref 9–17)
BUN BLDV-MCNC: 20 MG/DL (ref 8–23)
BUN/CREAT BLD: ABNORMAL (ref 9–20)
CALCIUM SERPL-MCNC: 8.2 MG/DL (ref 8.6–10.4)
CHLORIDE BLD-SCNC: 105 MMOL/L (ref 98–107)
CO2: 21 MMOL/L (ref 20–31)
CREAT SERPL-MCNC: 0.96 MG/DL (ref 0.7–1.2)
CULTURE: ABNORMAL
ESTIMATED AVERAGE GLUCOSE: 111 MG/DL
FIO2: 30
GFR AFRICAN AMERICAN: >60 ML/MIN
GFR NON-AFRICAN AMERICAN: >60 ML/MIN
GFR SERPL CREATININE-BSD FRML MDRD: ABNORMAL ML/MIN/{1.73_M2}
GFR SERPL CREATININE-BSD FRML MDRD: ABNORMAL ML/MIN/{1.73_M2}
GLUCOSE BLD-MCNC: 101 MG/DL (ref 70–99)
HBA1C MFR BLD: 5.5 % (ref 4–6)
HCT VFR BLD CALC: 36 % (ref 40.7–50.3)
HEMOGLOBIN: 11.3 G/DL (ref 13–17)
Lab: ABNORMAL
MCH RBC QN AUTO: 29.2 PG (ref 25.2–33.5)
MCHC RBC AUTO-ENTMCNC: 31.4 G/DL (ref 28.4–34.8)
MCV RBC AUTO: 93 FL (ref 82.6–102.9)
MODE: ABNORMAL
NEGATIVE BASE EXCESS, ART: ABNORMAL (ref 0–2)
NRBC AUTOMATED: 0 PER 100 WBC
O2 DEVICE/FLOW/%: ABNORMAL
ORGANISM: ABNORMAL
PARTIAL THROMBOPLASTIN TIME: 45.6 SEC (ref 20.5–30.5)
PARTIAL THROMBOPLASTIN TIME: 53.6 SEC (ref 20.5–30.5)
PATIENT TEMP: ABNORMAL
PDW BLD-RTO: 14.5 % (ref 11.8–14.4)
PLATELET # BLD: 137 K/UL (ref 138–453)
PMV BLD AUTO: 11.7 FL (ref 8.1–13.5)
POC HCO3: 24 MMOL/L (ref 21–28)
POC O2 SATURATION: 98 % (ref 94–98)
POC PCO2 TEMP: ABNORMAL MM HG
POC PCO2: 34 MM HG (ref 35–48)
POC PH TEMP: ABNORMAL
POC PH: 7.46 (ref 7.35–7.45)
POC PO2 TEMP: ABNORMAL MM HG
POC PO2: 97 MM HG (ref 83–108)
POSITIVE BASE EXCESS, ART: 0 (ref 0–3)
POTASSIUM SERPL-SCNC: 3.7 MMOL/L (ref 3.7–5.3)
RBC # BLD: 3.87 M/UL (ref 4.21–5.77)
SAMPLE SITE: ABNORMAL
SODIUM BLD-SCNC: 140 MMOL/L (ref 135–144)
SPECIMEN DESCRIPTION: ABNORMAL
STATUS: ABNORMAL
TCO2 (CALC), ART: 25 MMOL/L (ref 22–29)
TROPONIN INTERP: ABNORMAL
TROPONIN INTERP: ABNORMAL
TROPONIN T: 0.84 NG/ML
TROPONIN T: 1.05 NG/ML
WBC # BLD: 8.6 K/UL (ref 3.5–11.3)

## 2018-11-24 PROCEDURE — 2700000000 HC OXYGEN THERAPY PER DAY

## 2018-11-24 PROCEDURE — 94762 N-INVAS EAR/PLS OXIMTRY CONT: CPT

## 2018-11-24 PROCEDURE — 2580000003 HC RX 258: Performed by: STUDENT IN AN ORGANIZED HEALTH CARE EDUCATION/TRAINING PROGRAM

## 2018-11-24 PROCEDURE — 71045 X-RAY EXAM CHEST 1 VIEW: CPT

## 2018-11-24 PROCEDURE — 84484 ASSAY OF TROPONIN QUANT: CPT

## 2018-11-24 PROCEDURE — 6370000000 HC RX 637 (ALT 250 FOR IP)

## 2018-11-24 PROCEDURE — 80048 BASIC METABOLIC PNL TOTAL CA: CPT

## 2018-11-24 PROCEDURE — 2000000000 HC ICU R&B

## 2018-11-24 PROCEDURE — 94770 HC ETCO2 MONITOR DAILY: CPT

## 2018-11-24 PROCEDURE — 6360000002 HC RX W HCPCS: Performed by: STUDENT IN AN ORGANIZED HEALTH CARE EDUCATION/TRAINING PROGRAM

## 2018-11-24 PROCEDURE — 99291 CRITICAL CARE FIRST HOUR: CPT | Performed by: INTERNAL MEDICINE

## 2018-11-24 PROCEDURE — 82803 BLOOD GASES ANY COMBINATION: CPT

## 2018-11-24 PROCEDURE — 36600 WITHDRAWAL OF ARTERIAL BLOOD: CPT

## 2018-11-24 PROCEDURE — 85730 THROMBOPLASTIN TIME PARTIAL: CPT

## 2018-11-24 PROCEDURE — 6370000000 HC RX 637 (ALT 250 FOR IP): Performed by: STUDENT IN AN ORGANIZED HEALTH CARE EDUCATION/TRAINING PROGRAM

## 2018-11-24 PROCEDURE — 36415 COLL VENOUS BLD VENIPUNCTURE: CPT

## 2018-11-24 PROCEDURE — 6370000000 HC RX 637 (ALT 250 FOR IP): Performed by: INTERNAL MEDICINE

## 2018-11-24 PROCEDURE — 93005 ELECTROCARDIOGRAM TRACING: CPT

## 2018-11-24 PROCEDURE — 94003 VENT MGMT INPAT SUBQ DAY: CPT

## 2018-11-24 PROCEDURE — 85027 COMPLETE CBC AUTOMATED: CPT

## 2018-11-24 RX ORDER — METOPROLOL SUCCINATE 25 MG/1
25 TABLET, EXTENDED RELEASE ORAL DAILY
Status: DISCONTINUED | OUTPATIENT
Start: 2018-11-24 | End: 2018-11-24

## 2018-11-24 RX ORDER — FUROSEMIDE 20 MG/1
20 TABLET ORAL DAILY
Status: DISCONTINUED | OUTPATIENT
Start: 2018-11-24 | End: 2018-11-27 | Stop reason: HOSPADM

## 2018-11-24 RX ADMIN — Medication 10 ML: at 08:41

## 2018-11-24 RX ADMIN — PIPERACILLIN SODIUM,TAZOBACTAM SODIUM 2.25 G: 2; .25 INJECTION, POWDER, FOR SOLUTION INTRAVENOUS at 18:54

## 2018-11-24 RX ADMIN — PIPERACILLIN SODIUM,TAZOBACTAM SODIUM 2.25 G: 2; .25 INJECTION, POWDER, FOR SOLUTION INTRAVENOUS at 03:30

## 2018-11-24 RX ADMIN — POTASSIUM CHLORIDE 40 MEQ: 40 SOLUTION ORAL at 08:30

## 2018-11-24 RX ADMIN — METOPROLOL TARTRATE 25 MG: 25 TABLET ORAL at 08:31

## 2018-11-24 RX ADMIN — Medication 10 ML: at 19:52

## 2018-11-24 RX ADMIN — FENTANYL CITRATE 25 MCG: 50 INJECTION INTRAMUSCULAR; INTRAVENOUS at 09:32

## 2018-11-24 RX ADMIN — ASPIRIN 81 MG: 81 TABLET, CHEWABLE ORAL at 08:30

## 2018-11-24 RX ADMIN — FENTANYL CITRATE 25 MCG: 50 INJECTION INTRAMUSCULAR; INTRAVENOUS at 12:20

## 2018-11-24 RX ADMIN — METOPROLOL TARTRATE 25 MG: 25 TABLET ORAL at 01:55

## 2018-11-24 RX ADMIN — FENTANYL CITRATE 25 MCG: 50 INJECTION INTRAMUSCULAR; INTRAVENOUS at 13:40

## 2018-11-24 RX ADMIN — Medication 16 UNITS/KG/HR: at 06:28

## 2018-11-24 RX ADMIN — FENTANYL CITRATE 25 MCG: 50 INJECTION INTRAMUSCULAR; INTRAVENOUS at 11:02

## 2018-11-24 RX ADMIN — Medication 25 MG: at 08:31

## 2018-11-24 RX ADMIN — CLOPIDOGREL 75 MG: 75 TABLET, FILM COATED ORAL at 08:30

## 2018-11-24 RX ADMIN — Medication 4 MG/HR: at 03:36

## 2018-11-24 RX ADMIN — FENTANYL CITRATE 25 MCG: 50 INJECTION INTRAMUSCULAR; INTRAVENOUS at 06:49

## 2018-11-24 RX ADMIN — FUROSEMIDE 20 MG: 20 TABLET ORAL at 15:07

## 2018-11-24 RX ADMIN — ATORVASTATIN CALCIUM 40 MG: 80 TABLET, FILM COATED ORAL at 19:52

## 2018-11-24 RX ADMIN — FUROSEMIDE 20 MG: 10 INJECTION, SOLUTION INTRAMUSCULAR; INTRAVENOUS at 08:31

## 2018-11-24 RX ADMIN — PIPERACILLIN SODIUM,TAZOBACTAM SODIUM 2.25 G: 2; .25 INJECTION, POWDER, FOR SOLUTION INTRAVENOUS at 11:57

## 2018-11-24 RX ADMIN — ACETAMINOPHEN 650 MG: 325 TABLET ORAL at 01:21

## 2018-11-24 ASSESSMENT — PAIN SCALES - GENERAL
PAINLEVEL_OUTOF10: 6
PAINLEVEL_OUTOF10: 4
PAINLEVEL_OUTOF10: 5
PAINLEVEL_OUTOF10: 3
PAINLEVEL_OUTOF10: 5
PAINLEVEL_OUTOF10: 6
PAINLEVEL_OUTOF10: 0

## 2018-11-24 ASSESSMENT — PULMONARY FUNCTION TESTS
PIF_VALUE: 19
PIF_VALUE: 11
PIF_VALUE: 24
PIF_VALUE: 19
PIF_VALUE: 14

## 2018-11-24 NOTE — PROGRESS NOTES
PULMONARY & CRITICAL CARE MEDICINE PROGRESS NOTE     Patient:  Jimena Fernandez  MRN: [id number]  Admit date: 11/22/2018  Primary Care Physician: Savanna Wiley MD  Consulting Physician: Beba Saba MD    SUBJECTIVE     CHIEF COMPLAINT/REASON FOR INITIAL CONSULT:  Acute respiratory failure    BRIEF HISTORY & INTERVAL EVENTS:  The patient is a 80 y.o. male  With past medical history significant for coronary artery disease; status post stent to LAD in 2012,  had recent stress test which showed inferior defect on rest imaging, underwent cystourethroscopy and greenlight photo-vaporization of prostate for BPH on 11/19/18. Patient initially presented to the [facility] with worsening shortness of breath along with hypertensive crisis. He was started on heparin and nitro infusions and was transferred to [facility].  He was noted to have ST elevation MI on arrival and became hypotensive in the ER with worsening mental status. He was intubated for airway protection and was started on vasopressors. Patient was subsequently taken to the cath lab and underwent placement of a drug-eluting stent to LAD, and insertion of IABP.     Patient remains on invasive mechanical ventilation, he has been  Successfully weaned off norepinephrine infusion, the plan was to discontinue IABP this afternoon. He is doing well on spontaneous mode of ventilation. Plan for extubation later in the evening.     REVIEW OF SYSTEMS:  Unobtainable from patient due to sedation/mechanical ventilation    OBJECTIVE     Ventilator Settings:  Vent Information  $Ventilation: $Subsequent Day  Ventilator Started: Yes  Vent Type: Servo i  Vent Mode: PRVC  Vt Ordered: 460 mL  Rate Set: 14 bmp  Pressure Support: 6 cmH20  FiO2 : 30 %  Sensitivity: 5  PEEP/CPAP: 5  I Time/ I Time %: 0.9 s  Cuff Pressure (cm H2O): 22 cm H2O  Humidification Source: E  Additional Respiratory  Assessments  Pulse: 80  Resp: 14  SpO2: 100 %  End Tidal CO2: 46

## 2018-11-24 NOTE — PROGRESS NOTES
Urology Progress Note    Subjective: Preston Sauceda is a 80 y.o. male. His/Her current Diet is: Diet NPO Effective Now. The patient had greenlight laser PVP on 11/19/18 at [facility]. No acute urologic events overnight, Johnson draining 2590cc in past day . No chest pain, shortness of breath, nausea, vomiting, fevers, chills  Intubated on hep ggt.      Patient Vitals for the past 24 hrs:   BP Temp Temp src Pulse Resp SpO2 Height Weight   11/24/18 0600 (!) 125/56 - - 92 18 100 % - -   11/24/18 0545 - - - 96 23 100 % - -   11/24/18 0530 134/63 - - 94 20 100 % - -   11/24/18 0515 - - - 89 22 100 % - -   11/24/18 0500 (!) 92/43 - - 80 21 100 % - -   11/24/18 0445 - - - 96 28 100 % - -   11/24/18 0430 (!) 156/82 - - 95 21 100 % - -   11/24/18 0416 - - - 73 21 100 % - -   11/24/18 0415 - - - 73 21 100 % - -   11/24/18 0400 (!) 136/57 98.9 °F (37.2 °C) Axillary 77 18 99 % - 166 lb 3.6 oz (75.4 kg)   11/24/18 0345 - - - 72 23 99 % - -   11/24/18 0330 (!) 76/43 - - 63 16 99 % - -   11/24/18 0315 - - - 70 21 100 % - -   11/24/18 0300 (!) 141/68 - - 81 19 100 % - -   11/24/18 0245 - - - 83 23 - - -   11/24/18 0230 - - - 87 19 100 % - -   11/24/18 0215 - - - 94 13 100 % - -   11/24/18 0200 131/61 - - 98 22 100 % - -   11/24/18 0155 (!) 140/61 - - 97 - - - -   11/24/18 0145 - - - 100 20 100 % - -   11/24/18 0130 (!) 134/53 - - 99 19 100 % - -   11/24/18 0115 - - - 100 21 100 % - -   11/24/18 0100 (!) 127/56 - - 93 18 100 % - -   11/24/18 0045 - - - 91 18 100 % - -   11/24/18 0030 (!) 165/59 - - 103 23 93 % - -   11/24/18 0015 - - - 89 18 100 % - -   11/24/18 0000 (!) 137/41 100.1 °F (37.8 °C) Axillary 87 18 100 % - -   11/23/18 2345 - - - 95 20 100 % - -   11/23/18 2330 (!) 148/53 - - 91 23 100 % - -   11/23/18 2318 - - - 85 18 100 % - -   11/23/18 2315 - - - 85 17 100 % - -   11/23/18 2300 (!) 142/48 - - 93 20 100 % - -   11/23/18 2245 - - - 89 20 100 % - -   11/23/18 2230 (!) 169/45 - - 102 20 100 % - -   11/23/18

## 2018-11-24 NOTE — PROGRESS NOTES
Writer paged Dr. Jareth Vogel, Cardiology Fellow on call, with the following message @[phone]:    Pt has IABP and is hypertensive 165/59 and HR 92. Can I get IV Lopressor or Labetalol for him please? I'm having to give Fentanyl to help with his pressures and it helps for like 10 minutes. Thank you!!    Dr. Jareth Vogel called writer @[phone] and she was updated on patient's hemodynamics/IABP settings. Per Dr. Jareth Vogel, patient's IABP setting changed from 1:2 to 1:3 to see if it would drop his pressures so that he wasn't so hypertensive. Patient's pressures remained in the 140-150s with MAPs 106. Per Dr. Jareth Vogel, give a one time dose of PO Lopressor 25mg. Awaiting orders in [facility].

## 2018-11-24 NOTE — PROGRESS NOTES
[facility]   Progress Note                   Date:   11/24/2018  Patient name: Bernadine Canela  Date of admission:  11/22/2018 12:19 AM  MRN:   [id number]  YOB: 1927  PCP: Willie Osler, MD    Reason for Admission: Respiratory failure    Subjective:       Clinical Changes / Abnormalities:  @ on IABP with 1;3.  Required lopressor overnight for the higher SBP in 150 to 160. He is following the commands. Still on mechanical ventilator. No rhythm issues reported overnight. Medications:   Scheduled Meds:   metoprolol tartrate  25 mg Oral BID    furosemide  20 mg Oral Daily    pantoprazole  40 mg Oral QAM AC    piperacillin-tazobactam  2.25 g Intravenous Q8H    midazolam  1 mg Intravenous Once    sodium chloride flush  10 mL Intravenous 2 times per day    atorvastatin  40 mg Oral Nightly    aspirin  81 mg Oral Daily    clopidogrel  75 mg Oral Daily     Continuous Infusions:   midazolam 2 mg/hr (11/24/18 0635)    heparin (porcine) 16 Units/kg/hr (11/24/18 0628)    norepinephrine 1 mcg/min (11/23/18 1038)    DOPamine       CBC:   Recent Labs      11/22/18   1819  11/23/18   0354  11/24/18   0352   WBC  11.4*  10.9  8.6   HGB  11.7*  12.6*  11.3*   PLT  168  152  137*     BMP:    Recent Labs      11/22/18   1819  11/23/18   0354  11/23/18   1500  11/24/18   0352   NA  140  139  137  140   K  4.7  4.5  4.2  3.7   CL  109*  106  106  105   CO2  18*  18*  21  21   BUN  20  19   --   20   CREATININE  0.84  0.75   --   0.96   GLUCOSE  130*  124*   --   101*     Hepatic:   Recent Labs      11/23/18   0913   AST  59*   ALT  20   BILITOT  0.29*   ALKPHOS  65     Troponin:   Recent Labs      11/22/18   0034   TROPONINI  2.64*     BNP: No results for input(s): BNP in the last 72 hours. Lipids: No results for input(s): CHOL, HDL in the last 72 hours. Invalid input(s): LDLCALCU  INR: No results for input(s): INR in the last 72 hours.     Objective:   Vitals: BP (!) 125/56

## 2018-11-25 ENCOUNTER — APPOINTMENT (OUTPATIENT)
Dept: GENERAL RADIOLOGY | Age: 83
DRG: 270 | End: 2018-11-25
Payer: MEDICARE

## 2018-11-25 LAB
ANION GAP SERPL CALCULATED.3IONS-SCNC: 13 MMOL/L (ref 9–17)
BUN BLDV-MCNC: 14 MG/DL (ref 8–23)
BUN/CREAT BLD: ABNORMAL (ref 9–20)
CALCIUM SERPL-MCNC: 8.5 MG/DL (ref 8.6–10.4)
CHLORIDE BLD-SCNC: 103 MMOL/L (ref 98–107)
CO2: 25 MMOL/L (ref 20–31)
CREAT SERPL-MCNC: 0.73 MG/DL (ref 0.7–1.2)
EKG ATRIAL RATE: 117 BPM
EKG ATRIAL RATE: 95 BPM
EKG P AXIS: 27 DEGREES
EKG P AXIS: 82 DEGREES
EKG P-R INTERVAL: 142 MS
EKG P-R INTERVAL: 160 MS
EKG Q-T INTERVAL: 338 MS
EKG Q-T INTERVAL: 352 MS
EKG QRS DURATION: 124 MS
EKG QRS DURATION: 134 MS
EKG QTC CALCULATION (BAZETT): 442 MS
EKG QTC CALCULATION (BAZETT): 471 MS
EKG R AXIS: -71 DEGREES
EKG R AXIS: -78 DEGREES
EKG T AXIS: -23 DEGREES
EKG T AXIS: 69 DEGREES
EKG VENTRICULAR RATE: 117 BPM
EKG VENTRICULAR RATE: 95 BPM
GFR AFRICAN AMERICAN: >60 ML/MIN
GFR NON-AFRICAN AMERICAN: >60 ML/MIN
GFR SERPL CREATININE-BSD FRML MDRD: ABNORMAL ML/MIN/{1.73_M2}
GFR SERPL CREATININE-BSD FRML MDRD: ABNORMAL ML/MIN/{1.73_M2}
GLUCOSE BLD-MCNC: 83 MG/DL (ref 70–99)
HCT VFR BLD CALC: 37.7 % (ref 40.7–50.3)
HEMOGLOBIN: 12.1 G/DL (ref 13–17)
MAGNESIUM: 2 MG/DL (ref 1.6–2.6)
MCH RBC QN AUTO: 29.1 PG (ref 25.2–33.5)
MCHC RBC AUTO-ENTMCNC: 32.1 G/DL (ref 28.4–34.8)
MCV RBC AUTO: 90.6 FL (ref 82.6–102.9)
NRBC AUTOMATED: 0 PER 100 WBC
PDW BLD-RTO: 14.2 % (ref 11.8–14.4)
PLATELET # BLD: 152 K/UL (ref 138–453)
PMV BLD AUTO: 11 FL (ref 8.1–13.5)
POTASSIUM SERPL-SCNC: 3.5 MMOL/L (ref 3.7–5.3)
RBC # BLD: 4.16 M/UL (ref 4.21–5.77)
SODIUM BLD-SCNC: 141 MMOL/L (ref 135–144)
WBC # BLD: 6.6 K/UL (ref 3.5–11.3)

## 2018-11-25 PROCEDURE — 2580000003 HC RX 258: Performed by: STUDENT IN AN ORGANIZED HEALTH CARE EDUCATION/TRAINING PROGRAM

## 2018-11-25 PROCEDURE — 94762 N-INVAS EAR/PLS OXIMTRY CONT: CPT

## 2018-11-25 PROCEDURE — 6360000002 HC RX W HCPCS: Performed by: STUDENT IN AN ORGANIZED HEALTH CARE EDUCATION/TRAINING PROGRAM

## 2018-11-25 PROCEDURE — 99233 SBSQ HOSP IP/OBS HIGH 50: CPT | Performed by: INTERNAL MEDICINE

## 2018-11-25 PROCEDURE — 36415 COLL VENOUS BLD VENIPUNCTURE: CPT

## 2018-11-25 PROCEDURE — G8987 SELF CARE CURRENT STATUS: HCPCS

## 2018-11-25 PROCEDURE — 6370000000 HC RX 637 (ALT 250 FOR IP): Performed by: STUDENT IN AN ORGANIZED HEALTH CARE EDUCATION/TRAINING PROGRAM

## 2018-11-25 PROCEDURE — 97535 SELF CARE MNGMENT TRAINING: CPT

## 2018-11-25 PROCEDURE — G8978 MOBILITY CURRENT STATUS: HCPCS

## 2018-11-25 PROCEDURE — 99222 1ST HOSP IP/OBS MODERATE 55: CPT | Performed by: INTERNAL MEDICINE

## 2018-11-25 PROCEDURE — 2060000000 HC ICU INTERMEDIATE R&B

## 2018-11-25 PROCEDURE — 83735 ASSAY OF MAGNESIUM: CPT

## 2018-11-25 PROCEDURE — 97166 OT EVAL MOD COMPLEX 45 MIN: CPT

## 2018-11-25 PROCEDURE — 71045 X-RAY EXAM CHEST 1 VIEW: CPT

## 2018-11-25 PROCEDURE — 97530 THERAPEUTIC ACTIVITIES: CPT

## 2018-11-25 PROCEDURE — 97162 PT EVAL MOD COMPLEX 30 MIN: CPT

## 2018-11-25 PROCEDURE — G8979 MOBILITY GOAL STATUS: HCPCS

## 2018-11-25 PROCEDURE — 85027 COMPLETE CBC AUTOMATED: CPT

## 2018-11-25 PROCEDURE — 80048 BASIC METABOLIC PNL TOTAL CA: CPT

## 2018-11-25 PROCEDURE — G8988 SELF CARE GOAL STATUS: HCPCS

## 2018-11-25 PROCEDURE — 6370000000 HC RX 637 (ALT 250 FOR IP): Performed by: INTERNAL MEDICINE

## 2018-11-25 RX ORDER — CLOPIDOGREL BISULFATE 75 MG/1
75 TABLET ORAL DAILY
Status: ON HOLD | COMMUNITY
End: 2018-11-25 | Stop reason: CLARIF

## 2018-11-25 RX ORDER — OMEPRAZOLE 20 MG/1
20 CAPSULE, DELAYED RELEASE ORAL DAILY
Status: ON HOLD | COMMUNITY
End: 2018-11-25 | Stop reason: CLARIF

## 2018-11-25 RX ORDER — ALBUTEROL SULFATE 90 UG/1
2 AEROSOL, METERED RESPIRATORY (INHALATION) EVERY 6 HOURS PRN
Status: ON HOLD | COMMUNITY
End: 2018-11-25 | Stop reason: CLARIF

## 2018-11-25 RX ORDER — PRAVASTATIN SODIUM 20 MG
20 TABLET ORAL DAILY
Status: ON HOLD | COMMUNITY
End: 2018-11-25 | Stop reason: CLARIF

## 2018-11-25 RX ORDER — LACTULOSE 10 G/15ML
20 SOLUTION ORAL 3 TIMES DAILY PRN
Status: DISCONTINUED | OUTPATIENT
Start: 2018-11-25 | End: 2018-11-27 | Stop reason: HOSPADM

## 2018-11-25 RX ORDER — MECLIZINE HYDROCHLORIDE CHEWABLE TABLETS 25 MG/1
25 TABLET, CHEWABLE ORAL 3 TIMES DAILY PRN
Status: ON HOLD | COMMUNITY
End: 2018-11-25 | Stop reason: CLARIF

## 2018-11-25 RX ORDER — LISINOPRIL 2.5 MG/1
2.5 TABLET ORAL DAILY
Status: DISCONTINUED | OUTPATIENT
Start: 2018-11-25 | End: 2018-11-26

## 2018-11-25 RX ORDER — TRAMADOL HYDROCHLORIDE 50 MG/1
50 TABLET ORAL EVERY 6 HOURS PRN
Status: ON HOLD | COMMUNITY
End: 2018-11-25 | Stop reason: CLARIF

## 2018-11-25 RX ORDER — BUDESONIDE AND FORMOTEROL FUMARATE DIHYDRATE 160; 4.5 UG/1; UG/1
2 AEROSOL RESPIRATORY (INHALATION) 2 TIMES DAILY
Status: ON HOLD | COMMUNITY
End: 2018-11-25 | Stop reason: CLARIF

## 2018-11-25 RX ORDER — ASPIRIN 325 MG
325 TABLET ORAL DAILY
Status: ON HOLD | COMMUNITY
End: 2018-11-25 | Stop reason: CLARIF

## 2018-11-25 RX ORDER — NIFEDIPINE 30 MG/1
30 TABLET, FILM COATED, EXTENDED RELEASE ORAL DAILY
Status: ON HOLD | COMMUNITY
End: 2018-11-25 | Stop reason: CLARIF

## 2018-11-25 RX ORDER — VALSARTAN 320 MG/1
320 TABLET ORAL DAILY
Status: ON HOLD | COMMUNITY
End: 2018-11-25 | Stop reason: CLARIF

## 2018-11-25 RX ORDER — OMEGA-3S/DHA/EPA/FISH OIL/D3 300MG-1000
400 CAPSULE ORAL DAILY
Status: ON HOLD | COMMUNITY
End: 2018-11-25 | Stop reason: CLARIF

## 2018-11-25 RX ORDER — FUROSEMIDE 40 MG/1
40 TABLET ORAL DAILY
Status: ON HOLD | COMMUNITY
End: 2018-11-25 | Stop reason: CLARIF

## 2018-11-25 RX ORDER — DILTIAZEM HYDROCHLORIDE 120 MG/1
120 CAPSULE, COATED, EXTENDED RELEASE ORAL DAILY
Status: ON HOLD | COMMUNITY
End: 2018-11-25 | Stop reason: CLARIF

## 2018-11-25 RX ORDER — HYDROCHLOROTHIAZIDE 12.5 MG/1
12.5 TABLET ORAL DAILY
Status: ON HOLD | COMMUNITY
End: 2018-11-25 | Stop reason: CLARIF

## 2018-11-25 RX ADMIN — MEROPENEM 1 G: 1 INJECTION, POWDER, FOR SOLUTION INTRAVENOUS at 08:53

## 2018-11-25 RX ADMIN — PIPERACILLIN SODIUM,TAZOBACTAM SODIUM 2.25 G: 2; .25 INJECTION, POWDER, FOR SOLUTION INTRAVENOUS at 03:00

## 2018-11-25 RX ADMIN — POTASSIUM CHLORIDE 40 MEQ: 40 SOLUTION ORAL at 06:17

## 2018-11-25 RX ADMIN — LACTULOSE 20 G: 20 SOLUTION ORAL at 17:02

## 2018-11-25 RX ADMIN — ASPIRIN 81 MG: 81 TABLET, CHEWABLE ORAL at 08:56

## 2018-11-25 RX ADMIN — Medication 10 ML: at 19:35

## 2018-11-25 RX ADMIN — CLOPIDOGREL 75 MG: 75 TABLET, FILM COATED ORAL at 08:56

## 2018-11-25 RX ADMIN — METOPROLOL TARTRATE 12.5 MG: 25 TABLET ORAL at 20:00

## 2018-11-25 RX ADMIN — METOPROLOL TARTRATE 12.5 MG: 25 TABLET ORAL at 08:56

## 2018-11-25 RX ADMIN — FUROSEMIDE 20 MG: 20 TABLET ORAL at 08:56

## 2018-11-25 RX ADMIN — LACTULOSE 20 G: 20 SOLUTION ORAL at 20:00

## 2018-11-25 RX ADMIN — ATORVASTATIN CALCIUM 40 MG: 80 TABLET, FILM COATED ORAL at 19:36

## 2018-11-25 RX ADMIN — MEROPENEM 1 G: 1 INJECTION, POWDER, FOR SOLUTION INTRAVENOUS at 19:36

## 2018-11-25 RX ADMIN — PANTOPRAZOLE SODIUM 40 MG: 40 TABLET, DELAYED RELEASE ORAL at 06:17

## 2018-11-25 RX ADMIN — LISINOPRIL 2.5 MG: 2.5 TABLET ORAL at 15:47

## 2018-11-25 RX ADMIN — Medication 10 ML: at 08:58

## 2018-11-25 ASSESSMENT — ENCOUNTER SYMPTOMS
VOMITING: 0
EYES NEGATIVE: 1
TROUBLE SWALLOWING: 0
SHORTNESS OF BREATH: 0
DIARRHEA: 0
ABDOMINAL PAIN: 0
ALLERGIC/IMMUNOLOGIC NEGATIVE: 1
COUGH: 0
CONSTIPATION: 0
WHEEZING: 0
RESPIRATORY NEGATIVE: 1
GASTROINTESTINAL NEGATIVE: 1
SORE THROAT: 0
NAUSEA: 0

## 2018-11-25 ASSESSMENT — PAIN SCALES - GENERAL
PAINLEVEL_OUTOF10: 0

## 2018-11-25 ASSESSMENT — PAIN DESCRIPTION - LOCATION: LOCATION: LEG

## 2018-11-25 ASSESSMENT — PAIN DESCRIPTION - PAIN TYPE: TYPE: ACUTE PAIN

## 2018-11-25 ASSESSMENT — PAIN DESCRIPTION - ORIENTATION: ORIENTATION: RIGHT;LEFT

## 2018-11-25 ASSESSMENT — PAIN SCALES - WONG BAKER: WONGBAKER_NUMERICALRESPONSE: 6

## 2018-11-25 NOTE — PROGRESS NOTES
(04/30/2008); Prostate Biopsy (Bilateral, 06/23/2009); pr egd transoral biopsy single/multiple (N/A, 7/3/2017); Upper gastrointestinal endoscopy (02/2/2015); Upper gastrointestinal endoscopy (04/25/2016); Upper gastrointestinal endoscopy (07/03/2017); and pr cystourethroscopy (N/A, 11/19/2018). Restrictions  Restrictions/Precautions  Restrictions/Precautions: Fall Risk  Required Braces or Orthoses?: No  Vision/Hearing  Vision: Within Functional Limits  Hearing: Exceptions to [facility]  Hearing Exceptions: Hard of hearing/hearing concerns     Subjective  General  Patient assessed for rehabilitation services?: Yes  Additional Pertinent Hx: Pt recently extubated and balloon pump discontinued. Family / Caregiver Present: No  Follows Commands: Within Functional Limits  Pain Screening  Patient Currently in Pain: Yes  Pain Assessment  Pain Assessment: Faces  Salmon-Baker Pain Rating: Hurts even more  Pain Type: Acute pain  Pain Location: Leg  Pain Orientation: Right;Left  Vital Signs  Patient Currently in Pain: Yes       Orientation  Orientation  Overall Orientation Status: Within Functional Limits  Social/Functional History  Social/Functional History  Lives With: Alone  Type of Home: House  Home Layout: One level  Home Access: Level entry  Home Equipment: Rolling walker, [employer] Help From: Family  ADL Assistance: Independent  Homemaking Assistance: Independent  Homemaking Responsibilities: Yes  Ambulation Assistance: Independent  Transfer Assistance: Independent  Active : Yes  Mode of Transportation: Family    Objective  AROM RLE (degrees)  RLE AROM: WFL  AROM LLE (degrees)  LLE General AROM: Lt knee held in extension  AROM RUE (degrees)  RUE AROM : WFL  AROM LUE (degrees)  LUE AROM : WFL  Strength RLE  Strength RLE: WFL  Strength LLE  Comment: N/T  Strength RUE  Strength RUE: WFL  Strength LUE  Strength LUE: WFL  Motor Control  Gross Motor?: WFL  Sensation  Overall Sensation Status: WFL    Pt up in chair.  Assist 4: Pt to tolerate 30 minutes of activity to improve strength and endurance.   Patient Goals   Patient goals : Get stronger, walk       Therapy Time   Individual Concurrent Group Co-treatment   Time In 0760         Time Out 1015         Minutes 23         Timed Code Treatment Minutes: [address] [facility],

## 2018-11-25 NOTE — PROGRESS NOTES
step  Bathroom Shower/Tub: Tub/Shower unit  Bathroom Toilet: Standard  Bathroom Equipment: Grab bars in shower, Shower chair  Home Equipment: Rolling walker, 4 wheeled walker, Cane (pt reports use of cane majority of time )  Receives Help From: Home health  ADL Assistance: Needs assistance (pt reports having HHA that comes 1x/wk to assist with bathing )  Homemaking Assistance: Needs assistance  Homemaking Responsibilities: Yes (pt reports not having help with IADLs, but is unable to complete so IADLs have just not been completed )  Meal Prep Responsibility: Secondary  Laundry Responsibility: Secondary  Cleaning Responsibility: Secondary  Shopping Responsibility: Secondary  Ambulation Assistance: Independent (with DME)  Transfer Assistance: Independent  Active : Yes  Mode of Transportation: Car     Objective   Vision: Impaired  Vision Exceptions: Wears glasses at all times  Hearing: Exceptions to [facility]  Hearing Exceptions: Hard of hearing/hearing concerns    Orientation  Overall Orientation Status: Within Functional Limits     Balance  Sitting Balance: Supervision (~10 minute in chair with feet down )  Standing Balance: Unable to assess(comment)  Standing Balance  Sit to stand: Maximum assistance  Stand to sit: Maximum assistance     ADL  Feeding: Supervision  Grooming: Supervision  UE Bathing: Minimal assistance  LE Bathing: Moderate assistance  UE Dressing: Minimal assistance  LE Dressing: Moderate assistance (pt able to don/doff sock to L LE with increased time.  Pt L LE in extension from accident when 12years old )  Toileting: Maximum assistance    RUE Tone: Normotonic  LUE Tone: Normotonic    Coordination  Movements Are Fluid And Coordinated: Yes     Bed mobility  Scooting: Contact guard assistance  Comment: pt seated in chair upon arrival and was returned to chair following standing attempt   Transfers  Sit to stand: Maximum assistance  Stand to sit: Maximum assistance  Transfer Comments: pt unable to complete sit > stand transfer with max A. Pt able to complete ~70% of transfer but was unable to get high enough to get L LE underneath self in order to stand up safely      Cognition  Overall Cognitive Status: WFL     Sensation  Overall Sensation Status: WFL      LUE AROM : WFL  Left Hand AROM: WFL  RUE AROM : WFL  Right Hand AROM: WFL    LUE Strength  Gross LUE Strength: Exceptions to [facility] PEMBROUrban Gentleman  L Hand Grasp: 4-/5  LUE Strength Comment: overall muscle strength 4-/5   RUE Strength  Gross RUE Strength: Exceptions to [facility]EMBROU[facility] Gentleman  R Hand Grasp: 4-/5  RUE Strength Comment: overall muscle strength 4-/5      Assessment   Performance deficits / Impairments: Decreased functional mobility ; Decreased balance;Decreased ADL status; Decreased strength;Decreased high-level IADLs;Decreased endurance  Treatment Diagnosis: STEMI  Prognosis: Good  Decision Making: Medium Complexity  Patient Education: pt ed on POC, safety during functional transfers/functional mobility, d/c recommendations, and task modification. good return   REQUIRES OT FOLLOW UP: Yes  Activity Tolerance  Activity Tolerance: Patient limited by fatigue  Safety Devices  Safety Devices in place: Yes  Type of devices: Gait belt;Patient at risk for falls;Call light within reach; Chair alarm in place; Left in chair  Restraints  Initially in place: No         Plan   Plan  Times per week: 3-4x/wk    G-Code  OT G-codes  Functional Assessment Tool Used: [facility]  Score: 15/24  Functional Limitation: Self care  Self Care Current Status (): At least 40 percent but less than 60 percent impaired, limited or restricted  Self Care Goal Status (): At least 20 percent but less than 40 percent impaired, limited or restricted    AM-PAC Score      How much help from another person does the pt currently need? Unable A Lot A Little None   1. Putting on and taking off regular lower body clothing? 1      2      3       4   2. Bathing (including washing, rinsing, drying)?       1      2

## 2018-11-25 NOTE — PROGRESS NOTES
°C) (Oral)   Resp 22   Ht 5' 4.5\" (1.638 m)   Wt 158 lb 4.6 oz (71.8 kg)   SpO2 98%   BMI 26.75 kg/m²   General appearance: intubated  HEENT: Head: Normocephalic, no lesions, without obvious abnormality. Neck: no adenopathy, no carotid bruit, no JVD, supple, symmetrical, trachea midline and thyroid not enlarged, symmetric, no tenderness/mass/nodules  Lungs: decreased breath sounds at bases  Heart: regular rate and rhythm, S1, S2 normal, no murmur, click, rub or gallop  Abdomen: soft, non-tender; bowel sounds normal; no masses,  no organomegaly  Extremities: extremities normal, atraumatic, no cyanosis or edema        EK2018 RBBB, LAFB ,ST elevation in V 3 TO V 6 with Inferior ST Depression     Coronary angiography 2018:   LMCA: Mild irregularities 10-20%. LAD: 90% ostial stenosis reduced to 0% using 3.25x12 mm Xience stent. Post  dilated using 3.5 mm NC balloon. Patent mid segment stent     LCx: Mild irregularities 10-20%. RCA: 50% mid stenosis  LVEF by LV gram: 20%     Transthoracic echo 18  Technically difficult study. Normal LV size and wall thickness. Mid ti distal anteroseptal wall and Apical hypokinesis noted. Moderately reduced LV systolic function. LVEF 40%.           Assessment / Acute Cardiac Problems:   1  Anterior STEMI s/p KRISTEN to Ostial LAD   2. Ischemic cardiomyopathy with LVEF 40%  3. Cardiogenic shock on IABP S/P IABP removal on   4. H/O CAD s/p LAD Stent in   5 Acute pulmonary edema resolved S/P Extubation( )  6. BPH S/P TURP ( 18)  7.  UTI    Patient Active Problem List:     Dermatophytosis of nail     Coronary atherosclerosis of native coronary artery     Essential hypertension, benign     Obesity     Hyperlipidemia     PVC (premature ventricular contraction)     Osteoarthritis of knee     Duodenal ulcer     CAD (coronary artery disease)     Venous insufficiency     Kidney stone     Elevated PSA     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 Chronic pain of right knee     Encounter for chronic pain management     Colitis     Urinary retention     BPH (benign prostatic hyperplasia)     Gait instability     UTI (urinary tract infection)     ST elevation myocardial infarction (STEMI) ([facility] [address]      Plan of Treatment:   1 Patient`s BP is better. Will add lopressor 12.5 mg BID today. Will add lisinopril tomorrow. Recommend follow with CHF clinic. Lasix 20 mg  2 continue with DAPT with plavix. lipitor 40 mg.  3 will recommend ID consult for the patient since patient is on meropenem for ESBL E Coli.  4 D/C plan to home tomorrow if remains stable      Electronically signed by Colette Vanessa MD on 11/25/2018 at 10:06 AM  Fellow Cardiology    Attending Physician Statement  I have discussed the care of Bernadine Canela, including pertinent history and exam findings,  with the cardiology fellow/resident. I have seen and examined the patient and the key elements of all parts of the encounter have been performed by me. I agree with the assessment, plan and orders as documented by the resident with additional recommendations as below:       Patient is now extubated, resp status stable, sitting in chair, oriented to T/P/P, denies CP or dyspnea, BP and HR normal. Cont medical therapy with DAPT and lipitor. Start low dose BB and ACEI. PT/OT. Discharge planning to SNF.  On meropenem per urology recommendation.      Karey Banuelos [facility]  [facility], [address]  [phone]

## 2018-11-26 LAB
ANION GAP SERPL CALCULATED.3IONS-SCNC: 8 MMOL/L (ref 9–17)
BUN BLDV-MCNC: 13 MG/DL (ref 8–23)
BUN/CREAT BLD: ABNORMAL (ref 9–20)
CALCIUM SERPL-MCNC: 8.7 MG/DL (ref 8.6–10.4)
CHLORIDE BLD-SCNC: 108 MMOL/L (ref 98–107)
CO2: 25 MMOL/L (ref 20–31)
CREAT SERPL-MCNC: 0.64 MG/DL (ref 0.7–1.2)
GFR AFRICAN AMERICAN: >60 ML/MIN
GFR NON-AFRICAN AMERICAN: >60 ML/MIN
GFR SERPL CREATININE-BSD FRML MDRD: ABNORMAL ML/MIN/{1.73_M2}
GFR SERPL CREATININE-BSD FRML MDRD: ABNORMAL ML/MIN/{1.73_M2}
GLUCOSE BLD-MCNC: 112 MG/DL (ref 70–99)
HCT VFR BLD CALC: 36.7 % (ref 40.7–50.3)
HEMOGLOBIN: 12.1 G/DL (ref 13–17)
MCH RBC QN AUTO: 29 PG (ref 25.2–33.5)
MCHC RBC AUTO-ENTMCNC: 33 G/DL (ref 28.4–34.8)
MCV RBC AUTO: 88 FL (ref 82.6–102.9)
NRBC AUTOMATED: 0 PER 100 WBC
PDW BLD-RTO: 14.2 % (ref 11.8–14.4)
PLATELET # BLD: 185 K/UL (ref 138–453)
PMV BLD AUTO: 10.9 FL (ref 8.1–13.5)
POTASSIUM SERPL-SCNC: 3.8 MMOL/L (ref 3.7–5.3)
POTASSIUM SERPL-SCNC: 4.4 MMOL/L (ref 3.7–5.3)
RBC # BLD: 4.17 M/UL (ref 4.21–5.77)
SODIUM BLD-SCNC: 141 MMOL/L (ref 135–144)
WBC # BLD: 8.1 K/UL (ref 3.5–11.3)

## 2018-11-26 PROCEDURE — 97530 THERAPEUTIC ACTIVITIES: CPT

## 2018-11-26 PROCEDURE — 99232 SBSQ HOSP IP/OBS MODERATE 35: CPT | Performed by: INTERNAL MEDICINE

## 2018-11-26 PROCEDURE — C1751 CATH, INF, PER/CENT/MIDLINE: HCPCS

## 2018-11-26 PROCEDURE — 6370000000 HC RX 637 (ALT 250 FOR IP): Performed by: INTERNAL MEDICINE

## 2018-11-26 PROCEDURE — 51798 US URINE CAPACITY MEASURE: CPT

## 2018-11-26 PROCEDURE — 2060000000 HC ICU INTERMEDIATE R&B

## 2018-11-26 PROCEDURE — 80048 BASIC METABOLIC PNL TOTAL CA: CPT

## 2018-11-26 PROCEDURE — 6370000000 HC RX 637 (ALT 250 FOR IP): Performed by: STUDENT IN AN ORGANIZED HEALTH CARE EDUCATION/TRAINING PROGRAM

## 2018-11-26 PROCEDURE — 76937 US GUIDE VASCULAR ACCESS: CPT

## 2018-11-26 PROCEDURE — 85027 COMPLETE CBC AUTOMATED: CPT

## 2018-11-26 PROCEDURE — 84132 ASSAY OF SERUM POTASSIUM: CPT

## 2018-11-26 PROCEDURE — 6360000002 HC RX W HCPCS: Performed by: STUDENT IN AN ORGANIZED HEALTH CARE EDUCATION/TRAINING PROGRAM

## 2018-11-26 PROCEDURE — 2580000003 HC RX 258: Performed by: STUDENT IN AN ORGANIZED HEALTH CARE EDUCATION/TRAINING PROGRAM

## 2018-11-26 PROCEDURE — 97535 SELF CARE MNGMENT TRAINING: CPT

## 2018-11-26 PROCEDURE — 94762 N-INVAS EAR/PLS OXIMTRY CONT: CPT

## 2018-11-26 PROCEDURE — 02HV33Z INSERTION OF INFUSION DEVICE INTO SUPERIOR VENA CAVA, PERCUTANEOUS APPROACH: ICD-10-PCS | Performed by: INTERNAL MEDICINE

## 2018-11-26 PROCEDURE — 36415 COLL VENOUS BLD VENIPUNCTURE: CPT

## 2018-11-26 PROCEDURE — 2580000003 HC RX 258: Performed by: INTERNAL MEDICINE

## 2018-11-26 RX ORDER — SODIUM CHLORIDE 0.9 % (FLUSH) 0.9 %
10 SYRINGE (ML) INJECTION PRN
Status: DISCONTINUED | OUTPATIENT
Start: 2018-11-26 | End: 2018-11-27 | Stop reason: HOSPADM

## 2018-11-26 RX ORDER — LISINOPRIL 5 MG/1
5 TABLET ORAL DAILY
Status: DISCONTINUED | OUTPATIENT
Start: 2018-11-27 | End: 2018-11-27 | Stop reason: HOSPADM

## 2018-11-26 RX ORDER — UREA 10 %
3 LOTION (ML) TOPICAL NIGHTLY PRN
Status: DISCONTINUED | OUTPATIENT
Start: 2018-11-26 | End: 2018-11-27 | Stop reason: HOSPADM

## 2018-11-26 RX ORDER — SODIUM CHLORIDE 0.9 % (FLUSH) 0.9 %
10 SYRINGE (ML) INJECTION EVERY 8 HOURS
Status: DISCONTINUED | OUTPATIENT
Start: 2018-11-26 | End: 2018-11-27 | Stop reason: HOSPADM

## 2018-11-26 RX ORDER — LIDOCAINE HYDROCHLORIDE 10 MG/ML
5 INJECTION, SOLUTION INFILTRATION; PERINEURAL ONCE
Status: DISCONTINUED | OUTPATIENT
Start: 2018-11-27 | End: 2018-11-27 | Stop reason: HOSPADM

## 2018-11-26 RX ORDER — UREA 10 %
3 LOTION (ML) TOPICAL NIGHTLY PRN
Status: DISCONTINUED | OUTPATIENT
Start: 2018-11-26 | End: 2018-11-26

## 2018-11-26 RX ORDER — SODIUM CHLORIDE 0.9 % (FLUSH) 0.9 %
10 SYRINGE (ML) INJECTION EVERY 12 HOURS SCHEDULED
Status: DISCONTINUED | OUTPATIENT
Start: 2018-11-27 | End: 2018-11-27 | Stop reason: HOSPADM

## 2018-11-26 RX ADMIN — LISINOPRIL 2.5 MG: 2.5 TABLET ORAL at 08:39

## 2018-11-26 RX ADMIN — PANTOPRAZOLE SODIUM 40 MG: 40 TABLET, DELAYED RELEASE ORAL at 06:23

## 2018-11-26 RX ADMIN — MEROPENEM 1 G: 1 INJECTION, POWDER, FOR SOLUTION INTRAVENOUS at 08:37

## 2018-11-26 RX ADMIN — MEROPENEM 1 G: 1 INJECTION, POWDER, FOR SOLUTION INTRAVENOUS at 17:22

## 2018-11-26 RX ADMIN — FUROSEMIDE 20 MG: 20 TABLET ORAL at 08:39

## 2018-11-26 RX ADMIN — POTASSIUM CHLORIDE 40 MEQ: 40 SOLUTION ORAL at 06:23

## 2018-11-26 RX ADMIN — METOPROLOL TARTRATE 12.5 MG: 25 TABLET ORAL at 08:38

## 2018-11-26 RX ADMIN — Medication 3 MG: at 01:42

## 2018-11-26 RX ADMIN — METOPROLOL TARTRATE 12.5 MG: 25 TABLET ORAL at 20:30

## 2018-11-26 RX ADMIN — ATORVASTATIN CALCIUM 40 MG: 80 TABLET, FILM COATED ORAL at 20:30

## 2018-11-26 RX ADMIN — ASPIRIN 81 MG: 81 TABLET, CHEWABLE ORAL at 08:38

## 2018-11-26 RX ADMIN — Medication 10 ML: at 08:39

## 2018-11-26 RX ADMIN — CLOPIDOGREL 75 MG: 75 TABLET, FILM COATED ORAL at 08:38

## 2018-11-26 RX ADMIN — Medication 10 ML: at 20:30

## 2018-11-26 RX ADMIN — Medication 10 ML: at 12:00

## 2018-11-26 ASSESSMENT — PAIN SCALES - GENERAL
PAINLEVEL_OUTOF10: 0

## 2018-11-26 ASSESSMENT — ENCOUNTER SYMPTOMS
RESPIRATORY NEGATIVE: 1
EYES NEGATIVE: 1
ALLERGIC/IMMUNOLOGIC NEGATIVE: 1
GASTROINTESTINAL NEGATIVE: 1

## 2018-11-26 ASSESSMENT — PAIN SCALES - WONG BAKER: WONGBAKER_NUMERICALRESPONSE: 6

## 2018-11-26 NOTE — PROGRESS NOTES
even more  Vital Signs  Patient Currently in Pain: Denies   Orientation     Objective    ADL  Grooming: Minimal assistance;Setup; Increased time to complete  Additional Comments: Pt completed shaving seated in recliner, see above for LOF. Balance  Sitting Balance: Supervision (~12-13 minutes in chair with feet down)  Standing Balance: Unable to assess(RN needing to complete bladder scan at this time)   Assessment   Performance deficits / Impairments: Decreased functional mobility ; Decreased balance;Decreased ADL status; Decreased strength;Decreased high-level IADLs;Decreased endurance  Treatment Diagnosis: STEMI  Prognosis: Good  Patient Education: pt ed on POC   REQUIRES OT FOLLOW UP: Yes  Activity Tolerance  Activity Tolerance: Patient limited by fatigue  Safety Devices  Safety Devices in place: Yes  Type of devices: Gait belt;Patient at risk for falls;Call light within reach; Chair alarm in place; Left in chair          Plan   Plan  Times per week: 3-4x/wk  Goals  Short term goals  Time Frame for Short term goals: pt will, by discharge  Short term goal 1: complete sit <> stand transfer with max A x1 with use of LRD in order to increase independence  Short term goal 2: dem ~5 minutes static standing tolerance in order to increase endurance and ability to complete daily tasks  Short term goal 3: dem 10+ minutes activity tolerance in order to complete functional tasks  Short term goal 4: complete LB ADLS with min A and AE, as needed  Short term goal 5: complete UB ADLs and grooming tasks with supervision and set up       Therapy Time   Individual Concurrent Group Co-treatment   Time In  1311         Time Out  1345         Minutes               RN OK'ed tx and pt agreeable. Seated in recliner upon writer arrival. See above for LOF. Concluding grooming ADL, RN arrived stating a bladder scan was needed for the pt. Pt retired in recliner and RN in room upon Alirio Zuniga exit.      LENNOX Figueroa

## 2018-11-26 NOTE — PROGRESS NOTES
Value Date    LACTA 3.0 11/21/2018    LACTA 1.9 11/21/2018    LACTA 0.7 06/09/2018      Troponin: No results for input(s): TROPONINI in the last 72 hours. Microbiology:      Other Labs:      Radiology/Imaging:  No chest x-ray today   ASSESSMENT:     Active Problems:    ST elevation myocardial infarction (STEMI) ([address]  Resolved Problems:    * No resolved hospital problems. *  Left pleural effusion. Cardiomyopathy with an ejection fraction of 40%      PLAN:     WEAN PER PROTOCOL:  [] No   [] Yes  [x] N/A    DISCONTINUE ANY LABS:   [x] No   [] Yes    ICU PROPHYLAXIS:  Stress ulcer:  [x] PPI Agent  [] R0Apptc [] Sucralfate  [] Other:  VTE:   [] Enoxaparin  [] Unfract. Heparin Subcut  [] EPC Cuffs    NUTRITION:  [] NPO [] Tube Feeding  [] TPN  [x] PO    HOME MEDICATIONS RECONCILED: [] No  [] Yes    INSULIN DRIP:   [] No   [] Yes    CONSULTATION NEEDED:  [] No   [] Yes    FAMILY UPDATED:    [] No   [] Yes    TRANSFER OUT OF ICU:   [] No   [x] Yes    ADDITIONAL PLAN:   We will sign off. These call us if needed-  -    Please note that this chart was generated using voice recognition dictation software. Although every effort was made to ensure the accuracy of this automated transcription, some errors in transcription may have occurred.          Charu Rizo MD  11/26/2018 11:33 AM

## 2018-11-27 VITALS
OXYGEN SATURATION: 95 % | BODY MASS INDEX: 26.15 KG/M2 | RESPIRATION RATE: 18 BRPM | WEIGHT: 156.97 LBS | SYSTOLIC BLOOD PRESSURE: 108 MMHG | HEIGHT: 65 IN | DIASTOLIC BLOOD PRESSURE: 50 MMHG | TEMPERATURE: 97.5 F | HEART RATE: 85 BPM

## 2018-11-27 LAB
ANION GAP SERPL CALCULATED.3IONS-SCNC: 11 MMOL/L (ref 9–17)
BUN BLDV-MCNC: 11 MG/DL (ref 8–23)
BUN/CREAT BLD: ABNORMAL (ref 9–20)
CALCIUM SERPL-MCNC: 9.1 MG/DL (ref 8.6–10.4)
CHLORIDE BLD-SCNC: 106 MMOL/L (ref 98–107)
CO2: 22 MMOL/L (ref 20–31)
CREAT SERPL-MCNC: 0.66 MG/DL (ref 0.7–1.2)
CULTURE: NORMAL
CULTURE: NORMAL
GFR AFRICAN AMERICAN: >60 ML/MIN
GFR NON-AFRICAN AMERICAN: >60 ML/MIN
GFR SERPL CREATININE-BSD FRML MDRD: ABNORMAL ML/MIN/{1.73_M2}
GFR SERPL CREATININE-BSD FRML MDRD: ABNORMAL ML/MIN/{1.73_M2}
GLUCOSE BLD-MCNC: 116 MG/DL (ref 70–99)
HCT VFR BLD CALC: 39.5 % (ref 40.7–50.3)
HEMOGLOBIN: 12.8 G/DL (ref 13–17)
Lab: NORMAL
Lab: NORMAL
MCH RBC QN AUTO: 29.5 PG (ref 25.2–33.5)
MCHC RBC AUTO-ENTMCNC: 32.4 G/DL (ref 28.4–34.8)
MCV RBC AUTO: 91 FL (ref 82.6–102.9)
NRBC AUTOMATED: 0 PER 100 WBC
PDW BLD-RTO: 14.3 % (ref 11.8–14.4)
PLATELET # BLD: 304 K/UL (ref 138–453)
PMV BLD AUTO: 11.2 FL (ref 8.1–13.5)
POTASSIUM SERPL-SCNC: 4.1 MMOL/L (ref 3.7–5.3)
RBC # BLD: 4.34 M/UL (ref 4.21–5.77)
SODIUM BLD-SCNC: 139 MMOL/L (ref 135–144)
SPECIMEN DESCRIPTION: NORMAL
SPECIMEN DESCRIPTION: NORMAL
STATUS: NORMAL
STATUS: NORMAL
WBC # BLD: 10.3 K/UL (ref 3.5–11.3)

## 2018-11-27 PROCEDURE — 6370000000 HC RX 637 (ALT 250 FOR IP): Performed by: INTERNAL MEDICINE

## 2018-11-27 PROCEDURE — 99232 SBSQ HOSP IP/OBS MODERATE 35: CPT | Performed by: INTERNAL MEDICINE

## 2018-11-27 PROCEDURE — 51798 US URINE CAPACITY MEASURE: CPT

## 2018-11-27 PROCEDURE — 97110 THERAPEUTIC EXERCISES: CPT

## 2018-11-27 PROCEDURE — 97535 SELF CARE MNGMENT TRAINING: CPT

## 2018-11-27 PROCEDURE — 6360000002 HC RX W HCPCS: Performed by: STUDENT IN AN ORGANIZED HEALTH CARE EDUCATION/TRAINING PROGRAM

## 2018-11-27 PROCEDURE — 80048 BASIC METABOLIC PNL TOTAL CA: CPT

## 2018-11-27 PROCEDURE — 6370000000 HC RX 637 (ALT 250 FOR IP): Performed by: STUDENT IN AN ORGANIZED HEALTH CARE EDUCATION/TRAINING PROGRAM

## 2018-11-27 PROCEDURE — 36415 COLL VENOUS BLD VENIPUNCTURE: CPT

## 2018-11-27 PROCEDURE — 97530 THERAPEUTIC ACTIVITIES: CPT

## 2018-11-27 PROCEDURE — 2580000003 HC RX 258: Performed by: STUDENT IN AN ORGANIZED HEALTH CARE EDUCATION/TRAINING PROGRAM

## 2018-11-27 PROCEDURE — 85027 COMPLETE CBC AUTOMATED: CPT

## 2018-11-27 RX ORDER — LISINOPRIL 5 MG/1
TABLET ORAL
Qty: 90 TABLET | Refills: 3 | Status: SHIPPED | OUTPATIENT
Start: 2018-11-27 | End: 2019-01-15 | Stop reason: SDUPTHER

## 2018-11-27 RX ORDER — ATORVASTATIN CALCIUM 40 MG/1
40 TABLET, FILM COATED ORAL NIGHTLY
Qty: 30 TABLET | Refills: 3 | Status: SHIPPED | OUTPATIENT
Start: 2018-11-27 | End: 2019-01-14 | Stop reason: DRUGHIGH

## 2018-11-27 RX ORDER — CLOPIDOGREL BISULFATE 75 MG/1
75 TABLET ORAL DAILY
Qty: 30 TABLET | Refills: 3 | Status: SHIPPED | OUTPATIENT
Start: 2018-11-28 | End: 2019-01-15 | Stop reason: SDUPTHER

## 2018-11-27 RX ORDER — PANTOPRAZOLE SODIUM 40 MG/1
40 TABLET, DELAYED RELEASE ORAL
Qty: 30 TABLET | Refills: 3 | Status: SHIPPED | OUTPATIENT
Start: 2018-11-28 | End: 2019-01-23 | Stop reason: SDUPTHER

## 2018-11-27 RX ORDER — ASPIRIN 81 MG/1
81 TABLET, CHEWABLE ORAL DAILY
Qty: 30 TABLET | Refills: 3 | Status: SHIPPED | OUTPATIENT
Start: 2018-11-28 | End: 2019-01-23 | Stop reason: SDUPTHER

## 2018-11-27 RX ORDER — FUROSEMIDE 20 MG/1
20 TABLET ORAL DAILY
Qty: 60 TABLET | Refills: 3 | Status: SHIPPED | OUTPATIENT
Start: 2018-11-28 | End: 2019-01-15 | Stop reason: SDUPTHER

## 2018-11-27 RX ADMIN — MEROPENEM 1 G: 1 INJECTION, POWDER, FOR SOLUTION INTRAVENOUS at 08:06

## 2018-11-27 RX ADMIN — PANTOPRAZOLE SODIUM 40 MG: 40 TABLET, DELAYED RELEASE ORAL at 08:01

## 2018-11-27 RX ADMIN — CLOPIDOGREL 75 MG: 75 TABLET, FILM COATED ORAL at 08:00

## 2018-11-27 RX ADMIN — ASPIRIN 81 MG: 81 TABLET, CHEWABLE ORAL at 10:27

## 2018-11-27 RX ADMIN — MEROPENEM 1 G: 1 INJECTION, POWDER, FOR SOLUTION INTRAVENOUS at 00:35

## 2018-11-27 RX ADMIN — METOPROLOL TARTRATE 12.5 MG: 25 TABLET ORAL at 08:01

## 2018-11-27 RX ADMIN — FUROSEMIDE 20 MG: 20 TABLET ORAL at 08:02

## 2018-11-27 RX ADMIN — LISINOPRIL 5 MG: 5 TABLET ORAL at 08:02

## 2018-11-27 ASSESSMENT — PAIN SCALES - GENERAL
PAINLEVEL_OUTOF10: 0

## 2018-11-27 NOTE — PROGRESS NOTES
Occupational Therapy  Facility/Department: [facility] 1  Daily Treatment Note  NAME: Sree Espinoza  : 1927  MRN: [id number]    Date of Service: 2018    Discharge Recommendations:  [address]       Patient Diagnosis(es): The primary encounter diagnosis was ST elevation myocardial infarction (STEMI), unspecified artery ([facility] [address] Diagnoses of Pyelonephritis and Altered mental status, unspecified altered mental status type were also pertinent to this visit. has a past medical history of 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; CAD (coronary artery disease); Elevated PSA; Hyperlipidemia; Hypertension; Kidney stone; Osteoarthritis of knee; and Venous insufficiency. has a past surgical history that includes Colonoscopy (12); Cystocopy (11/15/2007); Appendectomy; Cholecystectomy (1983); Knee fusion (Left); lipoma resection; Colonoscopy (09); Colonoscopy (2008); Prostate Biopsy (Bilateral, 2009); pr egd transoral biopsy single/multiple (N/A, 7/3/2017); Upper gastrointestinal endoscopy (2015); Upper gastrointestinal endoscopy (2016); Upper gastrointestinal endoscopy (2017); and pr cystourethroscopy (N/A, 2018). Restrictions  Restrictions/Precautions  Restrictions/Precautions: Cardiac, General Precautions, Surgical Protocols, Fall Risk, Contact Precautions, Up as Tolerated (TURP)  Required Braces or Orthoses?: No  Position Activity Restriction  Other position/activity restrictions: Pt is unable to flex his L knee s/p injury when he was 12 yrs old  Subjective   General  Chart Reviewed: No  Patient assessed for rehabilitation services?: Yes  Family / Caregiver Present: No  Diagnosis: STEMI  General Comment  Comments: RN ok'd for therapy this am. Pt agreeable to participate in session and cooperative throughout.    Pain Assessment  Patient Currently in Pain: Denies   Objective    ADL  Feeding: Supervision  Grooming: Stand by chair;Nurse notified       Plan   Plan  Times per week: 3-4x/wk    Goals  Short term goals  Time Frame for Short term goals: pt will, by discharge  Short term goal 1: complete sit <> stand transfer with max A x1 with use of LRD in order to increase independence  Short term goal 2: dem ~5 minutes static standing tolerance in order to increase endurance and ability to complete daily tasks  Short term goal 3: dem 10+ minutes activity tolerance in order to complete functional tasks  Short term goal 4: complete LB ADLS with min A and AE, as needed  Short term goal 5: complete UB ADLs and grooming tasks with supervision and set up     Therapy Time   Individual Concurrent Group Co-treatment   Time In  1050         Time Out [address], HANNA/L

## 2018-11-27 NOTE — PROGRESS NOTES
input(s): LDLCALCU  INR: No results for input(s): INR in the last 72 hours. Objective:   Vitals: /64   Pulse 83   Temp 97.7 °F (36.5 °C) (Oral)   Resp 18   Ht 5' 4.5\" (1.638 m)   Wt 156 lb 15.5 oz (71.2 kg)   SpO2 95%   BMI 26.53 kg/m²   General appearance: intubated  HEENT: Head: Normocephalic, no lesions, without obvious abnormality. Neck: no adenopathy, no carotid bruit, no JVD, supple, symmetrical, trachea midline and thyroid not enlarged, symmetric, no tenderness/mass/nodules  Lungs: decreased breath sounds at bases  Heart: regular rate and rhythm, S1, S2 normal, no murmur, click, rub or gallop  Abdomen: soft, non-tender; bowel sounds normal; no masses,  no organomegaly  Extremities: extremities normal, atraumatic, no cyanosis or edema        EK2018 RBBB, LAFB ,ST elevation in V 3 TO V 6 with Inferior ST Depression     Coronary angiography 2018:   LMCA: Mild irregularities 10-20%. LAD: 90% ostial stenosis reduced to 0% using 3.25x12 mm Xience stent. Post  dilated using 3.5 mm NC balloon. Patent mid segment stent     LCx: Mild irregularities 10-20%. RCA: 50% mid stenosis  LVEF by LV gram: 20%     Transthoracic echo 18  Technically difficult study. Normal LV size and wall thickness. Mid ti distal anteroseptal wall and Apical hypokinesis noted. Moderately reduced LV systolic function. LVEF 40%.           Assessment / Acute Cardiac Problems:   1  Anterior STEMI s/p KRISTEN to Ostial LAD   2. Ischemic cardiomyopathy with LVEF 40%  3. Cardiogenic shock on IABP S/P IABP removal on   4. H/O CAD s/p LAD Stent in   5 Acute pulmonary edema resolved S/P Extubation( )  6. BPH S/P TURP ( 18)  7. UTI        Plan of Treatment:   1. Continue aspirin, Plavix, Lipitor, metoprolol 12.5 bid and lisinopril  2. Recommend follow up with CHF clinic. Lasix 20 mg PO daily  3.   ID following for UTI, recommended continuing meropenem until    4   D/C plan today if bed if

## 2018-11-27 NOTE — PROGRESS NOTES
Urology Progress Note    Subjective: Viri Carmona is a 80 y.o. male. His/Her current Diet is: DIET CARDIAC;. The patient had greenlight laser PVP on 11/19/18 at [facility]. No acute urologic events overnight  Voiding well. PVRs 0, 66, and 173 mL. Patient Vitals for the past 24 hrs:   BP Temp Temp src Pulse Resp SpO2   11/26/18 2332 (!) 140/63 99.3 °F (37.4 °C) Oral 72 18 98 %   11/26/18 2028 136/68 99.3 °F (37.4 °C) Oral 80 20 99 %   11/26/18 1545 (!) 111/52 98.4 °F (36.9 °C) Oral 73 18 100 %   11/26/18 1413 (!) 107/50 98 °F (36.7 °C) Oral 78 - 100 %   11/26/18 0952 121/61 - - 70 - 95 %       Intake/Output Summary (Last 24 hours) at 11/27/18 0718  Last data filed at 11/26/18 2031   Gross per 24 hour   Intake            307.8 ml   Output             1323 ml   Net          -1015.2 ml       Recent Labs      11/25/18   0457  11/26/18   0448  11/27/18   0507   WBC  6.6  8.1  10.3   HGB  12.1*  12.1*  12.8*   HCT  37.7*  36.7*  39.5*   MCV  90.6  88.0  91.0   PLT  152  185  304     Recent Labs      11/25/18   0457  11/26/18   0151  11/26/18   0448  11/27/18   0507   NA  141   --   141  139   K  3.5*  4.4  3.8  4.1   CL  103   --   108*  106   CO2  25   --   25  22   BUN  14   --   13  11   CREATININE  0.73   --   0.64*  0.66*       No results for input(s): COLORU, PHUR, LABCAST, WBCUA, RBCUA, MUCUS, TRICHOMONAS, YEAST, BACTERIA, CLARITYU, SPECGRAV, LEUKOCYTESUR, UROBILINOGEN, BILIRUBINUR, BLOODU in the last 72 hours.     Invalid input(s): NITRATE, GLUCOSEUKETONESUAMORPHOUS    Additional Lab/culture results:  ESBL E. Coli     Physical Exam:   Resting comfortably   NAD  Unlabored breathing  Normal rate   Soft, NT, ND  No calf ttp bilaterally        ESCHERICHIA COLI     Antibiotic Interpretation KYLIE Status   Confirmatory Extended Spectrum Beta-Lactamase Resistant POSITIVE Final   amikacin  NOT REPORTED Final   ampicillin Resistant >=32 RESISTANT Final   ampicillin-sulbactam  NOT REPORTED Final   aztreonam Resistant >=64 RESISTANT Final   ceFAZolin Resistant >=64 RESISTANT  Cefazolin sensitivity results can be used to predict the effectiveness of oral   cephalosporins (eg. Cephalexin) in uncomplicated Urinary Tract Infections due   to E. coli, K. pneumoniae, and P. mirabilis Final   cefTRIAXone Resistant >=64 RESISTANT Final   cefepime  NOT REPORTED Final   ciprofloxacin Resistant >=4 RESISTANT Final   ertapenem  NOT REPORTED Final   gentamicin Sensitive <=1 SUSCEPTIBLE Final   meropenem Sensitive <=0.25 SUSCEPTIBLE Final   nitrofurantoin Sensitive 32 SUSCEPTIBLE Final   piperacillin-tazobactam Resistant 8 RESISTANT Final   tigecycline  NOT REPORTED Final   tobramycin Sensitive <=1 SUSCEPTIBLE Final   trimethoprim-sulfamethoxazole Sensitive <=20 SUSCEPTIBLE Final         Impression:    Cardiogenic shock  ACS   S/p GreenLight laser PVP on 11/19/2018   ESBL E Coli     Plan:   -Patient awake and alert  -PVRs low  -ID managing antibiotics: Meropenem through 11/31   -Follow up with Dr. Martha Galvan as outpatient for continued urologic care     [facility]  Urology Resident, PGY3  7:18 AM 11/27/2018       Attending Physician Statement  I have discussed the care of the patient, including pertinent history and exam findings, with the resident. I have seen and examined the patient and the key elements of all parts of the encounter have been performed by me. I have reviewed all laboratory findings and imaging reports/films. I agree with the assessment, plan and orders as documented by the resident.   (GC Modifier)

## 2018-11-27 NOTE — DISCHARGE SUMMARY
to have UTI with ESBL organism sensitive only to carbopenems and was discharged on IV Ertapenem to be given until . At discharge, patient was completely asymptomatic and was discharged on aspirin, Plavix, Lipitor, metoprolol and lisinopril. He was instructed to follow up outpatient with [facility]. New medications: Aspirin, Plavix, metoprolol 12.5 mg bid, lisinopril 5 mg daily Lasix 20 mg daily and Lipitor 40 mg daily. Home simvastatin was stopped  Discharge exam:   Vitals:    18 1200   BP: (!) 108/50   Pulse: 85   Resp: 18   Temp: 97.5 °F (36.4 °C)   SpO2:      Neuro: normal  Chest: Clear to ausculation. No wheezing. Cardiac: Cor RRR  Abdomen/groin: soft, non-tender, without masses or organomegaly  Lower extremity edema: none     Follow up with primary care provider 1 week  Follow up with cardiology 4 weeks  Follow up with other consultant physicians at their directions. Discharge Medications:   Tonny Ingram   Home Medication Instructions [place]    Printed on:[id number]   Medication Information                      acetaminophen (TYLENOL) 325 MG tablet  Take 2 tablets by mouth every 4 hours as needed for Pain or Fever             aspirin 81 MG chewable tablet  Take 1 tablet by mouth daily             atorvastatin (LIPITOR) 40 MG tablet  Take 1 tablet by mouth nightly             clopidogrel (PLAVIX) 75 MG tablet  Take 1 tablet by mouth daily             ertapenem (INVANZ) infusion  Infuse 1,000 mg intravenously every 24 hours for 4 days Stop on 18  Compound per protocol. furosemide (LASIX) 20 MG tablet  Take 1 tablet by mouth daily             lisinopril (PRINIVIL;ZESTRIL) 5 MG tablet  TAKE 1 TABLET DAILY             metoprolol tartrate (LOPRESSOR) 25 MG tablet  Take 0.5 tablets by mouth 2 times daily             Multiple Vitamins-Minerals (MULTIVITAMIN PO)  Take 1 tablet by mouth daily.              pantoprazole (PROTONIX) 40 MG tablet  Take 1 tablet by

## 2018-11-28 ENCOUNTER — TELEPHONE (OUTPATIENT)
Dept: INTERNAL MEDICINE | Age: 83
End: 2018-11-28

## 2018-11-28 ENCOUNTER — CARE COORDINATION (OUTPATIENT)
Dept: CASE MANAGEMENT | Age: 83
End: 2018-11-28

## 2018-11-28 ASSESSMENT — ENCOUNTER SYMPTOMS
EYES NEGATIVE: 1
RESPIRATORY NEGATIVE: 1
ALLERGIC/IMMUNOLOGIC NEGATIVE: 1
GASTROINTESTINAL NEGATIVE: 1

## 2018-11-28 NOTE — PROGRESS NOTES
history, and family history, and I haveupdated the database accordingly. Past Medical History:     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 2/2/15    EGD with biopsies    CAD (coronary artery disease)     Elevated PSA     Hyperlipidemia     Hypertension     Kidney stone     Osteoarthritis of knee     Venous insufficiency        Past Surgical  History:     Past Surgical History:   Procedure Laterality Date    APPENDECTOMY      CHOLECYSTECTOMY  01/12/1983    COLONOSCOPY  08/29/12    COLONOSCOPY  05/27/09    COLONOSCOPY  04/30/2008    CYSTOSCOPY  11/15/2007    with left ureteroscopy and dorsal slit     KNEE FUSION Left     LIPOMA RESECTION      PA CYSTOURETHROSCOPY N/A 11/19/2018    CYSTO Photovaporization Prostate Greenlight Laser ([id number]) performed by Christine Ballesteros MD at [address] EGD TRANSORAL BIOPSY SINGLE/MULTIPLE N/A 7/3/2017    EGD BIOPSY performed by Jim Garcia MD at [address] Bilateral 06/23/2009    Benign    UPPER GASTROINTESTINAL ENDOSCOPY  02/2/2015    Sepulveda's (Dr. Jairo Parham)    UPPER GASTROINTESTINAL ENDOSCOPY  04/25/2016    Lg hiatal hernia, polyp at duodenum, gastric polyp, Barretts Esophagus, GE 28    UPPER GASTROINTESTINAL ENDOSCOPY  07/03/2017    mild gastritis, Sepulveda's, Dr. Estrada Navarrete 3 years       Medications:         Social History:     Social History     Social History    Marital status:      Spouse name: N/A    Number of children: N/A    Years of education: N/A     Occupational History    Not on file. Social History Main Topics    Smoking status: Former Smoker     Packs/day: 1.00     Types: Cigars     Quit date: 1/1/1998    Smokeless tobacco: Never Used      Comment: Former smoker (quit 1998)- ANN Torres [facility] 3/20/17    Alcohol use No    Drug use: No    Sexual activity: Not on file     Other Topics Concern    Not on file     Social History Narrative    No narrative on file

## 2018-12-03 ENCOUNTER — TELEPHONE (OUTPATIENT)
Dept: INTERNAL MEDICINE | Age: 83
End: 2018-12-03

## 2018-12-03 ENCOUNTER — CARE COORDINATION (OUTPATIENT)
Dept: CASE MANAGEMENT | Age: 83
End: 2018-12-03

## 2018-12-03 ENCOUNTER — OFFICE VISIT (OUTPATIENT)
Dept: UROLOGY | Age: 83
End: 2018-12-03
Payer: MEDICARE

## 2018-12-03 VITALS
BODY MASS INDEX: 26.8 KG/M2 | HEART RATE: 76 BPM | DIASTOLIC BLOOD PRESSURE: 56 MMHG | HEIGHT: 64 IN | WEIGHT: 156.97 LBS | SYSTOLIC BLOOD PRESSURE: 136 MMHG

## 2018-12-03 DIAGNOSIS — N40.1 BENIGN PROSTATIC HYPERPLASIA WITH INCOMPLETE BLADDER EMPTYING: Primary | ICD-10-CM

## 2018-12-03 DIAGNOSIS — R39.14 BENIGN PROSTATIC HYPERPLASIA WITH INCOMPLETE BLADDER EMPTYING: Primary | ICD-10-CM

## 2018-12-03 PROCEDURE — 99308 SBSQ NF CARE LOW MDM 20: CPT | Performed by: NURSE PRACTITIONER

## 2018-12-03 PROCEDURE — 51798 US URINE CAPACITY MEASURE: CPT | Performed by: UROLOGY

## 2018-12-03 PROCEDURE — 99024 POSTOP FOLLOW-UP VISIT: CPT | Performed by: UROLOGY

## 2018-12-03 NOTE — PROGRESS NOTES
effusions. Xr Chest Portable    Result Date: 11/23/2018  EXAMINATION: SINGLE XRAY VIEW OF THE CHEST 11/23/2018 7:10 am COMPARISON: Chest x-ray from 11/22/2018 HISTORY: ORDERING SYSTEM PROVIDED HISTORY: Balloon pump position TECHNOLOGIST PROVIDED HISTORY: Balloon pump position FINDINGS: Chest x-ray 11/23/2018 at 0604 hours: Endotracheal tube is approximately 5.8 cm above the wali. Enteric tube extends to the stomach. Intra-aortic balloon pump catheter marker is at the T5 vertebral body at the level of the aortic knob, intervally advanced compared to the prior exam. There is improving prominence and indistinctness of the pulmonary vascular markings and decreased pulmonary haziness with mild residual.  There is stable blunting of the left costophrenic angle and possible slight blunting of the right costophrenic angle. Supine technique limits evaluation for pneumothorax, but given this, no large pneumothorax identified. Heart size appears within normal limits, given technique. Visualized osseous structures appear mildly demineralized, but grossly intact, given the non dedicated imaging. Chest x-ray 11/23/2018 at 0659 hours: Endotracheal tube is approximately 3.4 cm cm above the wali. Enteric tube extends to the stomach. Intra-aortic balloon pump catheter marker is at the T5 vertebral body at the level of the aortic knob. There is improving prominence and indistinctness of the pulmonary vascular markings and decreased pulmonary haziness with mild residual.  There is stable blunting of the left costophrenic angle and possible slight blunting of the right costophrenic angle. Supine technique limits evaluation for pneumothorax, but given this, no large pneumothorax identified. Heart size appears within normal limits, given technique. Visualized osseous structures appear mildly demineralized, but grossly intact, given the non dedicated imaging.      1. Serial radiographs with interval advancement of the endotracheal tube, in expected position on the final radiograph. Remaining medical support devices have expected positions on both radiographic images. 2. Serial radiographs with improving vascular congestive changes with mild residual and small, left greater than right, pleural effusions. Xr Chest Portable    Result Date: 11/22/2018  EXAMINATION: SINGLE XRAY VIEW OF THE CHEST 11/22/2018 7:26 am COMPARISON: Chest x-ray from 11/22/2018 HISTORY: ORDERING SYSTEM PROVIDED HISTORY: IABP verification TECHNOLOGIST PROVIDED HISTORY: IABP verification FINDINGS: Endotracheal tube extends to the mid thoracic trachea approximately 5.6 cm above the wali. Enteric tube courses below the diaphragm, distal tip not included. Intra-aortic balloon pump catheter marker extends to the T6 vertebral body somewhat inferior to the aortic knob. Similar patchy airspace opacity in the right hemithorax emanating from the right tami. Left lung is clear. No pleural effusion or pneumothorax. The cardiac silhouette appears mildly enlarged, but there is no pulmonary vascular congestion. Visualized osseous structures appear moderately demineralized, but grossly intact, given the non dedicated imaging. 1. Intra-aortic balloon pump catheter marker is seen at the level of the T6 vertebral body slightly inferior to the aortic knob. Expected positions of remaining medical support devices. 2. Similar patchy airspace opacity emanating from the right tami. Continued imaging follow-up is recommended. IMPRESSION: 1. Expected positions of medical support devices. 2.    Xr Chest Portable    Result Date: 11/22/2018  EXAMINATION: SINGLE XRAY VIEW OF THE CHEST 11/22/2018 1:21 am COMPARISON: 11/22/2018 HISTORY: ORDERING SYSTEM PROVIDED HISTORY: Post intubation TECHNOLOGIST PROVIDED HISTORY: Post intubation FINDINGS: Pulmonary findings are stable. Heart size is stable. No significant pleural effusion or pneumothorax. Well-positioned ET tube.

## 2018-12-04 ENCOUNTER — OUTSIDE SERVICES (OUTPATIENT)
Dept: INTERNAL MEDICINE | Age: 83
End: 2018-12-04
Payer: MEDICARE

## 2018-12-04 DIAGNOSIS — I25.119 CORONARY ARTERY DISEASE INVOLVING NATIVE CORONARY ARTERY OF NATIVE HEART WITH ANGINA PECTORIS (HCC): ICD-10-CM

## 2018-12-04 DIAGNOSIS — I21.3 ST ELEVATION MYOCARDIAL INFARCTION (STEMI), UNSPECIFIED ARTERY (HCC): Primary | ICD-10-CM

## 2018-12-04 DIAGNOSIS — R33.9 URINARY RETENTION: ICD-10-CM

## 2018-12-04 DIAGNOSIS — R26.81 GAIT INSTABILITY: ICD-10-CM

## 2018-12-04 DIAGNOSIS — I10 ESSENTIAL HYPERTENSION, BENIGN: ICD-10-CM

## 2018-12-04 DIAGNOSIS — N39.0 UTI DUE TO EXTENDED-SPECTRUM BETA LACTAMASE (ESBL) PRODUCING ESCHERICHIA COLI: ICD-10-CM

## 2018-12-04 DIAGNOSIS — R39.14 BENIGN PROSTATIC HYPERPLASIA WITH INCOMPLETE BLADDER EMPTYING: ICD-10-CM

## 2018-12-04 DIAGNOSIS — N40.1 BENIGN PROSTATIC HYPERPLASIA WITH INCOMPLETE BLADDER EMPTYING: ICD-10-CM

## 2018-12-04 DIAGNOSIS — B96.29 UTI DUE TO EXTENDED-SPECTRUM BETA LACTAMASE (ESBL) PRODUCING ESCHERICHIA COLI: ICD-10-CM

## 2018-12-04 DIAGNOSIS — Z16.12 UTI DUE TO EXTENDED-SPECTRUM BETA LACTAMASE (ESBL) PRODUCING ESCHERICHIA COLI: ICD-10-CM

## 2018-12-04 DIAGNOSIS — E78.5 HYPERLIPIDEMIA, UNSPECIFIED HYPERLIPIDEMIA TYPE: ICD-10-CM

## 2018-12-07 ENCOUNTER — TELEPHONE (OUTPATIENT)
Dept: FAMILY MEDICINE CLINIC | Age: 83
End: 2018-12-07
Payer: MEDICARE

## 2018-12-07 DIAGNOSIS — Z46.6 FITTING AND ADJUSTMENT OF URINARY DEVICE: ICD-10-CM

## 2018-12-07 DIAGNOSIS — K22.70 BARRETT'S ESOPHAGUS WITHOUT DYSPLASIA: ICD-10-CM

## 2018-12-07 DIAGNOSIS — F03.90 SENILE DEMENTIA, UNCOMPLICATED (HCC): ICD-10-CM

## 2018-12-07 DIAGNOSIS — R33.9 URINARY RETENTION: ICD-10-CM

## 2018-12-07 DIAGNOSIS — K59.00 CONSTIPATION, UNSPECIFIED CONSTIPATION TYPE: ICD-10-CM

## 2018-12-07 DIAGNOSIS — I10 HYPERTENSION, ESSENTIAL: ICD-10-CM

## 2018-12-07 DIAGNOSIS — K21.9 GASTROESOPHAGEAL REFLUX DISEASE WITHOUT ESOPHAGITIS: ICD-10-CM

## 2018-12-07 DIAGNOSIS — I73.9 PERIPHERAL VASCULAR DISEASE, UNSPECIFIED (HCC): ICD-10-CM

## 2018-12-07 DIAGNOSIS — I87.2 PERIPHERAL VENOUS INSUFFICIENCY: ICD-10-CM

## 2018-12-07 DIAGNOSIS — K52.9 NONINFECTIOUS GASTROENTERITIS, UNSPECIFIED TYPE: Primary | ICD-10-CM

## 2018-12-07 DIAGNOSIS — G47.00 INSOMNIA, UNSPECIFIED TYPE: ICD-10-CM

## 2018-12-07 DIAGNOSIS — N40.0 BENIGN PROSTATIC HYPERPLASIA, UNSPECIFIED WHETHER LOWER URINARY TRACT SYMPTOMS PRESENT: ICD-10-CM

## 2018-12-07 DIAGNOSIS — E78.5 HYPERLIPIDEMIA, UNSPECIFIED HYPERLIPIDEMIA TYPE: ICD-10-CM

## 2018-12-07 DIAGNOSIS — M17.11 OSTEOARTHRITIS OF RIGHT KNEE, UNSPECIFIED OSTEOARTHRITIS TYPE: ICD-10-CM

## 2018-12-07 DIAGNOSIS — I25.10 ATHEROSCLEROSIS OF NATIVE CORONARY ARTERY OF NATIVE HEART WITHOUT ANGINA PECTORIS: ICD-10-CM

## 2018-12-07 PROCEDURE — G0179 MD RECERTIFICATION HHA PT: HCPCS | Performed by: FAMILY MEDICINE

## 2018-12-07 ASSESSMENT — ENCOUNTER SYMPTOMS
RHINORRHEA: 0
EYE PAIN: 0
TROUBLE SWALLOWING: 0
DIARRHEA: 0
BLOOD IN STOOL: 0
COLOR CHANGE: 0
VOMITING: 0
WHEEZING: 0
SINUS PRESSURE: 0
SHORTNESS OF BREATH: 0
CONSTIPATION: 0
FACIAL SWELLING: 0
ABDOMINAL PAIN: 0
NAUSEA: 0
SORE THROAT: 0
CHEST TIGHTNESS: 0
COUGH: 0

## 2018-12-10 ENCOUNTER — CARE COORDINATION (OUTPATIENT)
Dept: CASE MANAGEMENT | Age: 83
End: 2018-12-10

## 2018-12-11 ENCOUNTER — OFFICE VISIT (OUTPATIENT)
Dept: CARDIOLOGY | Age: 83
End: 2018-12-11
Payer: MEDICARE

## 2018-12-11 VITALS
SYSTOLIC BLOOD PRESSURE: 116 MMHG | HEIGHT: 64 IN | WEIGHT: 156 LBS | DIASTOLIC BLOOD PRESSURE: 50 MMHG | HEART RATE: 72 BPM | BODY MASS INDEX: 26.63 KG/M2

## 2018-12-11 DIAGNOSIS — I21.02 ST ELEVATION MYOCARDIAL INFARCTION INVOLVING LEFT ANTERIOR DESCENDING (LAD) CORONARY ARTERY (HCC): Primary | ICD-10-CM

## 2018-12-11 DIAGNOSIS — I25.119 CORONARY ARTERY DISEASE INVOLVING NATIVE CORONARY ARTERY OF NATIVE HEART WITH ANGINA PECTORIS (HCC): ICD-10-CM

## 2018-12-11 DIAGNOSIS — I10 ESSENTIAL HYPERTENSION, BENIGN: ICD-10-CM

## 2018-12-11 DIAGNOSIS — I25.10 ATHEROSCLEROSIS OF NATIVE CORONARY ARTERY WITHOUT ANGINA PECTORIS, UNSPECIFIED WHETHER NATIVE OR TRANSPLANTED HEART: ICD-10-CM

## 2018-12-11 DIAGNOSIS — I21.3 ST ELEVATION MYOCARDIAL INFARCTION (STEMI), UNSPECIFIED ARTERY (HCC): ICD-10-CM

## 2018-12-11 PROCEDURE — 99213 OFFICE O/P EST LOW 20 MIN: CPT | Performed by: INTERNAL MEDICINE

## 2018-12-11 NOTE — PROGRESS NOTES
[address] [facility]  is here post STEMI at [facility]. Doing well. No complaints. No chest pain. Feels well. 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 2/2/15    EGD with biopsies    CAD (coronary artery disease)     Elevated PSA     Hyperlipidemia     Hypertension     Kidney stone     Osteoarthritis of knee     Venous insufficiency        Past Surgical History:   Procedure Laterality Date    APPENDECTOMY      CHOLECYSTECTOMY  01/12/1983    COLONOSCOPY  08/29/12    COLONOSCOPY  05/27/09    COLONOSCOPY  04/30/2008    CYSTOSCOPY  11/15/2007    with left ureteroscopy and dorsal slit     KNEE FUSION Left     LIPOMA RESECTION      [place] CYSTOURETHROSCOPY N/A 11/19/2018    CYSTO Photovaporization Prostate Greenlight Laser ([id number]) performed by Teresa Nugent MD at [address] [facility] EGD TRANSORAL BIOPSY SINGLE/MULTIPLE N/A 7/3/2017    EGD BIOPSY performed by Loraine Johnson MD at [facility] [address] Bilateral 06/23/2009    Benign    UPPER GASTROINTESTINAL ENDOSCOPY  02/2/2015    Sepulveda's (Dr. Billy Krishna)    UPPER GASTROINTESTINAL ENDOSCOPY  04/25/2016    Lg hiatal hernia, polyp at duodenum, gastric polyp, Barretts Esophagus, GE 28    UPPER GASTROINTESTINAL ENDOSCOPY  07/03/2017    mild gastritis, Sepulveda's, Dr. Kishore Slater 3 years       Family History   Problem Relation Age of Onset    Cancer Father         stomach cancer    Heart Disease Paternal Grandfather      REVIEW OF SYSTEMS:    · Constitutional: there has been no unanticipated weight loss. There's been No change in energy level, No change in activity level. · Eyes: No visual changes or diplopia. No scleral icterus. · ENT: No Headaches, hearing loss or vertigo. No mouth sores or sore throat. · Cardiovascular: No chest pain, no dyspnea, no chf like symptoms  · Respiratory: No previous pulmonary problems  · Gastrointestinal: No abdominal pain, appetite loss, blood in stools. EGD showing healed ulcers. Per Dr. Orr Humberto ok to be on ASA  6. Asymptomatic PVCs  7. HTN- failry well controlled at this time. Continue current therapy. 8. Obesity - encouraged diet, exercise, and discussed weight loss extensively. 9. Hyperlipidemia- continue statin. Thank you for allowing me to participate in the care of this patient, please do not hesitate to call if you have any questions. Poly Brooks, [id number] [facility]  [facility]  [phone]

## 2018-12-17 ENCOUNTER — CARE COORDINATION (OUTPATIENT)
Dept: CASE MANAGEMENT | Age: 83
End: 2018-12-17

## 2018-12-21 PROBLEM — N39.0 UTI (URINARY TRACT INFECTION): Status: RESOLVED | Noted: 2018-11-21 | Resolved: 2018-12-21

## 2018-12-23 PROCEDURE — 99305 1ST NF CARE MODERATE MDM 35: CPT | Performed by: INTERNAL MEDICINE

## 2018-12-26 ENCOUNTER — OUTSIDE SERVICES (OUTPATIENT)
Dept: INTERNAL MEDICINE | Age: 83
End: 2018-12-26
Payer: MEDICARE

## 2018-12-26 DIAGNOSIS — I25.10 ATHEROSCLEROSIS OF NATIVE CORONARY ARTERY WITHOUT ANGINA PECTORIS, UNSPECIFIED WHETHER NATIVE OR TRANSPLANTED HEART: ICD-10-CM

## 2018-12-26 DIAGNOSIS — E78.2 MIXED HYPERLIPIDEMIA: ICD-10-CM

## 2018-12-26 DIAGNOSIS — K22.70 BARRETT'S ESOPHAGUS WITHOUT DYSPLASIA: ICD-10-CM

## 2018-12-26 DIAGNOSIS — I21.3 ST ELEVATION MYOCARDIAL INFARCTION (STEMI), UNSPECIFIED ARTERY (HCC): Primary | ICD-10-CM

## 2018-12-26 DIAGNOSIS — I10 ESSENTIAL HYPERTENSION, BENIGN: ICD-10-CM

## 2018-12-26 DIAGNOSIS — K21.9 GASTROESOPHAGEAL REFLUX DISEASE, ESOPHAGITIS PRESENCE NOT SPECIFIED: ICD-10-CM

## 2018-12-26 DIAGNOSIS — E66.3 OVERWEIGHT: ICD-10-CM

## 2018-12-26 DIAGNOSIS — M17.11 PRIMARY OSTEOARTHRITIS OF RIGHT KNEE: ICD-10-CM

## 2018-12-27 NOTE — PROGRESS NOTES
DR. 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  male was admitted to the [address] for rehabilitation and physical therapy following a recent hospitalization at [facility] for an ST segment elevation myocardial infarction. He is doing a lot better now since being hospitalized. He is getting physical therapy and is doing fairly well with it. There are no other complaints. ALLERGIES:   Allergies   Allergen Reactions    Codeine Nausea And Vomiting    Morphine Other (See Comments)     constipation       MEDICATIONS: As noted on the [facility] MAR, referenced and incorporated herein.     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

## 2018-12-31 ENCOUNTER — CARE COORDINATION (OUTPATIENT)
Dept: CASE MANAGEMENT | Age: 83
End: 2018-12-31

## 2019-01-02 RX ORDER — MELOXICAM 15 MG/1
TABLET ORAL
Qty: 90 TABLET | Refills: 1 | Status: SHIPPED | OUTPATIENT
Start: 2019-01-02 | End: 2019-01-14 | Stop reason: HOSPADM

## 2019-01-03 ENCOUNTER — OFFICE VISIT (OUTPATIENT)
Dept: PODIATRY | Age: 84
End: 2019-01-03
Payer: MEDICARE

## 2019-01-03 VITALS
DIASTOLIC BLOOD PRESSURE: 80 MMHG | SYSTOLIC BLOOD PRESSURE: 128 MMHG | WEIGHT: 165 LBS | HEART RATE: 60 BPM | HEIGHT: 64 IN | BODY MASS INDEX: 28.17 KG/M2

## 2019-01-03 DIAGNOSIS — B35.1 DERMATOPHYTOSIS OF NAIL: Primary | ICD-10-CM

## 2019-01-03 PROCEDURE — 11721 DEBRIDE NAIL 6 OR MORE: CPT | Performed by: PODIATRIST

## 2019-01-03 PROCEDURE — 99999 PR OFFICE/OUTPT VISIT,PROCEDURE ONLY: CPT | Performed by: PODIATRIST

## 2019-01-08 ENCOUNTER — CARE COORDINATION (OUTPATIENT)
Dept: CASE MANAGEMENT | Age: 84
End: 2019-01-08

## 2019-01-09 ENCOUNTER — CARE COORDINATION (OUTPATIENT)
Dept: CASE MANAGEMENT | Age: 84
End: 2019-01-09

## 2019-01-09 ENCOUNTER — TELEPHONE (OUTPATIENT)
Dept: FAMILY MEDICINE CLINIC | Age: 84
End: 2019-01-09

## 2019-01-09 DIAGNOSIS — M17.11 PRIMARY OSTEOARTHRITIS OF RIGHT KNEE: Primary | ICD-10-CM

## 2019-01-09 DIAGNOSIS — K21.9 GASTROESOPHAGEAL REFLUX DISEASE, ESOPHAGITIS PRESENCE NOT SPECIFIED: Primary | ICD-10-CM

## 2019-01-09 PROCEDURE — 1111F DSCHRG MED/CURRENT MED MERGE: CPT

## 2019-01-09 RX ORDER — DOCUSATE SODIUM 100 MG/1
100 CAPSULE, LIQUID FILLED ORAL 2 TIMES DAILY
COMMUNITY
End: 2019-01-14 | Stop reason: ALTCHOICE

## 2019-01-09 RX ORDER — BETHANECHOL CHLORIDE 25 MG/1
25 TABLET ORAL 4 TIMES DAILY
COMMUNITY
End: 2019-01-14 | Stop reason: HOSPADM

## 2019-01-09 RX ORDER — METOPROLOL SUCCINATE 25 MG/1
25 TABLET, EXTENDED RELEASE ORAL DAILY
COMMUNITY
End: 2019-01-14 | Stop reason: DRUGHIGH

## 2019-01-09 RX ORDER — ATORVASTATIN CALCIUM 10 MG/1
10 TABLET, FILM COATED ORAL DAILY
COMMUNITY
End: 2019-01-14 | Stop reason: SDUPTHER

## 2019-01-09 RX ORDER — ASPIRIN/CALCIUM/MAG/ALUMINUM 325 MG
TABLET ORAL DAILY
COMMUNITY
End: 2019-01-14 | Stop reason: SDUPTHER

## 2019-01-09 RX ORDER — TAMSULOSIN HYDROCHLORIDE 0.4 MG/1
0.4 CAPSULE ORAL DAILY
COMMUNITY
End: 2019-01-14 | Stop reason: ALTCHOICE

## 2019-01-09 RX ORDER — SUCRALFATE 1 G/1
1 TABLET ORAL 4 TIMES DAILY
COMMUNITY
End: 2019-01-14 | Stop reason: ALTCHOICE

## 2019-01-09 RX ORDER — SACCHAROMYCES BOULARDII 250 MG
250 CAPSULE ORAL DAILY
COMMUNITY
End: 2019-01-14 | Stop reason: ALTCHOICE

## 2019-01-10 RX ORDER — TRAMADOL HYDROCHLORIDE 50 MG/1
50 TABLET ORAL EVERY 6 HOURS PRN
Qty: 60 TABLET | Refills: 0 | Status: SHIPPED | OUTPATIENT
Start: 2019-01-10 | End: 2019-02-13 | Stop reason: SDUPTHER

## 2019-01-14 ENCOUNTER — OFFICE VISIT (OUTPATIENT)
Dept: FAMILY MEDICINE CLINIC | Age: 84
End: 2019-01-14
Payer: MEDICARE

## 2019-01-14 VITALS
HEART RATE: 72 BPM | SYSTOLIC BLOOD PRESSURE: 128 MMHG | WEIGHT: 164.9 LBS | BODY MASS INDEX: 28.15 KG/M2 | HEIGHT: 64 IN | DIASTOLIC BLOOD PRESSURE: 72 MMHG

## 2019-01-14 DIAGNOSIS — Z16.12 UTI DUE TO EXTENDED-SPECTRUM BETA LACTAMASE (ESBL) PRODUCING ESCHERICHIA COLI: ICD-10-CM

## 2019-01-14 DIAGNOSIS — N39.0 UTI DUE TO EXTENDED-SPECTRUM BETA LACTAMASE (ESBL) PRODUCING ESCHERICHIA COLI: ICD-10-CM

## 2019-01-14 DIAGNOSIS — E78.5 HYPERLIPIDEMIA, UNSPECIFIED HYPERLIPIDEMIA TYPE: ICD-10-CM

## 2019-01-14 DIAGNOSIS — I10 ESSENTIAL HYPERTENSION, BENIGN: ICD-10-CM

## 2019-01-14 DIAGNOSIS — I50.1 PULMONARY EDEMA CARDIAC CAUSE (HCC): ICD-10-CM

## 2019-01-14 DIAGNOSIS — I25.10 ATHEROSCLEROSIS OF NATIVE CORONARY ARTERY OF NATIVE HEART WITHOUT ANGINA PECTORIS: Primary | ICD-10-CM

## 2019-01-14 DIAGNOSIS — I21.02 ST ELEVATION MYOCARDIAL INFARCTION INVOLVING LEFT ANTERIOR DESCENDING (LAD) CORONARY ARTERY (HCC): ICD-10-CM

## 2019-01-14 DIAGNOSIS — B96.29 UTI DUE TO EXTENDED-SPECTRUM BETA LACTAMASE (ESBL) PRODUCING ESCHERICHIA COLI: ICD-10-CM

## 2019-01-14 PROCEDURE — 1111F DSCHRG MED/CURRENT MED MERGE: CPT | Performed by: FAMILY MEDICINE

## 2019-01-14 PROCEDURE — 99214 OFFICE O/P EST MOD 30 MIN: CPT | Performed by: FAMILY MEDICINE

## 2019-01-14 RX ORDER — ATORVASTATIN CALCIUM 10 MG/1
10 TABLET, FILM COATED ORAL DAILY
COMMUNITY
End: 2019-01-15 | Stop reason: SDUPTHER

## 2019-01-14 ASSESSMENT — PATIENT HEALTH QUESTIONNAIRE - PHQ9
1. LITTLE INTEREST OR PLEASURE IN DOING THINGS: 0
2. FEELING DOWN, DEPRESSED OR HOPELESS: 0
SUM OF ALL RESPONSES TO PHQ QUESTIONS 1-9: 0
SUM OF ALL RESPONSES TO PHQ QUESTIONS 1-9: 0
SUM OF ALL RESPONSES TO PHQ9 QUESTIONS 1 & 2: 0

## 2019-01-14 ASSESSMENT — ENCOUNTER SYMPTOMS
EYES NEGATIVE: 1
GASTROINTESTINAL NEGATIVE: 1
ALLERGIC/IMMUNOLOGIC NEGATIVE: 1
CHEST TIGHTNESS: 0
RESPIRATORY NEGATIVE: 1
SHORTNESS OF BREATH: 0

## 2019-01-15 RX ORDER — ATORVASTATIN CALCIUM 10 MG/1
10 TABLET, FILM COATED ORAL DAILY
Qty: 90 TABLET | Refills: 3 | Status: SHIPPED | OUTPATIENT
Start: 2019-01-15 | End: 2020-03-23

## 2019-01-15 RX ORDER — LACTULOSE 10 G/15ML
20 SOLUTION ORAL DAILY
Qty: 3 BOTTLE | Refills: 3 | Status: SHIPPED | OUTPATIENT
Start: 2019-01-15 | End: 2019-10-24 | Stop reason: SDUPTHER

## 2019-01-15 RX ORDER — CLOPIDOGREL BISULFATE 75 MG/1
75 TABLET ORAL DAILY
Qty: 90 TABLET | Refills: 3 | Status: SHIPPED | OUTPATIENT
Start: 2019-01-15 | End: 2019-12-23

## 2019-01-15 RX ORDER — FUROSEMIDE 20 MG/1
20 TABLET ORAL DAILY
Qty: 90 TABLET | Refills: 3 | Status: SHIPPED | OUTPATIENT
Start: 2019-01-15 | End: 2019-12-30

## 2019-01-15 RX ORDER — LISINOPRIL 5 MG/1
TABLET ORAL
Qty: 90 TABLET | Refills: 3 | Status: SHIPPED | OUTPATIENT
Start: 2019-01-15 | End: 2020-01-23

## 2019-01-16 DIAGNOSIS — M17.11 PRIMARY OSTEOARTHRITIS OF RIGHT KNEE: ICD-10-CM

## 2019-01-23 ENCOUNTER — TELEPHONE (OUTPATIENT)
Dept: FAMILY MEDICINE CLINIC | Age: 84
End: 2019-01-23
Payer: MEDICARE

## 2019-01-23 DIAGNOSIS — E78.2 MIXED HYPERLIPIDEMIA: ICD-10-CM

## 2019-01-23 DIAGNOSIS — M25.561 CHRONIC PAIN OF RIGHT KNEE: ICD-10-CM

## 2019-01-23 DIAGNOSIS — I10 ESSENTIAL HYPERTENSION, BENIGN: ICD-10-CM

## 2019-01-23 DIAGNOSIS — I21.02 ST ELEVATION MYOCARDIAL INFARCTION INVOLVING LEFT ANTERIOR DESCENDING (LAD) CORONARY ARTERY (HCC): Primary | ICD-10-CM

## 2019-01-23 DIAGNOSIS — I87.2 VENOUS INSUFFICIENCY: ICD-10-CM

## 2019-01-23 DIAGNOSIS — R33.9 URINARY RETENTION: ICD-10-CM

## 2019-01-23 DIAGNOSIS — K22.70 BARRETT'S ESOPHAGUS WITHOUT DYSPLASIA: ICD-10-CM

## 2019-01-23 DIAGNOSIS — I49.3 PVC (PREMATURE VENTRICULAR CONTRACTION): ICD-10-CM

## 2019-01-23 DIAGNOSIS — I25.10 ATHEROSCLEROSIS OF NATIVE CORONARY ARTERY WITHOUT ANGINA PECTORIS, UNSPECIFIED WHETHER NATIVE OR TRANSPLANTED HEART: ICD-10-CM

## 2019-01-23 DIAGNOSIS — G89.29 CHRONIC PAIN OF RIGHT KNEE: ICD-10-CM

## 2019-01-23 DIAGNOSIS — I25.119 CORONARY ARTERY DISEASE INVOLVING NATIVE CORONARY ARTERY OF NATIVE HEART WITH ANGINA PECTORIS (HCC): ICD-10-CM

## 2019-01-23 PROCEDURE — G0179 MD RECERTIFICATION HHA PT: HCPCS | Performed by: FAMILY MEDICINE

## 2019-01-23 RX ORDER — ASPIRIN 81 MG/1
81 TABLET, CHEWABLE ORAL DAILY
Qty: 90 TABLET | Refills: 3 | Status: SHIPPED | OUTPATIENT
Start: 2019-01-23 | End: 2021-09-20

## 2019-01-23 RX ORDER — PANTOPRAZOLE SODIUM 40 MG/1
40 TABLET, DELAYED RELEASE ORAL
Qty: 90 TABLET | Refills: 3 | Status: SHIPPED | OUTPATIENT
Start: 2019-01-23

## 2019-01-23 RX ORDER — TAMSULOSIN HYDROCHLORIDE 0.4 MG/1
0.4 CAPSULE ORAL DAILY
Qty: 90 CAPSULE | Refills: 3 | Status: SHIPPED | OUTPATIENT
Start: 2019-01-23 | End: 2019-03-04

## 2019-01-29 ENCOUNTER — TELEPHONE (OUTPATIENT)
Dept: FAMILY MEDICINE CLINIC | Age: 84
End: 2019-01-29

## 2019-02-07 ENCOUNTER — TELEPHONE (OUTPATIENT)
Dept: FAMILY MEDICINE CLINIC | Age: 84
End: 2019-02-07
Payer: MEDICARE

## 2019-02-07 DIAGNOSIS — K22.719 BARRETT'S ESOPHAGUS WITH DYSPLASIA: ICD-10-CM

## 2019-02-07 DIAGNOSIS — G47.00 INSOMNIA, UNSPECIFIED TYPE: ICD-10-CM

## 2019-02-07 DIAGNOSIS — K59.03 DRUG-INDUCED CONSTIPATION: ICD-10-CM

## 2019-02-07 DIAGNOSIS — I73.9 PERIPHERAL VASCULAR DISEASE, UNSPECIFIED (HCC): ICD-10-CM

## 2019-02-07 DIAGNOSIS — M17.11 OSTEOARTHRITIS OF RIGHT KNEE, UNSPECIFIED OSTEOARTHRITIS TYPE: ICD-10-CM

## 2019-02-07 DIAGNOSIS — I25.10 ATHEROSCLEROSIS OF NATIVE CORONARY ARTERY OF NATIVE HEART WITHOUT ANGINA PECTORIS: ICD-10-CM

## 2019-02-07 DIAGNOSIS — J81.0 POSTOPERATIVE PULMONARY EDEMA (HCC): Primary | ICD-10-CM

## 2019-02-07 DIAGNOSIS — M62.81 MUSCLE WEAKNESS (GENERALIZED): ICD-10-CM

## 2019-02-07 DIAGNOSIS — K21.9 GASTROESOPHAGEAL REFLUX DISEASE WITHOUT ESOPHAGITIS: ICD-10-CM

## 2019-02-07 DIAGNOSIS — E78.5 HYPERLIPIDEMIA, UNSPECIFIED HYPERLIPIDEMIA TYPE: ICD-10-CM

## 2019-02-07 DIAGNOSIS — I10 ESSENTIAL HYPERTENSION, BENIGN: ICD-10-CM

## 2019-02-07 DIAGNOSIS — N40.0 BENIGN PROSTATIC HYPERPLASIA, UNSPECIFIED WHETHER LOWER URINARY TRACT SYMPTOMS PRESENT: ICD-10-CM

## 2019-02-07 DIAGNOSIS — J96.90 RESPIRATORY FAILURE, UNSPECIFIED CHRONICITY, UNSPECIFIED WHETHER WITH HYPOXIA OR HYPERCAPNIA (HCC): ICD-10-CM

## 2019-02-07 DIAGNOSIS — F03.90 SENILE DEMENTIA, UNCOMPLICATED (HCC): ICD-10-CM

## 2019-02-07 DIAGNOSIS — I87.009 POSTPHLEBITIC SYNDROME: ICD-10-CM

## 2019-02-07 PROCEDURE — G0179 MD RECERTIFICATION HHA PT: HCPCS | Performed by: FAMILY MEDICINE

## 2019-02-13 DIAGNOSIS — M17.11 PRIMARY OSTEOARTHRITIS OF RIGHT KNEE: ICD-10-CM

## 2019-02-13 RX ORDER — TRAMADOL HYDROCHLORIDE 50 MG/1
50 TABLET ORAL EVERY 6 HOURS PRN
Qty: 60 TABLET | Refills: 0 | Status: SHIPPED | OUTPATIENT
Start: 2019-02-13 | End: 2019-03-13 | Stop reason: SDUPTHER

## 2019-03-04 ENCOUNTER — OFFICE VISIT (OUTPATIENT)
Dept: UROLOGY | Age: 84
End: 2019-03-04
Payer: MEDICARE

## 2019-03-04 ENCOUNTER — HOSPITAL ENCOUNTER (EMERGENCY)
Age: 84
Discharge: HOME | End: 2019-03-04
Attending: EMERGENCY MEDICINE
Payer: MEDICARE

## 2019-03-04 ENCOUNTER — APPOINTMENT (OUTPATIENT)
Dept: GENERAL RADIOLOGY | Age: 84
End: 2019-03-04
Payer: MEDICARE

## 2019-03-04 VITALS
TEMPERATURE: 100.4 F | HEART RATE: 98 BPM | DIASTOLIC BLOOD PRESSURE: 72 MMHG | RESPIRATION RATE: 16 BRPM | SYSTOLIC BLOOD PRESSURE: 140 MMHG | WEIGHT: 164 LBS | BODY MASS INDEX: 27.32 KG/M2 | HEIGHT: 65 IN | OXYGEN SATURATION: 95 %

## 2019-03-04 VITALS — SYSTOLIC BLOOD PRESSURE: 118 MMHG | DIASTOLIC BLOOD PRESSURE: 76 MMHG | HEART RATE: 64 BPM

## 2019-03-04 DIAGNOSIS — K52.9 GASTROENTERITIS: Primary | ICD-10-CM

## 2019-03-04 DIAGNOSIS — R97.20 ABNORMAL PSA: ICD-10-CM

## 2019-03-04 DIAGNOSIS — N40.1 BENIGN PROSTATIC HYPERPLASIA WITH INCOMPLETE BLADDER EMPTYING: Primary | ICD-10-CM

## 2019-03-04 DIAGNOSIS — R39.14 BENIGN PROSTATIC HYPERPLASIA WITH INCOMPLETE BLADDER EMPTYING: Primary | ICD-10-CM

## 2019-03-04 LAB
ABSOLUTE EOS #: 0 K/UL (ref 0–0.4)
ABSOLUTE IMMATURE GRANULOCYTE: ABNORMAL K/UL (ref 0–0.3)
ABSOLUTE LYMPH #: 0.17 K/UL (ref 1–4.8)
ABSOLUTE MONO #: 0.34 K/UL (ref 0.1–1.2)
ANION GAP SERPL CALCULATED.3IONS-SCNC: 17 MMOL/L (ref 9–17)
BASOPHILS # BLD: 0 % (ref 0–2)
BASOPHILS ABSOLUTE: 0 K/UL (ref 0–0.2)
BUN BLDV-MCNC: 25 MG/DL (ref 8–23)
BUN/CREAT BLD: 27 (ref 9–20)
CALCIUM SERPL-MCNC: 9.4 MG/DL (ref 8.6–10.4)
CHLORIDE BLD-SCNC: 100 MMOL/L (ref 98–107)
CO2: 24 MMOL/L (ref 20–31)
CREAT SERPL-MCNC: 0.93 MG/DL (ref 0.7–1.2)
DIFFERENTIAL TYPE: ABNORMAL
DIRECT EXAM: NORMAL
EOSINOPHILS RELATIVE PERCENT: 0 % (ref 1–8)
GFR AFRICAN AMERICAN: >60 ML/MIN
GFR NON-AFRICAN AMERICAN: >60 ML/MIN
GFR SERPL CREATININE-BSD FRML MDRD: ABNORMAL ML/MIN/{1.73_M2}
GFR SERPL CREATININE-BSD FRML MDRD: ABNORMAL ML/MIN/{1.73_M2}
GLUCOSE BLD-MCNC: 150 MG/DL (ref 70–99)
HCT VFR BLD CALC: 45 % (ref 41–53)
HEMOGLOBIN: 14.6 G/DL (ref 13.5–17.5)
IMMATURE GRANULOCYTES: ABNORMAL %
LYMPHOCYTES # BLD: 2 % (ref 15–43)
Lab: NORMAL
MCH RBC QN AUTO: 28.9 PG (ref 26–34)
MCHC RBC AUTO-ENTMCNC: 32.5 G/DL (ref 31–37)
MCV RBC AUTO: 89 FL (ref 80–100)
MONOCYTES # BLD: 4 % (ref 6–14)
MORPHOLOGY: ABNORMAL
MORPHOLOGY: ABNORMAL
NRBC AUTOMATED: ABNORMAL PER 100 WBC
PDW BLD-RTO: 15.4 % (ref 11–14.5)
PLATELET # BLD: 244 K/UL (ref 140–450)
PLATELET ESTIMATE: ABNORMAL
PMV BLD AUTO: 9 FL (ref 6–12)
POTASSIUM SERPL-SCNC: 4.2 MMOL/L (ref 3.7–5.3)
RBC # BLD: 5.06 M/UL (ref 4.5–5.9)
RBC # BLD: ABNORMAL 10*6/UL
SEG NEUTROPHILS: 94 % (ref 44–74)
SEGMENTED NEUTROPHILS ABSOLUTE COUNT: 8.09 K/UL (ref 1.8–7.7)
SODIUM BLD-SCNC: 141 MMOL/L (ref 135–144)
SPECIMEN DESCRIPTION: NORMAL
TROPONIN INTERP: NORMAL
TROPONIN T: NORMAL NG/ML
TROPONIN, HIGH SENSITIVITY: 16 NG/L (ref 0–22)
WBC # BLD: 8.6 K/UL (ref 3.5–11)
WBC # BLD: ABNORMAL 10*3/UL

## 2019-03-04 PROCEDURE — 2580000003 HC RX 258: Performed by: EMERGENCY MEDICINE

## 2019-03-04 PROCEDURE — 51798 US URINE CAPACITY MEASURE: CPT | Performed by: UROLOGY

## 2019-03-04 PROCEDURE — 74022 RADEX COMPL AQT ABD SERIES: CPT

## 2019-03-04 PROCEDURE — 99284 EMERGENCY DEPT VISIT MOD MDM: CPT

## 2019-03-04 PROCEDURE — 80048 BASIC METABOLIC PNL TOTAL CA: CPT

## 2019-03-04 PROCEDURE — 6360000002 HC RX W HCPCS: Performed by: EMERGENCY MEDICINE

## 2019-03-04 PROCEDURE — 84484 ASSAY OF TROPONIN QUANT: CPT

## 2019-03-04 PROCEDURE — 85025 COMPLETE CBC W/AUTO DIFF WBC: CPT

## 2019-03-04 PROCEDURE — 87804 INFLUENZA ASSAY W/OPTIC: CPT

## 2019-03-04 PROCEDURE — 99214 OFFICE O/P EST MOD 30 MIN: CPT | Performed by: UROLOGY

## 2019-03-04 PROCEDURE — 93005 ELECTROCARDIOGRAM TRACING: CPT

## 2019-03-04 PROCEDURE — 96374 THER/PROPH/DIAG INJ IV PUSH: CPT

## 2019-03-04 RX ORDER — ONDANSETRON 4 MG/1
4 TABLET, FILM COATED ORAL EVERY 8 HOURS PRN
Qty: 10 TABLET | Refills: 0 | Status: SHIPPED | OUTPATIENT
Start: 2019-03-04 | End: 2019-04-12 | Stop reason: ALTCHOICE

## 2019-03-04 RX ORDER — 0.9 % SODIUM CHLORIDE 0.9 %
500 INTRAVENOUS SOLUTION INTRAVENOUS ONCE
Status: COMPLETED | OUTPATIENT
Start: 2019-03-04 | End: 2019-03-04

## 2019-03-04 RX ORDER — ONDANSETRON 2 MG/ML
4 INJECTION INTRAMUSCULAR; INTRAVENOUS ONCE
Status: COMPLETED | OUTPATIENT
Start: 2019-03-04 | End: 2019-03-04

## 2019-03-04 RX ADMIN — ONDANSETRON 4 MG: 2 INJECTION INTRAMUSCULAR; INTRAVENOUS at 18:28

## 2019-03-04 RX ADMIN — SODIUM CHLORIDE 500 ML: 9 INJECTION, SOLUTION INTRAVENOUS at 18:28

## 2019-03-04 ASSESSMENT — PAIN DESCRIPTION - FREQUENCY: FREQUENCY: CONTINUOUS

## 2019-03-04 ASSESSMENT — PAIN DESCRIPTION - ONSET: ONSET: ON-GOING

## 2019-03-04 ASSESSMENT — PAIN DESCRIPTION - LOCATION: LOCATION: GENERALIZED

## 2019-03-04 ASSESSMENT — PAIN DESCRIPTION - PAIN TYPE: TYPE: ACUTE PAIN

## 2019-03-04 ASSESSMENT — PAIN DESCRIPTION - DESCRIPTORS: DESCRIPTORS: ACHING

## 2019-03-04 ASSESSMENT — PAIN SCALES - GENERAL: PAINLEVEL_OUTOF10: 4

## 2019-03-06 LAB
EKG ATRIAL RATE: 93 BPM
EKG P AXIS: 70 DEGREES
EKG P-R INTERVAL: 136 MS
EKG Q-T INTERVAL: 402 MS
EKG QRS DURATION: 130 MS
EKG QTC CALCULATION (BAZETT): 499 MS
EKG R AXIS: -85 DEGREES
EKG T AXIS: 37 DEGREES
EKG VENTRICULAR RATE: 93 BPM

## 2019-03-11 ENCOUNTER — HOSPITAL ENCOUNTER (OUTPATIENT)
Dept: NON INVASIVE DIAGNOSTICS | Age: 84
Discharge: HOME | End: 2019-03-11
Payer: MEDICARE

## 2019-03-11 DIAGNOSIS — I21.3 ST ELEVATION MYOCARDIAL INFARCTION (STEMI), UNSPECIFIED ARTERY (HCC): ICD-10-CM

## 2019-03-11 DIAGNOSIS — I10 ESSENTIAL HYPERTENSION, BENIGN: ICD-10-CM

## 2019-03-11 DIAGNOSIS — I25.10 ATHEROSCLEROSIS OF NATIVE CORONARY ARTERY WITHOUT ANGINA PECTORIS, UNSPECIFIED WHETHER NATIVE OR TRANSPLANTED HEART: ICD-10-CM

## 2019-03-11 DIAGNOSIS — I25.119 CORONARY ARTERY DISEASE INVOLVING NATIVE CORONARY ARTERY OF NATIVE HEART WITH ANGINA PECTORIS (HCC): ICD-10-CM

## 2019-03-11 DIAGNOSIS — I21.02 ST ELEVATION MYOCARDIAL INFARCTION INVOLVING LEFT ANTERIOR DESCENDING (LAD) CORONARY ARTERY (HCC): ICD-10-CM

## 2019-03-11 LAB
LV EF: 58 %
LVEF MODALITY: NORMAL

## 2019-03-11 PROCEDURE — 93306 TTE W/DOPPLER COMPLETE: CPT

## 2019-03-12 ENCOUNTER — TELEPHONE (OUTPATIENT)
Dept: CARDIOLOGY | Age: 84
End: 2019-03-12

## 2019-03-13 DIAGNOSIS — M17.11 PRIMARY OSTEOARTHRITIS OF RIGHT KNEE: ICD-10-CM

## 2019-03-13 RX ORDER — TRAMADOL HYDROCHLORIDE 50 MG/1
TABLET ORAL
Qty: 60 TABLET | Refills: 0 | Status: SHIPPED | OUTPATIENT
Start: 2019-03-13 | End: 2019-04-10 | Stop reason: SDUPTHER

## 2019-03-14 ENCOUNTER — OFFICE VISIT (OUTPATIENT)
Dept: PODIATRY | Age: 84
End: 2019-03-14
Payer: MEDICARE

## 2019-03-14 VITALS
HEART RATE: 60 BPM | BODY MASS INDEX: 27.31 KG/M2 | HEIGHT: 64 IN | DIASTOLIC BLOOD PRESSURE: 62 MMHG | WEIGHT: 160 LBS | SYSTOLIC BLOOD PRESSURE: 122 MMHG

## 2019-03-14 DIAGNOSIS — B35.1 DERMATOPHYTOSIS OF NAIL: Primary | ICD-10-CM

## 2019-03-14 PROCEDURE — 99999 PR OFFICE/OUTPT VISIT,PROCEDURE ONLY: CPT | Performed by: PODIATRIST

## 2019-03-14 PROCEDURE — 11721 DEBRIDE NAIL 6 OR MORE: CPT | Performed by: PODIATRIST

## 2019-03-19 ENCOUNTER — TELEPHONE (OUTPATIENT)
Dept: FAMILY MEDICINE CLINIC | Age: 84
End: 2019-03-19
Payer: MEDICARE

## 2019-03-19 DIAGNOSIS — N40.1 BENIGN PROSTATIC HYPERPLASIA WITH LOWER URINARY TRACT SYMPTOMS, SYMPTOM DETAILS UNSPECIFIED: ICD-10-CM

## 2019-03-19 DIAGNOSIS — K22.70 BARRETT'S ESOPHAGUS WITHOUT DYSPLASIA: ICD-10-CM

## 2019-03-19 DIAGNOSIS — I49.3 PVC (PREMATURE VENTRICULAR CONTRACTION): ICD-10-CM

## 2019-03-19 DIAGNOSIS — I25.10 CORONARY ARTERY DISEASE INVOLVING NATIVE CORONARY ARTERY OF NATIVE HEART WITHOUT ANGINA PECTORIS: ICD-10-CM

## 2019-03-19 DIAGNOSIS — M17.11 PRIMARY OSTEOARTHRITIS OF RIGHT KNEE: ICD-10-CM

## 2019-03-19 DIAGNOSIS — R26.81 GAIT INSTABILITY: ICD-10-CM

## 2019-03-19 DIAGNOSIS — I10 ESSENTIAL HYPERTENSION, BENIGN: Primary | ICD-10-CM

## 2019-03-19 DIAGNOSIS — K52.9 COLITIS: ICD-10-CM

## 2019-03-19 DIAGNOSIS — I87.2 VENOUS INSUFFICIENCY: ICD-10-CM

## 2019-03-19 DIAGNOSIS — R97.20 ELEVATED PSA: ICD-10-CM

## 2019-03-19 PROCEDURE — G0179 MD RECERTIFICATION HHA PT: HCPCS | Performed by: FAMILY MEDICINE

## 2019-03-26 ENCOUNTER — OFFICE VISIT (OUTPATIENT)
Dept: CARDIOLOGY | Age: 84
End: 2019-03-26
Payer: MEDICARE

## 2019-03-26 VITALS
HEART RATE: 64 BPM | HEIGHT: 64 IN | BODY MASS INDEX: 27.31 KG/M2 | DIASTOLIC BLOOD PRESSURE: 74 MMHG | WEIGHT: 160 LBS | SYSTOLIC BLOOD PRESSURE: 150 MMHG

## 2019-03-26 DIAGNOSIS — I21.3 ST ELEVATION MYOCARDIAL INFARCTION (STEMI), UNSPECIFIED ARTERY (HCC): Primary | ICD-10-CM

## 2019-03-26 PROCEDURE — 99214 OFFICE O/P EST MOD 30 MIN: CPT | Performed by: INTERNAL MEDICINE

## 2019-04-10 DIAGNOSIS — M17.11 PRIMARY OSTEOARTHRITIS OF RIGHT KNEE: ICD-10-CM

## 2019-04-10 RX ORDER — TRAMADOL HYDROCHLORIDE 50 MG/1
TABLET ORAL
Qty: 60 TABLET | Refills: 0 | Status: SHIPPED | OUTPATIENT
Start: 2019-04-10 | End: 2019-05-20 | Stop reason: SDUPTHER

## 2019-04-12 ENCOUNTER — OFFICE VISIT (OUTPATIENT)
Dept: FAMILY MEDICINE CLINIC | Age: 84
End: 2019-04-12
Payer: MEDICARE

## 2019-04-12 ENCOUNTER — HOSPITAL ENCOUNTER (OUTPATIENT)
Dept: LAB | Age: 84
Discharge: HOME | End: 2019-04-12
Payer: MEDICARE

## 2019-04-12 VITALS
SYSTOLIC BLOOD PRESSURE: 138 MMHG | BODY MASS INDEX: 26.98 KG/M2 | HEIGHT: 64 IN | DIASTOLIC BLOOD PRESSURE: 80 MMHG | HEART RATE: 62 BPM | WEIGHT: 158 LBS

## 2019-04-12 DIAGNOSIS — I87.2 VENOUS INSUFFICIENCY: ICD-10-CM

## 2019-04-12 DIAGNOSIS — N20.0 KIDNEY STONE: ICD-10-CM

## 2019-04-12 DIAGNOSIS — K22.719 BARRETT'S ESOPHAGUS WITH DYSPLASIA: ICD-10-CM

## 2019-04-12 DIAGNOSIS — E78.5 HYPERLIPIDEMIA, UNSPECIFIED HYPERLIPIDEMIA TYPE: ICD-10-CM

## 2019-04-12 DIAGNOSIS — R97.20 ELEVATED PSA: ICD-10-CM

## 2019-04-12 DIAGNOSIS — I10 ESSENTIAL HYPERTENSION, BENIGN: ICD-10-CM

## 2019-04-12 DIAGNOSIS — K59.04 CHRONIC IDIOPATHIC CONSTIPATION: ICD-10-CM

## 2019-04-12 DIAGNOSIS — M17.11 PRIMARY OSTEOARTHRITIS OF RIGHT KNEE: ICD-10-CM

## 2019-04-12 DIAGNOSIS — N40.1 BENIGN PROSTATIC HYPERPLASIA WITH LOWER URINARY TRACT SYMPTOMS, SYMPTOM DETAILS UNSPECIFIED: ICD-10-CM

## 2019-04-12 DIAGNOSIS — I25.10 ATHEROSCLEROSIS OF NATIVE CORONARY ARTERY OF NATIVE HEART WITHOUT ANGINA PECTORIS: Primary | ICD-10-CM

## 2019-04-12 LAB
ABSOLUTE EOS #: 0.1 K/UL (ref 0–0.4)
ABSOLUTE IMMATURE GRANULOCYTE: ABNORMAL K/UL (ref 0–0.3)
ABSOLUTE LYMPH #: 1.7 K/UL (ref 1–4.8)
ABSOLUTE MONO #: 0.9 K/UL (ref 0.1–1.2)
ALBUMIN SERPL-MCNC: 4 G/DL (ref 3.5–5.2)
ALBUMIN/GLOBULIN RATIO: 1.3 (ref 1–2.5)
ALP BLD-CCNC: 90 U/L (ref 40–129)
ALT SERPL-CCNC: 12 U/L (ref 5–41)
ANION GAP SERPL CALCULATED.3IONS-SCNC: 8 MMOL/L (ref 9–17)
AST SERPL-CCNC: 12 U/L
BASOPHILS # BLD: 0 % (ref 0–2)
BASOPHILS ABSOLUTE: 0 K/UL (ref 0–0.2)
BILIRUB SERPL-MCNC: 0.48 MG/DL (ref 0.3–1.2)
BUN BLDV-MCNC: 18 MG/DL (ref 8–23)
BUN/CREAT BLD: 20 (ref 9–20)
CALCIUM SERPL-MCNC: 9.3 MG/DL (ref 8.6–10.4)
CHLORIDE BLD-SCNC: 102 MMOL/L (ref 98–107)
CHOLESTEROL/HDL RATIO: 2.7
CHOLESTEROL: 105 MG/DL
CO2: 29 MMOL/L (ref 20–31)
CREAT SERPL-MCNC: 0.89 MG/DL (ref 0.7–1.2)
DIFFERENTIAL TYPE: ABNORMAL
EOSINOPHILS RELATIVE PERCENT: 2 % (ref 1–8)
GFR AFRICAN AMERICAN: >60 ML/MIN
GFR NON-AFRICAN AMERICAN: >60 ML/MIN
GFR SERPL CREATININE-BSD FRML MDRD: ABNORMAL ML/MIN/{1.73_M2}
GFR SERPL CREATININE-BSD FRML MDRD: ABNORMAL ML/MIN/{1.73_M2}
GLUCOSE BLD-MCNC: 106 MG/DL (ref 70–99)
HCT VFR BLD CALC: 42 % (ref 41–53)
HDLC SERPL-MCNC: 39 MG/DL
HEMOGLOBIN: 13.5 G/DL (ref 13.5–17.5)
IMMATURE GRANULOCYTES: ABNORMAL %
LDL CHOLESTEROL: 46 MG/DL (ref 0–130)
LYMPHOCYTES # BLD: 21 % (ref 15–43)
MCH RBC QN AUTO: 28.1 PG (ref 26–34)
MCHC RBC AUTO-ENTMCNC: 32 G/DL (ref 31–37)
MCV RBC AUTO: 87.9 FL (ref 80–100)
MONOCYTES # BLD: 11 % (ref 6–14)
NRBC AUTOMATED: ABNORMAL PER 100 WBC
PDW BLD-RTO: 15.6 % (ref 11–14.5)
PLATELET # BLD: 252 K/UL (ref 140–450)
PLATELET ESTIMATE: ABNORMAL
PMV BLD AUTO: 8.7 FL (ref 6–12)
POTASSIUM SERPL-SCNC: 4.4 MMOL/L (ref 3.7–5.3)
RBC # BLD: 4.78 M/UL (ref 4.5–5.9)
RBC # BLD: ABNORMAL 10*6/UL
SEG NEUTROPHILS: 66 % (ref 44–74)
SEGMENTED NEUTROPHILS ABSOLUTE COUNT: 5.3 K/UL (ref 1.8–7.7)
SODIUM BLD-SCNC: 139 MMOL/L (ref 135–144)
TOTAL PROTEIN: 7 G/DL (ref 6.4–8.3)
TRIGL SERPL-MCNC: 102 MG/DL
VLDLC SERPL CALC-MCNC: ABNORMAL MG/DL (ref 1–30)
WBC # BLD: 8 K/UL (ref 3.5–11)
WBC # BLD: ABNORMAL 10*3/UL

## 2019-04-12 PROCEDURE — 99214 OFFICE O/P EST MOD 30 MIN: CPT | Performed by: FAMILY MEDICINE

## 2019-04-12 PROCEDURE — 36415 COLL VENOUS BLD VENIPUNCTURE: CPT

## 2019-04-12 PROCEDURE — 85025 COMPLETE CBC W/AUTO DIFF WBC: CPT

## 2019-04-12 PROCEDURE — 80061 LIPID PANEL: CPT

## 2019-04-12 PROCEDURE — 80053 COMPREHEN METABOLIC PANEL: CPT

## 2019-04-12 RX ORDER — LANOLIN ALCOHOL/MO/W.PET/CERES
3 CREAM (GRAM) TOPICAL NIGHTLY
COMMUNITY

## 2019-04-12 RX ORDER — DOCUSATE SODIUM 100 MG/1
100 CAPSULE, LIQUID FILLED ORAL DAILY
COMMUNITY

## 2019-04-12 ASSESSMENT — ENCOUNTER SYMPTOMS
GASTROINTESTINAL NEGATIVE: 1
ALLERGIC/IMMUNOLOGIC NEGATIVE: 1
EYES NEGATIVE: 1
RESPIRATORY NEGATIVE: 1

## 2019-04-12 NOTE — PROGRESS NOTES
by mouth every morning (before breakfast) 90 tablet 3    metoprolol tartrate (LOPRESSOR) 25 MG tablet Take 0.5 tablets by mouth 2 times daily 90 tablet 3    aspirin 81 MG chewable tablet Take 1 tablet by mouth daily 90 tablet 3    lisinopril (PRINIVIL;ZESTRIL) 5 MG tablet TAKE 1 TABLET DAILY 90 tablet 3    atorvastatin (LIPITOR) 10 MG tablet Take 1 tablet by mouth daily 90 tablet 3    furosemide (LASIX) 20 MG tablet Take 1 tablet by mouth daily 90 tablet 3    clopidogrel (PLAVIX) 75 MG tablet Take 1 tablet by mouth daily 90 tablet 3    lactulose (CHRONULAC) 10 GM/15ML solution Take 30 mLs by mouth daily (Patient taking differently: Take 20 g by mouth 2 times daily ) 3 Bottle 3    acetaminophen (TYLENOL) 325 MG tablet Take 2 tablets by mouth every 4 hours as needed for Pain or Fever 120 tablet 0    Multiple Vitamins-Minerals (MULTIVITAMIN PO) Take 1 tablet by mouth daily. No current facility-administered medications for this visit. Allergies   Allergen Reactions    Codeine Nausea And Vomiting    Morphine Other (See Comments)     constipation         Review of Systems   Constitutional: Negative. HENT: Positive for hearing loss. Eyes: Negative. Respiratory: Negative. Cardiovascular: Negative. Gastrointestinal: Negative. Endocrine: Negative. Genitourinary: Negative. Musculoskeletal: Positive for arthralgias (right knee). Skin: Negative. Allergic/Immunologic: Negative. Neurological: Negative. Hematological: Negative. Psychiatric/Behavioral: Negative. All other systems reviewed and are negative. Objective:   Physical Exam   Constitutional: He is oriented to person, place, and time. He appears well-developed and well-nourished. No distress. HENT:   Head: Normocephalic and atraumatic. Right Ear: External ear normal. No foreign bodies. Decreased hearing is noted. Left Ear: External ear normal. No foreign bodies (bilateral cerumen impaction. ). Decreased hearing (cerumen impacted bilaterally) is noted. Mouth/Throat: Oropharynx is clear and moist. He does not have dentures. Normal dentition. No dental caries. No oropharyngeal exudate. Eyes: Conjunctivae and EOM are normal. No scleral icterus. Neck: Neck supple. No thyromegaly present. Cardiovascular: Normal rate, regular rhythm, normal heart sounds and intact distal pulses. No murmur heard. Pulmonary/Chest: Effort normal and breath sounds normal. No respiratory distress. He has no wheezes. Abdominal: Soft. Bowel sounds are normal. He exhibits no distension. There is no tenderness. There is no rebound. Musculoskeletal: He exhibits no edema or tenderness. Left knee: He exhibits decreased range of motion (fairly fused in extension. no patella, surgically removed). Neurological: He is alert and oriented to person, place, and time. Skin: Skin is warm and dry. No rash noted. No erythema. Psychiatric: He has a normal mood and affect.  His behavior is normal. Judgment and thought content normal.     /80 (Site: Right Upper Arm, Position: Sitting, Cuff Size: Large Adult)   Pulse 62   Ht 5' 4.02\" (1.626 m)   Wt 158 lb (71.7 kg)   BMI 27.11 kg/m²   Hospital Outpatient Visit on 04/12/2019   Component Date Value Ref Range Status    Cholesterol 04/12/2019 105  <200 mg/dL Final    HDL 04/12/2019 39* >40 mg/dL Final    LDL Cholesterol 04/12/2019 46  0 - 130 mg/dL Final    Chol/HDL Ratio 04/12/2019 2.7  <5 Final    Triglycerides 04/12/2019 102  <150 mg/dL Final    VLDL 04/12/2019 NOT REPORTED  1 - 30 mg/dL Final    Glucose 04/12/2019 106* 70 - 99 mg/dL Final    BUN 04/12/2019 18  8 - 23 mg/dL Final    CREATININE 04/12/2019 0.89  0.70 - 1.20 mg/dL Final    Bun/Cre Ratio 04/12/2019 20  9 - 20 Final    Calcium 04/12/2019 9.3  8.6 - 10.4 mg/dL Final    Sodium 04/12/2019 139  135 - 144 mmol/L Final    Potassium 04/12/2019 4.4  3.7 - 5.3 mmol/L Final    Chloride 04/12/2019 102 98 - 107 mmol/L Final    CO2 04/12/2019 29  20 - 31 mmol/L Final    Anion Gap 04/12/2019 8* 9 - 17 mmol/L Final    Alkaline Phosphatase 04/12/2019 90  40 - 129 U/L Final    ALT 04/12/2019 12  5 - 41 U/L Final    AST 04/12/2019 12  <40 U/L Final    Total Bilirubin 04/12/2019 0.48  0.3 - 1.2 mg/dL Final    Total Protein 04/12/2019 7.0  6.4 - 8.3 g/dL Final    Alb 04/12/2019 4.0  3.5 - 5.2 g/dL Final    Albumin/Globulin Ratio 04/12/2019 1.3  1.0 - 2.5 Final    GFR Non- 04/12/2019 >60  >60 mL/min Final    GFR  04/12/2019 >60  >60 mL/min Final    GFR Comment 04/12/2019        Final    GFR Staging 04/12/2019 NOT REPORTED   Final    WBC 04/12/2019 8.0  3.5 - 11.0 k/uL Final    RBC 04/12/2019 4.78  4.5 - 5.9 m/uL Final    Hemoglobin 04/12/2019 13.5  13.5 - 17.5 g/dL Final    Hematocrit 04/12/2019 42.0  41 - 53 % Final    MCV 04/12/2019 87.9  80 - 100 fL Final    MCH 04/12/2019 28.1  26 - 34 pg Final    MCHC 04/12/2019 32.0  31 - 37 g/dL Final    RDW 04/12/2019 15.6* 11.0 - 14.5 % Final    Platelets 06/41/7197 252  140 - 450 k/uL Final    MPV 04/12/2019 8.7  6.0 - 12.0 fL Final    NRBC Automated 04/12/2019 NOT REPORTED  per 100 WBC Final    Differential Type 04/12/2019 NOT REPORTED   Final    Immature Granulocytes 04/12/2019 NOT REPORTED  0 % Final    Absolute Immature Granulocyte 04/12/2019 NOT REPORTED  0.00 - 0.30 k/uL Final    WBC Morphology 04/12/2019 NOT REPORTED   Final    RBC Morphology 04/12/2019 NOT REPORTED   Final    Platelet Estimate 26/73/5797 NOT REPORTED   Final    Seg Neutrophils 04/12/2019 66  44 - 74 % Final    Lymphocytes 04/12/2019 21  15 - 43 % Final    Monocytes 04/12/2019 11  6 - 14 % Final    Eosinophils % 04/12/2019 2  1 - 8 % Final    Basophils 04/12/2019 0  0 - 2 % Final    Segs Absolute 04/12/2019 5.30  1.8 - 7.7 k/uL Final    Absolute Lymph # 04/12/2019 1.70  1.0 - 4.8 k/uL Final    Absolute Mono # 04/12/2019 0.90  0.1 - 1.2 k/uL Final    Absolute Eos # 04/12/2019 0.10  0.0 - 0.4 k/uL Final    Basophils # 04/12/2019 0.00  0.0 - 0.2 k/uL Final       Assessment:       Encounter Diagnoses   Name Primary?  Atherosclerosis of native coronary artery of native heart without angina pectoris Yes    Venous insufficiency     Kidney stone     Elevated PSA     Essential hypertension, benign     Hyperlipidemia, unspecified hyperlipidemia type     Primary osteoarthritis of right knee     Sepulveda's esophagus with dysplasia     Chronic idiopathic constipation     Benign prostatic hyperplasia with lower urinary tract symptoms, symptom details unspecified            Plan:      CAD: 2 KRISTEN to mid LAD 11/12. , s/p KRISTEN to ostial LAD 11/22/18. Doing well at present. Denies chest pain or change in exercise tolerance. Cont.  asa, metoprolol, lisinopril, plavix   Cont. Cardiology follow up as scheduled. Venous insufficiency. No recent issues. Edema staying away on lasix once daily. He does not use his compression hose at present. Kidney stones. No recurrent issues. Maintain fluid intake. psa elevations. Asx. Last psa slightly higher. Dr. Cristina Lockhart following him for recommendations. No significant obstructive symptoms. 1-2x nocturia. Follow up level in October 2017.  11.91 (9/13)--13.01--13.79--14.61--16.73(9/16)--18.72 (10/17). Following with urology. Observation at present with option for biopsy if patient agreeable. Cont. Urology follow up as scheduled. Htn: stable. Cont. Current dose of toprol and lisinopril. Hyperlipidemia: good control while on zocor. No side effect concerns at present. Plan to cont current dosing. oa knee on right. Progressing over the interval.    Having more trouble with stairs, but feels he can do so safely. Satisfied at present. His left knee is mostly fused, prior trauma , no patella by history. visco supplement injections tried without perceived improvement.   Not following with pain management at present. He had some significant constipation issues with norco.  Constipation better with metamucil and lactulose, working well for him. Ultram currently helping with more moderate to severe pain. He is only using once daily at present. Optioned increasing dose if needed to control pain and keep him on his feet, with attention to avoiding recurrent constipation. He doesn't feel ortho. Has much more to offer. He is undecided on whether he would pursue tka if offered. Sepulveda's esophagus on egd. Symptoms improved on protonix. EGD updated 2/2/15. Biopsies with mild chronic, inactive gastritis, distal esophagus with moderate/chronic/inactive inflammation and prominent intestinal/goblet cell metaplasia (Barretts) negative for dysplasia. Repeat EGD 4/25/16: lg sliding hiatal hernia, duodenal and fundic polyps. Sepulveda's tissue up to 3cm. Biopsies of polyps normal, still has metaplasia/Sepulveda's esophagus on distal esophageal biopsies on 7/17 egd. No dysplasia. Follow up 3 yrs. Mostly asx, some food related exacerbations at night. Cont. protonix 40mg bid and follow up. No dysphagia or vomiting.       colon polyps :2012 scope without recurrent polyps, mild sigmoid diverticulosis. .  Due for follow up 8/17. He defers at present, may consider next year. Currently on plavix s/p stents 11/22/18    Constipation: worse since starting norco for pain control. . Adding lactulose 30cc at bedtime has helped the most.  Satisfied with results at present. .  rec. He call for continued issues. Increase fruits. bph /urinary retention. He had green light laser 11/19/18. Doing better at present. No reported retention or significant issues with urination to report at present. Following urology for this as well.

## 2019-04-12 NOTE — PATIENT INSTRUCTIONS
Hospital Outpatient Visit on 04/12/2019   Component Date Value Ref Range Status    Cholesterol 04/12/2019 105  <200 mg/dL Final    Comment:    Cholesterol Guidelines:      <200  Desirable   200-240  Borderline      >240  Undesirable         HDL 04/12/2019 39* >40 mg/dL Final    Comment:    HDL Guidelines:    <40     Undesirable   40-59    Borderline    >59     Desirable         LDL Cholesterol 04/12/2019 46  0 - 130 mg/dL Final    Comment:    LDL Guidelines:     <100    Desirable   100-129   Near to/above Desirable   130-159   Borderline      >159   Undesirable     Direct (measured) LDL and calculated LDL are not interchangeable tests.  Chol/HDL Ratio 04/12/2019 2.7  <5 Final            Triglycerides 04/12/2019 102  <150 mg/dL Final    Comment:    Triglyceride Guidelines:     <150   Desirable   150-199  Borderline   200-499  High     >499   Very high   Based on AHA Guidelines for fasting triglyceride, October 2012.          VLDL 04/12/2019 NOT REPORTED  1 - 30 mg/dL Final    Glucose 04/12/2019 106* 70 - 99 mg/dL Final    BUN 04/12/2019 18  8 - 23 mg/dL Final    CREATININE 04/12/2019 0.89  0.70 - 1.20 mg/dL Final    Bun/Cre Ratio 04/12/2019 20  9 - 20 Final    Calcium 04/12/2019 9.3  8.6 - 10.4 mg/dL Final    Sodium 04/12/2019 139  135 - 144 mmol/L Final    Potassium 04/12/2019 4.4  3.7 - 5.3 mmol/L Final    Chloride 04/12/2019 102  98 - 107 mmol/L Final    CO2 04/12/2019 29  20 - 31 mmol/L Final    Anion Gap 04/12/2019 8* 9 - 17 mmol/L Final    Alkaline Phosphatase 04/12/2019 90  40 - 129 U/L Final    ALT 04/12/2019 12  5 - 41 U/L Final    AST 04/12/2019 12  <40 U/L Final    Total Bilirubin 04/12/2019 0.48  0.3 - 1.2 mg/dL Final    Total Protein 04/12/2019 7.0  6.4 - 8.3 g/dL Final    Alb 04/12/2019 4.0  3.5 - 5.2 g/dL Final    Albumin/Globulin Ratio 04/12/2019 1.3  1.0 - 2.5 Final    GFR Non- 04/12/2019 >60  >60 mL/min Final    GFR  04/12/2019 >60  >60 mL/min Final    GFR Comment 04/12/2019        Final    Comment: Average GFR for 79or more years old:   76 mL/min/1.73sq m  Chronic Kidney Disease:   <60 mL/min/1.73sq m  Kidney failure:   <15 mL/min/1.73sq m              eGFR calculated using average adult body mass.  Additional eGFR calculator available at:        [email or web address]            GFR Staging 04/12/2019 NOT REPORTED   Final    WBC 04/12/2019 8.0  3.5 - 11.0 k/uL Final    RBC 04/12/2019 4.78  4.5 - 5.9 m/uL Final    Hemoglobin 04/12/2019 13.5  13.5 - 17.5 g/dL Final    Hematocrit 04/12/2019 42.0  41 - 53 % Final    MCV 04/12/2019 87.9  80 - 100 fL Final    MCH 04/12/2019 28.1  26 - 34 pg Final    MCHC 04/12/2019 32.0  31 - 37 g/dL Final    RDW 04/12/2019 15.6* 11.0 - 14.5 % Final    Platelets 99/07/3672 252  140 - 450 k/uL Final    MPV 04/12/2019 8.7  6.0 - 12.0 fL Final    NRBC Automated 04/12/2019 NOT REPORTED  per 100 WBC Final    Differential Type 04/12/2019 NOT REPORTED   Final    Immature Granulocytes 04/12/2019 NOT REPORTED  0 % Final    Absolute Immature Granulocyte 04/12/2019 NOT REPORTED  0.00 - 0.30 k/uL Final    WBC Morphology 04/12/2019 NOT REPORTED   Final    RBC Morphology 04/12/2019 NOT REPORTED   Final    Platelet Estimate 18/99/9363 NOT REPORTED   Final    Seg Neutrophils 04/12/2019 66  44 - 74 % Final    Lymphocytes 04/12/2019 21  15 - 43 % Final    Monocytes 04/12/2019 11  6 - 14 % Final    Eosinophils % 04/12/2019 2  1 - 8 % Final    Basophils 04/12/2019 0  0 - 2 % Final    Segs Absolute 04/12/2019 5.30  1.8 - 7.7 k/uL Final    Absolute Lymph # 04/12/2019 1.70  1.0 - 4.8 k/uL Final    Absolute Mono # 04/12/2019 0.90  0.1 - 1.2 k/uL Final    Absolute Eos # 04/12/2019 0.10  0.0 - 0.4 k/uL Final    Basophils # 04/12/2019 0.00  0.0 - 0.2 k/uL Final

## 2019-05-20 DIAGNOSIS — M17.11 PRIMARY OSTEOARTHRITIS OF RIGHT KNEE: ICD-10-CM

## 2019-05-20 RX ORDER — TRAMADOL HYDROCHLORIDE 50 MG/1
50 TABLET ORAL EVERY 6 HOURS PRN
Qty: 60 TABLET | Refills: 0 | Status: SHIPPED | OUTPATIENT
Start: 2019-05-20 | End: 2019-06-17 | Stop reason: SDUPTHER

## 2019-05-20 NOTE — TELEPHONE ENCOUNTER
Per [facility], last filled 4/10/2019, #60/15 days    Last Appt:  4/12/2019  Next Appt:   10/14/2019  Med verified in [address]

## 2019-05-22 ENCOUNTER — TELEPHONE (OUTPATIENT)
Dept: FAMILY MEDICINE CLINIC | Age: 84
End: 2019-05-22
Payer: MEDICARE

## 2019-05-22 DIAGNOSIS — K22.70 BARRETT'S ESOPHAGUS WITHOUT DYSPLASIA: ICD-10-CM

## 2019-05-22 DIAGNOSIS — I87.2 VENOUS INSUFFICIENCY: ICD-10-CM

## 2019-05-22 DIAGNOSIS — E78.5 HYPERLIPIDEMIA, UNSPECIFIED HYPERLIPIDEMIA TYPE: ICD-10-CM

## 2019-05-22 DIAGNOSIS — I10 ESSENTIAL HYPERTENSION, BENIGN: Primary | ICD-10-CM

## 2019-05-22 DIAGNOSIS — M17.11 OSTEOARTHRITIS OF RIGHT KNEE, UNSPECIFIED OSTEOARTHRITIS TYPE: ICD-10-CM

## 2019-05-22 DIAGNOSIS — M62.81 MUSCLE WEAKNESS (GENERALIZED): ICD-10-CM

## 2019-05-22 DIAGNOSIS — G47.00 INSOMNIA, UNSPECIFIED TYPE: ICD-10-CM

## 2019-05-22 DIAGNOSIS — F03.90 SENILE DEMENTIA, UNCOMPLICATED (HCC): ICD-10-CM

## 2019-05-22 DIAGNOSIS — K21.9 GASTROESOPHAGEAL REFLUX DISEASE WITHOUT ESOPHAGITIS: ICD-10-CM

## 2019-05-22 DIAGNOSIS — I87.2 PERIPHERAL VENOUS INSUFFICIENCY: ICD-10-CM

## 2019-05-22 PROCEDURE — G0179 MD RECERTIFICATION HHA PT: HCPCS | Performed by: FAMILY MEDICINE

## 2019-05-22 NOTE — TELEPHONE ENCOUNTER
Home health plan of care reviewed and certification completed on 05/22/19 on patient for service dates 05/13/19-07/11/19. Medications reviewed and verified. Phsycian time spent on activities to coordinate services, documenting medical decision making, reviewing of reports, treatment plans and test results is 20 minutes.

## 2019-05-30 ENCOUNTER — OFFICE VISIT (OUTPATIENT)
Dept: PODIATRY | Age: 84
End: 2019-05-30
Payer: MEDICARE

## 2019-05-30 VITALS
DIASTOLIC BLOOD PRESSURE: 68 MMHG | HEART RATE: 72 BPM | WEIGHT: 159.4 LBS | SYSTOLIC BLOOD PRESSURE: 132 MMHG | BODY MASS INDEX: 27.21 KG/M2 | HEIGHT: 64 IN | RESPIRATION RATE: 20 BRPM

## 2019-05-30 DIAGNOSIS — B35.1 DERMATOPHYTOSIS OF NAIL: Primary | ICD-10-CM

## 2019-05-30 PROCEDURE — 11721 DEBRIDE NAIL 6 OR MORE: CPT | Performed by: PODIATRIST

## 2019-05-30 PROCEDURE — 99999 PR OFFICE/OUTPT VISIT,PROCEDURE ONLY: CPT | Performed by: PODIATRIST

## 2019-05-30 NOTE — PROGRESS NOTES
Subjective:  Patient presents to [facility] today for routine foot care. Patient denies any new problems with their feet. Patient denies any pain with the nails . Objective:  Vascular: DP and PT pulses palpable 2/4, bilateral.  CFT <3 seconds, bilateral.  Hair growth present to the level of the digits, bilateral.  Edema present, bilateral.  Varicosities absent, bilateral.    Neurological: Protective sensation intact. Integument:  Nails left 1, 2, 3, 4, 5 and right 1, 2, 3, 4, 5 thickened, dystrophic, crumbly, and discolored with subungual debris. Hyperkeratotic tissue is absent. Assessment:   Onychomycosis     Plan:  Nails as mentioned above debrided in length and thickness. Patient advised about OTC treatments for nails and callous. Patient will follow up in 10 weeks for routine foot care or PRN if any further problems arise.

## 2019-06-06 ENCOUNTER — HOSPITAL ENCOUNTER (OUTPATIENT)
Dept: LAB | Age: 84
Discharge: HOME | End: 2019-06-06
Payer: MEDICARE

## 2019-06-06 DIAGNOSIS — R97.20 ABNORMAL PSA: ICD-10-CM

## 2019-06-06 LAB — PROSTATE SPECIFIC ANTIGEN: 19.72 UG/L

## 2019-06-06 PROCEDURE — 84153 ASSAY OF PSA TOTAL: CPT

## 2019-06-06 PROCEDURE — 36415 COLL VENOUS BLD VENIPUNCTURE: CPT

## 2019-06-10 ENCOUNTER — OFFICE VISIT (OUTPATIENT)
Dept: UROLOGY | Age: 84
End: 2019-06-10
Payer: MEDICARE

## 2019-06-10 VITALS
HEART RATE: 64 BPM | DIASTOLIC BLOOD PRESSURE: 68 MMHG | SYSTOLIC BLOOD PRESSURE: 136 MMHG | HEIGHT: 64 IN | WEIGHT: 159.39 LBS | BODY MASS INDEX: 27.21 KG/M2

## 2019-06-10 DIAGNOSIS — N39.41 URGE INCONTINENCE: ICD-10-CM

## 2019-06-10 DIAGNOSIS — N40.1 BENIGN PROSTATIC HYPERPLASIA WITH INCOMPLETE BLADDER EMPTYING: Primary | ICD-10-CM

## 2019-06-10 DIAGNOSIS — R97.20 ELEVATED PSA: ICD-10-CM

## 2019-06-10 DIAGNOSIS — R39.14 BENIGN PROSTATIC HYPERPLASIA WITH INCOMPLETE BLADDER EMPTYING: Primary | ICD-10-CM

## 2019-06-10 PROCEDURE — 99214 OFFICE O/P EST MOD 30 MIN: CPT | Performed by: UROLOGY

## 2019-06-10 NOTE — PROGRESS NOTES
Tayler Stafford MD        [address] [facility] [address] [address]  Dept: [phone]  Dept Fax: [phone]  Loc: [phone]      [facility] Urology Office Note - Follow up Visit    Patient:  Sreekanth Breaux  YOB: 1927  Date: 6/10/2019    The patient is a 80 y.o. male who presents today for evaluation of the following problems:   Chief Complaint   Patient presents with    Benign Prostatic Hypertrophy     3 mo with psa        HISTORY OF PRESENT ILLNESS:     Onset was years ago  Overall, the problem(s) are stable  Severity is described as mild-moderate. Associated Symptoms: No dysuria, no gross hematuria. Current Pain Severity: 0    Voiding well   S/p greenlight  Some frequency, nocturia  Not bothersome. Also with hx of elevated PSA. Stable for > 2 years. Requested/reviewed records from Will Macario MD office and/or outside physician/EMR    (Patient's old records have been requested, reviewed and pertinent findings summarized in today's note.)    Procedures Today: N/A    Last several PSA's:  Lab Results   Component Value Date    PSA 19.72 (H) 06/06/2019    PSA 19.6 (H) 10/03/2018    PSA 17.50 (H) 06/13/2018       Last total testosterone:  No results found for: TESTOSTERONE    Urinalysis today:  No results found for this visit on 06/10/19.     Last BUN and creatinine:  Lab Results   Component Value Date    BUN 18 04/12/2019     Lab Results   Component Value Date    CREATININE 0.89 04/12/2019       Additional Lab/Culture results: none    No results      PAST MEDICAL, FAMILY AND SOCIAL HISTORY:  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 2/2/15    EGD with biopsies    CAD (coronary artery disease)     Elevated PSA     Hyperlipidemia     Hypertension     Kidney stone     Osteoarthritis of knee     Venous insufficiency      Past Surgical History:   Procedure Laterality Date    APPENDECTOMY      CHOLECYSTECTOMY  01/12/1983    COLONOSCOPY  08/29/12    COLONOSCOPY  05/27/09    COLONOSCOPY  04/30/2008    CYSTOSCOPY  11/15/2007    with left ureteroscopy and dorsal slit     KNEE FUSION Left     LIPOMA RESECTION      RI CYSTOURETHROSCOPY N/A 11/19/2018    CYSTO Photovaporization Prostate Greenlight Laser ([id number]) performed by Isidoro Yeager MD at [address] [facility] EGD TRANSORAL BIOPSY SINGLE/MULTIPLE N/A 7/3/2017    EGD BIOPSY performed by Estela Puckett MD at [facility] [address] Bilateral 06/23/2009    Benign    UPPER GASTROINTESTINAL ENDOSCOPY  02/2/2015    Sepulveda's (Dr. Woodfin Felty)    UPPER GASTROINTESTINAL ENDOSCOPY  04/25/2016    Lg hiatal hernia, polyp at duodenum, gastric polyp, Barretts Esophagus, GE 28    UPPER GASTROINTESTINAL ENDOSCOPY  07/03/2017    mild gastritis, Sepulveda's, Dr. Orr Red 3 years     Family History   Problem Relation Age of Onset    Cancer Father         stomach cancer    Heart Disease Paternal Grandfather      Outpatient Medications Marked as Taking for the 6/10/19 encounter (Office Visit) with Isidoro Yeager MD   Medication Sig Dispense Refill    traMADol (ULTRAM) 50 MG tablet Take 1 tablet by mouth every 6 hours as needed for Pain for up to 30 days.  60 tablet 0    melatonin 3 MG TABS tablet Take 3 mg by mouth nightly      docusate sodium (COLACE) 100 MG capsule Take 100 mg by mouth daily      pantoprazole (PROTONIX) 40 MG tablet Take 1 tablet by mouth every morning (before breakfast) 90 tablet 3    metoprolol tartrate (LOPRESSOR) 25 MG tablet Take 0.5 tablets by mouth 2 times daily 90 tablet 3    aspirin 81 MG chewable tablet Take 1 tablet by mouth daily 90 tablet 3    lisinopril (PRINIVIL;ZESTRIL) 5 MG tablet TAKE 1 TABLET DAILY 90 tablet 3    atorvastatin (LIPITOR) 10 MG tablet Take 1 tablet by mouth daily 90 tablet 3    furosemide (LASIX) 20 MG tablet Take 1 tablet by mouth daily 90 tablet 3    clopidogrel (PLAVIX) 75 MG tablet Take 1 tablet by mouth daily 90 tablet 3    lactulose (CHRONULAC) 10 GM/15ML solution Take 30 mLs by mouth daily (Patient taking differently: Take 20 g by mouth 2 times daily ) 3 Bottle 3    acetaminophen (TYLENOL) 325 MG tablet Take 2 tablets by mouth every 4 hours as needed for Pain or Fever 120 tablet 0    Multiple Vitamins-Minerals (MULTIVITAMIN PO) Take 1 tablet by mouth daily. Codeine and Morphine  Social History     Tobacco Use   Smoking Status Former Smoker    Packs/day: 1.00    Types: Cigars    Last attempt to quit: 1998    Years since quittin.4   Smokeless Tobacco Never Used   Tobacco Comment    Former smoker (quit )- [facility] 3/20/17      (If patient a smoker, smoking cessation counseling offered)   Social History     Substance and Sexual Activity   Alcohol Use No    Alcohol/week: 0.0 oz       REVIEW OF SYSTEMS:  Constitutional: negative  Eyes: negative  Respiratory: negative  Cardiovascular: negative  Gastrointestinal: negative  Genitourinary: see HPI  Musculoskeletal: negative  Skin: negative   Neurological: negative  Hematological/Lymphatic: negative  Psychological: negative        Physical Exam:    This a 80 y.o. male  Vitals:    06/10/19 1051   BP: 136/68   Pulse: 64     Body mass index is 27.25 kg/m². Constitutional: Patient in no acute distress; Wheelchair bound    Assessment and Plan        1. Benign prostatic hyperplasia with incomplete bladder emptying    2. Urge incontinence    3. Elevated PSA               Plan:      Voiding well. Some irritative symptoms. PSA elevated. Discussed with patient. He would like to watch PSA. I discussed that it is stable and at his age likely not clinically significant. Follow up in one year with PSA. Prescriptions Ordered:  No orders of the defined types were placed in this encounter. Orders Placed:  No orders of the defined types were placed in this encounter.            Adry Deras MD

## 2019-06-17 DIAGNOSIS — M17.11 PRIMARY OSTEOARTHRITIS OF RIGHT KNEE: ICD-10-CM

## 2019-06-17 RX ORDER — TRAMADOL HYDROCHLORIDE 50 MG/1
50 TABLET ORAL EVERY 6 HOURS PRN
Qty: 60 TABLET | Refills: 0 | Status: SHIPPED | OUTPATIENT
Start: 2019-06-17 | End: 2019-07-30 | Stop reason: SDUPTHER

## 2019-07-30 DIAGNOSIS — M17.11 PRIMARY OSTEOARTHRITIS OF RIGHT KNEE: ICD-10-CM

## 2019-07-30 RX ORDER — TRAMADOL HYDROCHLORIDE 50 MG/1
TABLET ORAL
Qty: 60 TABLET | Refills: 0 | Status: SHIPPED | OUTPATIENT
Start: 2019-07-30 | End: 2019-10-17 | Stop reason: SDUPTHER

## 2019-08-05 ENCOUNTER — OFFICE VISIT (OUTPATIENT)
Dept: FAMILY MEDICINE CLINIC | Age: 84
End: 2019-08-05
Payer: MEDICARE

## 2019-08-05 VITALS
SYSTOLIC BLOOD PRESSURE: 128 MMHG | HEART RATE: 68 BPM | DIASTOLIC BLOOD PRESSURE: 68 MMHG | WEIGHT: 158 LBS | HEIGHT: 64 IN | BODY MASS INDEX: 26.98 KG/M2

## 2019-08-05 DIAGNOSIS — M17.11 PRIMARY OSTEOARTHRITIS OF RIGHT KNEE: ICD-10-CM

## 2019-08-05 PROCEDURE — 20610 DRAIN/INJ JOINT/BURSA W/O US: CPT | Performed by: FAMILY MEDICINE

## 2019-08-05 PROCEDURE — 99214 OFFICE O/P EST MOD 30 MIN: CPT | Performed by: FAMILY MEDICINE

## 2019-08-05 RX ORDER — TRAMADOL HYDROCHLORIDE 50 MG/1
50 TABLET ORAL EVERY 6 HOURS PRN
Qty: 60 TABLET | Refills: 0 | Status: CANCELLED | OUTPATIENT
Start: 2019-08-05 | End: 2019-09-04

## 2019-08-05 RX ORDER — HYDROCODONE BITARTRATE AND ACETAMINOPHEN 7.5; 325 MG/1; MG/1
1 TABLET ORAL EVERY 6 HOURS PRN
Qty: 120 TABLET | Refills: 0 | Status: SHIPPED | OUTPATIENT
Start: 2019-08-05 | End: 2019-09-18 | Stop reason: SDUPTHER

## 2019-08-05 ASSESSMENT — PATIENT HEALTH QUESTIONNAIRE - PHQ9
1. LITTLE INTEREST OR PLEASURE IN DOING THINGS: 0
2. FEELING DOWN, DEPRESSED OR HOPELESS: 0
SUM OF ALL RESPONSES TO PHQ9 QUESTIONS 1 & 2: 0
SUM OF ALL RESPONSES TO PHQ QUESTIONS 1-9: 0
SUM OF ALL RESPONSES TO PHQ QUESTIONS 1-9: 0

## 2019-08-05 NOTE — PROGRESS NOTES
Take 0.5 tablets by mouth 2 times daily  Akanksha Osborne MD        Social History     Tobacco Use    Smoking status: Former Smoker     Packs/day: 1.00     Types: Cigars     Last attempt to quit: 1998     Years since quittin.6    Smokeless tobacco: Never Used    Tobacco comment: Former smoker (quit )- [facility] 3/20/17   Substance Use Topics    Alcohol use: No     Alcohol/week: 0.0 standard drinks        Vitals:    19 1302   BP: 128/68   Site: Left Upper Arm   Position: Sitting   Cuff Size: Large Adult   Pulse: 68   Weight: 158 lb (71.7 kg)   Height: 5' 4.13\" (1.629 m)     Estimated body mass index is 27.01 kg/m² as calculated from the following:    Height as of this encounter: 5' 4.13\" (1.629 m). Weight as of this encounter: 158 lb (71.7 kg). Physical Exam   Constitutional: He is oriented to person, place, and time. He appears well-developed and well-nourished. No distress. HENT:   Head: Normocephalic and atraumatic. Right Ear: External ear normal. No foreign bodies. Decreased hearing is noted. Left Ear: External ear normal. No foreign bodies (bilateral cerumen impaction. ). Decreased hearing (cerumen impacted bilaterally) is noted. Mouth/Throat: Oropharynx is clear and moist. He does not have dentures. Normal dentition. No dental caries. No oropharyngeal exudate. Eyes: Conjunctivae and EOM are normal. No scleral icterus. Neck: Neck supple. No thyromegaly present. Cardiovascular: Normal rate, regular rhythm, normal heart sounds and intact distal pulses. No murmur heard. Pulmonary/Chest: Effort normal and breath sounds normal. No respiratory distress. He has no wheezes. Abdominal: Soft. Bowel sounds are normal. He exhibits no distension. There is no tenderness. There is no rebound. Musculoskeletal: He exhibits no edema or tenderness. Left knee: He exhibits decreased range of motion (fairly fused in extension. no patella, surgically removed).

## 2019-08-06 ENCOUNTER — TELEPHONE (OUTPATIENT)
Dept: FAMILY MEDICINE CLINIC | Age: 84
End: 2019-08-06
Payer: MEDICARE

## 2019-08-06 DIAGNOSIS — K22.719 BARRETT'S ESOPHAGUS WITH DYSPLASIA: ICD-10-CM

## 2019-08-06 DIAGNOSIS — I10 ESSENTIAL HYPERTENSION, MALIGNANT: Primary | ICD-10-CM

## 2019-08-06 DIAGNOSIS — F03.90 SENILE DEMENTIA, UNCOMPLICATED (HCC): ICD-10-CM

## 2019-08-06 DIAGNOSIS — I25.119 ATHEROSCLEROSIS OF NATIVE CORONARY ARTERY OF NATIVE HEART WITH ANGINA PECTORIS (HCC): ICD-10-CM

## 2019-08-06 DIAGNOSIS — E78.5 HYPERLIPIDEMIA, UNSPECIFIED HYPERLIPIDEMIA TYPE: ICD-10-CM

## 2019-08-06 DIAGNOSIS — F51.01 PRIMARY INSOMNIA: ICD-10-CM

## 2019-08-06 DIAGNOSIS — K21.9 GASTROESOPHAGEAL REFLUX DISEASE WITHOUT ESOPHAGITIS: ICD-10-CM

## 2019-08-06 DIAGNOSIS — M17.11 OSTEOARTHRITIS OF RIGHT KNEE, UNSPECIFIED OSTEOARTHRITIS TYPE: ICD-10-CM

## 2019-08-06 DIAGNOSIS — K59.03 DRUG-INDUCED CONSTIPATION: ICD-10-CM

## 2019-08-06 PROCEDURE — G0179 MD RECERTIFICATION HHA PT: HCPCS | Performed by: FAMILY MEDICINE

## 2019-08-08 ENCOUNTER — OFFICE VISIT (OUTPATIENT)
Dept: PODIATRY | Age: 84
End: 2019-08-08
Payer: MEDICARE

## 2019-08-08 VITALS
HEIGHT: 64 IN | BODY MASS INDEX: 27.14 KG/M2 | RESPIRATION RATE: 20 BRPM | SYSTOLIC BLOOD PRESSURE: 126 MMHG | DIASTOLIC BLOOD PRESSURE: 80 MMHG | HEART RATE: 80 BPM | WEIGHT: 159 LBS

## 2019-08-08 DIAGNOSIS — B35.1 DERMATOPHYTOSIS OF NAIL: ICD-10-CM

## 2019-08-08 DIAGNOSIS — I73.9 PVD (PERIPHERAL VASCULAR DISEASE) (HCC): Primary | ICD-10-CM

## 2019-08-08 PROCEDURE — 99999 PR OFFICE/OUTPT VISIT,PROCEDURE ONLY: CPT | Performed by: PODIATRIST

## 2019-08-08 PROCEDURE — 11721 DEBRIDE NAIL 6 OR MORE: CPT | Performed by: PODIATRIST

## 2019-09-12 ENCOUNTER — TELEPHONE (OUTPATIENT)
Dept: FAMILY MEDICINE CLINIC | Age: 84
End: 2019-09-12
Payer: MEDICARE

## 2019-09-12 DIAGNOSIS — I25.10 ATHEROSCLEROSIS OF NATIVE CORONARY ARTERY, ANGINA PRESENCE UNSPECIFIED, UNSPECIFIED WHETHER NATIVE OR TRANSPLANTED HEART: ICD-10-CM

## 2019-09-12 DIAGNOSIS — M62.81 MUSCLE WEAKNESS (GENERALIZED): ICD-10-CM

## 2019-09-12 DIAGNOSIS — I10 ESSENTIAL HYPERTENSION, MALIGNANT: Primary | ICD-10-CM

## 2019-09-12 DIAGNOSIS — K59.03 DRUG-INDUCED CONSTIPATION: ICD-10-CM

## 2019-09-12 DIAGNOSIS — I87.2 PERIPHERAL VENOUS INSUFFICIENCY: ICD-10-CM

## 2019-09-12 DIAGNOSIS — K22.719 BARRETT'S ESOPHAGUS WITH DYSPLASIA: ICD-10-CM

## 2019-09-12 DIAGNOSIS — I73.9 PERIPHERAL VASCULAR DISEASE, UNSPECIFIED (HCC): ICD-10-CM

## 2019-09-12 DIAGNOSIS — E78.5 HYPERLIPIDEMIA, UNSPECIFIED HYPERLIPIDEMIA TYPE: ICD-10-CM

## 2019-09-12 DIAGNOSIS — F03.90 SENILE DEMENTIA, UNCOMPLICATED (HCC): ICD-10-CM

## 2019-09-12 PROCEDURE — G0179 MD RECERTIFICATION HHA PT: HCPCS | Performed by: FAMILY MEDICINE

## 2019-09-12 NOTE — TELEPHONE ENCOUNTER
Home Health plan of care reviewed and certification completed on 09/12/19 for service dates 09/10/19-11/08/19. Medications reviewed and verified. Physician time spent on activities to coordinate services, documenting medical decision making, reviewing of reports, treatment plans and test results is 20 minutes.

## 2019-09-18 DIAGNOSIS — M17.11 PRIMARY OSTEOARTHRITIS OF RIGHT KNEE: ICD-10-CM

## 2019-09-18 RX ORDER — HYDROCODONE BITARTRATE AND ACETAMINOPHEN 7.5; 325 MG/1; MG/1
TABLET ORAL
Qty: 120 TABLET | Refills: 0 | Status: SHIPPED | OUTPATIENT
Start: 2019-09-18 | End: 2020-01-03 | Stop reason: SDUPTHER

## 2019-09-24 ENCOUNTER — OFFICE VISIT (OUTPATIENT)
Dept: CARDIOLOGY | Age: 84
End: 2019-09-24
Payer: MEDICARE

## 2019-09-24 VITALS
HEART RATE: 61 BPM | WEIGHT: 154.8 LBS | BODY MASS INDEX: 26.43 KG/M2 | SYSTOLIC BLOOD PRESSURE: 138 MMHG | HEIGHT: 64 IN | DIASTOLIC BLOOD PRESSURE: 74 MMHG

## 2019-09-24 DIAGNOSIS — Z95.5 CORONARY STENT PATENT: ICD-10-CM

## 2019-09-24 DIAGNOSIS — I21.02 ST ELEVATION MYOCARDIAL INFARCTION INVOLVING LEFT ANTERIOR DESCENDING (LAD) CORONARY ARTERY (HCC): ICD-10-CM

## 2019-09-24 DIAGNOSIS — I25.10 ATHEROSCLEROSIS OF NATIVE CORONARY ARTERY WITHOUT ANGINA PECTORIS, UNSPECIFIED WHETHER NATIVE OR TRANSPLANTED HEART: ICD-10-CM

## 2019-09-24 DIAGNOSIS — E78.2 MIXED HYPERLIPIDEMIA: ICD-10-CM

## 2019-09-24 DIAGNOSIS — I10 ESSENTIAL HYPERTENSION: Primary | ICD-10-CM

## 2019-09-24 PROCEDURE — 93000 ELECTROCARDIOGRAM COMPLETE: CPT | Performed by: INTERNAL MEDICINE

## 2019-09-24 PROCEDURE — 99214 OFFICE O/P EST MOD 30 MIN: CPT | Performed by: INTERNAL MEDICINE

## 2019-09-24 NOTE — PROGRESS NOTES
wall and Apical hypokinesis noted. Moderately reduced LV systolic function. LVEF 40%. Echo 3/19:  Left ventricle is mildly enlarged. Global left ventricular systolic function is normal with an estimated  ejection fraction of 55 to 60 % . Grade I (mild) left ventricular diastolic dysfunction. Left atrium is mildly dilated. Aortic valve is sclerotic but opens well. Trivial aortic insufficiency. Mitral annular calcification is seen. Trivial mitral regurgitation. No significant pericardial effusion is seen. Labs:     CBC: No results for input(s): WBC, HGB, HCT, PLT in the last 72 hours. BMP: No results for input(s): NA, K, CO2, BUN, CREATININE, LABGLOM, GLUCOSE in the last 72 hours. PT/INR: No results for input(s): PROTIME, INR in the last 72 hours. FASTING LIPID PANEL:  Lab Results   Component Value Date    CHOL 105 04/12/2019    HDL 39 04/12/2019    LDLCHOLESTEROL 46 04/12/2019    TRIG 102 04/12/2019    CHOLHDLRATIO 2.7 04/12/2019     LIVER PROFILE:No results for input(s): AST, ALT, LABALBU in the last 72 hours.       IMPRESSION:    Patient Active Problem List   Diagnosis    Dermatophytosis of nail    Coronary atherosclerosis of native coronary artery    Essential hypertension, benign    Obesity    Hyperlipidemia    PVC (premature ventricular contraction)    Osteoarthritis of knee    Duodenal ulcer    CAD (coronary artery disease)    Venous insufficiency    Kidney stone    Elevated PSA    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    Chronic pain of right knee    Encounter for chronic pain management    Colitis    Urinary retention    BPH (benign prostatic hyperplasia)    Gait instability    ST elevation myocardial infarction (STEMI) ([facility] [address]    UTI due to extended-spectrum beta lactamase (ESBL) producing Escherichia coli    Gastroesophageal reflux disease    Overweight    Chronic idiopathic constipation       RECOMMENDATIONS:   CAD- History of STEMI , S/P stent LAD   · NO CHEST

## 2019-10-14 ENCOUNTER — OFFICE VISIT (OUTPATIENT)
Dept: FAMILY MEDICINE CLINIC | Age: 84
End: 2019-10-14
Payer: MEDICARE

## 2019-10-14 ENCOUNTER — HOSPITAL ENCOUNTER (OUTPATIENT)
Dept: LAB | Age: 84
Discharge: HOME | End: 2019-10-14
Payer: MEDICARE

## 2019-10-14 VITALS
HEART RATE: 72 BPM | BODY MASS INDEX: 26.98 KG/M2 | SYSTOLIC BLOOD PRESSURE: 132 MMHG | HEIGHT: 64 IN | DIASTOLIC BLOOD PRESSURE: 72 MMHG | WEIGHT: 158 LBS

## 2019-10-14 DIAGNOSIS — I10 ESSENTIAL HYPERTENSION, BENIGN: ICD-10-CM

## 2019-10-14 DIAGNOSIS — E78.5 HYPERLIPIDEMIA, UNSPECIFIED HYPERLIPIDEMIA TYPE: ICD-10-CM

## 2019-10-14 DIAGNOSIS — I10 ESSENTIAL HYPERTENSION, MALIGNANT: ICD-10-CM

## 2019-10-14 DIAGNOSIS — I87.2 PERIPHERAL VENOUS INSUFFICIENCY: ICD-10-CM

## 2019-10-14 DIAGNOSIS — M17.11 OSTEOARTHRITIS OF RIGHT KNEE, UNSPECIFIED OSTEOARTHRITIS TYPE: Primary | ICD-10-CM

## 2019-10-14 DIAGNOSIS — K59.04 CHRONIC IDIOPATHIC CONSTIPATION: ICD-10-CM

## 2019-10-14 DIAGNOSIS — I25.10 ATHEROSCLEROSIS OF NATIVE CORONARY ARTERY, ANGINA PRESENCE UNSPECIFIED, UNSPECIFIED WHETHER NATIVE OR TRANSPLANTED HEART: Primary | ICD-10-CM

## 2019-10-14 DIAGNOSIS — R97.20 ELEVATED PSA: ICD-10-CM

## 2019-10-14 DIAGNOSIS — K21.9 GASTROESOPHAGEAL REFLUX DISEASE WITHOUT ESOPHAGITIS: ICD-10-CM

## 2019-10-14 DIAGNOSIS — K22.70 BARRETT'S ESOPHAGUS WITHOUT DYSPLASIA: ICD-10-CM

## 2019-10-14 DIAGNOSIS — N40.1 BENIGN PROSTATIC HYPERPLASIA WITH LOWER URINARY TRACT SYMPTOMS, SYMPTOM DETAILS UNSPECIFIED: ICD-10-CM

## 2019-10-14 DIAGNOSIS — N20.0 KIDNEY STONE: ICD-10-CM

## 2019-10-14 DIAGNOSIS — M17.11 OSTEOARTHRITIS OF RIGHT KNEE, UNSPECIFIED OSTEOARTHRITIS TYPE: ICD-10-CM

## 2019-10-14 LAB
ABSOLUTE EOS #: 0.2 K/UL (ref 0–0.4)
ABSOLUTE IMMATURE GRANULOCYTE: ABNORMAL K/UL (ref 0–0.3)
ABSOLUTE LYMPH #: 1.3 K/UL (ref 1–4.8)
ABSOLUTE MONO #: 1.1 K/UL (ref 0.1–1.2)
ALBUMIN SERPL-MCNC: 4.1 G/DL (ref 3.5–5.2)
ALBUMIN/GLOBULIN RATIO: 1.5 (ref 1–2.5)
ALP BLD-CCNC: 93 U/L (ref 40–129)
ALT SERPL-CCNC: 13 U/L (ref 5–41)
ANION GAP SERPL CALCULATED.3IONS-SCNC: 9 MMOL/L (ref 9–17)
AST SERPL-CCNC: 14 U/L
BASOPHILS # BLD: 0 % (ref 0–2)
BASOPHILS ABSOLUTE: 0 K/UL (ref 0–0.2)
BILIRUB SERPL-MCNC: 0.35 MG/DL (ref 0.3–1.2)
BUN BLDV-MCNC: 15 MG/DL (ref 8–23)
BUN/CREAT BLD: 18 (ref 9–20)
CALCIUM SERPL-MCNC: 9.6 MG/DL (ref 8.6–10.4)
CHLORIDE BLD-SCNC: 104 MMOL/L (ref 98–107)
CHOLESTEROL/HDL RATIO: 2.5
CHOLESTEROL: 99 MG/DL
CO2: 30 MMOL/L (ref 20–31)
CREAT SERPL-MCNC: 0.84 MG/DL (ref 0.7–1.2)
DIFFERENTIAL TYPE: ABNORMAL
EOSINOPHILS RELATIVE PERCENT: 3 % (ref 1–8)
GFR AFRICAN AMERICAN: >60 ML/MIN
GFR NON-AFRICAN AMERICAN: >60 ML/MIN
GFR SERPL CREATININE-BSD FRML MDRD: ABNORMAL ML/MIN/{1.73_M2}
GFR SERPL CREATININE-BSD FRML MDRD: ABNORMAL ML/MIN/{1.73_M2}
GLUCOSE BLD-MCNC: 106 MG/DL (ref 70–99)
HCT VFR BLD CALC: 42.4 % (ref 41–53)
HDLC SERPL-MCNC: 40 MG/DL
HEMOGLOBIN: 13.9 G/DL (ref 13.5–17.5)
IMMATURE GRANULOCYTES: ABNORMAL %
LDL CHOLESTEROL: 30 MG/DL (ref 0–130)
LYMPHOCYTES # BLD: 19 % (ref 15–43)
MCH RBC QN AUTO: 29.5 PG (ref 26–34)
MCHC RBC AUTO-ENTMCNC: 32.7 G/DL (ref 31–37)
MCV RBC AUTO: 90.2 FL (ref 80–100)
MONOCYTES # BLD: 17 % (ref 6–14)
NRBC AUTOMATED: ABNORMAL PER 100 WBC
PDW BLD-RTO: 16.1 % (ref 11–14.5)
PLATELET # BLD: 231 K/UL (ref 140–450)
PLATELET ESTIMATE: ABNORMAL
PMV BLD AUTO: 8.6 FL (ref 6–12)
POTASSIUM SERPL-SCNC: 4.7 MMOL/L (ref 3.7–5.3)
RBC # BLD: 4.7 M/UL (ref 4.5–5.9)
RBC # BLD: ABNORMAL 10*6/UL
SEG NEUTROPHILS: 61 % (ref 44–74)
SEGMENTED NEUTROPHILS ABSOLUTE COUNT: 4 K/UL (ref 1.8–7.7)
SODIUM BLD-SCNC: 143 MMOL/L (ref 135–144)
TOTAL PROTEIN: 6.9 G/DL (ref 6.4–8.3)
TRIGL SERPL-MCNC: 143 MG/DL
VLDLC SERPL CALC-MCNC: ABNORMAL MG/DL (ref 1–30)
WBC # BLD: 6.7 K/UL (ref 3.5–11)
WBC # BLD: ABNORMAL 10*3/UL

## 2019-10-14 PROCEDURE — 36415 COLL VENOUS BLD VENIPUNCTURE: CPT

## 2019-10-14 PROCEDURE — 99214 OFFICE O/P EST MOD 30 MIN: CPT | Performed by: FAMILY MEDICINE

## 2019-10-14 PROCEDURE — G8510 SCR DEP NEG, NO PLAN REQD: HCPCS | Performed by: FAMILY MEDICINE

## 2019-10-14 PROCEDURE — 85025 COMPLETE CBC W/AUTO DIFF WBC: CPT

## 2019-10-14 PROCEDURE — 80061 LIPID PANEL: CPT

## 2019-10-14 PROCEDURE — 3288F FALL RISK ASSESSMENT DOCD: CPT | Performed by: FAMILY MEDICINE

## 2019-10-14 PROCEDURE — 80053 COMPREHEN METABOLIC PANEL: CPT

## 2019-10-14 RX ORDER — WHEELCHAIR
EACH MISCELLANEOUS
Qty: 1 EACH | Refills: 0 | Status: SHIPPED | OUTPATIENT
Start: 2019-10-14 | End: 2020-01-15 | Stop reason: ALTCHOICE

## 2019-10-14 ASSESSMENT — PATIENT HEALTH QUESTIONNAIRE - PHQ9
SUM OF ALL RESPONSES TO PHQ QUESTIONS 1-9: 0
SUM OF ALL RESPONSES TO PHQ QUESTIONS 1-9: 0
SUM OF ALL RESPONSES TO PHQ9 QUESTIONS 1 & 2: 0
1. LITTLE INTEREST OR PLEASURE IN DOING THINGS: 0
2. FEELING DOWN, DEPRESSED OR HOPELESS: 0

## 2019-10-14 ASSESSMENT — ENCOUNTER SYMPTOMS
ALLERGIC/IMMUNOLOGIC NEGATIVE: 1
GASTROINTESTINAL NEGATIVE: 1
RESPIRATORY NEGATIVE: 1
EYES NEGATIVE: 1

## 2019-10-17 ENCOUNTER — OFFICE VISIT (OUTPATIENT)
Dept: PODIATRY | Age: 84
End: 2019-10-17
Payer: MEDICARE

## 2019-10-17 VITALS
HEIGHT: 64 IN | BODY MASS INDEX: 26.98 KG/M2 | RESPIRATION RATE: 18 BRPM | SYSTOLIC BLOOD PRESSURE: 114 MMHG | DIASTOLIC BLOOD PRESSURE: 60 MMHG | HEART RATE: 68 BPM | WEIGHT: 158 LBS

## 2019-10-17 DIAGNOSIS — B35.1 DERMATOPHYTOSIS OF NAIL: ICD-10-CM

## 2019-10-17 DIAGNOSIS — I73.9 PVD (PERIPHERAL VASCULAR DISEASE) (HCC): Primary | ICD-10-CM

## 2019-10-17 DIAGNOSIS — M17.11 PRIMARY OSTEOARTHRITIS OF RIGHT KNEE: ICD-10-CM

## 2019-10-17 PROCEDURE — 11721 DEBRIDE NAIL 6 OR MORE: CPT | Performed by: PODIATRIST

## 2019-10-17 PROCEDURE — 99999 PR OFFICE/OUTPT VISIT,PROCEDURE ONLY: CPT | Performed by: PODIATRIST

## 2019-10-17 RX ORDER — TRAMADOL HYDROCHLORIDE 50 MG/1
50 TABLET ORAL EVERY 6 HOURS PRN
Qty: 60 TABLET | Refills: 0 | Status: SHIPPED | OUTPATIENT
Start: 2019-10-17 | End: 2019-11-16

## 2019-10-24 RX ORDER — LACTULOSE 10 G/15ML
20 SOLUTION ORAL 2 TIMES DAILY
Qty: 1 BOTTLE | Refills: 5 | Status: SHIPPED | OUTPATIENT
Start: 2019-10-24 | End: 2020-04-01

## 2019-11-20 ENCOUNTER — TELEPHONE (OUTPATIENT)
Dept: FAMILY MEDICINE CLINIC | Age: 84
End: 2019-11-20
Payer: MEDICARE

## 2019-11-20 DIAGNOSIS — I73.9 PERIPHERAL VASCULAR DISEASE, UNSPECIFIED (HCC): ICD-10-CM

## 2019-11-20 DIAGNOSIS — F03.90 SENILE DEMENTIA WITHOUT BEHAVIORAL DISTURBANCE (HCC): ICD-10-CM

## 2019-11-20 DIAGNOSIS — E78.5 HYPERLIPIDEMIA, UNSPECIFIED HYPERLIPIDEMIA TYPE: ICD-10-CM

## 2019-11-20 DIAGNOSIS — M17.11 OSTEOARTHRITIS OF RIGHT KNEE, UNSPECIFIED OSTEOARTHRITIS TYPE: ICD-10-CM

## 2019-11-20 DIAGNOSIS — K22.70 BARRETT'S ESOPHAGUS WITHOUT DYSPLASIA: ICD-10-CM

## 2019-11-20 DIAGNOSIS — F51.01 PRIMARY INSOMNIA: ICD-10-CM

## 2019-11-20 DIAGNOSIS — I87.009 POSTPHLEBITIC SYNDROME: ICD-10-CM

## 2019-11-20 DIAGNOSIS — K59.03 DRUG-INDUCED CONSTIPATION: ICD-10-CM

## 2019-11-20 DIAGNOSIS — I10 ESSENTIAL HYPERTENSION, MALIGNANT: Primary | ICD-10-CM

## 2019-11-20 DIAGNOSIS — I25.10 ATHEROSCLEROSIS OF NATIVE CORONARY ARTERY OF NATIVE HEART, ANGINA PRESENCE UNSPECIFIED: ICD-10-CM

## 2019-11-20 DIAGNOSIS — M62.81 MUSCLE WEAKNESS (GENERALIZED): ICD-10-CM

## 2019-11-20 PROCEDURE — G0179 MD RECERTIFICATION HHA PT: HCPCS | Performed by: FAMILY MEDICINE

## 2019-12-13 ENCOUNTER — TELEPHONE (OUTPATIENT)
Dept: FAMILY MEDICINE CLINIC | Age: 84
End: 2019-12-13

## 2019-12-18 ENCOUNTER — OFFICE VISIT (OUTPATIENT)
Dept: FAMILY MEDICINE CLINIC | Age: 84
End: 2019-12-18
Payer: MEDICARE

## 2019-12-18 VITALS
DIASTOLIC BLOOD PRESSURE: 60 MMHG | OXYGEN SATURATION: 98 % | BODY MASS INDEX: 26.8 KG/M2 | WEIGHT: 157 LBS | HEART RATE: 57 BPM | HEIGHT: 64 IN | RESPIRATION RATE: 20 BRPM | SYSTOLIC BLOOD PRESSURE: 116 MMHG

## 2019-12-18 DIAGNOSIS — M17.0 PRIMARY OSTEOARTHRITIS OF BOTH KNEES: ICD-10-CM

## 2019-12-18 DIAGNOSIS — Z00.00 ANNUAL PHYSICAL EXAM: Primary | ICD-10-CM

## 2019-12-18 DIAGNOSIS — I10 ESSENTIAL HYPERTENSION, MALIGNANT: ICD-10-CM

## 2019-12-18 DIAGNOSIS — K59.09 CHRONIC CONSTIPATION: ICD-10-CM

## 2019-12-18 DIAGNOSIS — E78.5 HYPERLIPIDEMIA, UNSPECIFIED HYPERLIPIDEMIA TYPE: ICD-10-CM

## 2019-12-18 DIAGNOSIS — Z01.89 ENCOUNTER FOR REHABILITATION EVALUATION: ICD-10-CM

## 2019-12-18 PROCEDURE — 99215 OFFICE O/P EST HI 40 MIN: CPT | Performed by: FAMILY MEDICINE

## 2019-12-23 RX ORDER — CLOPIDOGREL BISULFATE 75 MG/1
TABLET ORAL
Qty: 90 TABLET | Refills: 4 | Status: SHIPPED | OUTPATIENT
Start: 2019-12-23 | End: 2021-03-18

## 2019-12-26 ENCOUNTER — TELEPHONE (OUTPATIENT)
Dept: FAMILY MEDICINE CLINIC | Age: 84
End: 2019-12-26

## 2019-12-30 RX ORDER — FUROSEMIDE 20 MG/1
TABLET ORAL
Qty: 90 TABLET | Refills: 4 | Status: SHIPPED | OUTPATIENT
Start: 2019-12-30 | End: 2020-06-26 | Stop reason: SDUPTHER

## 2019-12-31 ENCOUNTER — TELEPHONE (OUTPATIENT)
Dept: FAMILY MEDICINE CLINIC | Age: 84
End: 2019-12-31
Payer: MEDICARE

## 2019-12-31 DIAGNOSIS — I87.2 VENOUS INSUFFICIENCY: ICD-10-CM

## 2019-12-31 DIAGNOSIS — K22.70 BARRETT'S ESOPHAGUS WITHOUT DYSPLASIA: ICD-10-CM

## 2019-12-31 DIAGNOSIS — I21.02 ST ELEVATION MYOCARDIAL INFARCTION INVOLVING LEFT ANTERIOR DESCENDING (LAD) CORONARY ARTERY (HCC): Primary | ICD-10-CM

## 2019-12-31 DIAGNOSIS — I49.3 PVC (PREMATURE VENTRICULAR CONTRACTION): ICD-10-CM

## 2019-12-31 DIAGNOSIS — R39.14 BENIGN PROSTATIC HYPERPLASIA WITH INCOMPLETE BLADDER EMPTYING: ICD-10-CM

## 2019-12-31 DIAGNOSIS — N40.1 BENIGN PROSTATIC HYPERPLASIA WITH INCOMPLETE BLADDER EMPTYING: ICD-10-CM

## 2019-12-31 DIAGNOSIS — I25.10 ATHEROSCLEROSIS OF NATIVE CORONARY ARTERY WITHOUT ANGINA PECTORIS, UNSPECIFIED WHETHER NATIVE OR TRANSPLANTED HEART: ICD-10-CM

## 2019-12-31 DIAGNOSIS — I25.119 CORONARY ARTERY DISEASE INVOLVING NATIVE CORONARY ARTERY OF NATIVE HEART WITH ANGINA PECTORIS (HCC): ICD-10-CM

## 2019-12-31 PROCEDURE — G0180 MD CERTIFICATION HHA PATIENT: HCPCS | Performed by: FAMILY MEDICINE

## 2020-01-02 ENCOUNTER — OFFICE VISIT (OUTPATIENT)
Dept: PODIATRY | Age: 85
End: 2020-01-02
Payer: MEDICARE

## 2020-01-02 VITALS — HEART RATE: 68 BPM | SYSTOLIC BLOOD PRESSURE: 130 MMHG | DIASTOLIC BLOOD PRESSURE: 62 MMHG

## 2020-01-02 PROCEDURE — 11721 DEBRIDE NAIL 6 OR MORE: CPT | Performed by: PODIATRIST

## 2020-01-02 PROCEDURE — 99999 PR OFFICE/OUTPT VISIT,PROCEDURE ONLY: CPT | Performed by: PODIATRIST

## 2020-01-02 NOTE — PROGRESS NOTES
Foot Care Worksheet  PCP: Aide Dangelo MD  Last visit: 12/18/19    Nail description:  Thick , Yellow , Crumbly , Marked limitation of ambulation     Pain resulting from thickened and dystrophy of nail plate Yes    Nails involved  Right   1, 2, 3, 4, 5  (T5-T9)  Left     1, 2, 3, 4, 5  (TA-T4)    Q7 1 Class A Finding - Non traumatic amputation of foot No    Q8 2 Class B Findings - Absent DP pulse No, Absent PT pulse No, Advanced tropic changes (3 required) Yes    Decrease hair growth Yes, Nail changes/thickening Yes, Pigmented changes/discoloration Yes, Skin texture (thin, shiny) Yes, Skin color (rubor/redness) No    Q9 1 Class B and 2 Class C Findings  Claudication No, Temperature change Yes, Paresthesia No, Burning No, Edema Yes

## 2020-01-03 ENCOUNTER — TELEPHONE (OUTPATIENT)
Dept: FAMILY MEDICINE CLINIC | Age: 85
End: 2020-01-03

## 2020-01-03 RX ORDER — HYDROCODONE BITARTRATE AND ACETAMINOPHEN 7.5; 325 MG/1; MG/1
1 TABLET ORAL EVERY 6 HOURS PRN
Qty: 120 TABLET | Refills: 0 | Status: ON HOLD | OUTPATIENT
Start: 2020-01-03 | End: 2020-01-13 | Stop reason: SDUPTHER

## 2020-01-03 NOTE — TELEPHONE ENCOUNTER
Ciro Burrows at [facility] calling stating pt is a new admit and they need to know who is going to be prescribing pt's pain meds, pt has not seen Pain Management since 2017, please advise Ciro Burrows at [phone]

## 2020-01-05 ENCOUNTER — HOSPITAL ENCOUNTER (EMERGENCY)
Age: 85
Discharge: HOME | End: 2020-01-05
Attending: EMERGENCY MEDICINE
Payer: MEDICARE

## 2020-01-05 ENCOUNTER — APPOINTMENT (OUTPATIENT)
Dept: GENERAL RADIOLOGY | Age: 85
End: 2020-01-05
Payer: MEDICARE

## 2020-01-05 VITALS
HEART RATE: 75 BPM | SYSTOLIC BLOOD PRESSURE: 162 MMHG | RESPIRATION RATE: 16 BRPM | OXYGEN SATURATION: 99 % | TEMPERATURE: 98.2 F | HEIGHT: 65 IN | DIASTOLIC BLOOD PRESSURE: 74 MMHG | BODY MASS INDEX: 25.83 KG/M2 | WEIGHT: 155 LBS

## 2020-01-05 PROCEDURE — 99283 EMERGENCY DEPT VISIT LOW MDM: CPT

## 2020-01-05 PROCEDURE — 73590 X-RAY EXAM OF LOWER LEG: CPT

## 2020-01-05 PROCEDURE — 73562 X-RAY EXAM OF KNEE 3: CPT

## 2020-01-05 RX ORDER — HYDROCODONE BITARTRATE AND ACETAMINOPHEN 5; 325 MG/1; MG/1
1 TABLET ORAL EVERY 8 HOURS PRN
Qty: 5 TABLET | Refills: 0 | Status: SHIPPED | OUTPATIENT
Start: 2020-01-05 | End: 2020-01-08

## 2020-01-05 NOTE — ED NOTES
Report called to Malena Roy, nurse at [facility]. Reviewed d/c instructions, questions and concerns addressed. Denies any further needs at this time.       Jayde Stern RN  01/05/20 1597

## 2020-01-05 NOTE — ED PROVIDER NOTES
history that includes Colonoscopy (12); Cystocopy (11/15/2007); Appendectomy; Cholecystectomy (1983); Knee fusion (Left); lipoma resection; Colonoscopy (09); Colonoscopy (2008); Prostate Biopsy (Bilateral, 2009); pr egd transoral biopsy single/multiple (N/A, 7/3/2017); Upper gastrointestinal endoscopy (2015); Upper gastrointestinal endoscopy (2016); Upper gastrointestinal endoscopy (2017); and pr cystourethroscopy (N/A, 2018). CURRENT MEDICATIONS       Previous Medications    ACETAMINOPHEN (TYLENOL) 325 MG TABLET    Take 2 tablets by mouth every 4 hours as needed for Pain or Fever    ASPIRIN 81 MG CHEWABLE TABLET    Take 1 tablet by mouth daily    ATORVASTATIN (LIPITOR) 10 MG TABLET    Take 1 tablet by mouth daily    CLOPIDOGREL (PLAVIX) 75 MG TABLET    TAKE 1 TABLET DAILY    DOCUSATE SODIUM (COLACE) 100 MG CAPSULE    Take 100 mg by mouth daily    FUROSEMIDE (LASIX) 20 MG TABLET    TAKE 1 TABLET DAILY    HANDICAP PLACARD MISC    by Does not apply route Expires:10/14/2023    HYDROCODONE-ACETAMINOPHEN (NORCO) 7.5-325 MG PER TABLET    Take 1 tablet by mouth every 6 hours as needed for Pain for up to 30 days. LACTULOSE (CHRONULAC) 10 GM/15ML SOLUTION    Take 30 mLs by mouth 2 times daily    LISINOPRIL (PRINIVIL;ZESTRIL) 5 MG TABLET    TAKE 1 TABLET DAILY    MELATONIN 3 MG TABS TABLET    Take 3 mg by mouth nightly    METOPROLOL TARTRATE (LOPRESSOR) 25 MG TABLET    Take 0.5 tablets by mouth 2 times daily    MISC. DEVICES (WHEELCHAIR) MISC    Lightweight, foldable    MULTIPLE VITAMINS-MINERALS (MULTIVITAMIN PO)    Take 1 tablet by mouth daily. PANTOPRAZOLE (PROTONIX) 40 MG TABLET    Take 1 tablet by mouth every morning (before breakfast)       ALLERGIES     is allergic to codeine and morphine. FAMILY HISTORY     He indicated that his mother is . He indicated that his father is . He indicated that his sister is alive.  He indicated that his brother is alive. He indicated that his maternal grandmother is . He indicated that his maternal grandfather is . He indicated that his paternal grandmother is . He indicated that his paternal grandfather is . He indicated that his son is alive. family history includes Cancer in his father; Heart Disease in his paternal grandfather. SOCIAL HISTORY      reports that he quit smoking about 22 years ago. His smoking use included cigars. He smoked 1.00 pack per day. He has never used smokeless tobacco. He reports that he does not drink alcohol or use drugs. I counseled the patient against using tobacco products. PHYSICAL EXAM     INITIAL VITALS:  height is 5' 5\" (1.651 m) and weight is 155 lb (70.3 kg). His temperature is 98.2 °F (36.8 °C). His blood pressure is 186/83 (abnormal) and his pulse is 77. His respiration is 16 and oxygen saturation is 99%. CONSTITUTIONAL: no apparent distress, well appearing  SKIN: warm, dry, no jaundice, hives or petechiae  EYES: clear conjunctiva, non-icteric sclera  HENT: normocephalic, atraumatic, moist mucus membranes  NECK: Nontender and supple with no nuchal rigidity, full range of motion  PULMONARY: clear to auscultation without wheezes, rhonchi, or rales, normal excursion, no accessory muscle use and no stridor  CARDIOVASCULAR: regular rate, rhythm. Strong radial pulses with intact distal perfusion. Capillary refill <2 seconds. GASTROINTESTINAL: soft, non-tender, non-distended, no palpable masses, no rebound or guarding   GENITOURINARY: No costovertebral angle tenderness to palpation  KNEE: There is tenderness to palpation over the left anterior shin and to the left medial calf. There are non ruptured blisters noted to the left medial calf with underlying ecchymosis. 2+ pitting edema noted to the left lower extremity and left foot. Small abrasion noted over the left tibial tubercle without any signs of infection.  Well healed surgical metaphysis likely related to a remote healed fracture. Severe tricompartmental osteoarthritis. Diffuse atherosclerotic disease. LABS:  No results found for this visit on 01/05/20. EMERGENCY DEPARTMENT COURSE:        The patient was given the following medications:  Orders Placed This Encounter   Medications    HYDROcodone-acetaminophen (NORCO) 5-325 MG per tablet     Sig: Take 1 tablet by mouth every 8 hours as needed for Pain for up to 3 days. Dispense:  5 tablet     Refill:  0        Vitals:    Vitals:    01/05/20 0224   BP: (!) 186/83   Pulse: 77   Resp: 16   Temp: 98.2 °F (36.8 °C)   SpO2: 99%   Weight: 155 lb (70.3 kg)   Height: 5' 5\" (1.651 m)     -------------------------  BP: (!) 186/83, Temp: 98.2 °F (36.8 °C), Pulse: 77, Resp: 16    CONSULTS:  None    CRITICAL CARE:   None    PROCEDURES:  None    DIAGNOSIS/ MDM:   Toya Fleming is a 80 y.o. male who presents with left lower extremity pain after an injury. Blood pressure slightly elevated but I suspect this is secondary to pain. Vital signs are otherwise stable. Heart regular rate and rhythm. Lungs clear to auscultation. Abdomen soft nontender. He has tenderness to palpation, ecchymosis and blistering noted to the left calf secondary to swelling. He is neurovascularly intact. X-ray of the left knee and left tib-fib show chronic changes without any acute fracture or dislocation. Compartments are still soft. I have low suspicion for compartment syndrome. I instructed the patient to take ibuprofen or Tylenol as needed for pain. He states that the Tylenol he took prior to arrival has significantly helped with his pain. I instructed him to elevate the leg and to apply a compression stocking. We placed a ace wrap on his leg while he was in the ED. I told him to try to avoid rupturing the blisters and to follow-up with his PCP in 1 to 2 days for reevaluation.   I instructed him to take Norco for breakthrough pain and to return to the ED for worsening symptoms or any other concern. At this time the patient is without objective evidence of an acute process requiring hospitalization or inpatient management. They have remained hemodynamically stable and are stable for discharge with outpatient follow-up. The patient is agreeable with plan and is requesting discharge. They were given the opportunity to ask questions and all questions were answered. The patient was given both written and verbal discharge instructions. The patient expressed understanding. FINAL IMPRESSION      1. Contusion of left lower extremity, initial encounter New Problem         DISPOSITION/PLAN   DISPOSITION          PATIENT REFERRED TO:  Lynette Vega MD  [address] [place] [address] [address] [facility] Favian Pollock  [phone]    Schedule an appointment as soon as possible for a visit in 2 days      [address] [facility]  Rohan Mcmillan  [address].  [phone]  Go to   If symptoms worsen      DISCHARGE MEDICATIONS:  New Prescriptions    HYDROCODONE-ACETAMINOPHEN (NORCO) 5-325 MG PER TABLET    Take 1 tablet by mouth every 8 hours as needed for Pain for up to 3 days.        (Please note that portions of this note were completed with a voice recognitionprogram.  Efforts were made to edit the dictations but occasionally words are mis-transcribed.)    Antonieta Borges DO  Emergency Physician Attending         Antonieta Borges DO  01/05/20 9289

## 2020-01-06 ENCOUNTER — CARE COORDINATION (OUTPATIENT)
Dept: CARE COORDINATION | Age: 85
End: 2020-01-06

## 2020-01-06 NOTE — CARE COORDINATION
Patient was called to follow up with most recent ER visit. There was no answer. A message was left on voicemail to have patient call back regarding ER visit. Office number given [phone].

## 2020-01-07 ENCOUNTER — HOSPITAL ENCOUNTER (EMERGENCY)
Age: 85
Discharge: HOME | DRG: 603 | End: 2020-01-07
Attending: EMERGENCY MEDICINE
Payer: MEDICARE

## 2020-01-07 ENCOUNTER — OUTSIDE SERVICES (OUTPATIENT)
Dept: INTERNAL MEDICINE | Age: 85
End: 2020-01-07
Payer: MEDICARE

## 2020-01-07 ENCOUNTER — APPOINTMENT (OUTPATIENT)
Dept: GENERAL RADIOLOGY | Age: 85
DRG: 603 | End: 2020-01-07
Payer: MEDICARE

## 2020-01-07 ENCOUNTER — APPOINTMENT (OUTPATIENT)
Dept: INTERVENTIONAL RADIOLOGY/VASCULAR | Age: 85
DRG: 603 | End: 2020-01-07
Payer: MEDICARE

## 2020-01-07 VITALS
RESPIRATION RATE: 16 BRPM | TEMPERATURE: 97.3 F | HEART RATE: 63 BPM | SYSTOLIC BLOOD PRESSURE: 91 MMHG | OXYGEN SATURATION: 99 % | DIASTOLIC BLOOD PRESSURE: 46 MMHG

## 2020-01-07 VITALS
OXYGEN SATURATION: 94 % | WEIGHT: 155 LBS | BODY MASS INDEX: 24.91 KG/M2 | SYSTOLIC BLOOD PRESSURE: 107 MMHG | HEART RATE: 60 BPM | DIASTOLIC BLOOD PRESSURE: 50 MMHG | HEIGHT: 66 IN | RESPIRATION RATE: 18 BRPM | TEMPERATURE: 97.3 F

## 2020-01-07 LAB
ABSOLUTE EOS #: 0.14 K/UL (ref 0–0.44)
ABSOLUTE IMMATURE GRANULOCYTE: <0.03 K/UL (ref 0–0.3)
ABSOLUTE LYMPH #: 1.64 K/UL (ref 1.1–3.7)
ABSOLUTE MONO #: 1.15 K/UL (ref 0.1–1.2)
ANION GAP SERPL CALCULATED.3IONS-SCNC: 11 MMOL/L (ref 9–17)
BASOPHILS # BLD: 0 % (ref 0–2)
BASOPHILS ABSOLUTE: <0.03 K/UL (ref 0–0.2)
BUN BLDV-MCNC: 26 MG/DL (ref 8–23)
BUN/CREAT BLD: 32 (ref 9–20)
CALCIUM SERPL-MCNC: 8.6 MG/DL (ref 8.6–10.4)
CHLORIDE BLD-SCNC: 103 MMOL/L (ref 98–107)
CO2: 24 MMOL/L (ref 20–31)
CREAT SERPL-MCNC: 0.82 MG/DL (ref 0.7–1.2)
DIFFERENTIAL TYPE: ABNORMAL
EOSINOPHILS RELATIVE PERCENT: 2 % (ref 1–4)
GFR AFRICAN AMERICAN: >60 ML/MIN
GFR NON-AFRICAN AMERICAN: >60 ML/MIN
GFR SERPL CREATININE-BSD FRML MDRD: ABNORMAL ML/MIN/{1.73_M2}
GFR SERPL CREATININE-BSD FRML MDRD: ABNORMAL ML/MIN/{1.73_M2}
GLUCOSE BLD-MCNC: 102 MG/DL (ref 70–99)
HCT VFR BLD CALC: 28.7 % (ref 40.7–50.3)
HEMOGLOBIN: 9.2 G/DL (ref 13–17)
IMMATURE GRANULOCYTES: 0 %
INR BLD: 1
LACTIC ACID: 1.2 MMOL/L (ref 0.5–2.2)
LYMPHOCYTES # BLD: 19 % (ref 24–43)
MCH RBC QN AUTO: 29.8 PG (ref 25.2–33.5)
MCHC RBC AUTO-ENTMCNC: 32.1 G/DL (ref 25.2–33.5)
MCV RBC AUTO: 92.9 FL (ref 82.6–102.9)
MONOCYTES # BLD: 14 % (ref 3–12)
NRBC AUTOMATED: 0 PER 100 WBC
PDW BLD-RTO: 15.1 % (ref 11.8–14.4)
PLATELET # BLD: 206 K/UL (ref 138–453)
PLATELET ESTIMATE: ABNORMAL
PMV BLD AUTO: 10.9 FL (ref 8.1–13.5)
POTASSIUM SERPL-SCNC: 4.4 MMOL/L (ref 3.7–5.3)
PROTHROMBIN TIME: 10 SEC (ref 9.4–11.3)
RBC # BLD: 3.09 M/UL (ref 4.21–5.77)
RBC # BLD: ABNORMAL 10*6/UL
SEG NEUTROPHILS: 65 % (ref 36–65)
SEGMENTED NEUTROPHILS ABSOLUTE COUNT: 5.57 K/UL (ref 1.5–8.1)
SODIUM BLD-SCNC: 138 MMOL/L (ref 135–144)
WBC # BLD: 8.5 K/UL (ref 3.5–11.3)
WBC # BLD: ABNORMAL 10*3/UL

## 2020-01-07 PROCEDURE — 6370000000 HC RX 637 (ALT 250 FOR IP): Performed by: EMERGENCY MEDICINE

## 2020-01-07 PROCEDURE — 73590 X-RAY EXAM OF LOWER LEG: CPT

## 2020-01-07 PROCEDURE — 83605 ASSAY OF LACTIC ACID: CPT

## 2020-01-07 PROCEDURE — 36415 COLL VENOUS BLD VENIPUNCTURE: CPT

## 2020-01-07 PROCEDURE — 99284 EMERGENCY DEPT VISIT MOD MDM: CPT

## 2020-01-07 PROCEDURE — 85025 COMPLETE CBC W/AUTO DIFF WBC: CPT

## 2020-01-07 PROCEDURE — 93971 EXTREMITY STUDY: CPT

## 2020-01-07 PROCEDURE — 85610 PROTHROMBIN TIME: CPT

## 2020-01-07 PROCEDURE — 80048 BASIC METABOLIC PNL TOTAL CA: CPT

## 2020-01-07 PROCEDURE — 87040 BLOOD CULTURE FOR BACTERIA: CPT

## 2020-01-07 RX ORDER — DOXYCYCLINE 100 MG/1
100 CAPSULE ORAL ONCE
Status: COMPLETED | OUTPATIENT
Start: 2020-01-07 | End: 2020-01-07

## 2020-01-07 RX ORDER — DOXYCYCLINE HYCLATE 100 MG/1
100 CAPSULE ORAL 2 TIMES DAILY
Qty: 20 CAPSULE | Refills: 0 | Status: SHIPPED | OUTPATIENT
Start: 2020-01-07 | End: 2020-01-15 | Stop reason: ALTCHOICE

## 2020-01-07 RX ADMIN — DOXYCYCLINE 100 MG: 100 CAPSULE ORAL at 13:24

## 2020-01-07 ASSESSMENT — ENCOUNTER SYMPTOMS
DIARRHEA: 0
COUGH: 0
VOMITING: 0
DIARRHEA: 0
NAUSEA: 0
TROUBLE SWALLOWING: 0
ABDOMINAL PAIN: 0
SHORTNESS OF BREATH: 0
NAUSEA: 0
WHEEZING: 0
BACK PAIN: 0
RHINORRHEA: 0
WHEEZING: 0
SORE THROAT: 0
VOMITING: 0

## 2020-01-07 NOTE — ED NOTES
Pt. Luis Felipe Funez back to  and placed in a recliner in the hallway, feet elevated and left leg placed on a pillow at this time.       Dustin Mcclain RN  01/07/20 1689
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
## 2020-01-07 NOTE — ED PROVIDER NOTES
. He indicated that his paternal grandmother is . He indicated that his paternal grandfather is . He indicated that his son is alive. family history includes Cancer in his father; Heart Disease in his paternal grandfather. SOCIAL HISTORY      reports that he quit smoking about 22 years ago. His smoking use included cigars. He smoked 1.00 pack per day. He has never used smokeless tobacco. He reports that he does not drink alcohol or use drugs. PHYSICAL EXAM     INITIAL VITALS:  height is 5' 5.5\" (1.664 m) and weight is 155 lb (70.3 kg). His tympanic temperature is 97.3 °F (36.3 °C). His blood pressure is 107/50 (abnormal) and his pulse is 60. His respiration is 18 and oxygen saturation is 94%. Physical Exam  Constitutional:       Appearance: He is well-developed. HENT:      Head: Normocephalic and atraumatic. Eyes:      Pupils: Pupils are equal, round, and reactive to light. Neck:      Musculoskeletal: Normal range of motion and neck supple. Cardiovascular:      Rate and Rhythm: Normal rate and regular rhythm. Pulmonary:      Effort: Pulmonary effort is normal.      Breath sounds: Normal breath sounds. Abdominal:      General: Bowel sounds are normal.      Palpations: Abdomen is soft. Musculoskeletal: Normal range of motion. General: Swelling, tenderness and signs of injury present. Left lower leg: Edema present. Comments: Patient is swelling to the left lower extremity distal pulses are intact he has a large bulla on the medial portion of his calf and some bruising above that good range of motion of the ankle knee and hip are noted neurovascular status is intact   Skin:     General: Skin is warm and dry. Neurological:      Mental Status: He is alert and oriented to person, place, and time.    Psychiatric:         Behavior: Behavior normal.           DIFFERENTIAL DIAGNOSIS/ MDM:     Leg swelling with blister posttraumatic will rule out DVT also cellulitis we will drain the bulla for comfort  DIAGNOSTIC RESULTS     EKG: All EKG's are interpreted by the Emergency Department Physician who either signs or Co-signs this chart in the absence of a cardiologist.        RADIOLOGY:   I directly visualized the following  images and reviewed the radiologist interpretations:       2 XRAY VIEWS OF THE LEFT TIBIA AND FIBULA       1/7/2020 12:11 pm       COMPARISON:   January 5, 2020, September 13, 2010       HISTORY:   ORDERING SYSTEM PROVIDED HISTORY: Injury   TECHNOLOGIST PROVIDED HISTORY:   Injury   Reason for Exam: Injury to left leg several days ago, dropped object onto   left midshaft of medial tibia.  Bruising, swelling, and fluid filled   blistering to the area.  History of old fracture of left knee. Acuity: Acute   Type of Exam: Subsequent/Follow-up       FINDINGS:   No acute osseous abnormality identified.  Old healed fracture in the distal   femoral shaft is partially visualized and otherwise grossly appears   unchanged.  Osteopenia noted with relative areas of lucency scattered   throughout the tibia again demonstrated.  Mild apparent soft tissue   enlargement in the medial proximal lower leg is again noted.  Soft tissue   swelling about the ankle is noted.  Calcified atheromatous plaque is present. Advanced arthropathy in the knee.           Impression   No acute osseous abnormality identified.         EXAMINATION:   DUPLEX VENOUS ULTRASOUND OF THE LEFT LOWER EXTREMITY       1/7/2020 11:59 am       TECHNIQUE:   Duplex ultrasound and Doppler images were obtained of the left lower   extremity.       COMPARISON:   None.       HISTORY:   ORDERING SYSTEM PROVIDED HISTORY: Leg swelling   TECHNOLOGIST PROVIDED HISTORY:   Leg swelling       80year-old male with leg swelling       FINDINGS:   Please note exam is limited due to posterior acoustic shadowing from arterial   calcifications, inability to move leg as well as patient pain.       The visualized veins of

## 2020-01-10 ENCOUNTER — HOSPITAL ENCOUNTER (INPATIENT)
Age: 85
LOS: 3 days | Discharge: HOME | DRG: 603 | End: 2020-01-13
Attending: EMERGENCY MEDICINE | Admitting: INTERNAL MEDICINE
Payer: MEDICARE

## 2020-01-10 PROBLEM — L03.116 CELLULITIS OF LEFT LOWER EXTREMITY: Status: ACTIVE | Noted: 2020-01-10

## 2020-01-10 LAB
ABSOLUTE EOS #: 0.05 K/UL (ref 0–0.44)
ABSOLUTE IMMATURE GRANULOCYTE: 0.04 K/UL (ref 0–0.3)
ABSOLUTE LYMPH #: 1.45 K/UL (ref 1.1–3.7)
ABSOLUTE MONO #: 1.75 K/UL (ref 0.1–1.2)
ALBUMIN SERPL-MCNC: 3.3 G/DL (ref 3.5–5.2)
ALBUMIN/GLOBULIN RATIO: 1.2 (ref 1–2.5)
ALP BLD-CCNC: 93 U/L (ref 40–129)
ALT SERPL-CCNC: 18 U/L (ref 5–41)
ANION GAP SERPL CALCULATED.3IONS-SCNC: 14 MMOL/L (ref 9–17)
AST SERPL-CCNC: 15 U/L
BASOPHILS # BLD: 0 % (ref 0–2)
BASOPHILS ABSOLUTE: <0.03 K/UL (ref 0–0.2)
BILIRUB SERPL-MCNC: 0.44 MG/DL (ref 0.3–1.2)
BUN BLDV-MCNC: 26 MG/DL (ref 8–23)
BUN/CREAT BLD: 30 (ref 9–20)
CALCIUM SERPL-MCNC: 9.2 MG/DL (ref 8.6–10.4)
CHLORIDE BLD-SCNC: 99 MMOL/L (ref 98–107)
CO2: 23 MMOL/L (ref 20–31)
CREAT SERPL-MCNC: 0.87 MG/DL (ref 0.7–1.2)
DIFFERENTIAL TYPE: ABNORMAL
EOSINOPHILS RELATIVE PERCENT: 1 % (ref 1–4)
GFR AFRICAN AMERICAN: >60 ML/MIN
GFR NON-AFRICAN AMERICAN: >60 ML/MIN
GFR SERPL CREATININE-BSD FRML MDRD: ABNORMAL ML/MIN/{1.73_M2}
GFR SERPL CREATININE-BSD FRML MDRD: ABNORMAL ML/MIN/{1.73_M2}
GLUCOSE BLD-MCNC: 116 MG/DL (ref 70–99)
HCT VFR BLD CALC: 28.7 % (ref 40.7–50.3)
HEMOGLOBIN: 9.2 G/DL (ref 13–17)
IMMATURE GRANULOCYTES: 0 %
INR BLD: 1
LACTIC ACID: 1.1 MMOL/L (ref 0.5–2.2)
LYMPHOCYTES # BLD: 16 % (ref 24–43)
MCH RBC QN AUTO: 29.8 PG (ref 25.2–33.5)
MCHC RBC AUTO-ENTMCNC: 32.1 G/DL (ref 25.2–33.5)
MCV RBC AUTO: 92.9 FL (ref 82.6–102.9)
MONOCYTES # BLD: 19 % (ref 3–12)
NRBC AUTOMATED: 0 PER 100 WBC
PDW BLD-RTO: 15.6 % (ref 11.8–14.4)
PLATELET # BLD: 263 K/UL (ref 138–453)
PLATELET ESTIMATE: ABNORMAL
PMV BLD AUTO: 10.6 FL (ref 8.1–13.5)
POTASSIUM SERPL-SCNC: 4.2 MMOL/L (ref 3.7–5.3)
PROTHROMBIN TIME: 10.4 SEC (ref 9.4–11.3)
RBC # BLD: 3.09 M/UL (ref 4.21–5.77)
RBC # BLD: ABNORMAL 10*6/UL
SEG NEUTROPHILS: 64 % (ref 36–65)
SEGMENTED NEUTROPHILS ABSOLUTE COUNT: 5.85 K/UL (ref 1.5–8.1)
SODIUM BLD-SCNC: 136 MMOL/L (ref 135–144)
TOTAL PROTEIN: 6.1 G/DL (ref 6.4–8.3)
WBC # BLD: 9.2 K/UL (ref 3.5–11.3)
WBC # BLD: ABNORMAL 10*3/UL

## 2020-01-10 PROCEDURE — 85610 PROTHROMBIN TIME: CPT

## 2020-01-10 PROCEDURE — 36415 COLL VENOUS BLD VENIPUNCTURE: CPT

## 2020-01-10 PROCEDURE — 87040 BLOOD CULTURE FOR BACTERIA: CPT

## 2020-01-10 PROCEDURE — 99221 1ST HOSP IP/OBS SF/LOW 40: CPT | Performed by: SURGERY

## 2020-01-10 PROCEDURE — 6360000002 HC RX W HCPCS: Performed by: INTERNAL MEDICINE

## 2020-01-10 PROCEDURE — 80053 COMPREHEN METABOLIC PANEL: CPT

## 2020-01-10 PROCEDURE — 94761 N-INVAS EAR/PLS OXIMETRY MLT: CPT

## 2020-01-10 PROCEDURE — 6370000000 HC RX 637 (ALT 250 FOR IP): Performed by: EMERGENCY MEDICINE

## 2020-01-10 PROCEDURE — 2060000000 HC ICU INTERMEDIATE R&B

## 2020-01-10 PROCEDURE — 6370000000 HC RX 637 (ALT 250 FOR IP): Performed by: INTERNAL MEDICINE

## 2020-01-10 PROCEDURE — 99223 1ST HOSP IP/OBS HIGH 75: CPT | Performed by: INTERNAL MEDICINE

## 2020-01-10 PROCEDURE — 93005 ELECTROCARDIOGRAM TRACING: CPT | Performed by: INTERNAL MEDICINE

## 2020-01-10 PROCEDURE — 6360000002 HC RX W HCPCS: Performed by: EMERGENCY MEDICINE

## 2020-01-10 PROCEDURE — 2580000003 HC RX 258: Performed by: INTERNAL MEDICINE

## 2020-01-10 PROCEDURE — 2580000003 HC RX 258: Performed by: EMERGENCY MEDICINE

## 2020-01-10 PROCEDURE — 83605 ASSAY OF LACTIC ACID: CPT

## 2020-01-10 PROCEDURE — 85025 COMPLETE CBC W/AUTO DIFF WBC: CPT

## 2020-01-10 RX ORDER — ACETAMINOPHEN 325 MG/1
650 TABLET ORAL EVERY 4 HOURS PRN
Status: DISCONTINUED | OUTPATIENT
Start: 2020-01-10 | End: 2020-01-13 | Stop reason: HOSPADM

## 2020-01-10 RX ORDER — LACTULOSE 10 G/15ML
20 SOLUTION ORAL 2 TIMES DAILY
Status: DISCONTINUED | OUTPATIENT
Start: 2020-01-10 | End: 2020-01-13 | Stop reason: HOSPADM

## 2020-01-10 RX ORDER — PANTOPRAZOLE SODIUM 40 MG/1
40 TABLET, DELAYED RELEASE ORAL
Status: DISCONTINUED | OUTPATIENT
Start: 2020-01-11 | End: 2020-01-13 | Stop reason: HOSPADM

## 2020-01-10 RX ORDER — ATORVASTATIN CALCIUM 10 MG/1
10 TABLET, FILM COATED ORAL DAILY
Status: DISCONTINUED | OUTPATIENT
Start: 2020-01-10 | End: 2020-01-13 | Stop reason: HOSPADM

## 2020-01-10 RX ORDER — SODIUM CHLORIDE 0.9 % (FLUSH) 0.9 %
10 SYRINGE (ML) INJECTION EVERY 12 HOURS SCHEDULED
Status: DISCONTINUED | OUTPATIENT
Start: 2020-01-10 | End: 2020-01-13 | Stop reason: HOSPADM

## 2020-01-10 RX ORDER — CLOPIDOGREL BISULFATE 75 MG/1
75 TABLET ORAL DAILY
Status: DISCONTINUED | OUTPATIENT
Start: 2020-01-10 | End: 2020-01-13 | Stop reason: HOSPADM

## 2020-01-10 RX ORDER — DOCUSATE SODIUM 100 MG/1
100 CAPSULE, LIQUID FILLED ORAL DAILY
Status: DISCONTINUED | OUTPATIENT
Start: 2020-01-10 | End: 2020-01-13 | Stop reason: HOSPADM

## 2020-01-10 RX ORDER — FUROSEMIDE 20 MG/1
20 TABLET ORAL DAILY
Status: DISCONTINUED | OUTPATIENT
Start: 2020-01-10 | End: 2020-01-13 | Stop reason: HOSPADM

## 2020-01-10 RX ORDER — LISINOPRIL 5 MG/1
5 TABLET ORAL DAILY
Status: DISCONTINUED | OUTPATIENT
Start: 2020-01-10 | End: 2020-01-13 | Stop reason: HOSPADM

## 2020-01-10 RX ORDER — HYDROCODONE BITARTRATE AND ACETAMINOPHEN 5; 325 MG/1; MG/1
1 TABLET ORAL ONCE
Status: COMPLETED | OUTPATIENT
Start: 2020-01-10 | End: 2020-01-10

## 2020-01-10 RX ORDER — MULTIVITAMIN WITH FOLIC ACID 400 MCG
1 TABLET ORAL DAILY
Status: DISCONTINUED | OUTPATIENT
Start: 2020-01-10 | End: 2020-01-13 | Stop reason: HOSPADM

## 2020-01-10 RX ORDER — ASPIRIN 81 MG/1
81 TABLET, CHEWABLE ORAL DAILY
Status: DISCONTINUED | OUTPATIENT
Start: 2020-01-10 | End: 2020-01-13 | Stop reason: HOSPADM

## 2020-01-10 RX ORDER — LANOLIN ALCOHOL/MO/W.PET/CERES
3 CREAM (GRAM) TOPICAL NIGHTLY
Status: DISCONTINUED | OUTPATIENT
Start: 2020-01-10 | End: 2020-01-13 | Stop reason: HOSPADM

## 2020-01-10 RX ORDER — SODIUM CHLORIDE 0.9 % (FLUSH) 0.9 %
10 SYRINGE (ML) INJECTION PRN
Status: DISCONTINUED | OUTPATIENT
Start: 2020-01-10 | End: 2020-01-13 | Stop reason: HOSPADM

## 2020-01-10 RX ORDER — HYDROCODONE BITARTRATE AND ACETAMINOPHEN 5; 325 MG/1; MG/1
2 TABLET ORAL EVERY 6 HOURS PRN
Status: DISCONTINUED | OUTPATIENT
Start: 2020-01-10 | End: 2020-01-13

## 2020-01-10 RX ADMIN — HYDROCODONE BITARTRATE AND ACETAMINOPHEN 1 TABLET: 5; 325 TABLET ORAL at 17:55

## 2020-01-10 RX ADMIN — HYDROMORPHONE HYDROCHLORIDE 0.5 MG: 1 INJECTION, SOLUTION INTRAMUSCULAR; INTRAVENOUS; SUBCUTANEOUS at 21:44

## 2020-01-10 RX ADMIN — Medication 10 ML: at 19:16

## 2020-01-10 RX ADMIN — VANCOMYCIN HYDROCHLORIDE 1000 MG: 1 INJECTION, POWDER, LYOPHILIZED, FOR SOLUTION INTRAVENOUS at 17:42

## 2020-01-10 RX ADMIN — METOPROLOL TARTRATE 12.5 MG: 25 TABLET ORAL at 21:52

## 2020-01-10 RX ADMIN — ACETAMINOPHEN 650 MG: 325 TABLET ORAL at 19:39

## 2020-01-10 RX ADMIN — SODIUM CHLORIDE, PRESERVATIVE FREE 10 ML: 5 INJECTION INTRAVENOUS at 21:48

## 2020-01-10 RX ADMIN — HYDROMORPHONE HYDROCHLORIDE 0.5 MG: 1 INJECTION, SOLUTION INTRAMUSCULAR; INTRAVENOUS; SUBCUTANEOUS at 19:11

## 2020-01-10 RX ADMIN — MELATONIN 3 MG: 3 TAB ORAL at 21:51

## 2020-01-10 ASSESSMENT — PAIN SCALES - GENERAL
PAINLEVEL_OUTOF10: 10
PAINLEVEL_OUTOF10: 4
PAINLEVEL_OUTOF10: 9
PAINLEVEL_OUTOF10: 10
PAINLEVEL_OUTOF10: 9
PAINLEVEL_OUTOF10: 0
PAINLEVEL_OUTOF10: 4

## 2020-01-10 ASSESSMENT — ENCOUNTER SYMPTOMS
CHOKING: 0
BLOOD IN STOOL: 0
NAUSEA: 0
TROUBLE SWALLOWING: 0
VOMITING: 0
ABDOMINAL PAIN: 0
DIARRHEA: 0
SHORTNESS OF BREATH: 0
BACK PAIN: 0
CONSTIPATION: 0
SORE THROAT: 0
COUGH: 0
WHEEZING: 0

## 2020-01-10 ASSESSMENT — PAIN DESCRIPTION - ORIENTATION
ORIENTATION: LEFT
ORIENTATION: LEFT

## 2020-01-10 ASSESSMENT — PAIN DESCRIPTION - LOCATION
LOCATION: LEG
LOCATION: LEG

## 2020-01-10 NOTE — ED PROVIDER NOTES
(MULTIVITAMIN PO)    Take 1 tablet by mouth daily. PANTOPRAZOLE (PROTONIX) 40 MG TABLET    Take 1 tablet by mouth every morning (before breakfast)       ALLERGIES     is allergic to codeine and morphine. FAMILY HISTORY     He indicated that his mother is . He indicated that his father is . He indicated that his sister is alive. He indicated that his brother is alive. He indicated that his maternal grandmother is . He indicated that his maternal grandfather is . He indicated that his paternal grandmother is . He indicated that his paternal grandfather is . He indicated that his son is alive. family history includes Cancer in his father; Heart Disease in his paternal grandfather. SOCIAL HISTORY      reports that he quit smoking about 22 years ago. His smoking use included cigars. He smoked 1.00 pack per day. He has never used smokeless tobacco. He reports that he does not drink alcohol or use drugs. PHYSICAL EXAM     INITIAL VITALS:  weight is 155 lb (70.3 kg). His blood pressure is 159/77 (abnormal) and his pulse is 85. His respiration is 16 and oxygen saturation is 99%. Physical Exam  Constitutional:       Appearance: He is well-developed. HENT:      Head: Normocephalic and atraumatic. Right Ear: External ear normal.      Left Ear: External ear normal.   Eyes:      Pupils: Pupils are equal, round, and reactive to light. Neck:      Musculoskeletal: Normal range of motion and neck supple. Cardiovascular:      Rate and Rhythm: Normal rate and regular rhythm. Pulmonary:      Effort: Pulmonary effort is normal.      Breath sounds: Normal breath sounds. Abdominal:      General: Bowel sounds are normal.      Palpations: Abdomen is soft. Musculoskeletal: Normal range of motion. Skin:     General: Skin is warm and dry. Neurological:      Mental Status: He is alert and oriented to person, place, and time.    Psychiatric: Behavior: Behavior normal.           DIFFERENTIAL DIAGNOSIS/ MDM:     Cellulitis of left leg failing outpatient therapy will do blood work start IV antibiotics and admit    DIAGNOSTIC RESULTS     EKG: All EKG's are interpreted by the Emergency Department Physician who either signs or Co-signs this chart in the absence of a cardiologist.        RADIOLOGY:   I directly visualized the following  images and reviewed the radiologist interpretations:          ED BEDSIDE ULTRASOUND:       LABS:  Labs Reviewed   CBC WITH AUTO DIFFERENTIAL - Abnormal; Notable for the following components:       Result Value    RBC 3.09 (*)     Hemoglobin 9.2 (*)     Hematocrit 28.7 (*)     RDW 15.6 (*)     Lymphocytes 16 (*)     Monocytes 19 (*)     Absolute Mono # 1.75 (*)     All other components within normal limits   COMPREHENSIVE METABOLIC PANEL - Abnormal; Notable for the following components:    Glucose 116 (*)     BUN 26 (*)     Bun/Cre Ratio 30 (*)     Total Protein 6.1 (*)     Alb 3.3 (*)     All other components within normal limits   CULTURE BLOOD #1   CULTURE BLOOD #1   PROTIME-INR   LACTIC ACID           EMERGENCY DEPARTMENT COURSE:   Vitals:    Vitals:    01/10/20 1549   BP: (!) 159/77   Pulse: 85   Resp: 16   TempSrc: Tympanic   SpO2: 99%   Weight: 155 lb (70.3 kg)     -------------------------  BP: (!) 159/77,  , Pulse: 85, Resp: 16        Re-evaluation Notes    Resting comfortably    CRITICAL CARE:   PHARMACY TO DOSE VANCOMYCIN  IP CONSULT TO HOSPITALIST        CONSULTS:      PROCEDURES:  None    FINAL IMPRESSION      1. Cellulitis of left lower leg          DISPOSITION/PLAN   DISPOSITION     Condition on Disposition    Stable    PATIENT REFERRED TO:  No follow-up provider specified.     DISCHARGE MEDICATIONS:  New Prescriptions    No medications on file       (Please note that portions of this note were completed with a voice recognition program.  Efforts were made to edit the dictations but occasionally words are

## 2020-01-10 NOTE — H&P
by Shira Jay MD at [facility] 477 Bilateral 06/23/2009    Benign    UPPER GASTROINTESTINAL ENDOSCOPY  02/2/2015    Sepulveda's (Dr. Antonio Chase)    UPPER GASTROINTESTINAL ENDOSCOPY  04/25/2016    Lg hiatal hernia, polyp at duodenum, gastric polyp, Barretts Esophagus, GE 32    UPPER GASTROINTESTINAL ENDOSCOPY  07/03/2017    mild gastritis, Sepulveda's, Dr. Cristino Adler 3 years       Medications Prior to Admission:    Medications Prior to Admission: doxycycline hyclate (VIBRAMYCIN) 100 MG capsule, Take 1 capsule by mouth 2 times daily for 10 days  HYDROcodone-acetaminophen (NORCO) 7.5-325 MG per tablet, Take 1 tablet by mouth every 6 hours as needed for Pain for up to 30 days. furosemide (LASIX) 20 MG tablet, TAKE 1 TABLET DAILY  clopidogrel (PLAVIX) 75 MG tablet, TAKE 1 TABLET DAILY  lactulose (CHRONULAC) 10 GM/15ML solution, Take 30 mLs by mouth 2 times daily  Handicap Placard [facility], by Does not apply route Expires:10/14/2023  [facility]. Devices (WHEELCHAIR) MISC, Lightweight, foldable  melatonin 3 MG TABS tablet, Take 3 mg by mouth nightly  docusate sodium (COLACE) 100 MG capsule, Take 100 mg by mouth daily  pantoprazole (PROTONIX) 40 MG tablet, Take 1 tablet by mouth every morning (before breakfast)  metoprolol tartrate (LOPRESSOR) 25 MG tablet, Take 0.5 tablets by mouth 2 times daily  aspirin 81 MG chewable tablet, Take 1 tablet by mouth daily  lisinopril (PRINIVIL;ZESTRIL) 5 MG tablet, TAKE 1 TABLET DAILY  atorvastatin (LIPITOR) 10 MG tablet, Take 1 tablet by mouth daily  acetaminophen (TYLENOL) 325 MG tablet, Take 2 tablets by mouth every 4 hours as needed for Pain or Fever  Multiple Vitamins-Minerals (MULTIVITAMIN PO), Take 1 tablet by mouth daily. Allergies:    Codeine and Morphine    Social History:    reports that he quit smoking about 22 years ago. His smoking use included cigars. He smoked 1.00 pack per day.  He has never used smokeless tobacco. He reports that he does not drink alcohol or use drugs. Family History:   family history includes Cancer in his father; Heart Disease in his paternal grandfather. REVIEW OF SYSTEMS:  See HPI and problem list; otherwise no other new complaints with respect to HEENT, neck, pulmonary, coronary, GI, , endocrine, musculoskeletal, immune system/connective tissue disease, hematologic, neuropsych, skin, lymphatics, or malignancies. PHYSICAL EXAM:  Vitals:  /78   Pulse 90   Temp 101.4 °F (38.6 °C) (Tympanic)   Resp 18   Ht 5' 5.5\" (1.664 m)   Wt 161 lb (73 kg)   SpO2 100%   BMI 26.38 kg/m²     HEENT: Normocephalic and Atraumatic  Neck: Supple, No Masses, Tenderness, Nodularity and No Lymphadenopathy  Chest/Lungs: Clear to Auscultation without Rales, Rhonchi, or Wheezes  Cardiac: Regular Rate and Rhythm  GI/Abdomen:  Bowel Sounds Present and Soft, Non-tender, without Guarding or Rebound Tenderness  : Not examined  EXT/Skin: left anteromedial lower leg--swollen--purple--some former blisters---tender and boggy to palpation, No Cyanosis, No Clubbing and Edema Present  Neuro: very hard of hearing--generalized weakness---complains of significant pain with change of position of the LLE and No Localizing Signs/Symptoms        LABS:    CBC with Differential:    Lab Results   Component Value Date    WBC 9.2 01/10/2020    RBC 3.09 01/10/2020    HGB 9.2 01/10/2020    HCT 28.7 01/10/2020     01/10/2020    MCV 92.9 01/10/2020    MCH 29.8 01/10/2020    MCHC 32.1 01/10/2020    RDW 15.6 01/10/2020    LYMPHOPCT 16 01/10/2020    MONOPCT 19 01/10/2020    BASOPCT 0 01/10/2020    MONOSABS 1.75 01/10/2020    LYMPHSABS 1.45 01/10/2020    EOSABS 0.05 01/10/2020    BASOSABS <0.03 01/10/2020    DIFFTYPE NOT REPORTED 01/10/2020     BMP:    Lab Results   Component Value Date     01/10/2020    K 4.2 01/10/2020    CL 99 01/10/2020    CO2 23 01/10/2020    BUN 26 01/10/2020    LABALBU 3.3 01/10/2020    CREATININE 0.87 01/10/2020    CALCIUM 9.2 01/10/2020    GFRAA >60

## 2020-01-11 ENCOUNTER — ANESTHESIA EVENT (OUTPATIENT)
Dept: OPERATING ROOM | Age: 85
DRG: 603 | End: 2020-01-11
Payer: MEDICARE

## 2020-01-11 ENCOUNTER — ANESTHESIA (OUTPATIENT)
Dept: OPERATING ROOM | Age: 85
DRG: 603 | End: 2020-01-11
Payer: MEDICARE

## 2020-01-11 VITALS
SYSTOLIC BLOOD PRESSURE: 97 MMHG | TEMPERATURE: 100.4 F | DIASTOLIC BLOOD PRESSURE: 55 MMHG | OXYGEN SATURATION: 100 % | RESPIRATION RATE: 17 BRPM

## 2020-01-11 LAB
ABSOLUTE EOS #: 0.06 K/UL (ref 0–0.44)
ABSOLUTE IMMATURE GRANULOCYTE: <0.03 K/UL (ref 0–0.3)
ABSOLUTE LYMPH #: 1.12 K/UL (ref 1.1–3.7)
ABSOLUTE MONO #: 1.44 K/UL (ref 0.1–1.2)
ANION GAP SERPL CALCULATED.3IONS-SCNC: 13 MMOL/L (ref 9–17)
BASOPHILS # BLD: 0 % (ref 0–2)
BASOPHILS ABSOLUTE: <0.03 K/UL (ref 0–0.2)
BUN BLDV-MCNC: 18 MG/DL (ref 8–23)
BUN/CREAT BLD: 26 (ref 9–20)
CALCIUM SERPL-MCNC: 9.1 MG/DL (ref 8.6–10.4)
CHLORIDE BLD-SCNC: 102 MMOL/L (ref 98–107)
CO2: 24 MMOL/L (ref 20–31)
CREAT SERPL-MCNC: 0.68 MG/DL (ref 0.7–1.2)
DIFFERENTIAL TYPE: ABNORMAL
EOSINOPHILS RELATIVE PERCENT: 1 % (ref 1–4)
GFR AFRICAN AMERICAN: >60 ML/MIN
GFR NON-AFRICAN AMERICAN: >60 ML/MIN
GFR SERPL CREATININE-BSD FRML MDRD: ABNORMAL ML/MIN/{1.73_M2}
GFR SERPL CREATININE-BSD FRML MDRD: ABNORMAL ML/MIN/{1.73_M2}
GLUCOSE BLD-MCNC: 112 MG/DL (ref 70–99)
HCT VFR BLD CALC: 28.3 % (ref 40.7–50.3)
HEMOGLOBIN: 9 G/DL (ref 13–17)
IMMATURE GRANULOCYTES: 0 %
LYMPHOCYTES # BLD: 13 % (ref 24–43)
MCH RBC QN AUTO: 29.3 PG (ref 25.2–33.5)
MCHC RBC AUTO-ENTMCNC: 31.8 G/DL (ref 25.2–33.5)
MCV RBC AUTO: 92.2 FL (ref 82.6–102.9)
MONOCYTES # BLD: 17 % (ref 3–12)
NRBC AUTOMATED: 0 PER 100 WBC
PDW BLD-RTO: 15.6 % (ref 11.8–14.4)
PLATELET # BLD: 263 K/UL (ref 138–453)
PLATELET ESTIMATE: ABNORMAL
PMV BLD AUTO: 10.2 FL (ref 8.1–13.5)
POTASSIUM SERPL-SCNC: 4.2 MMOL/L (ref 3.7–5.3)
RBC # BLD: 3.07 M/UL (ref 4.21–5.77)
RBC # BLD: ABNORMAL 10*6/UL
SEG NEUTROPHILS: 68 % (ref 36–65)
SEGMENTED NEUTROPHILS ABSOLUTE COUNT: 5.69 K/UL (ref 1.5–8.1)
SODIUM BLD-SCNC: 139 MMOL/L (ref 135–144)
WBC # BLD: 8.4 K/UL (ref 3.5–11.3)
WBC # BLD: ABNORMAL 10*3/UL

## 2020-01-11 PROCEDURE — 2500000003 HC RX 250 WO HCPCS: Performed by: NURSE ANESTHETIST, CERTIFIED REGISTERED

## 2020-01-11 PROCEDURE — 2580000003 HC RX 258: Performed by: SURGERY

## 2020-01-11 PROCEDURE — 80048 BASIC METABOLIC PNL TOTAL CA: CPT

## 2020-01-11 PROCEDURE — 6360000002 HC RX W HCPCS: Performed by: NURSE ANESTHETIST, CERTIFIED REGISTERED

## 2020-01-11 PROCEDURE — 36415 COLL VENOUS BLD VENIPUNCTURE: CPT

## 2020-01-11 PROCEDURE — 85025 COMPLETE CBC W/AUTO DIFF WBC: CPT

## 2020-01-11 PROCEDURE — 6360000002 HC RX W HCPCS: Performed by: SURGERY

## 2020-01-11 PROCEDURE — 87205 SMEAR GRAM STAIN: CPT

## 2020-01-11 PROCEDURE — 3700000001 HC ADD 15 MINUTES (ANESTHESIA): Performed by: SURGERY

## 2020-01-11 PROCEDURE — 6360000002 HC RX W HCPCS: Performed by: INTERNAL MEDICINE

## 2020-01-11 PROCEDURE — 2580000003 HC RX 258: Performed by: NURSE ANESTHETIST, CERTIFIED REGISTERED

## 2020-01-11 PROCEDURE — 2060000000 HC ICU INTERMEDIATE R&B

## 2020-01-11 PROCEDURE — 87075 CULTR BACTERIA EXCEPT BLOOD: CPT

## 2020-01-11 PROCEDURE — 27603 DRAIN LOWER LEG LESION: CPT | Performed by: SURGERY

## 2020-01-11 PROCEDURE — 0JCP0ZZ EXTIRPATION OF MATTER FROM LEFT LOWER LEG SUBCUTANEOUS TISSUE AND FASCIA, OPEN APPROACH: ICD-10-PCS | Performed by: SURGERY

## 2020-01-11 PROCEDURE — 3600000002 HC SURGERY LEVEL 2 BASE: Performed by: SURGERY

## 2020-01-11 PROCEDURE — 2709999900 HC NON-CHARGEABLE SUPPLY: Performed by: SURGERY

## 2020-01-11 PROCEDURE — 99232 SBSQ HOSP IP/OBS MODERATE 35: CPT | Performed by: FAMILY MEDICINE

## 2020-01-11 PROCEDURE — 6370000000 HC RX 637 (ALT 250 FOR IP): Performed by: SURGERY

## 2020-01-11 PROCEDURE — 3700000000 HC ANESTHESIA ATTENDED CARE: Performed by: SURGERY

## 2020-01-11 PROCEDURE — 87176 TISSUE HOMOGENIZATION CULTR: CPT

## 2020-01-11 PROCEDURE — 7100000001 HC PACU RECOVERY - ADDTL 15 MIN: Performed by: SURGERY

## 2020-01-11 PROCEDURE — 87070 CULTURE OTHR SPECIMN AEROBIC: CPT

## 2020-01-11 PROCEDURE — 94760 N-INVAS EAR/PLS OXIMETRY 1: CPT

## 2020-01-11 PROCEDURE — 3600000012 HC SURGERY LEVEL 2 ADDTL 15MIN: Performed by: SURGERY

## 2020-01-11 PROCEDURE — 7100000000 HC PACU RECOVERY - FIRST 15 MIN: Performed by: SURGERY

## 2020-01-11 PROCEDURE — 2500000003 HC RX 250 WO HCPCS: Performed by: SURGERY

## 2020-01-11 PROCEDURE — 94761 N-INVAS EAR/PLS OXIMETRY MLT: CPT

## 2020-01-11 RX ORDER — LIDOCAINE HYDROCHLORIDE 20 MG/ML
INJECTION, SOLUTION EPIDURAL; INFILTRATION; INTRACAUDAL; PERINEURAL PRN
Status: DISCONTINUED | OUTPATIENT
Start: 2020-01-11 | End: 2020-01-11 | Stop reason: SDUPTHER

## 2020-01-11 RX ORDER — SODIUM CHLORIDE, SODIUM LACTATE, POTASSIUM CHLORIDE, CALCIUM CHLORIDE 600; 310; 30; 20 MG/100ML; MG/100ML; MG/100ML; MG/100ML
INJECTION, SOLUTION INTRAVENOUS CONTINUOUS PRN
Status: DISCONTINUED | OUTPATIENT
Start: 2020-01-11 | End: 2020-01-11 | Stop reason: SDUPTHER

## 2020-01-11 RX ORDER — DEXAMETHASONE SODIUM PHOSPHATE 4 MG/ML
INJECTION, SOLUTION INTRA-ARTICULAR; INTRALESIONAL; INTRAMUSCULAR; INTRAVENOUS; SOFT TISSUE PRN
Status: DISCONTINUED | OUTPATIENT
Start: 2020-01-11 | End: 2020-01-11 | Stop reason: SDUPTHER

## 2020-01-11 RX ORDER — PROPOFOL 10 MG/ML
INJECTION, EMULSION INTRAVENOUS CONTINUOUS PRN
Status: DISCONTINUED | OUTPATIENT
Start: 2020-01-11 | End: 2020-01-11 | Stop reason: SDUPTHER

## 2020-01-11 RX ADMIN — HYDROMORPHONE HYDROCHLORIDE 0.5 MG: 1 INJECTION, SOLUTION INTRAMUSCULAR; INTRAVENOUS; SUBCUTANEOUS at 06:12

## 2020-01-11 RX ADMIN — HYDROMORPHONE HYDROCHLORIDE 0.5 MG: 1 INJECTION, SOLUTION INTRAMUSCULAR; INTRAVENOUS; SUBCUTANEOUS at 13:28

## 2020-01-11 RX ADMIN — METOPROLOL TARTRATE 12.5 MG: 25 TABLET ORAL at 20:02

## 2020-01-11 RX ADMIN — DOCUSATE SODIUM 100 MG: 100 CAPSULE, LIQUID FILLED ORAL at 11:08

## 2020-01-11 RX ADMIN — ASPIRIN 81 MG 81 MG: 81 TABLET ORAL at 11:06

## 2020-01-11 RX ADMIN — DEXAMETHASONE SODIUM PHOSPHATE 4 MG: 4 INJECTION, SOLUTION INTRAMUSCULAR; INTRAVENOUS at 09:13

## 2020-01-11 RX ADMIN — VANCOMYCIN HYDROCHLORIDE 1000 MG: 1 INJECTION, POWDER, LYOPHILIZED, FOR SOLUTION INTRAVENOUS at 17:29

## 2020-01-11 RX ADMIN — HYDROMORPHONE HYDROCHLORIDE 0.5 MG: 1 INJECTION, SOLUTION INTRAMUSCULAR; INTRAVENOUS; SUBCUTANEOUS at 02:50

## 2020-01-11 RX ADMIN — HYDROCODONE BITARTRATE AND ACETAMINOPHEN 2 TABLET: 5; 325 TABLET ORAL at 16:12

## 2020-01-11 RX ADMIN — LISINOPRIL 5 MG: 5 TABLET ORAL at 11:09

## 2020-01-11 RX ADMIN — PHENYLEPHRINE HYDROCHLORIDE 25 MCG/MIN: 10 INJECTION INTRAVENOUS at 09:06

## 2020-01-11 RX ADMIN — HYDROMORPHONE HYDROCHLORIDE 0.5 MG: 1 INJECTION, SOLUTION INTRAMUSCULAR; INTRAVENOUS; SUBCUTANEOUS at 05:17

## 2020-01-11 RX ADMIN — HYDROMORPHONE HYDROCHLORIDE 0.5 MG: 1 INJECTION, SOLUTION INTRAMUSCULAR; INTRAVENOUS; SUBCUTANEOUS at 04:19

## 2020-01-11 RX ADMIN — ATORVASTATIN CALCIUM 10 MG: 10 TABLET, FILM COATED ORAL at 11:09

## 2020-01-11 RX ADMIN — HYDROMORPHONE HYDROCHLORIDE 0.5 MG: 1 INJECTION, SOLUTION INTRAMUSCULAR; INTRAVENOUS; SUBCUTANEOUS at 19:41

## 2020-01-11 RX ADMIN — THERA TABS 1 TABLET: TAB at 11:07

## 2020-01-11 RX ADMIN — SODIUM CHLORIDE, PRESERVATIVE FREE 10 ML: 5 INJECTION INTRAVENOUS at 20:01

## 2020-01-11 RX ADMIN — LACTULOSE 20 G: 20 SOLUTION ORAL at 11:09

## 2020-01-11 RX ADMIN — Medication 0.25 MG: at 09:12

## 2020-01-11 RX ADMIN — ENOXAPARIN SODIUM 40 MG: 40 INJECTION SUBCUTANEOUS at 11:10

## 2020-01-11 RX ADMIN — LACTULOSE 20 G: 20 SOLUTION ORAL at 20:01

## 2020-01-11 RX ADMIN — MELATONIN 3 MG: 3 TAB ORAL at 20:01

## 2020-01-11 RX ADMIN — LIDOCAINE HYDROCHLORIDE 100 MG: 20 INJECTION, SOLUTION EPIDURAL; INFILTRATION; INTRACAUDAL; PERINEURAL at 08:59

## 2020-01-11 RX ADMIN — PROPOFOL 200 MCG/KG/MIN: 10 INJECTION, EMULSION INTRAVENOUS at 08:59

## 2020-01-11 RX ADMIN — Medication 0.25 MG: at 09:18

## 2020-01-11 RX ADMIN — SODIUM CHLORIDE, POTASSIUM CHLORIDE, SODIUM LACTATE AND CALCIUM CHLORIDE: 600; 310; 30; 20 INJECTION, SOLUTION INTRAVENOUS at 08:54

## 2020-01-11 RX ADMIN — METOPROLOL TARTRATE 12.5 MG: 25 TABLET ORAL at 11:10

## 2020-01-11 RX ADMIN — CLOPIDOGREL BISULFATE 75 MG: 75 TABLET ORAL at 11:08

## 2020-01-11 RX ADMIN — FUROSEMIDE 20 MG: 20 TABLET ORAL at 11:08

## 2020-01-11 ASSESSMENT — PAIN SCALES - GENERAL
PAINLEVEL_OUTOF10: 8
PAINLEVEL_OUTOF10: 0
PAINLEVEL_OUTOF10: 8
PAINLEVEL_OUTOF10: 0
PAINLEVEL_OUTOF10: 0
PAINLEVEL_OUTOF10: 3
PAINLEVEL_OUTOF10: 0
PAINLEVEL_OUTOF10: 7
PAINLEVEL_OUTOF10: 0
PAINLEVEL_OUTOF10: 9
PAINLEVEL_OUTOF10: 0
PAINLEVEL_OUTOF10: 7
PAINLEVEL_OUTOF10: 0
PAINLEVEL_OUTOF10: 9
PAINLEVEL_OUTOF10: 0
PAINLEVEL_OUTOF10: 9
PAINLEVEL_OUTOF10: 0
PAINLEVEL_OUTOF10: 3

## 2020-01-11 ASSESSMENT — PAIN DESCRIPTION - LOCATION
LOCATION: LEG

## 2020-01-11 ASSESSMENT — PAIN DESCRIPTION - ORIENTATION
ORIENTATION: LEFT

## 2020-01-11 ASSESSMENT — PAIN DESCRIPTION - PAIN TYPE: TYPE: SURGICAL PAIN

## 2020-01-11 NOTE — ANESTHESIA PRE PROCEDURE
Department of Anesthesiology  Preprocedure Note       Name:  Brit Neville   Age:  80 y.o.  :  1927                                          MRN:  [id number]         Date:  2020      Surgeon: Renetta Martinez):  Екатерина Gonzalez MD    Procedure: I and D of Left Lower Extremitity (Left )    Medications prior to admission:   Prior to Admission medications    Medication Sig Start Date End Date Taking? Authorizing Provider   doxycycline hyclate (VIBRAMYCIN) 100 MG capsule Take 1 capsule by mouth 2 times daily for 10 days 20 Yes Varsha Cullen MD   HYDROcodone-acetaminophen [facility]) 7.5-325 MG per tablet Take 1 tablet by mouth every 6 hours as needed for Pain for up to 30 days. 1/3/20 2/2/20 Yes Allan Martini MD   furosemide (LASIX) 20 MG tablet TAKE 1 TABLET DAILY 19  Yes Allan Martini MD   clopidogrel (PLAVIX) 75 MG tablet TAKE 1 TABLET DAILY 19  Yes Allan Martini MD   lactulose [facility]) 10 GM/15ML solution Take 30 mLs by mouth 2 times daily 10/24/19  Yes Allan Martini MD   Handicap Placard MISC by Does not apply route Expires:10/14/2023 10/14/19  Yes Allan Martini MD   Misc.  Devices [facility]) MISC Lightweight, foldable 10/14/19  Yes Allan Martini MD   melatonin 3 MG TABS tablet Take 3 mg by mouth nightly   Yes Historical Provider, MD   docusate sodium (COLACE) 100 MG capsule Take 100 mg by mouth daily   Yes Historical Provider, MD   pantoprazole (PROTONIX) 40 MG tablet Take 1 tablet by mouth every morning (before breakfast) 19  Yes Allan Martini MD   metoprolol tartrate (LOPRESSOR) 25 MG tablet Take 0.5 tablets by mouth 2 times daily 19  Yes Allan Martini MD   aspirin 81 MG chewable tablet Take 1 tablet by mouth daily 19  Yes Allan Martini MD   lisinopril (PRINIVIL;ZESTRIL) 5 MG tablet TAKE 1 TABLET DAILY 1/15/19  Yes Allan Martini MD   atorvastatin (LIPITOR) 10 MG tablet Take 1 tablet by mouth daily 1/15/19  Yes Allan Martini MD   acetaminophen Hold] HYDROcodone-acetaminophen (NORCO) 5-325 MG per tablet 2 tablet  2 tablet Oral Q6H PRN Kell Sally       [facility] Hold] vancomycin 1000 mg IVPB in 250 mL D5W addavial  1,000 mg Intravenous Q24H Lianna Castro MD   Stopped at 01/10/20 1842    [MAR Hold] vancomycin (VANCOCIN) intermittent dosing (placeholder)   Other RX Loreda Kawasaki, MD        [facility] Hold] HYDROmorphone (DILAUDID) injection 0.5 mg  0.5 mg Intravenous Q1H PRN Katherene [place] Purcell   0.5 mg at 01/11/20 1612       Allergies:     Allergies   Allergen Reactions    Codeine Nausea And Vomiting    Morphine Other (See Comments)     constipation       Problem List:    Patient Active Problem List   Diagnosis Code    Dermatophytosis of nail B35.1    Coronary atherosclerosis of native coronary artery I25.10    Essential hypertension, benign I10    Obesity E66.9    Hyperlipidemia E78.5    PVC (premature ventricular contraction) I49.3    Osteoarthritis of knee M17.10    Duodenal ulcer K26.9    CAD (coronary artery disease) I25.10    Venous insufficiency I87.2    Kidney stone N20.0    Elevated PSA R97.20    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    Chronic pain of right knee M25.561, G89.29    Encounter for chronic pain management G89.29    Colitis K52.9    Urinary retention R33.9    BPH (benign prostatic hyperplasia) N40.0    Gait instability R26.81    ST elevation myocardial infarction (STEMI) ([facility]) I21.3    UTI due to extended-spectrum beta lactamase (ESBL) producing Escherichia coli N39.0, B96.29, Z16.12    Gastroesophageal reflux disease K21.9    Overweight E66.3    Chronic idiopathic constipation K59.04    Cellulitis of left lower extremity L03.116       Past Medical History:        Diagnosis Date    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 2/2/15    EGD with biopsies    CAD (coronary artery disease)     Elevated PSA     Hyperlipidemia     Hypertension     Kidney stone     Osteoarthritis of knee     Status:                                                                                 BMI:   Wt Readings from Last 3 Encounters:   01/10/20 161 lb (73 kg)   01/07/20 155 lb (70.3 kg)   01/05/20 155 lb (70.3 kg)     Body mass index is 26.38 kg/m². CBC:   Lab Results   Component Value Date    WBC 8.4 01/11/2020    RBC 3.07 01/11/2020    HGB 9.0 01/11/2020    HCT 28.3 01/11/2020    MCV 92.2 01/11/2020    RDW 15.6 01/11/2020     01/11/2020       CMP:   Lab Results   Component Value Date     01/11/2020    K 4.2 01/11/2020     01/11/2020    CO2 24 01/11/2020    BUN 18 01/11/2020    CREATININE 0.68 01/11/2020    GFRAA >60 01/11/2020    LABGLOM >60 01/11/2020    GLUCOSE 112 01/11/2020    PROT 6.1 01/10/2020    CALCIUM 9.1 01/11/2020    BILITOT 0.44 01/10/2020    ALKPHOS 93 01/10/2020    AST 15 01/10/2020    ALT 18 01/10/2020       POC Tests: No results for input(s): POCGLU, POCNA, POCK, POCCL, POCBUN, POCHEMO, POCHCT in the last 72 hours.     Coags:   Lab Results   Component Value Date    PROTIME 10.4 01/10/2020    INR 1.0 01/10/2020    APTT 53.6 11/24/2018       HCG (If Applicable): No results found for: PREGTESTUR, PREGSERUM, HCG, HCGQUANT     ABGs: No results found for: PHART, PO2ART, TVX5CYT, YFL1QMO, BEART, D0JMTHEJ     Type & Screen (If Applicable):  No results found for: LABABO, [address]    Anesthesia Evaluation  Patient summary reviewed and Nursing notes reviewed no history of anesthetic complications:   Airway: Mallampati: II  TM distance: >3 FB   Neck ROM: full  Mouth opening: > = 3 FB Dental:    (+) partials      Pulmonary: breath sounds clear to auscultation                             Cardiovascular:  Exercise tolerance: good (>4 METS),   (+) hypertension:, past MI:, CAD:, hyperlipidemia    (-)  angina    ECG reviewed  Rhythm: regular  Rate: normal      Cleared by cardiology              Neuro/Psych:   Negative Neuro/Psych ROS              GI/Hepatic/Renal:   (+) GERD: well controlled, PUD, renal disease: kidney stones,           Endo/Other:    (+) : arthritis:., .                 Abdominal:           Vascular: negative vascular ROS. Anesthesia Plan      general, MAC and TIVA     ASA 3 - emergent       Induction: intravenous. Anesthetic plan and risks discussed with patient.       Plan discussed with surgical team.                  KEVIN Boyd - CRNA   1/11/2020

## 2020-01-11 NOTE — CONSULTS
Consulted re hematoma left leg. Patient drop 10 lb object on the left leg 1/4/2020. Has developed a lot of swelling and pain. Has been to the ED 3 times and was admitted with cellulitis today.   He has had a fever since being in the hospital.    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 2/2/15    EGD with biopsies    CAD (coronary artery disease)     Elevated PSA     Hyperlipidemia     Hypertension     Kidney stone     Osteoarthritis of knee     Venous insufficiency      Past Surgical History:   Procedure Laterality Date    APPENDECTOMY      CHOLECYSTECTOMY  01/12/1983    COLONOSCOPY  08/29/12    COLONOSCOPY  05/27/09    COLONOSCOPY  04/30/2008    CYSTOSCOPY  11/15/2007    with left ureteroscopy and dorsal slit     KNEE FUSION Left     LIPOMA RESECTION      IL CYSTOURETHROSCOPY N/A 11/19/2018    CYSTO Photovaporization Prostate Greenlight Laser ([id number]) performed by Charyl Riedel, MD at [address] EGD TRANSORAL BIOPSY SINGLE/MULTIPLE N/A 7/3/2017    EGD BIOPSY performed by Aida Alcantara MD at [facility] Bilateral 06/23/2009    Benign    UPPER GASTROINTESTINAL ENDOSCOPY  02/2/2015    Sepulveda's (Dr. Ulysses Mcburney)   [facility] ENDOSCOPY  04/25/2016    Lg hiatal hernia, polyp at duodenum, gastric polyp, Barretts Esophagus, GE 32    UPPER GASTROINTESTINAL ENDOSCOPY  07/03/2017    mild gastritis, Sepulveda's, Dr. Jessica Macias 3 years     Current Facility-Administered Medications   Medication Dose Route Frequency Provider Last Rate Last Dose    acetaminophen (TYLENOL) tablet 650 mg  650 mg Oral Q4H PRN Selinda Medal Purcell   650 mg at 01/10/20 1939    aspirin chewable tablet 81 mg  81 mg Oral Daily Hang Purcell        atorvastatin (LIPITOR) tablet 10 mg  10 mg Oral Daily Morrison Iha        clopidogrel (PLAVIX) tablet 75 mg  75 mg Oral Daily Hang Purcell        docusate sodium (COLACE) capsule 100 mg  100 mg Oral Daily Selinda Medal

## 2020-01-11 NOTE — PROGRESS NOTES
Pharmacy Vancomycin Consult    Vancomycin Day: 2  Current Dosin mg q 24 hr     Temp max:  101.4    Recent Labs     01/10/20  1612 20  0629   CREATININE 0.87 0.68*     Estimated Creatinine Clearance: 61 mL/min (A) (based on SCr of 0.68 mg/dL (L)). Recent Labs     01/10/20  1612 20  0629   WBC 9.2 8.4       Culture Date       Source                   Results  1/10/2020                blood x 3              pending    Level ordered for: 2020 @ 1715    Assessment/Plan:  Scr down. WBC wnl. Febrile. UO okay but not a full 24 hour window. Patient is currently in procedure for I & D of abscess site. Will continue to monitor.   Domenica Smith  2020  10:00 AM

## 2020-01-11 NOTE — OP NOTE
Brief Postoperative Note  ______________________________________________________________    Patient: Samira Greene  YOB: 1927  MRN: [id number]  Date of Procedure: 1/11/2020    Pre-Op Diagnosis: Infected hematoma of left leg    Post-Op Diagnosis: Same       Procedure(s):  Incision Drainage and Evacuations of Hematoma Left Leg    Anesthesia: General, Monitor Anesthesia Care, TIVA    Surgeon(s):  Del Contreras MD    Assistant:     Estimated Blood Loss (mL): 500 mainly clot from hematoma    Complications: None    Specimens:   ID Type Source Tests Collected by Time Destination   1 : LEFT LOWER EXTREMITY  Tissue Leg CULTURE, ANAEROBIC AND AEROBIC Del Contreras MD 1/11/2020 0919        Implants:  * No implants in log *      Drains: * No LDAs found *    Findings: Patient was brought to the operating room and deep IV sedation was achieved by the CRNA. Left leg was carefully prepped and draped. Incision was marked out along the posterior medial surface of the leg over the largest portion of the bulge of the hematoma. The skin was then anesthetized with local anesthetic a 50-50 mix 1% lidocaine half percent bupivacaine. A generous incision was made using electrocautery roughly 9 inches long. A large subcutaneous hematoma was found roughly 500 mL's of clotted blood was removed. Evacuated as much hematoma as possible. I then brought the Pulsavac device on the field, and we irrigated the wound extensively. Try to move this and all little pockets within the cavity to remove any little pieces of clot disconnected fat necrotic tissue etc.  Then at the end of the procedure I did go ahead and biopsy little subcutaneous fat for culture. Wound was dressed with a light compression dressing using an Ace wrap with ABDs over the wound itself. Case classification is dependent upon whether this is an infected wound or not. Sponge needle instrument counts correct at the end the case.   Patient was transported to recovery room in stable condition.     Lakisha Delgado MD  Date: 1/11/2020  Time: 9:24 AM

## 2020-01-12 LAB
ANION GAP SERPL CALCULATED.3IONS-SCNC: 11 MMOL/L (ref 9–17)
BUN BLDV-MCNC: 18 MG/DL (ref 8–23)
BUN/CREAT BLD: 26 (ref 9–20)
CALCIUM SERPL-MCNC: 9.1 MG/DL (ref 8.6–10.4)
CHLORIDE BLD-SCNC: 101 MMOL/L (ref 98–107)
CO2: 25 MMOL/L (ref 20–31)
CREAT SERPL-MCNC: 0.7 MG/DL (ref 0.7–1.2)
EKG ATRIAL RATE: 93 BPM
EKG P AXIS: 93 DEGREES
EKG P-R INTERVAL: 148 MS
EKG Q-T INTERVAL: 378 MS
EKG QRS DURATION: 128 MS
EKG QTC CALCULATION (BAZETT): 469 MS
EKG R AXIS: -42 DEGREES
EKG T AXIS: 59 DEGREES
EKG VENTRICULAR RATE: 93 BPM
GFR AFRICAN AMERICAN: >60 ML/MIN
GFR NON-AFRICAN AMERICAN: >60 ML/MIN
GFR SERPL CREATININE-BSD FRML MDRD: ABNORMAL ML/MIN/{1.73_M2}
GFR SERPL CREATININE-BSD FRML MDRD: ABNORMAL ML/MIN/{1.73_M2}
GLUCOSE BLD-MCNC: 107 MG/DL (ref 70–99)
HCT VFR BLD CALC: 28 % (ref 40.7–50.3)
HEMOGLOBIN: 8.9 G/DL (ref 13–17)
MCH RBC QN AUTO: 29.3 PG (ref 25.2–33.5)
MCHC RBC AUTO-ENTMCNC: 31.8 G/DL (ref 25.2–33.5)
MCV RBC AUTO: 92.1 FL (ref 82.6–102.9)
NRBC AUTOMATED: 0 PER 100 WBC
PDW BLD-RTO: 15.3 % (ref 11.8–14.4)
PLATELET # BLD: 303 K/UL (ref 138–453)
PMV BLD AUTO: 10.2 FL (ref 8.1–13.5)
POTASSIUM SERPL-SCNC: 3.8 MMOL/L (ref 3.7–5.3)
RBC # BLD: 3.04 M/UL (ref 4.21–5.77)
SODIUM BLD-SCNC: 137 MMOL/L (ref 135–144)
WBC # BLD: 10.1 K/UL (ref 3.5–11.3)

## 2020-01-12 PROCEDURE — 2580000003 HC RX 258: Performed by: SURGERY

## 2020-01-12 PROCEDURE — 6370000000 HC RX 637 (ALT 250 FOR IP): Performed by: SURGERY

## 2020-01-12 PROCEDURE — 80048 BASIC METABOLIC PNL TOTAL CA: CPT

## 2020-01-12 PROCEDURE — 6360000002 HC RX W HCPCS: Performed by: SURGERY

## 2020-01-12 PROCEDURE — 2060000000 HC ICU INTERMEDIATE R&B

## 2020-01-12 PROCEDURE — 94761 N-INVAS EAR/PLS OXIMETRY MLT: CPT

## 2020-01-12 PROCEDURE — 99232 SBSQ HOSP IP/OBS MODERATE 35: CPT | Performed by: FAMILY MEDICINE

## 2020-01-12 PROCEDURE — 85027 COMPLETE CBC AUTOMATED: CPT

## 2020-01-12 PROCEDURE — 36415 COLL VENOUS BLD VENIPUNCTURE: CPT

## 2020-01-12 PROCEDURE — 97161 PT EVAL LOW COMPLEX 20 MIN: CPT | Performed by: PHYSICAL THERAPIST

## 2020-01-12 RX ADMIN — SODIUM CHLORIDE, PRESERVATIVE FREE 10 ML: 5 INJECTION INTRAVENOUS at 08:00

## 2020-01-12 RX ADMIN — ENOXAPARIN SODIUM 40 MG: 40 INJECTION SUBCUTANEOUS at 07:58

## 2020-01-12 RX ADMIN — SODIUM CHLORIDE, PRESERVATIVE FREE 10 ML: 5 INJECTION INTRAVENOUS at 21:00

## 2020-01-12 RX ADMIN — DOCUSATE SODIUM 100 MG: 100 CAPSULE, LIQUID FILLED ORAL at 07:57

## 2020-01-12 RX ADMIN — METOPROLOL TARTRATE 12.5 MG: 25 TABLET ORAL at 20:57

## 2020-01-12 RX ADMIN — ATORVASTATIN CALCIUM 10 MG: 10 TABLET, FILM COATED ORAL at 07:57

## 2020-01-12 RX ADMIN — LISINOPRIL 5 MG: 5 TABLET ORAL at 07:57

## 2020-01-12 RX ADMIN — METOPROLOL TARTRATE 12.5 MG: 25 TABLET ORAL at 07:58

## 2020-01-12 RX ADMIN — VANCOMYCIN HYDROCHLORIDE 1000 MG: 1 INJECTION, POWDER, LYOPHILIZED, FOR SOLUTION INTRAVENOUS at 17:16

## 2020-01-12 RX ADMIN — FUROSEMIDE 20 MG: 20 TABLET ORAL at 07:57

## 2020-01-12 RX ADMIN — LACTULOSE 20 G: 20 SOLUTION ORAL at 20:57

## 2020-01-12 RX ADMIN — CLOPIDOGREL BISULFATE 75 MG: 75 TABLET ORAL at 07:57

## 2020-01-12 RX ADMIN — HYDROCODONE BITARTRATE AND ACETAMINOPHEN 2 TABLET: 5; 325 TABLET ORAL at 14:11

## 2020-01-12 RX ADMIN — HYDROMORPHONE HYDROCHLORIDE 0.5 MG: 1 INJECTION, SOLUTION INTRAMUSCULAR; INTRAVENOUS; SUBCUTANEOUS at 06:40

## 2020-01-12 RX ADMIN — HYDROCODONE BITARTRATE AND ACETAMINOPHEN 2 TABLET: 5; 325 TABLET ORAL at 07:58

## 2020-01-12 RX ADMIN — LACTULOSE 20 G: 20 SOLUTION ORAL at 07:57

## 2020-01-12 RX ADMIN — THERA TABS 1 TABLET: TAB at 07:57

## 2020-01-12 RX ADMIN — ASPIRIN 81 MG 81 MG: 81 TABLET ORAL at 07:57

## 2020-01-12 RX ADMIN — PANTOPRAZOLE SODIUM 40 MG: 40 TABLET, DELAYED RELEASE ORAL at 05:49

## 2020-01-12 RX ADMIN — HYDROMORPHONE HYDROCHLORIDE 0.5 MG: 1 INJECTION, SOLUTION INTRAMUSCULAR; INTRAVENOUS; SUBCUTANEOUS at 16:21

## 2020-01-12 RX ADMIN — MELATONIN 3 MG: 3 TAB ORAL at 20:57

## 2020-01-12 ASSESSMENT — PAIN DESCRIPTION - PAIN TYPE
TYPE: SURGICAL PAIN
TYPE: SURGICAL PAIN

## 2020-01-12 ASSESSMENT — PAIN DESCRIPTION - LOCATION
LOCATION: LEG
LOCATION: LEG

## 2020-01-12 ASSESSMENT — PAIN SCALES - GENERAL
PAINLEVEL_OUTOF10: 8
PAINLEVEL_OUTOF10: 0
PAINLEVEL_OUTOF10: 8
PAINLEVEL_OUTOF10: 3
PAINLEVEL_OUTOF10: 7
PAINLEVEL_OUTOF10: 8
PAINLEVEL_OUTOF10: 2

## 2020-01-12 ASSESSMENT — PAIN DESCRIPTION - ORIENTATION
ORIENTATION: LEFT
ORIENTATION: LEFT

## 2020-01-12 ASSESSMENT — PAIN DESCRIPTION - ONSET: ONSET: ON-GOING

## 2020-01-12 ASSESSMENT — PAIN DESCRIPTION - PROGRESSION
CLINICAL_PROGRESSION: GRADUALLY WORSENING

## 2020-01-12 ASSESSMENT — PAIN DESCRIPTION - DESCRIPTORS: DESCRIPTORS: ACHING

## 2020-01-12 ASSESSMENT — PAIN DESCRIPTION - FREQUENCY: FREQUENCY: CONTINUOUS

## 2020-01-12 NOTE — CARE COORDINATION
Business card provided, patient plans to return to [facility], currently resides inside and has help as needed. Denies further needs at this time.

## 2020-01-12 NOTE — PROGRESS NOTES
Hospitalist Progress Note  1/12/2020 9:28 AM  Subjective:   Admit Date: 1/10/2020  PCP: Stefania Richardson MD    Interval History: Patient is s/p evaacution hematom left leg,sweeling is down ,pain controlled. no chest pain or SOB. Diet: DIET GENERAL;  Medications:   Scheduled Meds:   aspirin  81 mg Oral Daily    atorvastatin  10 mg Oral Daily    clopidogrel  75 mg Oral Daily    docusate sodium  100 mg Oral Daily    furosemide  20 mg Oral Daily    lactulose  20 g Oral BID    lisinopril  5 mg Oral Daily    melatonin  3 mg Oral Nightly    metoprolol tartrate  12.5 mg Oral BID    multivitamin  1 tablet Oral Daily    pantoprazole  40 mg Oral QAM AC    sodium chloride flush  10 mL Intravenous 2 times per day    enoxaparin  40 mg Subcutaneous Daily    vancomycin  1,000 mg Intravenous Q24H    vancomycin (VANCOCIN) intermittent dosing (placeholder)   Other RX Placeholder     Continuous Infusions:    Patient's current medications documented, reviewed, and updated. CBC:   Recent Labs     01/10/20  1612 01/11/20  0629 01/12/20  0554   WBC 9.2 8.4 10.1   HGB 9.2* 9.0* 8.9*    263 303     BMP:    Recent Labs     01/10/20  1612 01/11/20  0629 01/12/20  0554    139 137   K 4.2 4.2 3.8   CL 99 102 101   CO2 23 24 25   BUN 26* 18 18   CREATININE 0.87 0.68* 0.70   GLUCOSE 116* 112* 107*     Hepatic:   Recent Labs     01/10/20  1612   AST 15   ALT 18   BILITOT 0.44   ALKPHOS 93     Troponin: No results for input(s): TROPONINI in the last 72 hours. BNP: No results for input(s): BNP in the last 72 hours. Lipids: No results for input(s): CHOL, HDL in the last 72 hours.     Invalid input(s): LDLCALCU  INR:   Recent Labs     01/10/20  1612   INR 1.0         Objective:   Vitals: BP (!) 158/64   Pulse 93   Temp 97.7 °F (36.5 °C) (Oral)   Resp 18   Ht 5' 5.5\" (1.664 m)   Wt 161 lb (73 kg)   SpO2 100%   BMI 26.38 kg/m²   General appearance: alert and cooperative with exam  HEENT: Head: Normal,

## 2020-01-12 NOTE — PLAN OF CARE
symptoms  1/11/2020 1915 by Jose Matias RN  Outcome: Ongoing  1/11/2020 1623 by Keena Alanis RN  Outcome: Ongoing  Goal: Absence of new skin breakdown  Description  Absence of new skin breakdown  1/11/2020 1915 by Jose Matias RN  Outcome: Ongoing  1/11/2020 1623 by Keena Alanis RN  Outcome: Ongoing     Problem: Pain:  Goal: Pain level will decrease  Description  Pain level will decrease  1/11/2020 1915 by Jose Matias RN  Outcome: Ongoing  1/11/2020 1623 by Keena Alanis RN  Outcome: Ongoing  Goal: Control of acute pain  Description  Control of acute pain  1/11/2020 1915 by Jose Matias RN  Outcome: Ongoing  1/11/2020 1623 by Keena Alanis RN  Outcome: Ongoing  Goal: Control of chronic pain  Description  Control of chronic pain  1/11/2020 1915 by Jose Matias RN  Outcome: Ongoing  1/11/2020 1623 by Keena Alanis RN  Outcome: Ongoing     Problem: Risk for Impaired Skin Integrity  Goal: Tissue integrity - skin and mucous membranes  Description  Structural intactness and normal physiological function of skin and  mucous membranes.   1/11/2020 1915 by Jose Matias RN  Outcome: Ongoing  1/11/2020 1623 by Keena Alanis RN  Outcome: Ongoing

## 2020-01-12 NOTE — PROGRESS NOTES
POD #1    Denies pain    BP (!) 158/64   Pulse 93   Temp 97.7 °F (36.5 °C) (Oral)   Resp 18   Ht 5' 5.5\" (1.664 m)   Wt 161 lb (73 kg)   SpO2 100%   BMI 26.38 kg/m²     Dressing from yesterday was intact. Dressing removed. Leg is still red, but much less edematous. Wound drain is serosanguinous. H&H fairly stable. WBC is normal    IMP/PLAN  1) Doing well. Continue wound care as previously ordered.

## 2020-01-13 ENCOUNTER — TELEPHONE (OUTPATIENT)
Dept: FAMILY MEDICINE CLINIC | Age: 85
End: 2020-01-13

## 2020-01-13 VITALS
DIASTOLIC BLOOD PRESSURE: 51 MMHG | WEIGHT: 156.7 LBS | SYSTOLIC BLOOD PRESSURE: 99 MMHG | OXYGEN SATURATION: 100 % | TEMPERATURE: 97.2 F | BODY MASS INDEX: 25.18 KG/M2 | HEART RATE: 57 BPM | HEIGHT: 66 IN | RESPIRATION RATE: 16 BRPM

## 2020-01-13 LAB
-: NORMAL
ABSOLUTE EOS #: 0.18 K/UL (ref 0–0.44)
ABSOLUTE IMMATURE GRANULOCYTE: <0.03 K/UL (ref 0–0.3)
ABSOLUTE LYMPH #: 1.69 K/UL (ref 1.1–3.7)
ABSOLUTE MONO #: 1.23 K/UL (ref 0.1–1.2)
ANION GAP SERPL CALCULATED.3IONS-SCNC: 13 MMOL/L (ref 9–17)
BASOPHILS # BLD: 0 % (ref 0–2)
BASOPHILS ABSOLUTE: <0.03 K/UL (ref 0–0.2)
BUN BLDV-MCNC: 23 MG/DL (ref 8–23)
BUN/CREAT BLD: 32 (ref 9–20)
CALCIUM SERPL-MCNC: 9.2 MG/DL (ref 8.6–10.4)
CHLORIDE BLD-SCNC: 98 MMOL/L (ref 98–107)
CO2: 23 MMOL/L (ref 20–31)
CREAT SERPL-MCNC: 0.71 MG/DL (ref 0.7–1.2)
CULTURE: NORMAL
CULTURE: NORMAL
DIFFERENTIAL TYPE: ABNORMAL
EOSINOPHILS RELATIVE PERCENT: 2 % (ref 1–4)
GFR AFRICAN AMERICAN: >60 ML/MIN
GFR NON-AFRICAN AMERICAN: >60 ML/MIN
GFR SERPL CREATININE-BSD FRML MDRD: ABNORMAL ML/MIN/{1.73_M2}
GFR SERPL CREATININE-BSD FRML MDRD: ABNORMAL ML/MIN/{1.73_M2}
GLUCOSE BLD-MCNC: 100 MG/DL (ref 70–99)
HCT VFR BLD CALC: 30.1 % (ref 40.7–50.3)
HEMOGLOBIN: 8.9 G/DL (ref 13–17)
IMMATURE GRANULOCYTES: 0 %
LYMPHOCYTES # BLD: 21 % (ref 24–43)
Lab: NORMAL
Lab: NORMAL
MCH RBC QN AUTO: 28.9 PG (ref 25.2–33.5)
MCHC RBC AUTO-ENTMCNC: 29.6 G/DL (ref 25.2–33.5)
MCV RBC AUTO: 97.7 FL (ref 82.6–102.9)
MONOCYTES # BLD: 15 % (ref 3–12)
NRBC AUTOMATED: 0 PER 100 WBC
PDW BLD-RTO: 15.2 % (ref 11.8–14.4)
PLATELET # BLD: 283 K/UL (ref 138–453)
PLATELET ESTIMATE: ABNORMAL
PMV BLD AUTO: 10.1 FL (ref 8.1–13.5)
POTASSIUM SERPL-SCNC: 4.2 MMOL/L (ref 3.7–5.3)
RBC # BLD: 3.08 M/UL (ref 4.21–5.77)
RBC # BLD: ABNORMAL 10*6/UL
REASON FOR REJECTION: NORMAL
SEG NEUTROPHILS: 62 % (ref 36–65)
SEGMENTED NEUTROPHILS ABSOLUTE COUNT: 5.02 K/UL (ref 1.5–8.1)
SODIUM BLD-SCNC: 134 MMOL/L (ref 135–144)
SPECIMEN DESCRIPTION: NORMAL
SPECIMEN DESCRIPTION: NORMAL
WBC # BLD: 8.2 K/UL (ref 3.5–11.3)
WBC # BLD: ABNORMAL 10*3/UL
ZZ NTE CLEAN UP: ORDERED TEST: NORMAL
ZZ NTE WITH NAME CLEAN UP: SPECIMEN SOURCE: NORMAL

## 2020-01-13 PROCEDURE — 97165 OT EVAL LOW COMPLEX 30 MIN: CPT

## 2020-01-13 PROCEDURE — 6370000000 HC RX 637 (ALT 250 FOR IP): Performed by: SURGERY

## 2020-01-13 PROCEDURE — 6360000002 HC RX W HCPCS: Performed by: SURGERY

## 2020-01-13 PROCEDURE — 36415 COLL VENOUS BLD VENIPUNCTURE: CPT

## 2020-01-13 PROCEDURE — 6370000000 HC RX 637 (ALT 250 FOR IP): Performed by: INTERNAL MEDICINE

## 2020-01-13 PROCEDURE — 85025 COMPLETE CBC W/AUTO DIFF WBC: CPT

## 2020-01-13 PROCEDURE — 80048 BASIC METABOLIC PNL TOTAL CA: CPT

## 2020-01-13 PROCEDURE — 97110 THERAPEUTIC EXERCISES: CPT | Performed by: PHYSICAL THERAPY ASSISTANT

## 2020-01-13 PROCEDURE — 94760 N-INVAS EAR/PLS OXIMETRY 1: CPT

## 2020-01-13 PROCEDURE — 2580000003 HC RX 258: Performed by: SURGERY

## 2020-01-13 PROCEDURE — 99238 HOSP IP/OBS DSCHRG MGMT 30/<: CPT | Performed by: INTERNAL MEDICINE

## 2020-01-13 RX ORDER — HYDROCODONE BITARTRATE AND ACETAMINOPHEN 5; 325 MG/1; MG/1
2 TABLET ORAL EVERY 4 HOURS PRN
Status: DISCONTINUED | OUTPATIENT
Start: 2020-01-13 | End: 2020-01-13 | Stop reason: HOSPADM

## 2020-01-13 RX ORDER — HYDROCODONE BITARTRATE AND ACETAMINOPHEN 7.5; 325 MG/1; MG/1
1 TABLET ORAL EVERY 6 HOURS PRN
Qty: 3 TABLET | Refills: 0 | Status: SHIPPED | OUTPATIENT
Start: 2020-01-13 | End: 2020-03-19 | Stop reason: SDUPTHER

## 2020-01-13 RX ADMIN — LISINOPRIL 5 MG: 5 TABLET ORAL at 08:36

## 2020-01-13 RX ADMIN — FUROSEMIDE 20 MG: 20 TABLET ORAL at 08:36

## 2020-01-13 RX ADMIN — LACTULOSE 20 G: 20 SOLUTION ORAL at 08:36

## 2020-01-13 RX ADMIN — HYDROMORPHONE HYDROCHLORIDE 0.5 MG: 1 INJECTION, SOLUTION INTRAMUSCULAR; INTRAVENOUS; SUBCUTANEOUS at 01:50

## 2020-01-13 RX ADMIN — DOCUSATE SODIUM 100 MG: 100 CAPSULE, LIQUID FILLED ORAL at 08:36

## 2020-01-13 RX ADMIN — PANTOPRAZOLE SODIUM 40 MG: 40 TABLET, DELAYED RELEASE ORAL at 05:46

## 2020-01-13 RX ADMIN — HYDROCODONE BITARTRATE AND ACETAMINOPHEN 2 TABLET: 5; 325 TABLET ORAL at 14:20

## 2020-01-13 RX ADMIN — CLOPIDOGREL BISULFATE 75 MG: 75 TABLET ORAL at 08:36

## 2020-01-13 RX ADMIN — THERA TABS 1 TABLET: TAB at 08:36

## 2020-01-13 RX ADMIN — ATORVASTATIN CALCIUM 10 MG: 10 TABLET, FILM COATED ORAL at 08:36

## 2020-01-13 RX ADMIN — ASPIRIN 81 MG 81 MG: 81 TABLET ORAL at 08:36

## 2020-01-13 RX ADMIN — HYDROCODONE BITARTRATE AND ACETAMINOPHEN 2 TABLET: 5; 325 TABLET ORAL at 01:18

## 2020-01-13 RX ADMIN — HYDROMORPHONE HYDROCHLORIDE 0.5 MG: 1 INJECTION, SOLUTION INTRAMUSCULAR; INTRAVENOUS; SUBCUTANEOUS at 02:51

## 2020-01-13 RX ADMIN — HYDROCODONE BITARTRATE AND ACETAMINOPHEN 2 TABLET: 5; 325 TABLET ORAL at 08:36

## 2020-01-13 RX ADMIN — SODIUM CHLORIDE, PRESERVATIVE FREE 10 ML: 5 INJECTION INTRAVENOUS at 08:37

## 2020-01-13 RX ADMIN — METOPROLOL TARTRATE 12.5 MG: 25 TABLET ORAL at 08:36

## 2020-01-13 RX ADMIN — ENOXAPARIN SODIUM 40 MG: 40 INJECTION SUBCUTANEOUS at 08:37

## 2020-01-13 ASSESSMENT — PAIN SCALES - GENERAL
PAINLEVEL_OUTOF10: 8
PAINLEVEL_OUTOF10: 9
PAINLEVEL_OUTOF10: 8
PAINLEVEL_OUTOF10: 7
PAINLEVEL_OUTOF10: 8
PAINLEVEL_OUTOF10: 9
PAINLEVEL_OUTOF10: 3
PAINLEVEL_OUTOF10: 8

## 2020-01-13 ASSESSMENT — PAIN DESCRIPTION - LOCATION
LOCATION: KNEE;LEG
LOCATION: LEG

## 2020-01-13 ASSESSMENT — PAIN DESCRIPTION - ORIENTATION
ORIENTATION: LEFT
ORIENTATION: LEFT

## 2020-01-13 NOTE — PROGRESS NOTES
Occupational Therapy   Occupational Therapy Initial Assessment  Date: 2020   Patient Name: Katelyn Zepeda  MRN: [id number]     : 1927    Date of Service: 2020    Discharge Recommendations:  24 hour supervision or assist, ECF with OT, Continue to assess pending progress     Assessment   Performance deficits / Impairments: Decreased functional mobility ; Decreased high-level IADLs;Decreased ADL status; Decreased endurance;Decreased strength;Decreased balance;Decreased safe awareness  Treatment Diagnosis: Generalized weakness  Prognosis: Good  Decision Making: Low Complexity  OT Education: OT Role;Plan of Care  REQUIRES OT FOLLOW UP: Yes  Safety Devices  Safety Devices in place: Yes  Type of devices: Left in chair;Call light within reach;Nurse notified           Patient Diagnosis(es): The encounter diagnosis was Cellulitis of left lower leg.     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 has a past surgical history that includes Colonoscopy (12); Cystocopy (11/15/2007); Appendectomy; Cholecystectomy (1983); Knee fusion (Left); lipoma resection; Colonoscopy (09); Colonoscopy (2008); Prostate Biopsy (Bilateral, 2009); pr egd transoral biopsy single/multiple (N/A, 7/3/2017); Upper gastrointestinal endoscopy (2015); Upper gastrointestinal endoscopy (2016); Upper gastrointestinal endoscopy (2017); pr cystourethroscopy (N/A, 2018); and Leg Surgery (Left, 2020). Treatment Diagnosis: Generalized weakness      Subjective   General  Chart Reviewed: Yes  Patient assessed for rehabilitation services?: Yes  Family / Caregiver Present: No  Referring Practitioner: MYA Tracy  Diagnosis: Cellulitis of left lower extremity  Subjective  Subjective: Pt rec'd in bed, pleasant and cooperative for OT. Pt is a 80 y.o. male with cellulitis of left lower extremity. Patient Currently in Pain: Yes  Pain Assessment  Pain Assessment: 0-10  Pain Level: 8  Pain Location: Knee;Leg  Pain Orientation: Left  Vital Signs  Patient Currently in Pain: Yes  Social/Functional History  Social/Functional History  Lives With: Alone  Type of Home: Facility(Assisted living)  Home Layout: One level  Home Access: Level entry  Bathroom Shower/Tub: Walk-in shower  Bathroom Toilet: Standard  Bathroom Equipment: Grab bars in shower, Grab bars around toilet, Built-in shower seat  Home Equipment: Rolling walker, [employer], [place]  ADL Assistance: Independent  Bath: Independent  Dressing: Independent  Toileting: Independent  Homemaking Assistance: Needs assistance  Homemaking Responsibilities: No  Ambulation Assistance: Needs assistance(Pt reports that he uses w/c to/from the dining room and within his room)  Transfer Assistance: Independent  Active : No  Occupation: Retired     Objective   Vision: Within Functional Limits(wears glasses)  Hearing: Exceptions to [facility]  Hearing Exceptions: Hard of hearing/hearing concerns    Orientation  Overall Orientation Status: Within Functional Limits  Observation/Palpation  Observation: L calf/ leg in ace wrap  Toilet Transfers  Toilet - Technique: Stand pivot  Equipment Used: Standard bedside commode  Toilet Transfer: Moderate assistance  ADL  LE Dressing: Dependent/Total  Toileting: Moderate assistance  Bed mobility  Supine to Sit: Minimal assistance  Scooting: Minimal assistance  Transfers  Stand Pivot Transfers: Minimal assistance  Sit to stand:  Moderate assistance  Stand to sit: Minimal assistance  Cognition  Overall Cognitive Status: WFL  LUE AROM (degrees)  LUE AROM : WFL  Left Hand AROM (degrees)  Left Hand AROM: WFL  RUE AROM (degrees)  RUE AROM : WFL  Right Hand AROM (degrees)  Right Hand AROM: WFL  LUE Strength  Gross LUE Strength: WFL  RUE Strength  Gross RUE Strength: WFL    Plan Plan  Times per week: 1-2    Goals  Short term goals  Time Frame for Short term goals: Duration of hospital stay  Short term goal 1: Pt will complete BSC transfer with a/e as needed with CGA  Short term goal 2: Pt will complete lower body dressing with a/e as needed with Min A       Therapy Time   Individual Concurrent Group Co-treatment   Time In 4810         Time Out 1207         Minutes 20         Timed Code Treatment Minutes: 0 Minutes       Angelina Ag OT

## 2020-01-13 NOTE — PROGRESS NOTES
study  Hypertension  Hyperlipidemia  ASCVD        EKG---1.10.2020--SR--93--PACs--LAD--RBBB         EKG--9.24.2019---SR--61--RBBB---LAD          2D ECHO--3.11.2019---mildly dilated LA--mild enlarge LV--                       globally NLVSF--NRVSF--MAC--trivial MR--CHA but opens                       well---trivial AI--TR---Grade I mild DD----LVEF ~ 55-60%         STEMI--LAD--11.28.2018----cardiogenic shock----hypotension---IABP         Cardiac catheterization---11.28.2018--10-20% mild irregularities LM--                        90% ostial LAD---KRISTEN LAD stent--10-20% LCx--50% mid                         RCA stenosis---severely reduced LVSF----LVEF ~ 20%         PVCs         Nuclear stress test---9.12.2018--no ischemia--inferior defect at rest,                        but none in stress imaging---normal wall motion---NLVSF---normal LVEF         2D ECHO---7.31.2018---moderate LVH--NLVSF--mildly dilated RA---                        NRVSF--mild MR--CHA without stenosis---trivial AI--TR--                        RVSP ~ 32 mm Hg--no pulmonary hypertension--Grade I mild DD---                        LVEF ~ 60%         ASCVD--coronary atherosclerosis native coronary vessels         EKG---3.20.2017--sinus tachycardia---109--RBBB--                          LAHB--bifascicular block           2D ECHO---7.20.2016---NAYELY---LV upper limit of normal--borderline                         concentric LVH--NLVSF--mild dilated RV---NRVSF--thickened                          MV leaflets---moderate MR--CHA--mild AR--mild TR---LVEF ~ 46 mm Hg----                         LVEF ~ 50-55%  RBBB  CKD--Stage 2   GERD  Venous insufficiency   Chronic pain syndrome   Gait-balance instability  Obesity   Tobacco abuse--quit---1.1.1998  HEARING IMPAIRMENT  Anemia   PMH:   dermatophytosis--nails, PVCs, OA--knee, colitis,              duodenal ulcer, kidney stone, elevated PSA,               Sepulvedas esophagus--no dysphagia, LLL pneumonia--              2017,

## 2020-01-13 NOTE — PLAN OF CARE
Problem: Falls - Risk of:  Goal: Will remain free from falls  Description  Will remain free from falls  1/13/2020 1020 by Laura Lawrence RN  Outcome: Ongoing  1/12/2020 2226 by Margaret Ballesteros RN  Outcome: Ongoing  Goal: Absence of physical injury  Description  Absence of physical injury  1/13/2020 1020 by Laura Lawrence RN  Outcome: Ongoing  1/12/2020 2226 by Margaret Ballesteros RN  Outcome: Ongoing     Problem: Discharge Planning:  Goal: Discharged to appropriate level of care  Description  Discharged to appropriate level of care  1/13/2020 1020 by Laura Lawrence RN  Outcome: Ongoing  1/12/2020 2226 by Margaret Ballesteros RN  Outcome: Ongoing     Problem:  Body Temperature - Imbalanced:  Goal: Ability to maintain a body temperature in the normal range will improve  Description  Ability to maintain a body temperature in the normal range will improve  1/13/2020 1020 by Laura Lawrence RN  Outcome: Ongoing  1/12/2020 2226 by Margaret Ballesteros RN  Outcome: Ongoing     Problem: Mobility - Impaired:  Goal: Mobility will improve to maximum level  Description  Mobility will improve to maximum level  1/13/2020 1020 by Laura Lawrence RN  Outcome: Ongoing  1/12/2020 2226 by Margaret Ballesteros RN  Outcome: Ongoing     Problem: Pain:  Goal: Pain level will decrease  Description  Pain level will decrease  1/13/2020 1020 by Laura Lawrence RN  Outcome: Ongoing  1/12/2020 2226 by Margaret Ballesteros RN  Outcome: Ongoing  Goal: Control of acute pain  Description  Control of acute pain  1/13/2020 1020 by Laura Lawrence RN  Outcome: Ongoing  1/12/2020 2226 by Margaret Ballesteros RN  Outcome: Ongoing  Goal: Control of chronic pain  Description  Control of chronic pain  1/13/2020 1020 by Laura Lawrence RN  Outcome: Ongoing  1/12/2020 2226 by Margaret Ballesteros RN  Outcome: Ongoing     Problem: Skin Integrity - Impaired:  Goal: Will show no infection signs and symptoms  Description  Will show no infection signs and symptoms  1/13/2020 1020 by Rocío Leslie Asif Beebe RN  Outcome: Ongoing  1/12/2020 2226 by Aliene Fleischer, RN  Outcome: Ongoing  Goal: Absence of new skin breakdown  Description  Absence of new skin breakdown  1/13/2020 1020 by Allan Martini RN  Outcome: Ongoing  1/12/2020 2226 by Aliene Fleischer, RN  Outcome: Ongoing     Problem: Pain:  Goal: Pain level will decrease  Description  Pain level will decrease  1/13/2020 1020 by Allan Martini RN  Outcome: Ongoing  1/12/2020 2226 by Aliene Fleischer, RN  Outcome: Ongoing  Goal: Control of acute pain  Description  Control of acute pain  1/13/2020 1020 by Allan Martini RN  Outcome: Ongoing  1/12/2020 2226 by Aliene Fleischer, RN  Outcome: Ongoing  Goal: Control of chronic pain  Description  Control of chronic pain  1/13/2020 1020 by Allan Martini RN  Outcome: Ongoing  1/12/2020 2226 by Aliene Fleischer, RN  Outcome: Ongoing     Problem: Risk for Impaired Skin Integrity  Goal: Tissue integrity - skin and mucous membranes  Description  Structural intactness and normal physiological function of skin and  mucous membranes.   1/13/2020 1020 by Allan Martini RN  Outcome: Ongoing  1/12/2020 2226 by Aliene Fleischer, RN  Outcome: Ongoing     Problem: IP BALANCE  Goal: LTG - Patient will maintain balance to allow for safe/functional mobility  1/13/2020 1020 by Allan Martini RN  Outcome: Ongoing  1/12/2020 2226 by Aliene Fleischer, RN  Outcome: Ongoing

## 2020-01-13 NOTE — PROGRESS NOTES
Within Functional Limits  Cognition      Objective   Bed mobility  Supine to Sit: Minimal assistance  Transfers  Sit to Stand: Minimal Assistance(x2)  Stand to sit: Minimal Assistance  Stand Pivot Transfers: Minimal Assistance  Ambulation  Ambulation?: No(Patient unable to bear any significant weight through left LE. )  Neuromuscular Education  NDT Treatment: Sitting;Standing  Balance  Posture: Fair  Sitting - Static: Good  Sitting - Dynamic: Good  Standing - Static: Poor;+  Standing - Dynamic: Poor  Exercises  Quad Sets: 10x  Gluteal Sets: 10x  Ankle Pumps: 10x  Comments: Patient reports increased pain with all performed ALANNA                        G-Code     OutComes Score                                                     AM-PAC Score             Goals  Short term goals  Time Frame for Short term goals: 1 day  Short term goal 1: Assess functional status  Long term goals  Time Frame for Long term goals : 3 days  Long term goal 1: Independent bed mobility  Long term goal 2: Transfer SBA  Long term goal 3: Gait with RW 25 ft    Plan    Plan  Times per day: Daily  Current Treatment Recommendations: Gait Training, Transfer Training  Safety Devices  Type of devices: Nurse notified, Left in chair, Patient at risk for falls, Gait belt  Restraints  Initially in place: No     Therapy Time   Individual Concurrent Group Co-treatment   Time In 1140         Time Out 1152         Minutes [address] [place] [address]

## 2020-01-14 ENCOUNTER — CARE COORDINATION (OUTPATIENT)
Dept: CASE MANAGEMENT | Age: 85
End: 2020-01-14

## 2020-01-14 PROCEDURE — 1111F DSCHRG MED/CURRENT MED MERGE: CPT | Performed by: FAMILY MEDICINE

## 2020-01-14 NOTE — DISCHARGE SUMMARY
[address]                 [address] [place] [address], [address] [place] [address]                               DISCHARGE SUMMARY    PATIENT NAME: Margarita Limon                   :        1927  MED REC NO:   [id number]                             ROOM:       0210  ACCOUNT NO:   [de-identified]                           ADMIT DATE: 01/10/2020  PROVIDER:     Karina Weaver                  DISCHARGE DATE: 2020    ATTENDING PHYSICIAN OF HOSPITALIZATION:  Shiraz Yancey MD    PERSONAL PHYSICIAN:  Parth Faustin, [facility], [facility]. The patient seen by Brody Trevizo, General Surgery. DIAGNOSES:  1. Left lower extremity cellulitis, failed outpatient therapy together  with hematoma, blister, cellulitis, superimposed on chronic left knee  pain. X-ray, left tibia and fibula, 01/10/2020, no acute osseous abnormality. Doppler ultrasound, left lower extremity, 01/10/2020, no DVT, limited  study. Status post drainage and evacuation of hematoma of the left leg,  2020, by Dr. Feroz Ramos, greater than 500 mL of blood and clot  present. 2.  Hypertension. 3.  Hyperlipidemia. 4.  Coronary artery disease. EKG, 01/10/2020, sinus rhythm, rate 93,  PACs, LAD, right bundle-branch block. A 2D echo, 2019, mildly  dilated LA, mildly enlarged LV, globally normal left ventricular  systolic function, normal right ventricular systolic function, MAC,  trivial MR, CHA but opens well, trivial AI, TR, grade 1 mild diastolic  dysfunction, LVEF 55% to 60%. ST elevation MI, LAD, 2018,  cardiogenic shock, hypotension, intra-atrial balloon pump therapy. Cardiac catheterization, 2018, 20% mild irregularities OM, 90%  ostial LAD, KRISTEN LAD stent placed, 10% to 20% left circumflex, 50% mid  RCA stenosis, severely reduced left ventricular systolic function at  that time with an LVEF of 20%. Noted improvement in 2019 and with  stents.   Right bundle-branch block, 134,  potassium 4.2, chloride 98, CO2 of 23, BUN 23, creatinine 0.7, glucose  100, calcium 9.2, GFR greater than 60. DISCHARGE INSTRUCTIONS:  FOLLOWUP:  Discharged to [facility] on 01/13/2020. CONDITION AT DISCHARGE:  Improved---fair    MEDICATION DISCONTINUED:  Vancomycin. FOLLOWING MEDICATIONS HAVE BEEN CONTINUED WITHOUT CHANGE:  Acetaminophen  (Tylenol) 650 mg p.o. q.4 hours p.r.n. pain, fever; aspirin 81 mg p.o.  daily; atorvastatin (Lipitor) 10 mg p.o. daily; Plavix (clopidogrel) 75  mg p.o. daily; Colace 100 mg p.o. daily; Vibramycin (doxycycline) 100 mg  p.o. b.i.d. for 10 days; furosemide (Lasix) 20 mg p.o. daily; Norco  (hydrocodone/APAP) 7.5/325 one p.o. q.6 hours p.r.n. pain; lactulose  (Chronulac) 30 mL p.o. b.i.d. for constipation; lisinopril 5 mg p.o.  daily; melatonin 3 mg p.o. nightly for sleep; metoprolol tartrate  (Lopressor) 12.5 mg p.o. b.i.d.; multivitamin one p.o. daily; omeprazole  (Prilosec) 40 mg p.o. q.a.m. Follow up with the patient's personal physician, Fiordaliza Gilliam, [facility]. The patient will be at [facility]. The patient to follow up with Dr. Annemarie Ludwig, General Surgery for  leg wound. Any aspect of the patient's care not discussed in the chart and/or  dictation will be addressed and treated as an outpatient. The patient's medications have been reviewed including, but not limited  to, pre-hospital, hospital and discharge medications. The patient  and/or the patient's personal representatives were specifically advised  the only medications to be taken are those set forth in the discharge  orders and no other medications should be taken. Any prior medications  not on the discharge orders are specifically discontinued.         Carina Chinchilla    D: 01/13/2020 17:34:40       T: 01/13/2020 17:38:11     [email or web address]  Job#: [id number]     Doc#: [id number]

## 2020-01-15 ENCOUNTER — OFFICE VISIT (OUTPATIENT)
Dept: PRIMARY CARE CLINIC | Age: 85
End: 2020-01-15
Payer: MEDICARE

## 2020-01-15 VITALS — DIASTOLIC BLOOD PRESSURE: 70 MMHG | TEMPERATURE: 96.9 F | HEART RATE: 76 BPM | SYSTOLIC BLOOD PRESSURE: 130 MMHG

## 2020-01-15 PROCEDURE — 99214 OFFICE O/P EST MOD 30 MIN: CPT | Performed by: FAMILY MEDICINE

## 2020-01-15 PROCEDURE — 99215 OFFICE O/P EST HI 40 MIN: CPT

## 2020-01-15 RX ORDER — DOXYCYCLINE HYCLATE 100 MG
100 TABLET ORAL 2 TIMES DAILY
COMMUNITY
End: 2020-06-30 | Stop reason: ALTCHOICE

## 2020-01-15 RX ORDER — AMOXICILLIN AND CLAVULANATE POTASSIUM 875; 125 MG/1; MG/1
1 TABLET, FILM COATED ORAL 2 TIMES DAILY
COMMUNITY
End: 2020-06-30 | Stop reason: ALTCHOICE

## 2020-01-15 ASSESSMENT — PATIENT HEALTH QUESTIONNAIRE - PHQ9
SUM OF ALL RESPONSES TO PHQ QUESTIONS 1-9: 0
SUM OF ALL RESPONSES TO PHQ QUESTIONS 1-9: 0
2. FEELING DOWN, DEPRESSED OR HOPELESS: 0
1. LITTLE INTEREST OR PLEASURE IN DOING THINGS: 0
SUM OF ALL RESPONSES TO PHQ9 QUESTIONS 1 & 2: 0

## 2020-01-15 ASSESSMENT — ENCOUNTER SYMPTOMS
GASTROINTESTINAL NEGATIVE: 1
RESPIRATORY NEGATIVE: 1
ALLERGIC/IMMUNOLOGIC NEGATIVE: 1
COLOR CHANGE: 1
EYES NEGATIVE: 1

## 2020-01-15 NOTE — PROGRESS NOTES
Extremitity performed by Olvin Boudreaux MD at [address] [facility] CYSTOURETHROSCOPY N/A 11/19/2018    CYSTO Photovaporization Prostate Greenlight Laser ([id number]) performed by Raymond Seth MD at [address] [facility] EGD TRANSORAL BIOPSY SINGLE/MULTIPLE N/A 7/3/2017    EGD BIOPSY performed by Abhi Arce MD at [facility] Bilateral 06/23/2009    Benign    UPPER GASTROINTESTINAL ENDOSCOPY  02/2/2015    Sepulveda's (Dr. Meryl Guerrero)    UPPER GASTROINTESTINAL ENDOSCOPY  04/25/2016    Lg hiatal hernia, polyp at duodenum, gastric polyp, Barretts Esophagus, GE 32    UPPER GASTROINTESTINAL ENDOSCOPY  07/03/2017    mild gastritis, Sepulveda's, Dr. Nathalie Tobias 3 years       Review of Systems   Constitutional: Negative. HENT: Positive for hearing loss. Eyes: Negative. Respiratory: Negative. Cardiovascular: Positive for leg swelling (left leg only). Gastrointestinal: Negative. Nausea: left leg. Endocrine: Negative. Genitourinary: Negative. Musculoskeletal: Positive for arthralgias (right knee>left knee) and myalgias. Skin: Positive for color change and wound. Allergic/Immunologic: Negative. Neurological: Negative. Hematological: Negative. Psychiatric/Behavioral: Negative. All other systems reviewed and are negative. Prior to Visit Medications    Medication Sig Taking?  Authorizing Provider   doxycycline hyclate (VIBRA-TABS) 100 MG tablet Take 100 mg by mouth 2 times daily Yes Historical Provider, MD   amoxicillin-clavulanate (AUGMENTIN) 875-125 MG per tablet Take 1 tablet by mouth 2 times daily Yes Historical Provider, MD   furosemide (LASIX) 20 MG tablet TAKE 1 TABLET DAILY Yes Aide Dangelo MD   clopidogrel (PLAVIX) 75 MG tablet TAKE 1 TABLET DAILY Yes Aide Dangelo MD   lactulose (CHRONULAC) 10 GM/15ML solution Take 30 mLs by mouth 2 times daily Yes Aide Dangelo MD   melatonin 3 MG TABS tablet Take 3 mg by mouth nightly Yes Historical Provider, MD   docusate sodium (COLACE) 100 MG capsule Take 100 mg by mouth daily Yes Historical Provider, MD   pantoprazole (PROTONIX) 40 MG tablet Take 1 tablet by mouth every morning (before breakfast) Yes Gwendolyn Adames MD   metoprolol tartrate (LOPRESSOR) 25 MG tablet Take 0.5 tablets by mouth 2 times daily Yes Gwendolyn Adames MD   aspirin 81 MG chewable tablet Take 1 tablet by mouth daily Yes Gwendolyn Adames MD   lisinopril (PRINIVIL;ZESTRIL) 5 MG tablet TAKE 1 TABLET DAILY Yes Gwendolyn Adames MD   atorvastatin (LIPITOR) 10 MG tablet Take 1 tablet by mouth daily Yes Gwendolyn Adames MD   Multiple Vitamins-Minerals (MULTIVITAMIN PO) Take 1 tablet by mouth daily. Yes Historical Provider, MD   HYDROcodone-acetaminophen (NORCO) 7.5-325 MG per tablet Take 1 tablet by mouth every 6 hours as needed for Pain for up to 30 days. Patient not taking: Reported on 1/15/2020  Keny Liz   acetaminophen (TYLENOL) 325 MG tablet Take 2 tablets by mouth every 4 hours as needed for Pain or Fever  Patient not taking: Reported on 1/15/2020  Keny Liz        Social History     Tobacco Use    Smoking status: Former Smoker     Packs/day: 1.00     Types: Cigars     Last attempt to quit: 1998     Years since quittin.0    Smokeless tobacco: Never Used    Tobacco comment: Former smoker (quit )- ANN Torres [facility] 3/20/17   Substance Use Topics    Alcohol use: No     Alcohol/week: 0.0 standard drinks        Vitals:    01/15/20 1518   BP: 130/70   Site: Left Upper Arm   Position: Sitting   Cuff Size: Large Adult   Pulse: 76   Temp: 96.9 °F (36.1 °C)   TempSrc: Tympanic   Weight: Comment: Unable to stand     Estimated body mass index is 25.68 kg/m² as calculated from the following:    Height as of 1/10/20: 5' 5.5\" (1.664 m). Weight as of 20: 156 lb 11.2 oz (71.1 kg). Physical Exam  Constitutional:       Appearance: Normal appearance.    HENT:      Nose: Nose normal.   Eyes:      Pupils: Pupils are equal, round, and

## 2020-01-16 LAB
CULTURE: NORMAL
CULTURE: NORMAL
Lab: NORMAL
Lab: NORMAL
SPECIMEN DESCRIPTION: NORMAL
SPECIMEN DESCRIPTION: NORMAL

## 2020-01-17 ENCOUNTER — TELEPHONE (OUTPATIENT)
Dept: FAMILY MEDICINE CLINIC | Age: 85
End: 2020-01-17

## 2020-01-17 LAB
CULTURE: NORMAL
Lab: NORMAL
SPECIMEN DESCRIPTION: NORMAL

## 2020-01-17 RX ORDER — CALCIUM ACETATE MONOHYDRATE AND ALUMINUM SULFATE TETRADECAHYDRATE 952; 1347 MG/2299MG; MG/2299MG
POWDER, FOR SOLUTION TOPICAL
Qty: 100 EACH | Refills: 4 | Status: SHIPPED | OUTPATIENT
Start: 2020-01-17 | End: 2020-02-16

## 2020-01-17 NOTE — TELEPHONE ENCOUNTER
Please advise during GO's absence. He wanted patient to change from daily dressing changes to Domeboro solution wet dressing t.i.marcella. Odella Dakin from [facility] calling stating their pharmacy does not have that and requesting a script be sent to our clinic pharmacy. Can you order? Please advise. Thank you.

## 2020-01-17 NOTE — TELEPHONE ENCOUNTER
Rafael Click at [facility] stating they can't get the \"stuff\" for pt's wet dressing at their pharmacy, please send to clinic pharmacy and call them so they can .

## 2020-01-17 NOTE — TELEPHONE ENCOUNTER
Writer called [facility] Goldberg and spoke to patients nurse Odella Dakin- Informed of patients missed appointment today with GO and yesterdays appt with Dr. Jesus Manuel Garcia for his lower left leg wound. Odella Dakin stated she was under the assumption patients appointment was cancelled today. Nurse Odella Dakin also stated patients family refusied to see Dr. Jesus Manuel Garcia and has the patient set up to see the [address] [facility] on Monday 1/20/19 @ 3PM. Nurse states she has not assessed patient yet this AM but will do so after lunch. Nurse stated patients wound has decreased redness and swelling as of yesterday and patient was feeling well but did not have any other information to report to writer. Writer requested nurse call back with assessment findings today. Odella Dakin verbalized understanding and will call when assessment in completed.

## 2020-01-18 LAB
CULTURE: NORMAL
DIRECT EXAM: NORMAL
DIRECT EXAM: NORMAL
Lab: NORMAL
SPECIMEN DESCRIPTION: NORMAL

## 2020-01-21 ENCOUNTER — CARE COORDINATION (OUTPATIENT)
Dept: CASE MANAGEMENT | Age: 85
End: 2020-01-21

## 2020-01-21 NOTE — CARE COORDINATION
Ana [facility] 45 Transitions Follow Up Call    2020    Patient: Pamela Wise  Patient : 1927   MRN: <[id number]>  Reason for Admission: LLE cellulitis   Discharge Date: 20 RARS: Readmission Risk Score: 15         Spoke with: facility nurse Garcia Saeed [id number]     Call to facility nurse who states pt doing better- saw wound care doctor yesterday- I & D now with tunneling- packing wound with NS, ABD pad, kerlex and ACE bandage   States follows up again with wound care doc on   States he is getting pain meds q4h ATC still  States doxy abx continue (another 10 days)   Denies needs  Encouraged to call writer/ CTC or providers if questions or concerns- v/u       Care Transitions Subsequent and Final Call    Subsequent and Final Calls  Do you have any ongoing symptoms?:  No  Have your medications changed?:  Yes  Patient Reports:  see note   Do you have any questions related to your medications?:  No  Do you currently have any active services?:  No  Are you currently active with any services?:  Home Health  Do you have any needs or concerns that I can assist you with?:  No  Identified Barriers:  Lack of Education  Care Transitions Interventions                          Other Interventions:             Follow Up  Future Appointments   Date Time Provider Kira Fischer   2020 11:30 AM [facility]ikel Ferrell [facility]   3/12/2020  9:30 AM Teressa Boateng DPM DPASHVIN DPP   3/24/2020 10:30 AM MD DANIEL Watkins DPP   2020  9:00 AM [facility]       Yuki eJffery RN

## 2020-01-23 RX ORDER — LISINOPRIL 5 MG/1
TABLET ORAL
Qty: 90 TABLET | Refills: 4 | Status: SHIPPED | OUTPATIENT
Start: 2020-01-23 | End: 2021-04-19

## 2020-01-28 ENCOUNTER — CARE COORDINATION (OUTPATIENT)
Dept: CASE MANAGEMENT | Age: 85
End: 2020-01-28

## 2020-01-28 NOTE — CARE COORDINATION
Ana Paula 45 Transitions Follow Up Call    2020    Patient: Shari Marks  Patient : 1927   MRN: <[id number]>  Reason for Admission:  LLE cellulitis   Discharge Date: 20 RARS: Readmission Risk Score: 14    Spoke with: Son/Bill    Reports pt is now at [facility] and is enjoying the company of others. Mostly wheelchair bound at the time but is ambulating w/ aide and assist. Pt goal is to get strong enough to visit his spouse at [place]. No falls. Tolerating daily dressing changes well and reports appropriate healing at this time. Feels current wound care is effective and pain controlled. Denies any additional needs/concerns at this time. Advised final CTN telephonic check and to reach out for any new/upcoming needs/concerns, v/u. Shares gratitude for check in calls. Care Transitions Subsequent and Final Call    Subsequent and Final Calls  Do you have any ongoing symptoms?:  Yes  Patient-reported symptoms:  Weakness  Have your medications changed?:  No  Do you have any questions related to your medications?:  No  Do you currently have any active services?:  Yes  Do you have any needs or concerns that I can assist you with?:  No  Identified Barriers: Other  Care Transitions Interventions                          Other Interventions:             Follow Up  Future Appointments   Date Time Provider Kira Fischer   3/12/2020  9:30 AM ARIELLA Boyd DPP   3/24/2020 10:30 AM MD DANIEL Arriaga DPP   2020  9:00 AM MD MANUEL Weir DPP       Francheska Merlos RN

## 2020-02-04 ENCOUNTER — HOSPITAL ENCOUNTER (OUTPATIENT)
Dept: INTERVENTIONAL RADIOLOGY/VASCULAR | Age: 85
Discharge: HOME | End: 2020-02-06
Payer: MEDICARE

## 2020-02-04 PROCEDURE — 93922 UPR/L XTREMITY ART 2 LEVELS: CPT

## 2020-03-12 ENCOUNTER — OFFICE VISIT (OUTPATIENT)
Dept: PODIATRY | Age: 85
End: 2020-03-12
Payer: MEDICARE

## 2020-03-12 VITALS
HEART RATE: 70 BPM | HEIGHT: 65 IN | SYSTOLIC BLOOD PRESSURE: 118 MMHG | BODY MASS INDEX: 26.08 KG/M2 | DIASTOLIC BLOOD PRESSURE: 68 MMHG

## 2020-03-12 PROCEDURE — 99999 PR OFFICE/OUTPT VISIT,PROCEDURE ONLY: CPT | Performed by: PODIATRIST

## 2020-03-12 PROCEDURE — 11721 DEBRIDE NAIL 6 OR MORE: CPT | Performed by: PODIATRIST

## 2020-03-12 NOTE — PROGRESS NOTES
as needed for Pain or Fever 18  Yes Zoie Mayo   Multiple Vitamins-Minerals (MULTIVITAMIN PO) Take 1 tablet by mouth daily. Yes Historical Provider, MD       Social History     Tobacco Use    Smoking status: Former Smoker     Packs/day: 1.00     Types: Cigars     Last attempt to quit: 1998     Years since quittin.2    Smokeless tobacco: Never Used    Tobacco comment: Former smoker (quit )- ANN Torres RCP 3/20/17   Substance Use Topics    Alcohol use: No     Alcohol/week: 0.0 standard drinks     Review of Systems: All 12 systems reviewed and pertinent positives noted above. Objective:  Vascular: DP and PT pulses palpable 2/4, bilateral.  CFT <3 seconds, bilateral.  Hair growth absent to the level of the digits, bilateral.  Edema present, bilateral.  Varicosities present, bilateral. Erythema absent, bilateral. Distal Rubor absent bilateral.  Temperature decreased bilateral. Hyperpigmentation present bilateral. Atrophic skin yes. Neurological: Sensation intact to light touch to level of digits, bilateral.  Protective sensation intact 10/10 sites via 5.07/10g [place] Monofilament, bilateral.  negative Tinel's, bilateral.  negative Valleix sign, bilateral.  Vibratory intact bilateral.  Reflexes Decreased bilateral.  Paresthesias negative. Dysthesias negative. Sharp/dull intact bilateral.  Integument:  Nails left 1, 2, 3, 4, 5 and right 1, 2, 3, 4, 5 thickened, dystrophic and crumbly, discolored with subungual debris. Hyperkeratotic tissue is absent. Assessment:   Diagnosis Orders   1. Atherosclerosis of native artery of both lower extremities, with unspecified presence of clinical manifestation ([address]     2. Dermatophytosis of nail         Plan:  Nails as mentioned above debrided in length and thickness. Patient advised OTC methods for treatment of the mycotic nails. Patient will follow up in 10 weeks.

## 2020-03-19 RX ORDER — HYDROCODONE BITARTRATE AND ACETAMINOPHEN 7.5; 325 MG/1; MG/1
1 TABLET ORAL EVERY 6 HOURS PRN
Qty: 3 TABLET | Refills: 0 | Status: SHIPPED | OUTPATIENT
Start: 2020-03-19 | End: 2020-04-18

## 2020-03-23 RX ORDER — ATORVASTATIN CALCIUM 10 MG/1
TABLET, FILM COATED ORAL
Qty: 90 TABLET | Refills: 3 | Status: SHIPPED | OUTPATIENT
Start: 2020-03-23 | End: 2021-03-18

## 2020-04-01 RX ORDER — LACTULOSE 10 G/15ML
SOLUTION ORAL
Qty: 1 BOTTLE | Refills: 3 | Status: SHIPPED | OUTPATIENT
Start: 2020-04-01 | End: 2020-08-25

## 2020-04-21 ENCOUNTER — TELEPHONE (OUTPATIENT)
Dept: INTERNAL MEDICINE | Age: 85
End: 2020-04-21

## 2020-04-21 RX ORDER — TRAMADOL HYDROCHLORIDE 50 MG/1
50 TABLET ORAL EVERY 8 HOURS PRN
Qty: 20 TABLET | Refills: 0 | Status: SHIPPED | OUTPATIENT
Start: 2020-04-21 | End: 2020-04-28

## 2020-06-26 ENCOUNTER — OFFICE VISIT (OUTPATIENT)
Dept: CARDIOLOGY | Age: 85
End: 2020-06-26
Payer: MEDICARE

## 2020-06-26 ENCOUNTER — HOSPITAL ENCOUNTER (OUTPATIENT)
Dept: ULTRASOUND IMAGING | Age: 85
End: 2020-06-26
Payer: MEDICARE

## 2020-06-26 ENCOUNTER — HOSPITAL ENCOUNTER (OUTPATIENT)
Dept: INTERVENTIONAL RADIOLOGY/VASCULAR | Age: 85
Discharge: HOME | End: 2020-06-28
Payer: MEDICARE

## 2020-06-26 VITALS
HEIGHT: 65 IN | SYSTOLIC BLOOD PRESSURE: 126 MMHG | BODY MASS INDEX: 26.08 KG/M2 | TEMPERATURE: 96.5 F | HEART RATE: 62 BPM | DIASTOLIC BLOOD PRESSURE: 66 MMHG | RESPIRATION RATE: 16 BRPM

## 2020-06-26 PROCEDURE — 99214 OFFICE O/P EST MOD 30 MIN: CPT

## 2020-06-26 PROCEDURE — 93005 ELECTROCARDIOGRAM TRACING: CPT | Performed by: INTERNAL MEDICINE

## 2020-06-26 PROCEDURE — 99214 OFFICE O/P EST MOD 30 MIN: CPT | Performed by: INTERNAL MEDICINE

## 2020-06-26 PROCEDURE — 93970 EXTREMITY STUDY: CPT

## 2020-06-26 PROCEDURE — 93010 ELECTROCARDIOGRAM REPORT: CPT | Performed by: INTERNAL MEDICINE

## 2020-06-26 RX ORDER — FUROSEMIDE 40 MG/1
40 TABLET ORAL DAILY
Qty: 90 TABLET | Refills: 1 | Status: SHIPPED | OUTPATIENT
Start: 2020-06-26 | End: 2021-09-20 | Stop reason: SDUPTHER

## 2020-06-26 NOTE — PROGRESS NOTES
sores or sore throat. · Cardiovascular: No chest pain, no dyspnea, no chf like symptoms  · Respiratory: No previous pulmonary problems  · Gastrointestinal: No abdominal pain, appetite loss, blood in stools. No change in bowel or bladder habits. · Genitourinary: No dysuria, trouble voiding, or hematuria. · Musculoskeletal:  No gait disturbance, No weakness or joint complaints. · Integumentary: No rash or pruritis. · Neurological: No headache, diplopia, change in muscle strength, numbness or tingling. No change in gait, balance, coordination, mood, affect, memory, mentation, behavior. · Psychiatric: No new anxiety or depression. · Endocrine: No temperature intolerance. No excessive thirst, fluid intake, or urination. No tremor. · Hematologic/Lymphatic: No abnormal bruising or bleeding, blood clots or swollen lymph nodes. · Allergic/Immunologic: No nasal congestion or hives. Vitals:    06/26/20 1347   BP: 126/66   Pulse: 62   Resp: 16   Temp: 96.5 °F (35.8 °C)     Physical exam  Vitals as above. Alert and oriented x 3. No JVD, or carotid bruits. Lungs are clear to auscultation. Heart sounds are regular, normal, no murmur. Abdomen is soft, no tenderness.   Extremities - no edema        Meds:    Current Outpatient Medications:     lactulose (CHRONULAC) 10 GM/15ML solution, TAKE 30 ML DAILY, Disp: 1 Bottle, Rfl: 3    atorvastatin (LIPITOR) 10 MG tablet, TAKE 1 TABLET DAILY, Disp: 90 tablet, Rfl: 3    lisinopril (PRINIVIL;ZESTRIL) 5 MG tablet, TAKE 1 TABLET DAILY, Disp: 90 tablet, Rfl: 4    doxycycline hyclate (VIBRA-TABS) 100 MG tablet, Take 100 mg by mouth 2 times daily, Disp: , Rfl:     furosemide (LASIX) 20 MG tablet, TAKE 1 TABLET DAILY, Disp: 90 tablet, Rfl: 4    clopidogrel (PLAVIX) 75 MG tablet, TAKE 1 TABLET DAILY, Disp: 90 tablet, Rfl: 4    melatonin 3 MG TABS tablet, Take 3 mg by mouth nightly, Disp: , Rfl:     docusate sodium (COLACE) 100 MG capsule, Take 100 mg by mouth daily, regurgitation. RVSP is 46 mm Hg. 10. No pericardial effusion.     Echo 11/2018  Technically difficult study. Normal LV size and wall thickness. Mid ti distal anteroseptal wall and Apical hypokinesis noted. Moderately reduced LV systolic function. LVEF 40%. Normal RV size and function. RVSP 31 mmHg  Normal size LA and RA. No obvious significant structural valvular abnormality noted. No significant valvular stenosis or regurgitation noted. Normal aortic root dimension. No significant pericardial effusion. No obvious intra-cardiac mass or shunt noted. Coronary angiography 11/21/2018:   LMCA: Mild irregularities 10-20%. LAD: 90% ostial stenosis reduced to 0% using 3.25x12 mm Xience stent. Post  dilated using 3.5 mm NC balloon. Patent mid segment stent  LCx: Mild irregularities 10-20%. RCA: 50% mid stenosis  LVEF by LV gram: 20%     Echo 11/23/18  Technically difficult study. Normal LV size and wall thickness. Mid ti distal anteroseptal wall and Apical hypokinesis noted. Moderately reduced LV systolic function. LVEF 40%. Echo 3/19:  Left ventricle is mildly enlarged. Global left ventricular systolic function is normal with an estimated  ejection fraction of 55 to 60 % . Grade I (mild) left ventricular diastolic dysfunction. Left atrium is mildly dilated. Aortic valve is sclerotic but opens well. Trivial aortic insufficiency. Mitral annular calcification is seen. Trivial mitral regurgitation. No significant pericardial effusion is seen. Assessment and Plan:    1. Bilateral LE edema L > R- concern for DVT. Will send for stat LE venous doppler. 2. Probable acute on chronic diastolic HF. Will increase lasix to 40 mg po qd, and BMP in 2 weeks. 3. CAD initially with Anterior STEMI s/p KRISTEN to Ostial LAD on 11/22/2018. Continue ASA, plavix, statin, BB, ACE  4. Ischemic cardiomyopathy initially with LVEF 40%. EF now recovered- EF 55-60% on Echo 3/19  5.  CAD- KRISTEN to LAD in 2012.   6. H/o bleeding ulcer with EGD showing healed ulcers. Per Dr. Benny Sidhu ok to be on ASA  7. Asymptomatic PVCs  8. HTN- well controlled   9. Obesity - encouraged diet, exercise, and discussed weight loss extensively. 10. Hyperlipidemia- LDL 30 on 10/19- continue statin. The patient is to continue heart healthy diet, weight loss and exercise as tolerated. Patient's medications and side effects were discussed. Medication refills were provided if needed. Follow up appointment timing was discussed. All questions and concerns were addressed to patient's satisfaction. The patient is to follow up in 1 month or sooner if necessary. Thank you for allowing me to participate in the care of this patient, please do not hesitate to call if you have any questions. Mary Russell DO, [facility], [facility], [facility]  [facility]. [facility]  [phone]

## 2020-06-30 ENCOUNTER — TELEPHONE (OUTPATIENT)
Dept: INTERNAL MEDICINE | Age: 85
End: 2020-06-30
Payer: MEDICARE

## 2020-06-30 PROCEDURE — G0180 MD CERTIFICATION HHA PATIENT: HCPCS | Performed by: NURSE PRACTITIONER

## 2020-06-30 RX ORDER — TRAMADOL HYDROCHLORIDE 50 MG/1
50 TABLET ORAL EVERY 8 HOURS PRN
COMMUNITY
End: 2020-07-21

## 2020-06-30 RX ORDER — HYDROCODONE BITARTRATE AND ACETAMINOPHEN 7.5; 325 MG/1; MG/1
1 TABLET ORAL EVERY 6 HOURS PRN
COMMUNITY
End: 2020-07-21 | Stop reason: SDUPTHER

## 2020-07-07 ENCOUNTER — OUTSIDE SERVICES (OUTPATIENT)
Dept: INTERNAL MEDICINE | Age: 85
End: 2020-07-07
Payer: MEDICARE

## 2020-07-07 VITALS
TEMPERATURE: 97.8 F | HEART RATE: 73 BPM | SYSTOLIC BLOOD PRESSURE: 100 MMHG | DIASTOLIC BLOOD PRESSURE: 61 MMHG | RESPIRATION RATE: 16 BRPM

## 2020-07-07 NOTE — PROGRESS NOTES
[facility]      Tj Virgilio is a 80 y.o. male resident of [place] who presents today for medical conditions/complaints as noted below. HPI:     HPI  Patient of Dr. Lo Livingston presents for face to face for home health and physical therapy. Patient has a previous diagnosis of R knee osteoarthritis, though he reports considerable pain bilateral knees. Unable to ambulate independently, currently in wheelchair. Previously evaluated by pain mgmt. Currently on PRN norco, tramadol and acetaminophen. Hypertension well controlled with lasix, lisinopril, and metoprolol. Denies chest pain or dyspnea. Stable on atorvastatin for hyperlipidemia. Continues to follow with cardiology for his CAD. Continues on plavix and aspirin. Continues on protonix for GERD and Sepulveda's esophagitis. Per Dr. Darien Mercedes most recent note, he had an EGD 07/2017 and is due for his 3 year follow up. Currently follows with urology for BPH and urinary retention. Current Outpatient Medications   Medication Sig Dispense Refill    HYDROcodone-acetaminophen (NORCO) 7.5-325 MG per tablet Take 1 tablet by mouth every 6 hours as needed for Pain (severe pain).  traMADol (ULTRAM) 50 MG tablet Take 50 mg by mouth every 8 hours as needed for Pain (moderate pain).       furosemide (LASIX) 40 MG tablet Take 1 tablet by mouth daily 90 tablet 1    lactulose (CHRONULAC) 10 GM/15ML solution TAKE 30 ML DAILY 1 Bottle 3    atorvastatin (LIPITOR) 10 MG tablet TAKE 1 TABLET DAILY 90 tablet 3    lisinopril (PRINIVIL;ZESTRIL) 5 MG tablet TAKE 1 TABLET DAILY 90 tablet 4    clopidogrel (PLAVIX) 75 MG tablet TAKE 1 TABLET DAILY 90 tablet 4    melatonin 3 MG TABS tablet Take 3 mg by mouth nightly      docusate sodium (COLACE) 100 MG capsule Take 100 mg by mouth daily      pantoprazole (PROTONIX) 40 MG tablet Take 1 tablet by mouth every morning (before breakfast) 90 tablet 3    metoprolol tartrate (LOPRESSOR) 25 MG tablet Take 0.5 tablets by mouth 2 times daily 90 tablet 3    aspirin 81 MG chewable tablet Take 1 tablet by mouth daily 90 tablet 3    acetaminophen (TYLENOL) 325 MG tablet Take 2 tablets by mouth every 4 hours as needed for Pain or Fever 120 tablet 0    Multiple Vitamins-Minerals (MULTIVITAMIN PO) Take 1 tablet by mouth daily. No current facility-administered medications for this visit. Allergies   Allergen Reactions    Codeine Nausea And Vomiting    Morphine Other (See Comments)     constipation       Health Maintenance   Topic Date Due    Annual Wellness Visit (AWV)  05/29/2019    PSA counseling  06/06/2020    Flu vaccine (1) 09/01/2020    Lipid screen  10/14/2020    Potassium monitoring  01/13/2021    Creatinine monitoring  01/13/2021    DTaP/Tdap/Td vaccine (2 - Td) 02/28/2027    Pneumococcal 65+ years Vaccine  Completed    Hepatitis A vaccine  Aged Out    Hepatitis B vaccine  Aged Out    Hib vaccine  Aged Out    Meningococcal (ACWY) vaccine  Aged Out       Subjective:      Review of Systems   Constitutional: Negative for chills and fever. HENT: Negative for congestion and sore throat. Respiratory: Negative for chest tightness and shortness of breath. Cardiovascular: Negative for chest pain and leg swelling. Gastrointestinal: Negative for diarrhea, nausea and vomiting. Genitourinary: Negative for difficulty urinating and dysuria. Musculoskeletal: Negative for gait problem and myalgias. Neurological: Negative for dizziness and headaches. Psychiatric/Behavioral: Negative for confusion and decreased concentration. Objective:     Vitals:    07/07/20 0857   BP: 100/61   Pulse: 73   Resp: 16   Temp: 97.8 °F (36.6 °C)     Physical Exam  Vitals signs and nursing note reviewed. Constitutional:       General: He is not in acute distress. Appearance: He is well-developed. HENT:      Head: Normocephalic and atraumatic.       Right Ear: External ear normal. Dr. James Bhagat. No orders of the defined types were placed in this encounter. No orders of the defined types were placed in this encounter.                 Electronically signed by TAHMINA Strong on 7/9/2020 at 9:22 AM

## 2020-07-09 ENCOUNTER — HOSPITAL ENCOUNTER (OUTPATIENT)
Age: 85
Setting detail: SPECIMEN
Discharge: HOME | End: 2020-07-09
Payer: MEDICARE

## 2020-07-09 ENCOUNTER — TELEPHONE (OUTPATIENT)
Dept: INTERNAL MEDICINE | Age: 85
End: 2020-07-09

## 2020-07-09 LAB
ANION GAP SERPL CALCULATED.3IONS-SCNC: 15 MMOL/L (ref 9–17)
BUN BLDV-MCNC: 25 MG/DL (ref 8–23)
BUN/CREAT BLD: 28 (ref 9–20)
CALCIUM SERPL-MCNC: 9.1 MG/DL (ref 8.6–10.4)
CHLORIDE BLD-SCNC: 98 MMOL/L (ref 98–107)
CO2: 26 MMOL/L (ref 20–31)
CREAT SERPL-MCNC: 0.89 MG/DL (ref 0.7–1.2)
GFR AFRICAN AMERICAN: >60 ML/MIN
GFR NON-AFRICAN AMERICAN: >60 ML/MIN
GFR SERPL CREATININE-BSD FRML MDRD: ABNORMAL ML/MIN/{1.73_M2}
GFR SERPL CREATININE-BSD FRML MDRD: ABNORMAL ML/MIN/{1.73_M2}
GLUCOSE BLD-MCNC: 146 MG/DL (ref 70–99)
POTASSIUM SERPL-SCNC: 3.8 MMOL/L (ref 3.7–5.3)
SODIUM BLD-SCNC: 139 MMOL/L (ref 135–144)

## 2020-07-09 PROCEDURE — 80048 BASIC METABOLIC PNL TOTAL CA: CPT

## 2020-07-09 ASSESSMENT — ENCOUNTER SYMPTOMS
NAUSEA: 0
SORE THROAT: 0
CHEST TIGHTNESS: 0
SHORTNESS OF BREATH: 0
DIARRHEA: 0
VOMITING: 0

## 2020-07-09 NOTE — TELEPHONE ENCOUNTER
[facility] responded- \"resident states he wants to keep seeing Girma Lester @ [facility]. \"

## 2020-07-09 NOTE — TELEPHONE ENCOUNTER
Face to face for home health and PT completed - please fax to [facility]. Please verify patient does plan to schedule a follow up with Dr. Janna Caldera since 10/2019 and does not have upcoming appointment noted in epic.

## 2020-07-21 ENCOUNTER — TELEPHONE (OUTPATIENT)
Dept: INTERNAL MEDICINE | Age: 85
End: 2020-07-21

## 2020-07-21 RX ORDER — HYDROCODONE BITARTRATE AND ACETAMINOPHEN 7.5; 325 MG/1; MG/1
1 TABLET ORAL EVERY 6 HOURS PRN
Qty: 28 TABLET | Refills: 0 | Status: SHIPPED | OUTPATIENT
Start: 2020-07-21 | End: 2020-07-28

## 2020-07-21 NOTE — TELEPHONE ENCOUNTER
Spoke with Abril STRICKLAND [facility] [place] nurse)- pt has never taken Tramadol. She will d/c order.

## 2020-07-21 NOTE — TELEPHONE ENCOUNTER
Knee pain has been worsening, in need of norco refill. Please make sure patient has not been taking tramadol - cannot take with norco, can d/c this is not taking.  [place] sent to [facility]

## 2020-07-28 ENCOUNTER — HOSPITAL ENCOUNTER (OUTPATIENT)
Age: 85
Setting detail: SPECIMEN
Discharge: HOME | End: 2020-07-28
Payer: MEDICARE

## 2020-07-28 LAB
-: ABNORMAL
AMORPHOUS: ABNORMAL
BACTERIA: ABNORMAL
BILIRUBIN URINE: NEGATIVE
CASTS UA: ABNORMAL /LPF (ref 0–2)
COLOR: ABNORMAL
COMMENT UA: ABNORMAL
CRYSTALS, UA: ABNORMAL /HPF
EPITHELIAL CELLS UA: ABNORMAL /HPF (ref 0–5)
GLUCOSE URINE: NEGATIVE
KETONES, URINE: NEGATIVE
LEUKOCYTE ESTERASE, URINE: ABNORMAL
MUCUS: ABNORMAL
NITRITE, URINE: POSITIVE
OTHER OBSERVATIONS UA: ABNORMAL
PH UA: 7 (ref 5–6)
PROTEIN UA: NEGATIVE
RBC UA: ABNORMAL /HPF (ref 0–4)
RENAL EPITHELIAL, UA: ABNORMAL /HPF
SPECIFIC GRAVITY UA: 1.02 (ref 1.01–1.02)
TRICHOMONAS: ABNORMAL
TURBIDITY: ABNORMAL
URINE HGB: ABNORMAL
UROBILINOGEN, URINE: NORMAL
WBC UA: >100 /HPF (ref 0–4)
YEAST: ABNORMAL

## 2020-07-28 PROCEDURE — 87077 CULTURE AEROBIC IDENTIFY: CPT

## 2020-07-28 PROCEDURE — 87088 URINE BACTERIA CULTURE: CPT

## 2020-07-28 PROCEDURE — 81001 URINALYSIS AUTO W/SCOPE: CPT

## 2020-07-28 PROCEDURE — 87086 URINE CULTURE/COLONY COUNT: CPT

## 2020-07-28 PROCEDURE — 87186 SC STD MICRODIL/AGAR DIL: CPT

## 2020-07-30 LAB
CULTURE: ABNORMAL
CULTURE: ABNORMAL
Lab: ABNORMAL
SPECIMEN DESCRIPTION: ABNORMAL

## 2020-07-31 ENCOUNTER — OFFICE VISIT (OUTPATIENT)
Dept: CARDIOLOGY | Age: 85
End: 2020-07-31
Payer: MEDICARE

## 2020-07-31 VITALS
BODY MASS INDEX: 29.41 KG/M2 | DIASTOLIC BLOOD PRESSURE: 50 MMHG | HEIGHT: 66 IN | WEIGHT: 183 LBS | SYSTOLIC BLOOD PRESSURE: 128 MMHG | HEART RATE: 72 BPM

## 2020-07-31 PROBLEM — I50.1 PULMONARY EDEMA CARDIAC CAUSE (HCC): Status: ACTIVE | Noted: 2020-07-31

## 2020-07-31 PROCEDURE — 99214 OFFICE O/P EST MOD 30 MIN: CPT | Performed by: INTERNAL MEDICINE

## 2020-07-31 RX ORDER — HYDROCODONE BITARTRATE AND ACETAMINOPHEN 7.5; 325 MG/1; MG/1
1 TABLET ORAL EVERY 6 HOURS PRN
COMMUNITY
End: 2020-08-11 | Stop reason: SDUPTHER

## 2020-07-31 RX ORDER — SULFAMETHOXAZOLE AND TRIMETHOPRIM 800; 160 MG/1; MG/1
TABLET ORAL 2 TIMES DAILY
COMMUNITY
Start: 2020-07-28 | End: 2020-08-11 | Stop reason: ALTCHOICE

## 2020-08-11 ENCOUNTER — TELEPHONE (OUTPATIENT)
Dept: INTERNAL MEDICINE | Age: 85
End: 2020-08-11

## 2020-08-11 ENCOUNTER — OUTSIDE SERVICES (OUTPATIENT)
Dept: INTERNAL MEDICINE | Age: 85
End: 2020-08-11
Payer: MEDICARE

## 2020-08-11 RX ORDER — DOXYCYCLINE HYCLATE 100 MG
100 TABLET ORAL 2 TIMES DAILY
Qty: 14 TABLET | Refills: 0 | Status: SHIPPED | OUTPATIENT
Start: 2020-08-11 | End: 2020-08-18

## 2020-08-11 RX ORDER — HYDROCODONE BITARTRATE AND ACETAMINOPHEN 7.5; 325 MG/1; MG/1
1 TABLET ORAL EVERY 6 HOURS PRN
Qty: 28 TABLET | Refills: 0 | Status: SHIPPED | OUTPATIENT
Start: 2020-08-11 | End: 2020-08-12 | Stop reason: SDUPTHER

## 2020-08-11 ASSESSMENT — ENCOUNTER SYMPTOMS
RHINORRHEA: 0
DIARRHEA: 0
VOMITING: 0
NAUSEA: 0
WHEEZING: 0
COUGH: 0

## 2020-08-11 NOTE — TELEPHONE ENCOUNTER
OARRS reviewed  Last fill 07/21/20 #28/7days   Patient ar Milton Noyola by Courtney Austin on 07/07/20

## 2020-08-11 NOTE — PROGRESS NOTES
TABLET DAILY 90 tablet 4    melatonin 3 MG TABS tablet Take 3 mg by mouth nightly      docusate sodium (COLACE) 100 MG capsule Take 100 mg by mouth daily      pantoprazole (PROTONIX) 40 MG tablet Take 1 tablet by mouth every morning (before breakfast) 90 tablet 3    metoprolol tartrate (LOPRESSOR) 25 MG tablet Take 0.5 tablets by mouth 2 times daily 90 tablet 3    aspirin 81 MG chewable tablet Take 1 tablet by mouth daily 90 tablet 3    acetaminophen (TYLENOL) 325 MG tablet Take 2 tablets by mouth every 4 hours as needed for Pain or Fever (Patient not taking: Reported on 7/31/2020) 120 tablet 0    Multiple Vitamins-Minerals (MULTIVITAMIN PO) Take 1 tablet by mouth daily. No current facility-administered medications for this visit. Allergies   Allergen Reactions    Codeine Nausea And Vomiting    Morphine Other (See Comments)     constipation       Health Maintenance   Topic Date Due    Annual Wellness Visit (AWV)  05/29/2019    PSA counseling  06/06/2020    Flu vaccine (1) 09/01/2020    Lipid screen  10/14/2020    Potassium monitoring  07/09/2021    Creatinine monitoring  07/09/2021    DTaP/Tdap/Td vaccine (2 - Td) 02/28/2027    Pneumococcal 65+ years Vaccine  Completed    Hepatitis A vaccine  Aged Out    Hepatitis B vaccine  Aged Out    Hib vaccine  Aged Out    Meningococcal (ACWY) vaccine  Aged Out       Subjective:      Review of Systems   Constitutional: Negative for chills and fever. HENT: Negative for congestion and rhinorrhea. Respiratory: Negative for cough and wheezing. Gastrointestinal: Negative for diarrhea, nausea and vomiting. Neurological: Negative for dizziness and headaches. Objective: There were no vitals filed for this visit. Physical Exam  Constitutional:       General: He is not in acute distress. HENT:      Head: Normocephalic and atraumatic. Nose: No rhinorrhea. Eyes:      Extraocular Movements: Extraocular movements intact. Pulmonary:      Effort: Pulmonary effort is normal. No respiratory distress. Musculoskeletal:      Comments: As this visit was conducted virtually, RN at site noted very slight warmth to the medially LLE; no tenderness was noted in the area. Area in question did note some diffuse mild erythema surrounding the blistered areas. Bilateral lower extremity edema noted, slightly worse on LLE   Neurological:      General: No focal deficit present. Mental Status: He is alert. Cranial Nerves: No cranial nerve deficit. Psychiatric:         Mood and Affect: Mood normal.         Behavior: Behavior normal.         Assessment/Plan:        1. Cellulitis of left lower extremity  - Bactrim was verified completed, EMR updated. Start doxycycline 100 mg BID x 7 days. Wound care on LLE per discussion with Yasmine Malave. Continue to monitor for any signs of compartment syndrome or DVT  - doxycycline hyclate (VIBRA-TABS) 100 MG tablet; Take 1 tablet by mouth 2 times daily for 7 days  Dispense: 14 tablet; Refill: 0    2. Osteoarthritis of both knees, unspecified osteoarthritis type  - Will refill norco as his pain is not well controlled and he is currently in need of a refill        Return if symptoms worsen or fail to improve. No orders of the defined types were placed in this encounter. Orders Placed This Encounter   Medications    doxycycline hyclate (VIBRA-TABS) 100 MG tablet     Sig: Take 1 tablet by mouth 2 times daily for 7 days     Dispense:  14 tablet     Refill:  0                 Electronically signed by TAHMINA Erickson on 8/11/2020 at 8:11 PM     Daria Hanson is a 80 y.o. male being evaluated by a Virtual Visit (video visit) encounter to address concerns as mentioned above. A caregiver was present when appropriate.  Due to this being a TeleHealth encounter (During [place] [address] public health emergency), evaluation of the following organ systems was limited: Vitals/Constitutional/EENT/Resp/CV/GI//MS/Neuro/Skin/Heme-Lymph-Imm. Pursuant to the emergency declaration under the [address] [place] [address], [address] [place] [address] and the [employer] and [employer] Act, this Virtual Visit was conducted with patient's (and/or legal guardian's) consent, to reduce the patient's risk of exposure to COVID-19 and provide necessary medical care. The patient (and/or legal guardian) has also been advised to contact this office for worsening conditions or problems, and seek emergency medical treatment and/or call 911 if deemed necessary. Patient identification was verified at the start of the visit: Yes    Total time spent for this encounter: Not billed by time    Services were provided through a video synchronous discussion virtually to substitute for in-person clinic visit. Patient and provider were located at their individual homes. --KEVIN Farooq - CNP on 8/11/2020 at 8:13 PM    An electronic signature was used to authenticate this note.

## 2020-08-12 RX ORDER — HYDROCODONE BITARTRATE AND ACETAMINOPHEN 7.5; 325 MG/1; MG/1
1 TABLET ORAL EVERY 6 HOURS PRN
Qty: 28 TABLET | Refills: 0 | Status: SHIPPED | OUTPATIENT
Start: 2020-08-12 | End: 2020-08-19

## 2020-08-20 ENCOUNTER — HOSPITAL ENCOUNTER (OUTPATIENT)
Age: 85
Discharge: HOME | End: 2020-08-20
Payer: MEDICARE

## 2020-08-20 ENCOUNTER — OUTSIDE SERVICES (OUTPATIENT)
Dept: INTERNAL MEDICINE | Age: 85
End: 2020-08-20
Payer: MEDICARE

## 2020-08-20 ENCOUNTER — TELEPHONE (OUTPATIENT)
Dept: INTERNAL MEDICINE | Age: 85
End: 2020-08-20

## 2020-08-20 VITALS
TEMPERATURE: 98.1 F | HEART RATE: 76 BPM | OXYGEN SATURATION: 97 % | RESPIRATION RATE: 16 BRPM | DIASTOLIC BLOOD PRESSURE: 73 MMHG | SYSTOLIC BLOOD PRESSURE: 152 MMHG

## 2020-08-20 PROBLEM — I50.32 CHRONIC HEART FAILURE WITH PRESERVED EJECTION FRACTION (HFPEF) (HCC): Status: ACTIVE | Noted: 2020-08-20

## 2020-08-20 LAB
ANION GAP SERPL CALCULATED.3IONS-SCNC: 12 MMOL/L (ref 9–17)
BNP INTERPRETATION: NORMAL
BUN BLDV-MCNC: 26 MG/DL (ref 8–23)
BUN/CREAT BLD: 22 (ref 9–20)
CALCIUM SERPL-MCNC: 9.1 MG/DL (ref 8.6–10.4)
CHLORIDE BLD-SCNC: 101 MMOL/L (ref 98–107)
CO2: 27 MMOL/L (ref 20–31)
CREAT SERPL-MCNC: 1.16 MG/DL (ref 0.7–1.2)
D-DIMER QUANTITATIVE: 0.52 MG/L FEU (ref 0–0.59)
GFR AFRICAN AMERICAN: >60 ML/MIN
GFR NON-AFRICAN AMERICAN: 59 ML/MIN
GFR SERPL CREATININE-BSD FRML MDRD: ABNORMAL ML/MIN/{1.73_M2}
GFR SERPL CREATININE-BSD FRML MDRD: ABNORMAL ML/MIN/{1.73_M2}
GLUCOSE BLD-MCNC: 143 MG/DL (ref 70–99)
HCT VFR BLD CALC: 41 % (ref 40.7–50.3)
HEMOGLOBIN: 13.4 G/DL (ref 13–17)
MCH RBC QN AUTO: 29.9 PG (ref 25.2–33.5)
MCHC RBC AUTO-ENTMCNC: 32.7 G/DL (ref 25.2–33.5)
MCV RBC AUTO: 91.5 FL (ref 82.6–102.9)
NRBC AUTOMATED: 0 PER 100 WBC
PDW BLD-RTO: 16.8 % (ref 11.8–14.4)
PLATELET # BLD: 239 K/UL (ref 138–453)
PMV BLD AUTO: 11 FL (ref 8.1–13.5)
POTASSIUM SERPL-SCNC: 3.9 MMOL/L (ref 3.7–5.3)
PRO-BNP: 127 PG/ML
RBC # BLD: 4.48 M/UL (ref 4.21–5.77)
SODIUM BLD-SCNC: 140 MMOL/L (ref 135–144)
WBC # BLD: 7.2 K/UL (ref 3.5–11.3)

## 2020-08-20 PROCEDURE — 85027 COMPLETE CBC AUTOMATED: CPT

## 2020-08-20 PROCEDURE — 83880 ASSAY OF NATRIURETIC PEPTIDE: CPT

## 2020-08-20 PROCEDURE — 80048 BASIC METABOLIC PNL TOTAL CA: CPT

## 2020-08-20 PROCEDURE — 85379 FIBRIN DEGRADATION QUANT: CPT

## 2020-08-20 ASSESSMENT — ENCOUNTER SYMPTOMS
COUGH: 0
NAUSEA: 0
RHINORRHEA: 0
WHEEZING: 0
VOMITING: 0
DIARRHEA: 0

## 2020-08-20 NOTE — TELEPHONE ENCOUNTER
Please fax to [facility] as follow up from visit today:    Continue lasix 40 mg QD as prescribed. Give additional lasix 20 mg QD x 3 days. Check BNP, BMP, CBC, and D-dimer. Continue to monitor BP and oxygen saturation. If any recurrence of cellulitis, notify office and will start additional antibiotic. To ER if develops and chest pain or dyspnea. If stable, will plan to follow up next week.      Electronically signed by TAHMINA Marie on 8/20/2020 at 9:01 AM

## 2020-08-20 NOTE — PROGRESS NOTES
[facility]      Yandy Tadeo is a 80 y.o. male resident of [facility] who presents today for medical conditions/complaints as noted below. HPI:     HPI   Patient presents for evaluation and management of bilateral lower extremity edema. He was evaluated last week and noted to have cellulitis, which was subsequently treated with doxycycline. While his cellulitis did improve, he has continued to have worsening of his edema. He does have a history of CHF with preserved ejection fraction. Denies chest pain or dyspnea. Oxygen saturation stable at 97% on RA. He is currently on lasix 40 mg QD. No fever, chills, nausea, vomiting or diarrhea. Current Outpatient Medications   Medication Sig Dispense Refill    furosemide (LASIX) 40 MG tablet Take 1 tablet by mouth daily 90 tablet 1    lactulose (CHRONULAC) 10 GM/15ML solution TAKE 30 ML DAILY (Patient not taking: Reported on 7/31/2020) 1 Bottle 3    atorvastatin (LIPITOR) 10 MG tablet TAKE 1 TABLET DAILY 90 tablet 3    lisinopril (PRINIVIL;ZESTRIL) 5 MG tablet TAKE 1 TABLET DAILY 90 tablet 4    clopidogrel (PLAVIX) 75 MG tablet TAKE 1 TABLET DAILY 90 tablet 4    melatonin 3 MG TABS tablet Take 3 mg by mouth nightly      docusate sodium (COLACE) 100 MG capsule Take 100 mg by mouth daily      pantoprazole (PROTONIX) 40 MG tablet Take 1 tablet by mouth every morning (before breakfast) 90 tablet 3    metoprolol tartrate (LOPRESSOR) 25 MG tablet Take 0.5 tablets by mouth 2 times daily 90 tablet 3    aspirin 81 MG chewable tablet Take 1 tablet by mouth daily 90 tablet 3    acetaminophen (TYLENOL) 325 MG tablet Take 2 tablets by mouth every 4 hours as needed for Pain or Fever (Patient not taking: Reported on 7/31/2020) 120 tablet 0    Multiple Vitamins-Minerals (MULTIVITAMIN PO) Take 1 tablet by mouth daily. No current facility-administered medications for this visit.       Allergies   Allergen Reactions    Codeine Nausea And Vomiting    Morphine Other (See Comments)     constipation       Health Maintenance   Topic Date Due    Annual Wellness Visit (AWV)  2019    PSA counseling  2020    Flu vaccine (1) 2020    Lipid screen  10/14/2020    Potassium monitoring  2021    Creatinine monitoring  2021    DTaP/Tdap/Td vaccine (2 - Td) 2027    Pneumococcal 65+ years Vaccine  Completed    Hepatitis A vaccine  Aged Out    Hepatitis B vaccine  Aged Out    Hib vaccine  Aged Out    Meningococcal (ACWY) vaccine  Aged Out       Subjective:      Review of Systems   Constitutional: Negative for chills and fever. HENT: Negative for congestion and rhinorrhea. Respiratory: Negative for cough and wheezing. Cardiovascular: Positive for leg swelling. Negative for chest pain. Gastrointestinal: Negative for diarrhea, nausea and vomiting. Neurological: Negative for dizziness and headaches. Objective:     Vitals:    20 1250   BP: (!) 152/73   Pulse: 76   Resp: 16   Temp: 98.1 °F (36.7 °C)   SpO2: 97%     Physical Exam  Musculoskeletal:      Right lower le+ Pitting Edema present. Left lower le+ Pitting Edema present. Assessment/Plan:          1. Bilateral lower extremity edema  2. Chronic heart failure with preserved ejection fraction (HFpEF) ([facility])  - Continue lasix 40 mg QD as prescribed. Give additional lasix 20 mg QD x 3 days. Check BNP, BMP, CBC, and D-dimer. Continue to monitor BP and oxygen saturation. If any recurrence of cellulitis, notify office and will start additional antibiotic. To ER if develops and chest pain or dyspnea. If stable, will plan to follow up next week. Return in about 1 week (around 2020). No orders of the defined types were placed in this encounter. No orders of the defined types were placed in this encounter.                 Electronically signed by TAHMINA Singh on 2020 at 8:40 PM

## 2020-08-21 ENCOUNTER — HOSPITAL ENCOUNTER (EMERGENCY)
Age: 85
Discharge: HOME | End: 2020-08-21
Attending: EMERGENCY MEDICINE
Payer: MEDICARE

## 2020-08-21 ENCOUNTER — APPOINTMENT (OUTPATIENT)
Dept: INTERVENTIONAL RADIOLOGY/VASCULAR | Age: 85
End: 2020-08-21
Payer: MEDICARE

## 2020-08-21 VITALS
SYSTOLIC BLOOD PRESSURE: 160 MMHG | HEIGHT: 65 IN | HEART RATE: 81 BPM | OXYGEN SATURATION: 95 % | TEMPERATURE: 97.9 F | WEIGHT: 183 LBS | DIASTOLIC BLOOD PRESSURE: 72 MMHG | RESPIRATION RATE: 14 BRPM | BODY MASS INDEX: 30.49 KG/M2

## 2020-08-21 LAB
ABSOLUTE EOS #: 0.23 K/UL (ref 0–0.44)
ABSOLUTE IMMATURE GRANULOCYTE: <0.03 K/UL (ref 0–0.3)
ABSOLUTE LYMPH #: 2 K/UL (ref 1.1–3.7)
ABSOLUTE MONO #: 1.06 K/UL (ref 0.1–1.2)
ANION GAP SERPL CALCULATED.3IONS-SCNC: 11 MMOL/L (ref 9–17)
BASOPHILS # BLD: 0 % (ref 0–2)
BASOPHILS ABSOLUTE: 0.03 K/UL (ref 0–0.2)
BNP INTERPRETATION: NORMAL
BUN BLDV-MCNC: 26 MG/DL (ref 8–23)
BUN/CREAT BLD: 25 (ref 9–20)
CALCIUM SERPL-MCNC: 9.2 MG/DL (ref 8.6–10.4)
CHLORIDE BLD-SCNC: 98 MMOL/L (ref 98–107)
CO2: 27 MMOL/L (ref 20–31)
CREAT SERPL-MCNC: 1.02 MG/DL (ref 0.7–1.2)
DIFFERENTIAL TYPE: ABNORMAL
EKG ATRIAL RATE: 70 BPM
EKG P AXIS: 79 DEGREES
EKG P-R INTERVAL: 148 MS
EKG Q-T INTERVAL: 438 MS
EKG QRS DURATION: 136 MS
EKG QTC CALCULATION (BAZETT): 473 MS
EKG R AXIS: -54 DEGREES
EKG T AXIS: 19 DEGREES
EKG VENTRICULAR RATE: 70 BPM
EOSINOPHILS RELATIVE PERCENT: 3 % (ref 1–4)
GFR AFRICAN AMERICAN: >60 ML/MIN
GFR NON-AFRICAN AMERICAN: >60 ML/MIN
GFR SERPL CREATININE-BSD FRML MDRD: ABNORMAL ML/MIN/{1.73_M2}
GFR SERPL CREATININE-BSD FRML MDRD: ABNORMAL ML/MIN/{1.73_M2}
GLUCOSE BLD-MCNC: 125 MG/DL (ref 70–99)
HCT VFR BLD CALC: 44.4 % (ref 40.7–50.3)
HEMOGLOBIN: 14.4 G/DL (ref 13–17)
IMMATURE GRANULOCYTES: 0 %
LYMPHOCYTES # BLD: 25 % (ref 24–43)
MCH RBC QN AUTO: 29.9 PG (ref 25.2–33.5)
MCHC RBC AUTO-ENTMCNC: 32.4 G/DL (ref 25.2–33.5)
MCV RBC AUTO: 92.3 FL (ref 82.6–102.9)
MONOCYTES # BLD: 13 % (ref 3–12)
NRBC AUTOMATED: 0 PER 100 WBC
PDW BLD-RTO: 16.6 % (ref 11.8–14.4)
PLATELET # BLD: 231 K/UL (ref 138–453)
PLATELET ESTIMATE: ABNORMAL
PMV BLD AUTO: 10.3 FL (ref 8.1–13.5)
POTASSIUM SERPL-SCNC: 3.9 MMOL/L (ref 3.7–5.3)
PRO-BNP: 111 PG/ML
RBC # BLD: 4.81 M/UL (ref 4.21–5.77)
RBC # BLD: ABNORMAL 10*6/UL
SEG NEUTROPHILS: 59 % (ref 36–65)
SEGMENTED NEUTROPHILS ABSOLUTE COUNT: 4.75 K/UL (ref 1.5–8.1)
SODIUM BLD-SCNC: 136 MMOL/L (ref 135–144)
TROPONIN INTERP: NORMAL
TROPONIN T: NORMAL NG/ML
TROPONIN, HIGH SENSITIVITY: 19 NG/L (ref 0–22)
WBC # BLD: 8.1 K/UL (ref 3.5–11.3)
WBC # BLD: ABNORMAL 10*3/UL

## 2020-08-21 PROCEDURE — 83880 ASSAY OF NATRIURETIC PEPTIDE: CPT

## 2020-08-21 PROCEDURE — 85025 COMPLETE CBC W/AUTO DIFF WBC: CPT

## 2020-08-21 PROCEDURE — 80048 BASIC METABOLIC PNL TOTAL CA: CPT

## 2020-08-21 PROCEDURE — 84484 ASSAY OF TROPONIN QUANT: CPT

## 2020-08-21 PROCEDURE — 99284 EMERGENCY DEPT VISIT MOD MDM: CPT

## 2020-08-21 PROCEDURE — 93005 ELECTROCARDIOGRAM TRACING: CPT | Performed by: EMERGENCY MEDICINE

## 2020-08-21 PROCEDURE — 93971 EXTREMITY STUDY: CPT

## 2020-08-21 NOTE — ED PROVIDER NOTES
[facility] ED  [address]  Phone: [phone]        Pt Name: Nataly Stevenson  MRN: [id number]  [place] 6/28/1927  Date of evaluation: 8/21/20      CHIEF COMPLAINT     Chief Complaint   Patient presents with    Leg Swelling     left lower extremity swelling with redness, ongoing wound healing with recent increase in swelling and redness to lower left leg and foot. HISTORY OF PRESENT ILLNESS  (Location/Symptom, Timing/Onset, Context/Setting, Quality, Duration, Modifying Factors, Severity.)    Nataly Stevenson is a 80 y.o. male who presents with swelling of the left lower extremity. The patient states that he had dropped an object on his left leg resulted in a wound has been going to the wound center at [facility] he is noted over the last several days he is got swelling of both legs with the left leg being worse than the right denies any chest pain no shortness of breath no fever no chills nothing he does makes his symptoms better or worse      REVIEW OF SYSTEMS    (2-9 systems for level 4, 10 or more for level 5)     Review of Systems   Cardiovascular: Positive for leg swelling. All other systems reviewed and are negative. 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 SURGICAL HISTORY      has a past surgical history that includes Colonoscopy (08/29/12); Cystocopy (11/15/2007); Appendectomy; Cholecystectomy (01/12/1983); Knee fusion (Left); lipoma resection; Colonoscopy (05/27/09); Colonoscopy (04/30/2008); Prostate Biopsy (Bilateral, 06/23/2009); pr egd transoral biopsy single/multiple (N/A, 7/3/2017); Upper gastrointestinal endoscopy (02/2/2015); Upper gastrointestinal endoscopy (04/25/2016);  Upper gastrointestinal endoscopy (07/03/2017); pr cystourethroscopy (N/A, 2018); and Leg Surgery (Left, 2020). CURRENTMEDICATIONS       Previous Medications    ACETAMINOPHEN (TYLENOL) 325 MG TABLET    Take 2 tablets by mouth every 4 hours as needed for Pain or Fever    ASPIRIN 81 MG CHEWABLE TABLET    Take 1 tablet by mouth daily    ATORVASTATIN (LIPITOR) 10 MG TABLET    TAKE 1 TABLET DAILY    CLOPIDOGREL (PLAVIX) 75 MG TABLET    TAKE 1 TABLET DAILY    DOCUSATE SODIUM (COLACE) 100 MG CAPSULE    Take 100 mg by mouth daily    FUROSEMIDE (LASIX) 40 MG TABLET    Take 1 tablet by mouth daily    LACTULOSE (CHRONULAC) 10 GM/15ML SOLUTION    TAKE 30 ML DAILY    LISINOPRIL (PRINIVIL;ZESTRIL) 5 MG TABLET    TAKE 1 TABLET DAILY    MELATONIN 3 MG TABS TABLET    Take 3 mg by mouth nightly    METOPROLOL TARTRATE (LOPRESSOR) 25 MG TABLET    Take 0.5 tablets by mouth 2 times daily    MULTIPLE VITAMINS-MINERALS (MULTIVITAMIN PO)    Take 1 tablet by mouth daily. PANTOPRAZOLE (PROTONIX) 40 MG TABLET    Take 1 tablet by mouth every morning (before breakfast)       ALLERGIES     is allergic to codeine and morphine. FAMILY HISTORY     He indicated that his mother is . He indicated that his father is . He indicated that his sister is alive. He indicated that his brother is alive. He indicated that his maternal grandmother is . He indicated that his maternal grandfather is . He indicated that his paternal grandmother is . He indicated that his paternal grandfather is . He indicated that his son is alive. family history includes Cancer in his father; Heart Disease in his paternal grandfather. SOCIAL HISTORY      reports that he quit smoking about 22 years ago. His smoking use included cigars. He smoked 1.00 pack per day. He has never used smokeless tobacco. He reports that he does not drink alcohol or use drugs.     PHYSICAL EXAM    (up to 7 for level 4, 8 or more for level 5)   INITIAL VITALS:  height is 5' 5\" Ultrasound and MRI are read by the radiologist. Gerry Mondragon radiographic images are visualized and the radiologist interpretations are reviewed as follows:     VL DUP LOWER EXTREMITY VENOUS LEFT (Final result)   Result time 08/21/20 15:29:04   Final result by Harriett Jensen MD (08/21/20 15:29:04)                 Impression:     No evidence of DVT in the left lower extremity. Narrative:     EXAMINATION:   DUPLEX VENOUS ULTRASOUND OF THE LEFT LOWER EXTREMITY     8/21/2020 2:59 pm     TECHNIQUE:   Duplex ultrasound and Doppler images were obtained of the left lower   extremity. COMPARISON:   06/26/2020     HISTORY:   ORDERING SYSTEM PROVIDED HISTORY: left leg swelling   TECHNOLOGIST PROVIDED HISTORY:   left leg swelling   Reason for Exam: lt leg edema, redness   Acuity: Acute   Type of Exam: Initial     FINDINGS:   The visualized veins of the left lower extremity are patent and free of   echogenic thrombus. The veins are normally compressible and have normal   phasic flow.  Superficial soft tissue edema in the lower leg.                    Interpretation per the Radiologist below, if available at the time of this note:        LABS:  Results for orders placed or performed during the hospital encounter of 08/21/20   Brain Natriuretic Peptide   Result Value Ref Range    Pro- <300 pg/mL    BNP Interpretation Pro-BNP Reference Range:    Troponin   Result Value Ref Range    Troponin, High Sensitivity 19 0 - 22 ng/L    Troponin T NOT REPORTED <0.03 ng/mL    Troponin Interp NOT REPORTED    CBC Auto Differential   Result Value Ref Range    WBC 8.1 3.5 - 11.3 k/uL    RBC 4.81 4.21 - 5.77 m/uL    Hemoglobin 14.4 13.0 - 17.0 g/dL    Hematocrit 44.4 40.7 - 50.3 %    MCV 92.3 82.6 - 102.9 fL    MCH 29.9 25.2 - 33.5 pg    MCHC 32.4 25.2 - 33.5 g/dL    RDW 16.6 (H) 11.8 - 14.4 %    Platelets 857 628 - 558 k/uL    MPV 10.3 8.1 - 13.5 fL    NRBC Automated 0.0 0.0 per 100 WBC    Differential Type NOT REPORTED     Seg Neutrophils 59 36 - 65 %    Lymphocytes 25 24 - 43 %    Monocytes 13 (H) 3 - 12 %    Eosinophils % 3 1 - 4 %    Basophils 0 0 - 2 %    Immature Granulocytes 0 0 %    Segs Absolute 4.75 1.50 - 8.10 k/uL    Absolute Lymph # 2.00 1. 10 - 3.70 k/uL    Absolute Mono # 1.06 0.10 - 1.20 k/uL    Absolute Eos # 0.23 0.00 - 0.44 k/uL    Basophils Absolute 0.03 0.00 - 0.20 k/uL    Absolute Immature Granulocyte <0.03 0.00 - 0.30 k/uL    WBC Morphology NOT REPORTED     RBC Morphology ANISOCYTOSIS PRESENT     Platelet Estimate NOT REPORTED    Basic Metabolic Panel   Result Value Ref Range    Glucose 125 (H) 70 - 99 mg/dL    BUN 26 (H) 8 - 23 mg/dL    CREATININE 1.02 0.70 - 1.20 mg/dL    Bun/Cre Ratio 25 (H) 9 - 20    Calcium 9.2 8.6 - 10.4 mg/dL    Sodium 136 135 - 144 mmol/L    Potassium 3.9 3.7 - 5.3 mmol/L    Chloride 98 98 - 107 mmol/L    CO2 27 20 - 31 mmol/L    Anion Gap 11 9 - 17 mmol/L    GFR Non-African American >60 >60 mL/min    GFR African American >60 >60 mL/min    GFR Comment          GFR Staging NOT REPORTED    EKG 12 Lead   Result Value Ref Range    Ventricular Rate 70 BPM    Atrial Rate 70 BPM    P-R Interval 148 ms    QRS Duration 136 ms    Q-T Interval 438 ms    QTc Calculation (Bazett) 473 ms    P Axis 79 degrees    R Axis -54 degrees    T Axis 19 degrees           EMERGENCY DEPARTMENT COURSE:   Vitals:    Vitals:    08/21/20 1439   BP: (!) 150/74   Pulse: 69   Resp: 16   Temp: 97.9 °F (36.6 °C)   TempSrc: Tympanic   SpO2: 98%   Weight: 183 lb (83 kg)   Height: 5' 5\" (1.651 m)     -------------------------  BP: (!) 150/74, Temp: 97.9 °F (36.6 °C), Pulse: 69, Resp: 16      RE-EVALUATION:  Lab work and ultrasound are all unremarkable the patient presents with dependent edema I will go ahead and apply an Ace wrap to the area and recommending that he consider support hose which the patient states that he has been talked to previously he is to continue his antibiotics as previously directed return to the ER for worsening of symptoms chest pain shortness of breath fever signs of infection or other concerns otherwise to follow-up with his family doctor within the next few days  At this time the patient is without objective evidence of an acute process requiring hospitalization or inpatient management. They have remained hemodynamically stable throughout their entire ED visit and are stable for discharge with outpatient follow-up. The patient understands that at this time there is no evidence for a more malignant underlying process, but the patient also understands that early in the process of an illness or injury, an emergency department workup can be falsely reassuring. Routine discharge counseling was given, and the patient understands that worsening, changing or persistent symptoms should prompt an immediate call or follow up with their primary physician or return to the emergency department. The importance of appropriate follow up was also discussed. I have reviewed the disposition diagnosis with the patient and or their family/guardian. I have answered their questions and given discharge instructions. They voiced understanding of these instructions and did not have any further questions or complaints. PROCEDURES:  None    FINAL IMPRESSION      1. Left leg swelling          DISPOSITION/PLAN   DISPOSITION        CONDITION ON DISPOSITION:   Stable    PATIENT REFERRED TO:  Slim Larsen MD  [address] [place] [address] Favian Foreman [place]  [phone]    Call in 2 days        DISCHARGE MEDICATIONS:  New Prescriptions    No medications on file       (Please note that portions of this note were completed with a voicerecognition program.  Efforts were made to edit the dictations but occasionally words are mis-transcribed.)    Lopez MD, F.A.C.E.P.   Attending Emergency Medicine Physician       Barb Finnegan MD  08/21/20 2695

## 2020-08-25 ENCOUNTER — OUTSIDE SERVICES (OUTPATIENT)
Dept: INTERNAL MEDICINE | Age: 85
End: 2020-08-25
Payer: MEDICARE

## 2020-08-25 VITALS
OXYGEN SATURATION: 96 % | HEART RATE: 76 BPM | RESPIRATION RATE: 18 BRPM | TEMPERATURE: 98.2 F | DIASTOLIC BLOOD PRESSURE: 70 MMHG | SYSTOLIC BLOOD PRESSURE: 128 MMHG

## 2020-08-25 RX ORDER — LACTULOSE 10 G/15ML
30 SOLUTION ORAL DAILY PRN
COMMUNITY
End: 2021-06-24

## 2020-08-25 RX ORDER — HYDROCODONE BITARTRATE AND ACETAMINOPHEN 7.5; 325 MG/1; MG/1
1 TABLET ORAL EVERY 6 HOURS PRN
COMMUNITY
End: 2020-08-27 | Stop reason: SDUPTHER

## 2020-08-25 ASSESSMENT — ENCOUNTER SYMPTOMS
DIARRHEA: 0
NAUSEA: 0
SHORTNESS OF BREATH: 0
RHINORRHEA: 0
WHEEZING: 0
VOMITING: 0

## 2020-08-25 NOTE — PROGRESS NOTES
[facility]      Fercho Alan is a 80 y.o. male resident of [facility] who presents today for medical conditions/complaints as noted below. HPI:     HPI    Patient presents for follow up from ED visit for leg swelling. This had been problematic for the past several weeks, and had recently been treated at [facility] with additional lasix as well as doxycycline for concern for cellulitis prior to his ED visit. Venous doppler showed no evidence of DVT. Labs were unremarkable. Was treated with ACE wrap advised to consider compression stockings. He did have an appointment with cardiology in July, at which his BLE edema was evaluated, but it has been consistently worse since that time. Denies chest pain or dyspnea, but legs are still painfully edematous. He did receive any additional diuretics since his ED visit. Current Outpatient Medications   Medication Sig Dispense Refill    furosemide (LASIX) 40 MG tablet Take 1 tablet by mouth daily 90 tablet 1    lactulose (CHRONULAC) 10 GM/15ML solution TAKE 30 ML DAILY (Patient not taking: Reported on 7/31/2020) 1 Bottle 3    atorvastatin (LIPITOR) 10 MG tablet TAKE 1 TABLET DAILY 90 tablet 3    lisinopril (PRINIVIL;ZESTRIL) 5 MG tablet TAKE 1 TABLET DAILY 90 tablet 4    clopidogrel (PLAVIX) 75 MG tablet TAKE 1 TABLET DAILY 90 tablet 4    melatonin 3 MG TABS tablet Take 3 mg by mouth nightly      docusate sodium (COLACE) 100 MG capsule Take 100 mg by mouth daily      pantoprazole (PROTONIX) 40 MG tablet Take 1 tablet by mouth every morning (before breakfast) 90 tablet 3    metoprolol tartrate (LOPRESSOR) 25 MG tablet Take 0.5 tablets by mouth 2 times daily 90 tablet 3    aspirin 81 MG chewable tablet Take 1 tablet by mouth daily 90 tablet 3    acetaminophen (TYLENOL) 325 MG tablet Take 2 tablets by mouth every 4 hours as needed for Pain or Fever 120 tablet 0    Multiple Vitamins-Minerals (MULTIVITAMIN PO) Take 1 tablet by mouth daily. No current facility-administered medications for this visit. Allergies   Allergen Reactions    Codeine Nausea And Vomiting    Morphine Other (See Comments)     constipation       Health Maintenance   Topic Date Due    Annual Wellness Visit (AWV)  2019    PSA counseling  2020    Flu vaccine (1) 2020    Lipid screen  10/14/2020    Potassium monitoring  2021    Creatinine monitoring  2021    DTaP/Tdap/Td vaccine (2 - Td) 2027    Pneumococcal 65+ years Vaccine  Completed    Hepatitis A vaccine  Aged Out    Hepatitis B vaccine  Aged Out    Hib vaccine  Aged Out    Meningococcal (ACWY) vaccine  Aged Out       Subjective:      Review of Systems   Constitutional: Negative for chills and fever. HENT: Negative for congestion and rhinorrhea. Respiratory: Negative for shortness of breath and wheezing. Cardiovascular: Positive for leg swelling. Negative for chest pain. Gastrointestinal: Negative for diarrhea, nausea and vomiting. Neurological: Negative for dizziness and headaches. Objective:     Vitals:    20 0808   BP: 128/70   Pulse: 76   Resp: 18   Temp: 98.2 °F (36.8 °C)   SpO2: 96%     Physical Exam  Vitals signs and nursing note reviewed. Constitutional:       General: He is not in acute distress. Appearance: He is well-developed. Cardiovascular:      Rate and Rhythm: Normal rate and regular rhythm. Pulmonary:      Effort: Pulmonary effort is normal.      Breath sounds: Normal breath sounds. Abdominal:      Palpations: Abdomen is soft. Tenderness: There is no abdominal tenderness. Musculoskeletal:      Right lower le+ Pitting Edema present. Left lower le+ Pitting Edema present. Lymphadenopathy:      Cervical: No cervical adenopathy. Skin:         Neurological:      Mental Status: He is alert. Psychiatric:         Behavior: Behavior normal.         Assessment/Plan:       1.  Bilateral lower extremity edema  - Advise follow up with cardio for worsening BLE edema. Continue lasix 40 mg QD. Give additional lasix 20 mg QD x 3 days; monitor BP. Check BMP Thursday. If no improvement in edema by Thursday and BMP stable, may consider additional lasix if unable to be evaluated by cardiology this week        Return if symptoms worsen or fail to improve. No orders of the defined types were placed in this encounter. No orders of the defined types were placed in this encounter.                 Electronically signed by TAHMINA Sin on 8/25/2020 at 3:55 PM

## 2020-08-27 ENCOUNTER — HOSPITAL ENCOUNTER (OUTPATIENT)
Age: 85
Setting detail: SPECIMEN
Discharge: HOME | End: 2020-08-27
Payer: MEDICARE

## 2020-08-27 LAB
ANION GAP SERPL CALCULATED.3IONS-SCNC: 10 MMOL/L (ref 9–17)
BUN BLDV-MCNC: 28 MG/DL (ref 8–23)
BUN/CREAT BLD: 30 (ref 9–20)
CALCIUM SERPL-MCNC: 9.6 MG/DL (ref 8.6–10.4)
CHLORIDE BLD-SCNC: 102 MMOL/L (ref 98–107)
CO2: 28 MMOL/L (ref 20–31)
CREAT SERPL-MCNC: 0.94 MG/DL (ref 0.7–1.2)
GFR AFRICAN AMERICAN: >60 ML/MIN
GFR NON-AFRICAN AMERICAN: >60 ML/MIN
GFR SERPL CREATININE-BSD FRML MDRD: ABNORMAL ML/MIN/{1.73_M2}
GFR SERPL CREATININE-BSD FRML MDRD: ABNORMAL ML/MIN/{1.73_M2}
GLUCOSE BLD-MCNC: 106 MG/DL (ref 70–99)
POTASSIUM SERPL-SCNC: 3.9 MMOL/L (ref 3.7–5.3)
SODIUM BLD-SCNC: 140 MMOL/L (ref 135–144)

## 2020-08-27 PROCEDURE — 80048 BASIC METABOLIC PNL TOTAL CA: CPT

## 2020-08-27 RX ORDER — HYDROCODONE BITARTRATE AND ACETAMINOPHEN 7.5; 325 MG/1; MG/1
1 TABLET ORAL EVERY 6 HOURS PRN
Qty: 28 TABLET | Refills: 0 | Status: SHIPPED | OUTPATIENT
Start: 2020-08-27 | End: 2020-09-03

## 2020-08-27 NOTE — TELEPHONE ENCOUNTER
Refill approved  I did send 1 week of medication - because I have not seen the patient specifically for his pain, and his pain contract is likely with his PCP Dr. Jeremiah Blanca, will need to contact Dr. Stuart Gil office for 30 day refills

## 2020-08-27 NOTE — TELEPHONE ENCOUNTER
Spoke with Kymberly Hilton at [facility]- pt needs refill of Turner to [employer]. Pt has been taking two dose almost every day. He only has 4 tablets left.   RX pended

## 2020-08-27 NOTE — TELEPHONE ENCOUNTER
Matt Andres, at [facility] , notified and aware that future refills go to the PCP.

## 2020-09-04 ENCOUNTER — HOSPITAL ENCOUNTER (OUTPATIENT)
Age: 85
Setting detail: SPECIMEN
Discharge: HOME | End: 2020-09-04
Payer: MEDICARE

## 2020-09-04 LAB
ANION GAP SERPL CALCULATED.3IONS-SCNC: 12 MMOL/L (ref 9–17)
BUN BLDV-MCNC: 27 MG/DL (ref 8–23)
BUN/CREAT BLD: 26 (ref 9–20)
CALCIUM SERPL-MCNC: 9.1 MG/DL (ref 8.6–10.4)
CHLORIDE BLD-SCNC: 100 MMOL/L (ref 98–107)
CO2: 28 MMOL/L (ref 20–31)
CREAT SERPL-MCNC: 1.05 MG/DL (ref 0.7–1.2)
GFR AFRICAN AMERICAN: >60 ML/MIN
GFR NON-AFRICAN AMERICAN: >60 ML/MIN
GFR SERPL CREATININE-BSD FRML MDRD: ABNORMAL ML/MIN/{1.73_M2}
GFR SERPL CREATININE-BSD FRML MDRD: ABNORMAL ML/MIN/{1.73_M2}
GLUCOSE BLD-MCNC: 122 MG/DL (ref 70–99)
POTASSIUM SERPL-SCNC: 3.7 MMOL/L (ref 3.7–5.3)
SODIUM BLD-SCNC: 140 MMOL/L (ref 135–144)

## 2020-09-04 PROCEDURE — 80048 BASIC METABOLIC PNL TOTAL CA: CPT

## 2020-09-15 ENCOUNTER — OUTSIDE SERVICES (OUTPATIENT)
Dept: INTERNAL MEDICINE | Age: 85
End: 2020-09-15
Payer: MEDICARE

## 2020-09-15 ENCOUNTER — TELEPHONE (OUTPATIENT)
Dept: INTERNAL MEDICINE | Age: 85
End: 2020-09-15

## 2020-09-15 VITALS
RESPIRATION RATE: 16 BRPM | HEART RATE: 75 BPM | SYSTOLIC BLOOD PRESSURE: 136 MMHG | DIASTOLIC BLOOD PRESSURE: 76 MMHG | TEMPERATURE: 97.5 F

## 2020-09-15 RX ORDER — HYDROCODONE BITARTRATE AND ACETAMINOPHEN 7.5; 325 MG/1; MG/1
1 TABLET ORAL DAILY
Qty: 30 TABLET | Refills: 0 | Status: SHIPPED | OUTPATIENT
Start: 2020-09-15 | End: 2020-09-24 | Stop reason: DRUGHIGH

## 2020-09-15 RX ORDER — HYDROCODONE BITARTRATE AND ACETAMINOPHEN 7.5; 325 MG/1; MG/1
1 TABLET ORAL EVERY 6 HOURS PRN
Qty: 28 TABLET | Refills: 0 | Status: CANCELLED | COMMUNITY
Start: 2020-09-15

## 2020-09-15 RX ORDER — HYDROCODONE BITARTRATE AND ACETAMINOPHEN 7.5; 325 MG/1; MG/1
1 TABLET ORAL EVERY 6 HOURS PRN
Qty: 28 TABLET | Refills: 0 | Status: CANCELLED | OUTPATIENT
Start: 2020-09-15 | End: 2020-09-22

## 2020-09-15 NOTE — PROGRESS NOTES
[facility] Internal Medicine  [address] [place], [address] [place]  [phone]      Minh Salmeron c/o of Other (BLE edema)      HPI:     HPI     Patient presents for evaluation and management of bilateral lower extremity edema. He has had several recurrences of this in the past several months, both with and without associated cellulitis. Recently treated with bactrim in July, then doxycycline in August. Staff continues to elevate legs and ACE wraps. He does have a history of CHF with preserved ejection fraction. Denies chest pain or dyspnea. Previously tolerated increased scheduled lasix 40 mg QD plus 20 mg QD x 3 days. Current Outpatient Medications   Medication Sig Dispense Refill    HYDROcodone-acetaminophen (NORCO) 7.5-325 MG per tablet Take 1 tablet by mouth daily for 30 days. Intended supply: 30 days 30 tablet 0    lactulose (CHRONULAC) 10 GM/15ML solution Take 30 mLs by mouth daily as needed      furosemide (LASIX) 40 MG tablet Take 1 tablet by mouth daily 90 tablet 1    atorvastatin (LIPITOR) 10 MG tablet TAKE 1 TABLET DAILY 90 tablet 3    lisinopril (PRINIVIL;ZESTRIL) 5 MG tablet TAKE 1 TABLET DAILY 90 tablet 4    clopidogrel (PLAVIX) 75 MG tablet TAKE 1 TABLET DAILY 90 tablet 4    melatonin 3 MG TABS tablet Take 3 mg by mouth nightly      docusate sodium (COLACE) 100 MG capsule Take 100 mg by mouth daily      pantoprazole (PROTONIX) 40 MG tablet Take 1 tablet by mouth every morning (before breakfast) 90 tablet 3    metoprolol tartrate (LOPRESSOR) 25 MG tablet Take 0.5 tablets by mouth 2 times daily 90 tablet 3    aspirin 81 MG chewable tablet Take 1 tablet by mouth daily 90 tablet 3    acetaminophen (TYLENOL) 325 MG tablet Take 2 tablets by mouth every 4 hours as needed for Pain or Fever 120 tablet 0    Multiple Vitamins-Minerals (MULTIVITAMIN PO) Take 1 tablet by mouth daily. No current facility-administered medications for this visit.       Allergies Allergen Reactions    Codeine Nausea And Vomiting    Morphine Other (See Comments)     constipation       Health Maintenance   Topic Date Due    Annual Wellness Visit (AWV)  05/29/2019    PSA counseling  06/06/2020    Flu vaccine (1) 09/01/2020    Lipid screen  10/14/2020    Potassium monitoring  09/04/2021    Creatinine monitoring  09/04/2021    DTaP/Tdap/Td vaccine (2 - Td) 02/28/2027    Pneumococcal 65+ years Vaccine  Completed    Hepatitis A vaccine  Aged Out    Hepatitis B vaccine  Aged Out    Hib vaccine  Aged Out    Meningococcal (ACWY) vaccine  Aged Out       Subjective:      Review of Systems   Constitutional: Negative for chills and fever. HENT: Negative for congestion and rhinorrhea. Respiratory: Negative for shortness of breath and wheezing. Cardiovascular: Positive for leg swelling. Negative for chest pain. Gastrointestinal: Negative for diarrhea, nausea and vomiting. Neurological: Negative for dizziness and headaches. Objective:     Vitals:    09/15/20 0926   BP: 136/76   Pulse: 75   Resp: 16   Temp: 97.5 °F (36.4 °C)     Physical Exam  Vitals signs and nursing note reviewed. Constitutional:       General: He is not in acute distress. Appearance: He is well-developed. Cardiovascular:      Rate and Rhythm: Normal rate and regular rhythm. Pulmonary:      Effort: Pulmonary effort is normal.      Breath sounds: Normal breath sounds. Abdominal:      Palpations: Abdomen is soft. Tenderness: There is no abdominal tenderness. Lymphadenopathy:      Cervical: No cervical adenopathy. Neurological:      Mental Status: He is alert. Psychiatric:         Behavior: Behavior normal.         Assessment/Plan:       1. Bilateral lower extremity edema  -  Continue scheduled lasix 40 mg QD plus 20 mg QD x 3 days        Return if symptoms worsen or fail to improve.     Orders Placed This Encounter   Procedures   Matthew Paz MD, Pain Management, [place]

## 2020-09-16 ASSESSMENT — ENCOUNTER SYMPTOMS
VOMITING: 0
SHORTNESS OF BREATH: 0
NAUSEA: 0
DIARRHEA: 0
RHINORRHEA: 0
WHEEZING: 0

## 2020-09-22 ENCOUNTER — OFFICE VISIT (OUTPATIENT)
Dept: PAIN MANAGEMENT | Age: 85
End: 2020-09-22
Payer: MEDICARE

## 2020-09-22 ENCOUNTER — HOSPITAL ENCOUNTER (OUTPATIENT)
Age: 85
Setting detail: SPECIMEN
Discharge: HOME | End: 2020-09-22
Payer: MEDICARE

## 2020-09-22 VITALS
HEART RATE: 78 BPM | DIASTOLIC BLOOD PRESSURE: 70 MMHG | OXYGEN SATURATION: 94 % | BODY MASS INDEX: 31.82 KG/M2 | WEIGHT: 191 LBS | HEIGHT: 65 IN | TEMPERATURE: 96.9 F | SYSTOLIC BLOOD PRESSURE: 120 MMHG

## 2020-09-22 PROCEDURE — 80307 DRUG TEST PRSMV CHEM ANLYZR: CPT

## 2020-09-22 PROCEDURE — 99214 OFFICE O/P EST MOD 30 MIN: CPT

## 2020-09-22 PROCEDURE — 99204 OFFICE O/P NEW MOD 45 MIN: CPT | Performed by: PHYSICAL MEDICINE & REHABILITATION

## 2020-09-22 RX ORDER — HYDROCODONE BITARTRATE AND ACETAMINOPHEN 5; 325 MG/1; MG/1
1 TABLET ORAL EVERY 8 HOURS PRN
Qty: 90 TABLET | Refills: 0 | Status: SHIPPED | OUTPATIENT
Start: 2020-09-22 | End: 2020-09-24 | Stop reason: SDUPTHER

## 2020-09-22 ASSESSMENT — ENCOUNTER SYMPTOMS
NAUSEA: 0
COUGH: 1
BACK PAIN: 1
CONSTIPATION: 0
ALLERGIC/IMMUNOLOGIC NEGATIVE: 1

## 2020-09-22 NOTE — PROGRESS NOTES
there pain relief from Pain Intervention: No.    How long was your pain relief from the Pain Intervention .-  Sleep:    Sleep disturbance: No   Difficultyfalling asleep:  No   Feels fatiguedmuch of the time: No   Wakes uprested: No   Awakens in themiddle of the night: No   Takes sleepingmedication: No    Mental health:    Patient feels - secondary to their current pain problems as described above. H/O depression and anxiety: No   Patient is not seeing psychologist orpsychiatrist   Abuse history? No    Employed? No  Alcohol use?: No  Tobacco use?: No  Marijuana use?: No  Illicit drug use?: No    Imaging: Reviewed available imagingin our system with the patient. No results found. Referring physician records reviewed. Review of Systems   Constitutional: Positive for fatigue. HENT: Positive for hearing loss. Eyes: Positive for visual disturbance. Respiratory: Positive for cough. Cardiovascular: Positive for leg swelling. Gastrointestinal: Negative for constipation and nausea. Endocrine: Negative. Genitourinary: Negative for difficulty urinating. Musculoskeletal: Positive for arthralgias, back pain, joint swelling and myalgias. Skin: Negative. Allergic/Immunologic: Negative. Neurological: Positive for weakness and numbness. Hematological: Negative. Psychiatric/Behavioral: Positive for sleep disturbance. All other systems reviewed and are negative. Allergies   Allergen Reactions    Codeine Nausea And Vomiting    Morphine Other (See Comments)     constipation       Outpatient Medications Prior to Visit   Medication Sig Dispense Refill    HYDROcodone-acetaminophen (NORCO) 7.5-325 MG per tablet Take 1 tablet by mouth daily for 30 days.  Intended supply: 30 days 30 tablet 0    lactulose (CHRONULAC) 10 GM/15ML solution Take 30 mLs by mouth daily as needed      furosemide (LASIX) 40 MG tablet Take 1 tablet by mouth daily 90 tablet 1    atorvastatin (LIPITOR) 10 MG tablet TAKE 1 TABLET DAILY 90 tablet 3    lisinopril (PRINIVIL;ZESTRIL) 5 MG tablet TAKE 1 TABLET DAILY 90 tablet 4    clopidogrel (PLAVIX) 75 MG tablet TAKE 1 TABLET DAILY 90 tablet 4    melatonin 3 MG TABS tablet Take 3 mg by mouth nightly      docusate sodium (COLACE) 100 MG capsule Take 100 mg by mouth daily      pantoprazole (PROTONIX) 40 MG tablet Take 1 tablet by mouth every morning (before breakfast) 90 tablet 3    metoprolol tartrate (LOPRESSOR) 25 MG tablet Take 0.5 tablets by mouth 2 times daily 90 tablet 3    aspirin 81 MG chewable tablet Take 1 tablet by mouth daily 90 tablet 3    acetaminophen (TYLENOL) 325 MG tablet Take 2 tablets by mouth every 4 hours as needed for Pain or Fever 120 tablet 0    Multiple Vitamins-Minerals (MULTIVITAMIN PO) Take 1 tablet by mouth daily. No facility-administered medications prior to visit.         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 2/2/15    EGD with biopsies    CAD (coronary artery disease)     Elevated PSA     Hyperlipidemia     Hypertension     Kidney stone     Osteoarthritis of knee     Venous insufficiency        Past Surgical History:   Procedure Laterality Date    APPENDECTOMY      CHOLECYSTECTOMY  01/12/1983    COLONOSCOPY  08/29/12    COLONOSCOPY  05/27/09    COLONOSCOPY  04/30/2008    CYSTOSCOPY  11/15/2007    with left ureteroscopy and dorsal slit     KNEE FUSION Left     LEG SURGERY Left 1/11/2020    I and D of Left Lower Extremitity performed by Olga Freedman MD at [address] [place] [address]. [place] CYSTOURETHROSCOPY N/A 11/19/2018    CYSTO Photovaporization Prostate Greenlight Laser ([id number]) performed by Hoa Perez MD at [address] [facility] EGD TRANSORAL BIOPSY SINGLE/MULTIPLE N/A 7/3/2017    EGD BIOPSY performed by Catalina Collazo MD at [address] [place] [address] Bilateral 06/23/2009    Benign    UPPER GASTROINTESTINAL ENDOSCOPY  02/2/2015    Sepulveda's (Dr. Rama Christensen)   [facility] kg)   Height: 5' 5\" (1.651 m)     Pain Score:   3    Physical Exam  Vitals reviewed: in wheelchair could not  standing transfer    Constitutional:       Appearance: He is well-developed. HENT:      Head: Normocephalic and atraumatic. Nose: Nose normal.   Neck:      Musculoskeletal: Normal range of motion and neck supple. Cardiovascular:      Pulses: Normal pulses. Comments: Warm extremities. Normal capillary refill. Pulmonary:      Effort: Pulmonary effort is normal.   Abdominal:      General: Abdomen is flat. Palpations: Abdomen is soft. Skin:     General: Skin is warm and dry. Neurological:      General: No focal deficit present. Mental Status: He is alert and oriented to person, place, and time. Cranial Nerves: No cranial nerve deficit. Sensory: No sensory deficit. Motor: No atrophy or abnormal muscle tone. Gait: Gait abnormal.      Deep Tendon Reflexes: Reflexes are normal and symmetric. Psychiatric:         Mood and Affect: Mood normal.         Speech: Speech normal.         Behavior: Behavior normal.         Right Knee Exam     Comments:  B knees 100 flexion   tender anterior joint line B      R knee has circular  1 cm skin redness  With central opening? Over  Lateral  Patella        Back Exam     Tenderness   The patient is experiencing no tenderness.      Range of Motion   Extension: normal   Flexion: normal   Lateral bend right: normal   Lateral bend left: normal   Rotation right: normal   Rotation left: normal     Muscle Strength   Right Quadriceps:  5/5   Left Quadriceps:  5/5   Right Hamstrings:  5/5   Left Hamstrings:  5/5     Tests   Straight leg raise right: negative  Straight leg raise left: negative    Other   Toe walk: normal  Heel walk: normal  Sensation: normal  Gait: normal              Labs:   Lab Results   Component Value Date    WBC 8.1 08/21/2020    HGB 14.4 08/21/2020    HCT 44.4 08/21/2020     08/21/2020    CHOL 99 10/14/2019    TRIG 143 10/14/2019    HDL 40 (L) 10/14/2019    ALT 18 01/10/2020    AST 15 01/10/2020     09/04/2020    K 3.7 09/04/2020     09/04/2020    CREATININE 1.05 09/04/2020    BUN 27 (H) 09/04/2020    CO2 28 09/04/2020    PSA 19.72 (H) 06/06/2019    INR 1.0 01/10/2020    LABA1C 5.5 11/22/2018       Assessment: This is a 80 y.o. male with the following diagnosis:     Pain Diagnoses:  No diagnosis found. Medical/ Psychological Comorbidities:  As listed in the past medical and surgical history    Functional Limitations secondary to the above problems:  Chronic painlimits function and quality of life    Plan:   Discussed will prescribe     Norco 5 /325 Three times a day  And reduce   To one or twice a day  Cannot  Place R knee injection  Due to redness of R knee     May   Look at knee injections again     2 month follow could be  Virtual  Have obtained  Contract and UDT   Will follow  1. Meds:   New Prescriptions    No medications on file      No orders of the defined types were placed in this encounter. Controlled Substances Monitoring:    OAR[place] report was reviewed for [place], [place]. Pt educated about the risks of taking opiates, including increasedsedation, constipation, slowed breathing, tolerance, dependence, and addiction. No orders of the defined types were placed in this encounter. No follow-ups on file. The patient expressed understanding of the above assessment and plan. Totaltime spent face to face with patient was 35 minutes inwhich  50% or more of the time was spent in counseling, education about risk andbenefits of the above plan, and coordination of care.

## 2020-09-23 ENCOUNTER — TELEPHONE (OUTPATIENT)
Dept: PAIN MANAGEMENT | Age: 85
End: 2020-09-23

## 2020-09-23 NOTE — TELEPHONE ENCOUNTER
pharamcy needs clarifacation on the norco, two Rx were given with different instructions.  They need to know if it needs to be PRN or every 3 hours and asking for a new Rx to be sent over

## 2020-09-23 NOTE — TELEPHONE ENCOUNTER
Called and informed the pharmacy that Dr. Jarad Orellana took over the script and the patient is to get the norco every 8 hours and the other script is to be dc

## 2020-09-24 RX ORDER — HYDROCODONE BITARTRATE AND ACETAMINOPHEN 5; 325 MG/1; MG/1
1 TABLET ORAL 3 TIMES DAILY
Qty: 90 TABLET | Refills: 0 | Status: SHIPPED | OUTPATIENT
Start: 2020-09-24 | End: 2020-10-05 | Stop reason: SDUPTHER

## 2020-09-24 NOTE — TELEPHONE ENCOUNTER
Nursing home called because the pt was told the take the norco at 7am 1pm and 7pm. But the Rx says every 8 hours. So that doesn't work with the time frame. She wanted a new Rx with TID PRN. And she can give it to him at the times they talked.

## 2020-09-24 NOTE — TELEPHONE ENCOUNTER
Writer changed script to reflect the times described. Please print and sign the script and writer will fax this for the patient.

## 2020-09-25 LAB
6-ACETYLMORPHINE, UR: NOT DETECTED
7-AMINOCLONAZEPAM, URINE: NOT DETECTED
ALPHA-OH-ALPRAZ, URINE: NOT DETECTED
ALPRAZOLAM, URINE: NOT DETECTED
AMPHETAMINES, URINE: NOT DETECTED
BARBITURATES, URINE: NOT DETECTED
BENZOYLECGONINE, UR: NOT DETECTED
BUPRENORPHINE URINE: NOT DETECTED
CARISOPRODOL, UR: NOT DETECTED
CLONAZEPAM, URINE: NOT DETECTED
CODEINE, URINE: NOT DETECTED
CREATININE URINE: 104.6 MG/DL (ref 20–400)
DIAZEPAM, URINE: NOT DETECTED
EER PAIN MGT DRUG PANEL, HIGH RES/EMIT U: NORMAL
ETHYL GLUCURONIDE UR: NOT DETECTED
FENTANYL URINE: NOT DETECTED
HYDROCODONE, URINE: PRESENT
HYDROMORPHONE, URINE: NOT DETECTED
LORAZEPAM, URINE: NOT DETECTED
MARIJUANA METAB, UR: NOT DETECTED
MDA, UR: NOT DETECTED
MDEA, EVE, UR: NOT DETECTED
MDMA URINE: NOT DETECTED
MEPERIDINE METAB, UR: NOT DETECTED
METHADONE, URINE: NOT DETECTED
METHAMPHETAMINE, URINE: NOT DETECTED
METHYLPHENIDATE: NOT DETECTED
MIDAZOLAM, URINE: NOT DETECTED
MORPHINE URINE: NOT DETECTED
NORBUPRENORPHINE, URINE: NOT DETECTED
NORDIAZEPAM, URINE: NOT DETECTED
NORFENTANYL, URINE: NOT DETECTED
NORHYDROCODONE, URINE: PRESENT
NOROXYCODONE, URINE: NOT DETECTED
NOROXYMORPHONE, URINE: NOT DETECTED
OXAZEPAM, URINE: NOT DETECTED
OXYCODONE URINE: NOT DETECTED
OXYMORPHONE, URINE: NOT DETECTED
PAIN MGT DRUG PANEL, HI RES, UR: NORMAL
PCP,URINE: NOT DETECTED
PHENTERMINE, UR: NOT DETECTED
PROPOXYPHENE, URINE: NOT DETECTED
TAPENTADOL, URINE: NOT DETECTED
TAPENTADOL-O-SULFATE, URINE: NOT DETECTED
TEMAZEPAM, URINE: NOT DETECTED
TRAMADOL, URINE: NOT DETECTED
ZOLPIDEM, URINE: NOT DETECTED

## 2020-10-05 ENCOUNTER — TELEPHONE (OUTPATIENT)
Dept: SURGERY | Age: 85
End: 2020-10-05

## 2020-10-05 RX ORDER — HYDROCODONE BITARTRATE AND ACETAMINOPHEN 5; 325 MG/1; MG/1
1 TABLET ORAL 3 TIMES DAILY
Qty: 90 TABLET | Refills: 0 | Status: SHIPPED | OUTPATIENT
Start: 2020-10-05 | End: 2020-10-26 | Stop reason: SDUPTHER

## 2020-10-05 NOTE — TELEPHONE ENCOUNTER
Susi Razo called back to our General Surgery department stating they had rec'd a telephone call in regards to patient pursue testing in regards to his Sepulveda's. Caller states patient does not wish to do anything in the future as he is 80 yr old.

## 2020-10-05 NOTE — TELEPHONE ENCOUNTER
[facility] called and stated that they did not receive the prescription. Please reprint so that the new script can be sent in via fax.

## 2020-10-26 ENCOUNTER — TELEPHONE (OUTPATIENT)
Dept: INTERNAL MEDICINE | Age: 85
End: 2020-10-26

## 2020-10-26 RX ORDER — HYDROCODONE BITARTRATE AND ACETAMINOPHEN 5; 325 MG/1; MG/1
1 TABLET ORAL 3 TIMES DAILY
Qty: 90 TABLET | Refills: 0 | Status: SHIPPED | OUTPATIENT
Start: 2020-10-26 | End: 2020-11-24

## 2020-10-26 NOTE — TELEPHONE ENCOUNTER
Fax received from [facility] scheduled an appt with Dr. Connor Alvarez November 16, 2020 at 11:00 am.

## 2020-10-26 NOTE — TELEPHONE ENCOUNTER
No response from [facility] yet. Spoke with Ángel Winslow- pt is still following with Dr. Ayad Jc.  She will let pt know he is needing an appt (last saw Dr. Ayad Jc 10/14/2019)

## 2020-10-26 NOTE — TELEPHONE ENCOUNTER
Attempted to call [facility]- no answer. Note faxed to GP. complain of right sided weakness

## 2020-10-26 NOTE — TELEPHONE ENCOUNTER
Can we please verify that patient is still following with Dr. Connor Alvarez as PCP?  Has not had a recent appointment

## 2020-10-27 ENCOUNTER — OUTSIDE SERVICES (OUTPATIENT)
Dept: INTERNAL MEDICINE | Age: 85
End: 2020-10-27
Payer: MEDICARE

## 2020-10-27 ENCOUNTER — TELEPHONE (OUTPATIENT)
Dept: INTERNAL MEDICINE | Age: 85
End: 2020-10-27

## 2020-10-27 VITALS
SYSTOLIC BLOOD PRESSURE: 141 MMHG | HEART RATE: 75 BPM | DIASTOLIC BLOOD PRESSURE: 63 MMHG | RESPIRATION RATE: 20 BRPM | TEMPERATURE: 98.1 F | OXYGEN SATURATION: 99 %

## 2020-10-27 RX ORDER — DOXYCYCLINE HYCLATE 100 MG
100 TABLET ORAL 2 TIMES DAILY
Qty: 14 TABLET | Refills: 0 | Status: SHIPPED | OUTPATIENT
Start: 2020-10-27 | End: 2020-11-03

## 2020-10-27 ASSESSMENT — ENCOUNTER SYMPTOMS
VOMITING: 0
COUGH: 0
WHEEZING: 0
NAUSEA: 0
DIARRHEA: 0
RHINORRHEA: 0

## 2020-11-05 ENCOUNTER — TELEPHONE (OUTPATIENT)
Dept: INTERNAL MEDICINE | Age: 85
End: 2020-11-05

## 2020-11-05 ENCOUNTER — HOSPITAL ENCOUNTER (OUTPATIENT)
Dept: INTERVENTIONAL RADIOLOGY/VASCULAR | Age: 85
Discharge: HOME | End: 2020-11-07
Payer: MEDICARE

## 2020-11-05 ENCOUNTER — OUTSIDE SERVICES (OUTPATIENT)
Dept: INTERNAL MEDICINE | Age: 85
End: 2020-11-05
Payer: MEDICARE

## 2020-11-05 PROCEDURE — 93971 EXTREMITY STUDY: CPT

## 2020-11-05 ASSESSMENT — ENCOUNTER SYMPTOMS
DIARRHEA: 0
WHEEZING: 0
RHINORRHEA: 0
COUGH: 0
NAUSEA: 0
VOMITING: 0

## 2020-11-05 NOTE — PROGRESS NOTES
[facility]      Krishna Reed is a 80 y.o. male resident of [facility] who presents today for medical conditions/complaints as noted below. HPI:     HPI     Patient presents for evaluation and management of left lower extremity edema. He was evaluated last week and started on doxycycline for LLE edema. While much of the warmth and erythema on his medial left lower extremity has resolved, he continues to have significant LLE edema. Notably worse on left compared to right. He denies chest pain or dyspnea. No fever, chills, nausea, vomiting, or diarrhea. Staff notes he is not compliant with elevating his legs. No recent weight gain noted. Previously tolerated increased scheduled lasix 40 mg QD plus 20 mg QD x 3 days. Current Outpatient Medications   Medication Sig Dispense Refill    HYDROcodone-acetaminophen (NORCO) 5-325 MG per tablet Take 1 tablet by mouth three times daily for 30 days.  Take 1 tablet PO at 7am, 1pm and 7 pm. 90 tablet 0    lactulose (CHRONULAC) 10 GM/15ML solution Take 30 mLs by mouth daily as needed      furosemide (LASIX) 40 MG tablet Take 1 tablet by mouth daily 90 tablet 1    atorvastatin (LIPITOR) 10 MG tablet TAKE 1 TABLET DAILY 90 tablet 3    lisinopril (PRINIVIL;ZESTRIL) 5 MG tablet TAKE 1 TABLET DAILY 90 tablet 4    clopidogrel (PLAVIX) 75 MG tablet TAKE 1 TABLET DAILY 90 tablet 4    melatonin 3 MG TABS tablet Take 3 mg by mouth nightly      docusate sodium (COLACE) 100 MG capsule Take 100 mg by mouth daily      pantoprazole (PROTONIX) 40 MG tablet Take 1 tablet by mouth every morning (before breakfast) 90 tablet 3    metoprolol tartrate (LOPRESSOR) 25 MG tablet Take 0.5 tablets by mouth 2 times daily 90 tablet 3    aspirin 81 MG chewable tablet Take 1 tablet by mouth daily 90 tablet 3    acetaminophen (TYLENOL) 325 MG tablet Take 2 tablets by mouth every 4 hours as needed for Pain or Fever 120 tablet 0    Multiple Vitamins-Minerals DVT. Continue scheduled lasix 40 mg QD plus 20 mg QD x 3 days. Monitor for any additional signs of CHF exacerbation, to ER if develops any dyspnea. Monitor pulse ox and BP at least once daily for 3 days, notify office if BP <100/60 or pulse ox less than 92%. If no improvement in edema with extra lasix as above, advise follow up with cardiology. Return if symptoms worsen or fail to improve. No orders of the defined types were placed in this encounter. No orders of the defined types were placed in this encounter.                 Electronically signed by TAHMINA Rivas on 11/6/2020 at 1:33 PM

## 2020-11-06 ENCOUNTER — OFFICE VISIT (OUTPATIENT)
Dept: CARDIOLOGY | Age: 85
End: 2020-11-06
Payer: MEDICARE

## 2020-11-06 VITALS
BODY MASS INDEX: 30.83 KG/M2 | RESPIRATION RATE: 16 BRPM | HEIGHT: 66 IN | DIASTOLIC BLOOD PRESSURE: 58 MMHG | HEART RATE: 83 BPM | SYSTOLIC BLOOD PRESSURE: 119 MMHG

## 2020-11-06 VITALS
DIASTOLIC BLOOD PRESSURE: 65 MMHG | TEMPERATURE: 98.3 F | SYSTOLIC BLOOD PRESSURE: 124 MMHG | OXYGEN SATURATION: 97 % | HEART RATE: 85 BPM | RESPIRATION RATE: 16 BRPM

## 2020-11-06 PROCEDURE — 99214 OFFICE O/P EST MOD 30 MIN: CPT

## 2020-11-06 PROCEDURE — 93005 ELECTROCARDIOGRAM TRACING: CPT | Performed by: INTERNAL MEDICINE

## 2020-11-06 PROCEDURE — 99214 OFFICE O/P EST MOD 30 MIN: CPT | Performed by: INTERNAL MEDICINE

## 2020-11-06 ASSESSMENT — ENCOUNTER SYMPTOMS
EYE ITCHING: 0
EYE DISCHARGE: 0
COLOR CHANGE: 0
ABDOMINAL PAIN: 0
CHEST TIGHTNESS: 0
BACK PAIN: 0
SHORTNESS OF BREATH: 0
NAUSEA: 0
VOMITING: 0

## 2020-11-06 NOTE — TELEPHONE ENCOUNTER
Please fax to [facility] as follow up from visit 11/05/20:    Doppler this evening was negative for DVT. Continue scheduled lasix 40 mg QD plus 20 mg QD x 3 days. Monitor for any additional signs of CHF exacerbation, to ER if develops any dyspnea. Monitor pulse ox and BP at least once daily for 3 days, notify office if BP <100/60 or pulse ox less than 92%. If no improvement in edema with extra lasix as above, advise follow up with cardiology.      Please also have them fax over vital signs as these were not recorded at time of visit yesterday, thanks

## 2020-11-06 NOTE — PROGRESS NOTES
Today's Date: 11/6/2020  Patient's Name: Manuel Mg  Patient's age: 80 y.o., 6/28/1927    Subjective: The patient is a 80y.o. year old, , male is in the office for CAD. Denies exertional chest pain or SOB, no dizziness or syncope and no palpitations. 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 Past Surgical History:   has a past surgical history that includes Colonoscopy (08/29/12); Cystocopy (11/15/2007); Appendectomy; Cholecystectomy (01/12/1983); Knee fusion (Left); lipoma resection; Colonoscopy (05/27/09); Colonoscopy (04/30/2008); Prostate Biopsy (Bilateral, 06/23/2009); pr egd transoral biopsy single/multiple (N/A, 7/3/2017); Upper gastrointestinal endoscopy (02/2/2015); Upper gastrointestinal endoscopy (04/25/2016); Upper gastrointestinal endoscopy (07/03/2017); pr cystourethroscopy (N/A, 11/19/2018); and Leg Surgery (Left, 1/11/2020). Home Medications:  Prior to Admission medications    Medication Sig Start Date End Date Taking? Authorizing Provider   HYDROcodone-acetaminophen (NORCO) 5-325 MG per tablet Take 1 tablet by mouth three times daily for 30 days.  Take 1 tablet PO at 7am, 1pm and 7 pm. 10/26/20 11/25/20  KEVIN Daley - CNP   lactulose (CHRONULAC) 10 GM/15ML solution Take 30 mLs by mouth daily as needed    Historical Provider, MD   furosemide (LASIX) 40 MG tablet Take 1 tablet by mouth daily 6/26/20   Tyron Cornejo DO   atorvastatin (LIPITOR) 10 MG tablet TAKE 1 TABLET DAILY 3/23/20   Kaushik Murillo MD   lisinopril (PRINIVIL;ZESTRIL) 5 MG tablet TAKE 1 TABLET DAILY 1/23/20   Kaushik Murillo MD   clopidogrel (PLAVIX) 75 MG tablet TAKE 1 TABLET DAILY 12/23/19   Kaushik Murillo MD   melatonin 3 MG TABS tablet Take 3 mg by mouth nightly    Historical Provider, MD   docusate sodium (COLACE) 100 MG capsule Take 100 mg by mouth daily    Historical Provider, MD   pantoprazole (PROTONIX) 40 MG tablet Take 1 tablet by mouth every morning (before breakfast) 1/23/19   Kalpana Hooks MD   metoprolol tartrate (LOPRESSOR) 25 MG tablet Take 0.5 tablets by mouth 2 times daily 1/23/19   Kalpana Hooks MD   aspirin 81 MG chewable tablet Take 1 tablet by mouth daily 1/23/19   Kalpana Hooks MD   acetaminophen (TYLENOL) 325 MG tablet Take 2 tablets by mouth every 4 hours as needed for Pain or Fever 6/12/18   Emory Pang   Multiple Vitamins-Minerals (MULTIVITAMIN PO) Take 1 tablet by mouth daily. Historical Provider, MD       Allergies:  Codeine and Morphine    Social History:   reports that he quit smoking about 22 years ago. His smoking use included cigars. He smoked 1.00 pack per day. He has never used smokeless tobacco. He reports that he does not drink alcohol or use drugs. Review of Systems:  Review of Systems   Constitutional: Negative for chills, fatigue and fever. HENT: Negative for congestion and dental problem. Eyes: Negative for discharge and itching. Respiratory: Negative for chest tightness and shortness of breath. Cardiovascular: Negative for chest pain and palpitations. Gastrointestinal: Negative for abdominal pain, nausea and vomiting. Endocrine: Negative for cold intolerance and heat intolerance. Genitourinary: Negative for difficulty urinating. Musculoskeletal: Negative for arthralgias and back pain. Skin: Negative for color change and pallor. Neurological: Negative for dizziness and syncope. Psychiatric/Behavioral: Negative for agitation and behavioral problems. Physical Exam:  /58 HR 83   Physical Exam  Constitutional:       Appearance: Normal appearance. HENT:      Head: Normocephalic and atraumatic. Mouth/Throat:      Mouth: Mucous membranes are moist.   Neck:      Musculoskeletal: No neck rigidity or muscular tenderness. Cardiovascular:      Rate and Rhythm: Normal rate and regular rhythm. Pulses: Normal pulses. Heart sounds: Normal heart sounds. No murmur. Pulmonary:      Effort: Pulmonary effort is normal. No respiratory distress. Breath sounds: Normal breath sounds. No stridor. Abdominal:      General: There is no distension. Palpations: There is no mass. Musculoskeletal:         General: Swelling present. Right lower leg: Edema present. Left lower leg: Edema present. Skin:     General: Skin is warm. Coloration: Skin is not jaundiced or pale. Neurological:      General: No focal deficit present. Mental Status: He is alert and oriented to person, place, and time. Cranial Nerves: No cranial nerve deficit. Sensory: No sensory deficit. Psychiatric:         Mood and Affect: Mood normal.         Behavior: Behavior normal.         Cardiac Data:      Labs:     CBC: No results for input(s): WBC, HGB, HCT, PLT in the last 72 hours. BMP:No results for input(s): NA, K, CO2, BUN, CREATININE, LABGLOM, GLUCOSE in the last 72 hours. PT/INR: No results for input(s): PROTIME, INR in the last 72 hours. FASTING LIPID PANEL:  Lab Results   Component Value Date    HDL 40 10/14/2019    TRIG 143 10/14/2019     LIVER PROFILE:No results for input(s): AST, ALT, LABALBU in the last 72 hours.     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

## 2020-11-24 RX ORDER — HYDROCODONE BITARTRATE AND ACETAMINOPHEN 5; 325 MG/1; MG/1
1 TABLET ORAL EVERY 8 HOURS PRN
Qty: 90 TABLET | Refills: 0 | Status: SHIPPED | OUTPATIENT
Start: 2020-11-25 | End: 2020-12-21 | Stop reason: SDUPTHER

## 2020-11-24 NOTE — TELEPHONE ENCOUNTER
O[place] checked for [place], [place], and [place]: 10/26/2020 norco 5-325mg #90. Due 11/25/2020.     Last Appt:  9/22/2020  LM for the patient to call the office back to schedule follow up

## 2020-11-27 ENCOUNTER — TELEPHONE (OUTPATIENT)
Dept: INTERNAL MEDICINE | Age: 85
End: 2020-11-27

## 2020-11-27 RX ORDER — ALBUTEROL SULFATE 2.5 MG/3ML
2.5 SOLUTION RESPIRATORY (INHALATION) EVERY 6 HOURS PRN
Qty: 120 EACH | Refills: 3 | Status: SHIPPED | OUTPATIENT
Start: 2020-11-27 | End: 2021-01-18 | Stop reason: ALTCHOICE

## 2020-11-27 RX ORDER — MELATONIN
1000 DAILY
Qty: 30 TABLET | Refills: 5 | Status: SHIPPED | OUTPATIENT
Start: 2020-11-27 | End: 2021-01-18 | Stop reason: ALTCHOICE

## 2020-11-27 RX ORDER — ASCORBIC ACID 500 MG
500 TABLET ORAL 2 TIMES DAILY
Qty: 30 TABLET | Refills: 3 | Status: SHIPPED | OUTPATIENT
Start: 2020-11-27 | End: 2021-01-18 | Stop reason: ALTCHOICE

## 2020-11-27 NOTE — TELEPHONE ENCOUNTER
Received call from [facility], patient tested positive for COVID. Currently stable.  Gave verbal orders for:    Albuterol 2.5 mg/ 3 mL 0.083% nebulizer solution 2.5  mg Q6H PRN for wheezing  Vitamin D 1000 units QD  Vitamin C 500 mg BID  Supplemental oxygen to keep saturation >93%, contact office if any desaturation on supplemental oxygen  Encourage ambulation - if not ambulatory roll side to side at least every 2 hours    Please fax to [facility] and notify Dr. Gab Engel

## 2020-11-30 ENCOUNTER — OUTSIDE SERVICES (OUTPATIENT)
Dept: INTERNAL MEDICINE | Age: 85
End: 2020-11-30
Payer: MEDICARE

## 2020-11-30 NOTE — PROGRESS NOTES
[facility]      Kimberly Nance is a 80 y.o. male resident of [facility]  who presents today for medical conditions/complaints as noted below. HPI:     HPI   Patient presents via virtual visit with assisted living staff member for evaluation and management of medical conditions as noted below. He was diagnosed with COVID-19 on 11/27/20. Was noted to be stable at the time. Started on PRN albuterol, PRN oxygen, Vit D&C, and to encourage ambulation. He has been doing very well since diagnosis. Generally asymptomatic, denies cough or dyspnea. Temperature slightly elevated at 99.2. Oxygen saturation has been stable, 98% RA. Current Outpatient Medications   Medication Sig Dispense Refill    albuterol (PROVENTIL) (2.5 MG/3ML) 0.083% nebulizer solution Take 3 mLs by nebulization every 6 hours as needed for Wheezing 120 each 3    vitamin C (ASCORBIC ACID) 500 MG tablet Take 1 tablet by mouth 2 times daily 30 tablet 3    vitamin D3 (CHOLECALCIFEROL) 25 MCG (1000 UT) TABS tablet Take 1 tablet by mouth daily 30 tablet 5    HYDROcodone-acetaminophen (NORCO) 5-325 MG per tablet Take 1 tablet by mouth every 8 hours as needed for Pain for up to 30 days.  90 tablet 0    lactulose (CHRONULAC) 10 GM/15ML solution Take 30 mLs by mouth daily as needed      furosemide (LASIX) 40 MG tablet Take 1 tablet by mouth daily 90 tablet 1    atorvastatin (LIPITOR) 10 MG tablet TAKE 1 TABLET DAILY 90 tablet 3    lisinopril (PRINIVIL;ZESTRIL) 5 MG tablet TAKE 1 TABLET DAILY 90 tablet 4    clopidogrel (PLAVIX) 75 MG tablet TAKE 1 TABLET DAILY 90 tablet 4    melatonin 3 MG TABS tablet Take 3 mg by mouth nightly      docusate sodium (COLACE) 100 MG capsule Take 100 mg by mouth daily      pantoprazole (PROTONIX) 40 MG tablet Take 1 tablet by mouth every morning (before breakfast) 90 tablet 3    metoprolol tartrate (LOPRESSOR) 25 MG tablet Take 0.5 tablets by mouth 2 times daily 90 tablet 3    aspirin 81 MG chewable tablet Take 1 tablet by mouth daily 90 tablet 3    acetaminophen (TYLENOL) 325 MG tablet Take 2 tablets by mouth every 4 hours as needed for Pain or Fever 120 tablet 0    Multiple Vitamins-Minerals (MULTIVITAMIN PO) Take 1 tablet by mouth daily. No current facility-administered medications for this visit. Allergies   Allergen Reactions    Codeine Nausea And Vomiting    Morphine Other (See Comments)     constipation       Health Maintenance   Topic Date Due    Annual Wellness Visit (AWV)  05/29/2019    PSA counseling  06/06/2020    Lipid screen  10/14/2020    Potassium monitoring  09/04/2021    Creatinine monitoring  09/04/2021    DTaP/Tdap/Td vaccine (2 - Td) 02/28/2027    Flu vaccine  Completed    Pneumococcal 65+ years Vaccine  Completed    Hepatitis A vaccine  Aged Out    Hepatitis B vaccine  Aged Out    Hib vaccine  Aged Out    Meningococcal (ACWY) vaccine  Aged Out       Subjective:      Review of Systems   Constitutional: Negative for activity change and appetite change. HENT: Negative for congestion and rhinorrhea. Respiratory: Negative for cough, shortness of breath and wheezing. Cardiovascular: Negative for chest pain and leg swelling. Gastrointestinal: Negative for diarrhea and vomiting. Genitourinary: Negative for frequency and hematuria. Musculoskeletal: Negative for back pain and myalgias. Neurological:        Unable to assess due to dementia   Psychiatric/Behavioral: Negative for agitation and behavioral problems. Objective:     Vitals:    12/01/20 1037   Temp: 99.2 °F (37.3 °C)   SpO2: 98%     Physical Exam  Constitutional:       General: He is not in acute distress. Appearance: Normal appearance. HENT:      Head: Normocephalic and atraumatic. Right Ear: External ear normal.      Left Ear: External ear normal.      Nose: No rhinorrhea. Mouth/Throat:      Lips: No lesions.    Eyes:      Conjunctiva/sclera: Conjunctivae normal. Neck:      Musculoskeletal: Normal range of motion. Vascular: No JVD. Pulmonary:      Effort: Pulmonary effort is normal. No accessory muscle usage or respiratory distress. Skin:     Coloration: Skin is not cyanotic or jaundiced. Neurological:      Mental Status: He is alert. Mental status is at baseline. Psychiatric:         Attention and Perception: Attention and perception normal.         Mood and Affect: Mood and affect normal.         Speech: Speech normal.         Behavior: Behavior is cooperative. Thought Content: Thought content normal.         Assessment/Plan:        1. COVID-19  - Stable. Continue supportive care, notify office if patient develops any symptoms. No follow-ups on file. No orders of the defined types were placed in this encounter. No orders of the defined types were placed in this encounter. Electronically signed by TAHMINA Sage on 12/1/2020 at 10:41 AM       Cortney Edmondson is a 80 y.o. male being evaluated by a Virtual Visit (video visit) encounter to address concerns as mentioned above. A caregiver was present when appropriate. Due to this being a TeleHealth encounter (During [facility]- public health emergency), evaluation of the following organ systems was limited: Vitals/Constitutional/EENT/Resp/CV/GI//MS/Neuro/Skin/Heme-Lymph-Imm. Pursuant to the emergency declaration under the [address] [place] [address], [address] [place] [address] authority and the [employer] Act, this Virtual Visit was conducted with patient's (and/or legal guardian's) consent, to reduce the patient's risk of exposure to COVID-19 and provide necessary medical care. The patient (and/or legal guardian) has also been advised to contact this office for worsening conditions or problems, and seek emergency medical treatment and/or call 911 if deemed necessary.      Patient identification was verified at the start of the visit: Yes    Total time spent for this encounter: Not billed by time    Services were provided through a video synchronous discussion virtually to substitute for in-person clinic visit. Patient and provider were located at their individual homes. --KEVIN Hutton - CNP on 12/1/2020 at 10:41 AM    An electronic signature was used to authenticate this note.

## 2020-12-01 VITALS — TEMPERATURE: 99.2 F | OXYGEN SATURATION: 98 %

## 2020-12-01 ASSESSMENT — ENCOUNTER SYMPTOMS
VOMITING: 0
COUGH: 0
BACK PAIN: 0
RHINORRHEA: 0
WHEEZING: 0
SHORTNESS OF BREATH: 0
DIARRHEA: 0

## 2020-12-21 ENCOUNTER — HOSPITAL ENCOUNTER (OUTPATIENT)
Age: 85
Setting detail: SPECIMEN
Discharge: HOME | End: 2020-12-21
Payer: MEDICARE

## 2020-12-21 ENCOUNTER — VIRTUAL VISIT (OUTPATIENT)
Dept: FAMILY MEDICINE CLINIC | Age: 85
End: 2020-12-21
Payer: MEDICARE

## 2020-12-21 PROCEDURE — 99214 OFFICE O/P EST MOD 30 MIN: CPT | Performed by: FAMILY MEDICINE

## 2020-12-21 PROCEDURE — 99211 OFF/OP EST MAY X REQ PHY/QHP: CPT

## 2020-12-21 RX ORDER — HYDROCODONE BITARTRATE AND ACETAMINOPHEN 5; 325 MG/1; MG/1
1 TABLET ORAL EVERY 8 HOURS PRN
Qty: 90 TABLET | Refills: 0 | Status: SHIPPED | OUTPATIENT
Start: 2020-12-21 | End: 2021-01-25 | Stop reason: SDUPTHER

## 2020-12-21 ASSESSMENT — PATIENT HEALTH QUESTIONNAIRE - PHQ9
1. LITTLE INTEREST OR PLEASURE IN DOING THINGS: 0
SUM OF ALL RESPONSES TO PHQ9 QUESTIONS 1 & 2: 0
SUM OF ALL RESPONSES TO PHQ QUESTIONS 1-9: 0
2. FEELING DOWN, DEPRESSED OR HOPELESS: 0

## 2020-12-21 ASSESSMENT — ENCOUNTER SYMPTOMS
GASTROINTESTINAL NEGATIVE: 1
RESPIRATORY NEGATIVE: 1
ALLERGIC/IMMUNOLOGIC NEGATIVE: 1
EYES NEGATIVE: 1

## 2020-12-21 NOTE — TELEPHONE ENCOUNTER
Last Appt:  9/22/2020  Next Appt:   Visit date not found  Med verified in [address] checked for [place], and [place]: 11/24/20 [place] 5/325 #90. Due 12/24/20      [facility] a fax from [facility] asking for a refill for the patient to be sent to [facility].

## 2020-12-21 NOTE — PROGRESS NOTES
No trouble swallowing. .     Memory slowly worsening. Remains hard of hearing. More dependent on others at present. 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 90 tablet 0    albuterol (PROVENTIL) (2.5 MG/3ML) 0.083% nebulizer solution Take 3 mLs by nebulization every 6 hours as needed for Wheezing 120 each 3    vitamin C (ASCORBIC ACID) 500 MG tablet Take 1 tablet by mouth 2 times daily 30 tablet 3    vitamin D3 (CHOLECALCIFEROL) 25 MCG (1000 UT) TABS tablet Take 1 tablet by mouth daily 30 tablet 5    lactulose (CHRONULAC) 10 GM/15ML solution Take 30 mLs by mouth daily as needed      furosemide (LASIX) 40 MG tablet Take 1 tablet by mouth daily 90 tablet 1    atorvastatin (LIPITOR) 10 MG tablet TAKE 1 TABLET DAILY 90 tablet 3    lisinopril (PRINIVIL;ZESTRIL) 5 MG tablet TAKE 1 TABLET DAILY 90 tablet 4    clopidogrel (PLAVIX) 75 MG tablet TAKE 1 TABLET DAILY 90 tablet 4    melatonin 3 MG TABS tablet Take 3 mg by mouth nightly      docusate sodium (COLACE) 100 MG capsule Take 100 mg by mouth daily      pantoprazole (PROTONIX) 40 MG tablet Take 1 tablet by mouth every morning (before breakfast) 90 tablet 3    metoprolol tartrate (LOPRESSOR) 25 MG tablet Take 0.5 tablets by mouth 2 times daily 90 tablet 3    aspirin 81 MG chewable tablet Take 1 tablet by mouth daily 90 tablet 3    acetaminophen (TYLENOL) 325 MG tablet Take 2 tablets by mouth every 4 hours as needed for Pain or Fever 120 tablet 0    Multiple Vitamins-Minerals (MULTIVITAMIN PO) Take 1 tablet by mouth daily. No current facility-administered medications for this visit. Allergies   Allergen Reactions    Codeine Nausea And Vomiting    Morphine Other (See Comments)     constipation         Review of Systems   Constitutional: Negative. Negative for appetite change and fever. HENT: Positive for hearing loss. Eyes: Negative. Respiratory: Negative. Cardiovascular: Negative. Gastrointestinal: Negative. Endocrine: Negative. Genitourinary: Negative. Musculoskeletal: Positive for arthralgias (right knee>left knee). Skin: Positive for wound. Allergic/Immunologic: Negative. Neurological: Negative. Hematological: Negative. Psychiatric/Behavioral: Positive for confusion. All other systems reviewed and are negative. Objective:   Physical Exam  Constitutional:       General: He is not in acute distress. Appearance: He is not toxic-appearing. Pulmonary:      Effort: Pulmonary effort is normal. No respiratory distress. Neurological:      Mental Status: He is alert. Mental status is at baseline. Psychiatric:         Mood and Affect: Mood normal.         Thought Content: Thought content normal.       Patient-Reported Vitals 12/21/2020   Patient-Reported Systolic 394   Patient-Reported Diastolic 68   Patient-Reported Pulse 75   Patient-Reported Temperature 97.6        There were no vitals taken for this visit. No visits with results within 1 Month(s) from this visit.    Latest known visit with results is:   Hospital Outpatient Visit on 09/22/2020   Component Date Value Ref Range Status    6-Acetylmorphine, Ur 09/22/2020 Not Detected   Final    7-Aminoclonazepam, Urine 09/22/2020 Not Detected   Final    Alpha-OH-Alpraz, Urine 09/22/2020 Not Detected   Final    Alprazolam, Urine 09/22/2020 Not Detected   Final    Amphetamines, urine 09/22/2020 Not Detected   Final    Barbiturates, Ur 09/22/2020 Not Detected   Final    Benzoylecgonine, Ur 09/22/2020 Not Detected   Final    Buprenorphine Urine 09/22/2020 Not Detected   Final    Carisoprodol, Ur 09/22/2020 Not Detected   Final    Clonazepam, Urine 09/22/2020 Not Detected   Final    Codeine, Urine 09/22/2020 Not Detected   Final    MDA, Ur 09/22/2020 Not Detected   Final    Diazepam, Urine 09/22/2020 Not Detected   Final    Ethyl Glucuronide Ur 09/22/2020 Not Detected   Final    Fentanyl, Ur 09/22/2020 Not Detected   Final    Hydrocodone, Urine 09/22/2020 Present   Final    Hydromorphone, Urine 09/22/2020 Not Detected   Final    Lorazepam, Urine 09/22/2020 Not Detected   Final    Marijuana Metab, Ur 09/22/2020 Not Detected   Final    MDEA, ANETTE, Ur 09/22/2020 Not Detected   Final    MDMA, Urine 09/22/2020 Not Detected   Final    Meperidine Metab, Ur 09/22/2020 Not Detected   Final    Methadone, Urine 09/22/2020 Not Detected   Final    Methamphetamine, Urine 09/22/2020 Not Detected   Final    Methylphenidate 09/22/2020 Not Detected   Final    Midazolam, Urine 09/22/2020 Not Detected   Final    Morphine Urine 09/22/2020 Not Detected   Final    Norbuprenorphine, Urine 09/22/2020 Not Detected   Final    Nordiazepam, Urine 09/22/2020 Not Detected   Final    Norfentanyl, Urine 09/22/2020 Not Detected   Final    NORHYDROCODONE, URINE 09/22/2020 Present   Final    Noroxycodone, Urine 09/22/2020 Not Detected   Final    NOROXYMORPHONE, URINE 09/22/2020 Not Detected   Final    Oxazepam, Urine 09/22/2020 Not Detected   Final    Oxycodone Urine 09/22/2020 Not Detected   Final    Oxymorphone, Urine 09/22/2020 Not Detected   Final    PCP, Urine 09/22/2020 Not Detected   Final    Phentermine, Ur 09/22/2020 Not Detected   Final    Propoxyphene, Urine 09/22/2020 Not Detected   Final    Tapentadol-O-Sulfate, Urine 09/22/2020 Not Detected   Final    Tapentadol, Urine 09/22/2020 Not Detected   Final    Temazepam, Urine 09/22/2020 Not Detected   Final    Tramadol, Urine 09/22/2020 Not Detected   Final    Zolpidem, Urine 09/22/2020 Not Detected   Final    Creatinine, Ur 09/22/2020 104.6  20.0 - 400.0 mg/dL Final    Pain Mgt Drug Panel, Hi Res, Ur 09/22/2020 See Below   Final    EER Pain Mgt Drug Panel, High Res/* 09/22/2020 See Note   Final     Labs from august reviewed. Assessment:       Encounter Diagnoses   Name Primary? 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 , s/p KRISTEN to ostial LAD 11/22/18. Doing well at present. Denies chest pain or change in exercise tolerance. Cont.  asa, metoprolol, lisinopril, plavix   Cont. Cardiology follow up as scheduled. Venous insufficiency. No recent issues. Edema staying away on lasix once daily. He does not use his compression hose at present. Kidney stones. No recurrent issues. Maintain fluid intake. psa elevations. Asx. Last psa slightly higher. No significant obstructive symptoms. 1-2x nocturia. Follow up level in October 2017.  11.91 (9/13)--13.01--13.79--14.61--16.73(9/16)--18.72 (10/17). 19.72 as of 6/6/19  Following with urology. Observation at present with option for biopsy if patient agreeable. Cont. Urology follow up as scheduled. Currently observing at his age. Htn: stable. Cont. Current dose of toprol and lisinopril. Hyperlipidemia: good control while on zocor. No side effect concerns at present. Plan to cont current dosing. Rec. Updated labs. oa knee on right. Progressing over the interval.    Having more trouble with stairs, but feels he can do so safely. Satisfied at present.   His left knee is mostly fused, prior trauma , no patella by history. visco supplement injections tried without perceived improvement. Not following with pain management at present. He had some significant constipation issues with norco.  Constipation better with metamucil and lactulose, working well for him. Ultram currently helping with more moderate to severe pain. He is only using once daily at present. Optioned increasing dose if needed to control pain and keep him on his feet, with attention to avoiding recurrent constipation. Doing well at present. He actually cut back a little on the lactulose. He doesn't feel ortho. Has much more to offer. He remains undecided on whether he would pursue tka if offered. He is having ambulation issues. Has committed to a rolling wheel chair ,foldable,more portable model. Still a little heavy for him to fold up and put in the car. There is an aluminum model that is much lighter and he is interested in this upgrade. He is able to get outside the home more with this rolling chair and values his autonomy/independence. Sepulveda's esophagus on egd. Symptoms improved on protonix. EGD updated 2/2/15. Biopsies with mild chronic, inactive gastritis, distal esophagus with moderate/chronic/inactive inflammation and prominent intestinal/goblet cell metaplasia (Barretts) negative for dysplasia. Repeat EGD 4/25/16: lg sliding hiatal hernia, duodenal and fundic polyps. Sepulveda's tissue up to 3cm. Biopsies of polyps normal, still has metaplasia/Sepulveda's esophagus on distal esophageal biopsies on 7/17 egd. No dysplasia. Follow up 3 yrs. Mostly asx, some food related exacerbations at night. Cont. protonix 40mg bid and follow up. No dysphagia or vomiting.  covid restrictions in the way of repeat egd, but we could get it scheduled if he is willing. colon polyps :2012 scope without recurrent polyps, mild sigmoid diverticulosis. .  Due for follow up 8/17.   He defers at present, may consider next year. Currently on plavix s/p stents 11/22/18. He defers at present. Constipation: worse since starting norco for pain control. . Adding lactulose 30cc at bedtime has helped the most.  Satisfied with results at present. .  rec. He call for continued issues. Increase fruits. Doing quite well. He actually backed down on the lactulose dose, working well for him. bph /urinary retention. He had green light laser 11/19/18. Doing better at present. No reported retention or significant issues with urination to report at present. Following urology for this as well. Has had flu shot this year in October. Rec. covid vaccine when facility gets supply.

## 2020-12-22 LAB
-: NORMAL
REASON FOR REJECTION: NORMAL
ZZ NTE CLEAN UP: ORDERED TEST: NORMAL
ZZ NTE WITH NAME CLEAN UP: SPECIMEN SOURCE: NORMAL

## 2020-12-23 ENCOUNTER — HOSPITAL ENCOUNTER (OUTPATIENT)
Age: 85
Setting detail: SPECIMEN
Discharge: HOME | End: 2020-12-23
Payer: MEDICARE

## 2020-12-23 LAB
-: ABNORMAL
AMORPHOUS: ABNORMAL
BACTERIA: ABNORMAL
BILIRUBIN URINE: NEGATIVE
CASTS UA: ABNORMAL /LPF (ref 0–2)
CASTS UA: ABNORMAL /LPF (ref 0–2)
COLOR: ABNORMAL
COMMENT UA: ABNORMAL
CRYSTALS, UA: ABNORMAL /HPF
EPITHELIAL CELLS UA: ABNORMAL /HPF (ref 0–5)
GLUCOSE URINE: NEGATIVE
KETONES, URINE: NEGATIVE
LEUKOCYTE ESTERASE, URINE: ABNORMAL
MUCUS: ABNORMAL
NITRITE, URINE: POSITIVE
OTHER OBSERVATIONS UA: ABNORMAL
PH UA: 5.5 (ref 5–6)
PROTEIN UA: NEGATIVE
RBC UA: ABNORMAL /HPF (ref 0–4)
RENAL EPITHELIAL, UA: ABNORMAL /HPF
SPECIFIC GRAVITY UA: 1.02 (ref 1.01–1.02)
TRICHOMONAS: ABNORMAL
TURBIDITY: ABNORMAL
URINE HGB: NEGATIVE
UROBILINOGEN, URINE: NORMAL
WBC UA: ABNORMAL /HPF (ref 0–4)
YEAST: ABNORMAL

## 2020-12-23 PROCEDURE — 87086 URINE CULTURE/COLONY COUNT: CPT

## 2020-12-23 PROCEDURE — 81001 URINALYSIS AUTO W/SCOPE: CPT

## 2020-12-23 PROCEDURE — 87186 SC STD MICRODIL/AGAR DIL: CPT

## 2020-12-23 PROCEDURE — 87077 CULTURE AEROBIC IDENTIFY: CPT

## 2020-12-25 LAB
CULTURE: ABNORMAL
Lab: ABNORMAL
SPECIMEN DESCRIPTION: ABNORMAL

## 2020-12-28 RX ORDER — SULFAMETHOXAZOLE AND TRIMETHOPRIM 800; 160 MG/1; MG/1
1 TABLET ORAL 2 TIMES DAILY
Qty: 14 TABLET | Refills: 0 | Status: SHIPPED | OUTPATIENT
Start: 2020-12-28 | End: 2021-01-04

## 2020-12-29 NOTE — DISCHARGE INSTR - ACTIVITY
Up as tolerated per [facility] [place] routine 97

## 2021-01-11 ENCOUNTER — HOSPITAL ENCOUNTER (OUTPATIENT)
Age: 86
Setting detail: SPECIMEN
Discharge: HOME | End: 2021-01-11
Payer: MEDICARE

## 2021-01-11 DIAGNOSIS — N30.00 ACUTE CYSTITIS WITHOUT HEMATURIA: ICD-10-CM

## 2021-01-11 LAB
-: NORMAL
AMORPHOUS: NORMAL
BACTERIA: NORMAL
BILIRUBIN URINE: NEGATIVE
CASTS UA: NORMAL /LPF (ref 0–2)
COLOR: NORMAL
COMMENT UA: NORMAL
CRYSTALS, UA: NORMAL /HPF
EPITHELIAL CELLS UA: NORMAL /HPF (ref 0–5)
GLUCOSE URINE: NEGATIVE
KETONES, URINE: NEGATIVE
LEUKOCYTE ESTERASE, URINE: NEGATIVE
MUCUS: NORMAL
NITRITE, URINE: NEGATIVE
OTHER OBSERVATIONS UA: NORMAL
PH UA: 5.5 (ref 5–6)
PROTEIN UA: NEGATIVE
RBC UA: NORMAL /HPF (ref 0–4)
RENAL EPITHELIAL, UA: NORMAL /HPF
SPECIFIC GRAVITY UA: 1.02 (ref 1.01–1.02)
TRICHOMONAS: NORMAL
TURBIDITY: NORMAL
URINE HGB: NEGATIVE
UROBILINOGEN, URINE: NORMAL
WBC UA: NORMAL /HPF (ref 0–4)
YEAST: NORMAL

## 2021-01-11 PROCEDURE — 81001 URINALYSIS AUTO W/SCOPE: CPT

## 2021-01-18 ENCOUNTER — TELEPHONE (OUTPATIENT)
Dept: INTERNAL MEDICINE | Age: 86
End: 2021-01-18

## 2021-01-25 DIAGNOSIS — M25.562 CHRONIC PAIN OF BOTH KNEES: ICD-10-CM

## 2021-01-25 DIAGNOSIS — G89.29 CHRONIC PAIN OF BOTH KNEES: ICD-10-CM

## 2021-01-25 DIAGNOSIS — M25.561 CHRONIC PAIN OF BOTH KNEES: ICD-10-CM

## 2021-01-25 NOTE — TELEPHONE ENCOUNTER
OAR[place] Report checked for [place], and [place]: 12/21/20 hydrocodone acetaminophen 5-325mg #90.  Due Now

## 2021-01-25 NOTE — TELEPHONE ENCOUNTER
[facility] called stating that the [facility] pharmacy will not be shipping the rest of the day due to the upcoming \"snowstorm\". Pt is almost out of his Norco and needs sent to [facility] in [place].      Last Appt:  9/22/2020  Next Appt:   Visit date not found  Med verified in [facility] [address]

## 2021-01-26 RX ORDER — HYDROCODONE BITARTRATE AND ACETAMINOPHEN 5; 325 MG/1; MG/1
1 TABLET ORAL EVERY 8 HOURS PRN
Qty: 90 TABLET | Refills: 0 | Status: SHIPPED | OUTPATIENT
Start: 2021-01-26 | End: 2021-02-22 | Stop reason: SDUPTHER

## 2021-02-22 DIAGNOSIS — M25.562 CHRONIC PAIN OF BOTH KNEES: ICD-10-CM

## 2021-02-22 DIAGNOSIS — M25.561 CHRONIC PAIN OF BOTH KNEES: ICD-10-CM

## 2021-02-22 DIAGNOSIS — G89.29 CHRONIC PAIN OF BOTH KNEES: ICD-10-CM

## 2021-02-22 RX ORDER — HYDROCODONE BITARTRATE AND ACETAMINOPHEN 5; 325 MG/1; MG/1
1 TABLET ORAL EVERY 8 HOURS PRN
Qty: 90 TABLET | Refills: 0 | Status: SHIPPED | OUTPATIENT
Start: 2021-02-25 | End: 2021-03-25 | Stop reason: SDUPTHER

## 2021-02-22 NOTE — TELEPHONE ENCOUNTER
OAR[place] Report checked for [place], and [place]: 1/26/2021 norco 5-325mg #90. Due 2/25/2021.     Last Appt:  9/22/2020  Next Appt:   Visit date not found  Med verified in [address]

## 2021-03-02 NOTE — TELEPHONE ENCOUNTER
Last appt: Visit date not found  Next appt: Visit date not found    Fax from [address]  The patient was in therapy and the therapists stated the resident was more fatigued than usual.  Vitals were WNL. No c/o pain or SOB. The patient did state that he took a tylenol last night and that 's why he is more tired. Will continue to monitor.
Normal

## 2021-03-18 RX ORDER — CLOPIDOGREL BISULFATE 75 MG/1
TABLET ORAL
Qty: 90 TABLET | Refills: 3 | Status: SHIPPED | OUTPATIENT
Start: 2021-03-18 | End: 2022-02-23

## 2021-03-18 RX ORDER — ATORVASTATIN CALCIUM 10 MG/1
TABLET, FILM COATED ORAL
Qty: 90 TABLET | Refills: 3 | Status: SHIPPED | OUTPATIENT
Start: 2021-03-18 | End: 2022-02-23

## 2021-03-24 DIAGNOSIS — M25.561 CHRONIC PAIN OF BOTH KNEES: ICD-10-CM

## 2021-03-24 DIAGNOSIS — G89.29 CHRONIC PAIN OF BOTH KNEES: ICD-10-CM

## 2021-03-24 DIAGNOSIS — M25.562 CHRONIC PAIN OF BOTH KNEES: ICD-10-CM

## 2021-03-24 RX ORDER — HYDROCODONE BITARTRATE AND ACETAMINOPHEN 5; 325 MG/1; MG/1
TABLET ORAL
Qty: 90 TABLET | Refills: 0 | OUTPATIENT
Start: 2021-03-24 | End: 2021-04-23

## 2021-03-24 NOTE — TELEPHONE ENCOUNTER
O[place] checked for [place], and [place]: 2/22/2021 norco 5-325mg #90. Due NOW.     Last Appt:  9/22/2020    Will get refills tomorrow when seen

## 2021-03-25 ENCOUNTER — HOSPITAL ENCOUNTER (OUTPATIENT)
Age: 86
Setting detail: SPECIMEN
Discharge: HOME | End: 2021-03-25
Payer: MEDICARE

## 2021-03-25 ENCOUNTER — OFFICE VISIT (OUTPATIENT)
Dept: PAIN MANAGEMENT | Age: 86
End: 2021-03-25
Payer: MEDICARE

## 2021-03-25 VITALS — SYSTOLIC BLOOD PRESSURE: 138 MMHG | DIASTOLIC BLOOD PRESSURE: 78 MMHG

## 2021-03-25 DIAGNOSIS — M25.561 CHRONIC PAIN OF BOTH KNEES: ICD-10-CM

## 2021-03-25 DIAGNOSIS — M25.562 CHRONIC PAIN OF BOTH KNEES: ICD-10-CM

## 2021-03-25 DIAGNOSIS — Z02.83 ENCOUNTER FOR DRUG SCREENING: ICD-10-CM

## 2021-03-25 DIAGNOSIS — M17.11 PRIMARY OSTEOARTHRITIS OF RIGHT KNEE: Primary | ICD-10-CM

## 2021-03-25 DIAGNOSIS — Z79.891 ENCOUNTER FOR LONG-TERM OPIATE ANALGESIC USE: ICD-10-CM

## 2021-03-25 DIAGNOSIS — G89.29 CHRONIC PAIN OF BOTH KNEES: ICD-10-CM

## 2021-03-25 PROCEDURE — 99214 OFFICE O/P EST MOD 30 MIN: CPT | Performed by: NURSE PRACTITIONER

## 2021-03-25 PROCEDURE — 80307 DRUG TEST PRSMV CHEM ANLYZR: CPT

## 2021-03-25 RX ORDER — LACTULOSE 10 G/15ML
SOLUTION ORAL; RECTAL
COMMUNITY
Start: 2020-12-17 | End: 2021-05-21

## 2021-03-25 RX ORDER — HYDROCODONE BITARTRATE AND ACETAMINOPHEN 5; 325 MG/1; MG/1
1 TABLET ORAL 4 TIMES DAILY PRN
Qty: 120 TABLET | Refills: 0 | Status: SHIPPED | OUTPATIENT
Start: 2021-03-25 | End: 2021-05-13 | Stop reason: SDUPTHER

## 2021-03-25 ASSESSMENT — ENCOUNTER SYMPTOMS
GASTROINTESTINAL NEGATIVE: 1
RESPIRATORY NEGATIVE: 1
BACK PAIN: 0

## 2021-03-25 NOTE — PROGRESS NOTES
Subjective:      Patient ID: Dena Alston is a 80 y.o. male. Chief Complaint   Patient presents with    Follow-up     bilateral knees     HPI Here today for routine pain clinic recheck. Chronic pain in knees, resides at assisted living. Prescribed norco tid prn, states not enough, wakes up 1am due to pain and is told it's not time for a pain pill, given tylenol without much relief. Pain Assessment  Location of Pain: Knee(bilateral)  Location Modifiers: Left, Right  Severity of Pain: 2  Quality of Pain: Aching  Duration of Pain: Persistent  Frequency of Pain: Constant  Aggravating Factors: Bending, Stretching, Straightening, Exercise, Kneeling, Squatting, Standing, Walking, Stairs  Limiting Behavior: No  Relieving Factors: Rest    Allergies   Allergen Reactions    Codeine Nausea And Vomiting    Morphine Other (See Comments)     constipation       Outpatient Medications Marked as Taking for the 3/25/21 encounter (Office Visit) with KEVIN Prince CNP   Medication Sig Dispense Refill    HYDROcodone-acetaminophen (NORCO) 5-325 MG per tablet Take 1 tablet by mouth 4 times daily as needed for Pain for up to 30 days.  120 tablet 0    atorvastatin (LIPITOR) 10 MG tablet TAKE 1 TABLET DAILY 90 tablet 3    clopidogrel (PLAVIX) 75 MG tablet TAKE 1 TABLET DAILY 90 tablet 3    lactulose (CHRONULAC) 10 GM/15ML solution Take 30 mLs by mouth daily as needed      furosemide (LASIX) 40 MG tablet Take 1 tablet by mouth daily 90 tablet 1    lisinopril (PRINIVIL;ZESTRIL) 5 MG tablet TAKE 1 TABLET DAILY 90 tablet 4    melatonin 3 MG TABS tablet Take 3 mg by mouth nightly      docusate sodium (COLACE) 100 MG capsule Take 100 mg by mouth daily      pantoprazole (PROTONIX) 40 MG tablet Take 1 tablet by mouth every morning (before breakfast) 90 tablet 3    metoprolol tartrate (LOPRESSOR) 25 MG tablet Take 0.5 tablets by mouth 2 times daily 90 tablet 3    aspirin 81 MG chewable tablet Take 1 tablet by mouth daily 90 tablet 3    Multiple Vitamins-Minerals (MULTIVITAMIN PO) Take 1 tablet by mouth daily.          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 2/2/15    EGD with biopsies    CAD (coronary artery disease)     Elevated PSA     Hyperlipidemia     Hypertension     Kidney stone     Osteoarthritis of knee     Venous insufficiency        Past Surgical History:   Procedure Laterality Date    APPENDECTOMY      CHOLECYSTECTOMY  01/12/1983    COLONOSCOPY  08/29/12    COLONOSCOPY  05/27/09    COLONOSCOPY  04/30/2008    CYSTOSCOPY  11/15/2007    with left ureteroscopy and dorsal slit     KNEE FUSION Left     LEG SURGERY Left 1/11/2020    I and D of Left Lower Extremitity performed by Yassine eBnson MD at [address] [facility] CYSTOURETHROSCOPY N/A 11/19/2018    CYSTO Photovaporization Prostate Greenlight Laser ([id number]) performed by Tashia Pennington MD at [address] [facility] EGD TRANSORAL BIOPSY SINGLE/MULTIPLE N/A 7/3/2017    EGD BIOPSY performed by Silvia Connolly MD at [facility] Bilateral 06/23/2009    Benign    UPPER GASTROINTESTINAL ENDOSCOPY  02/2/2015    Sepulveda's (Dr. Marolyn Councilman)    UPPER GASTROINTESTINAL ENDOSCOPY  04/25/2016    Lg hiatal hernia, polyp at duodenum, gastric polyp, Barretts Esophagus, GE 28    UPPER GASTROINTESTINAL ENDOSCOPY  07/03/2017    mild gastritis, Sepulveda's, Dr. Gumaro Mtz 3 years       Family History   Problem Relation Age of Onset    Cancer Father         stomach cancer    Heart Disease Paternal Grandfather        Social History     Socioeconomic History    Marital status:      Spouse name: None    Number of children: None    Years of education: None    Highest education level: None   Occupational History    None   Social Needs    Financial resource strain: None    Food insecurity     Worry: None     Inability: None    Transportation needs     Medical: None     Non-medical: None

## 2021-03-28 LAB
6-ACETYLMORPHINE, UR: NOT DETECTED
7-AMINOCLONAZEPAM, URINE: NOT DETECTED
ALPHA-OH-ALPRAZ, URINE: NOT DETECTED
ALPHA-OH-MIDAZOLAM, URINE: NOT DETECTED
ALPRAZOLAM, URINE: NOT DETECTED
AMPHETAMINES, URINE: NOT DETECTED
BARBITURATES, URINE: NOT DETECTED
BENZOYLECGONINE, UR: NOT DETECTED
BUPRENORPHINE URINE: NOT DETECTED
CARISOPRODOL, UR: NOT DETECTED
CLONAZEPAM, URINE: NOT DETECTED
CODEINE, URINE: NOT DETECTED
CREATININE URINE: 26.2 MG/DL (ref 20–400)
DIAZEPAM, URINE: NOT DETECTED
EER PAIN MGT DRUG PANEL, HIGH RES/EMIT U: NORMAL
ETHYL GLUCURONIDE UR: NOT DETECTED
FENTANYL URINE: NOT DETECTED
GABAPENTIN: NOT DETECTED
HYDROCODONE, URINE: PRESENT
HYDROMORPHONE, URINE: PRESENT
LORAZEPAM, URINE: NOT DETECTED
MARIJUANA METAB, UR: NOT DETECTED
MDA, UR: NOT DETECTED
MDEA, EVE, UR: NOT DETECTED
MDMA URINE: NOT DETECTED
MEPERIDINE METAB, UR: NOT DETECTED
METHADONE, URINE: NOT DETECTED
METHAMPHETAMINE, URINE: NOT DETECTED
METHYLPHENIDATE: NOT DETECTED
MIDAZOLAM, URINE: NOT DETECTED
MORPHINE URINE: NOT DETECTED
NALOXONE URINE: NOT DETECTED
NORBUPRENORPHINE, URINE: NOT DETECTED
NORDIAZEPAM, URINE: NOT DETECTED
NORFENTANYL, URINE: NOT DETECTED
NORHYDROCODONE, URINE: PRESENT
NOROXYCODONE, URINE: NOT DETECTED
NOROXYMORPHONE, URINE: NOT DETECTED
OXAZEPAM, URINE: NOT DETECTED
OXYCODONE URINE: NOT DETECTED
OXYMORPHONE, URINE: NOT DETECTED
PAIN MGT DRUG PANEL, HI RES, UR: NORMAL
PCP,URINE: NOT DETECTED
PHENTERMINE, UR: NOT DETECTED
PREGABALIN: NOT DETECTED
TAPENTADOL, URINE: NOT DETECTED
TAPENTADOL-O-SULFATE, URINE: NOT DETECTED
TEMAZEPAM, URINE: NOT DETECTED
TRAMADOL, URINE: NOT DETECTED
ZOLPIDEM METABOLITE (ZCA), URINE: NOT DETECTED
ZOLPIDEM, URINE: NOT DETECTED

## 2021-04-09 ENCOUNTER — APPOINTMENT (OUTPATIENT)
Dept: GENERAL RADIOLOGY | Age: 86
End: 2021-04-09
Payer: MEDICARE

## 2021-04-09 ENCOUNTER — HOSPITAL ENCOUNTER (EMERGENCY)
Age: 86
Discharge: HOME | End: 2021-04-09
Attending: EMERGENCY MEDICINE
Payer: MEDICARE

## 2021-04-09 VITALS
SYSTOLIC BLOOD PRESSURE: 141 MMHG | BODY MASS INDEX: 30.66 KG/M2 | HEART RATE: 64 BPM | WEIGHT: 184 LBS | RESPIRATION RATE: 17 BRPM | OXYGEN SATURATION: 88 % | TEMPERATURE: 97.4 F | DIASTOLIC BLOOD PRESSURE: 57 MMHG | HEIGHT: 65 IN

## 2021-04-09 DIAGNOSIS — R42 LIGHTHEADEDNESS: Primary | ICD-10-CM

## 2021-04-09 DIAGNOSIS — N39.0 ACUTE UTI: ICD-10-CM

## 2021-04-09 LAB
-: ABNORMAL
ABSOLUTE EOS #: 0.22 K/UL (ref 0–0.44)
ABSOLUTE IMMATURE GRANULOCYTE: 0.03 K/UL (ref 0–0.3)
ABSOLUTE LYMPH #: 1.83 K/UL (ref 1.1–3.7)
ABSOLUTE MONO #: 0.73 K/UL (ref 0.1–1.2)
AMORPHOUS: ABNORMAL
ANION GAP SERPL CALCULATED.3IONS-SCNC: 11 MMOL/L (ref 9–17)
BACTERIA: ABNORMAL
BASOPHILS # BLD: 0 % (ref 0–2)
BASOPHILS ABSOLUTE: 0.03 K/UL (ref 0–0.2)
BILIRUBIN URINE: NEGATIVE
BNP INTERPRETATION: NORMAL
BUN BLDV-MCNC: 25 MG/DL (ref 8–23)
BUN/CREAT BLD: 20 (ref 9–20)
CALCIUM SERPL-MCNC: 9.7 MG/DL (ref 8.6–10.4)
CASTS UA: ABNORMAL /LPF (ref 0–2)
CHLORIDE BLD-SCNC: 100 MMOL/L (ref 98–107)
CO2: 27 MMOL/L (ref 20–31)
COLOR: ABNORMAL
COMMENT UA: ABNORMAL
CREAT SERPL-MCNC: 1.22 MG/DL (ref 0.7–1.2)
CRYSTALS, UA: ABNORMAL /HPF
DIFFERENTIAL TYPE: NORMAL
EKG ATRIAL RATE: 57 BPM
EKG ATRIAL RATE: 75 BPM
EKG P AXIS: 22 DEGREES
EKG P AXIS: 74 DEGREES
EKG P-R INTERVAL: 148 MS
EKG P-R INTERVAL: 164 MS
EKG Q-T INTERVAL: 446 MS
EKG Q-T INTERVAL: 462 MS
EKG QRS DURATION: 138 MS
EKG QRS DURATION: 140 MS
EKG QTC CALCULATION (BAZETT): 449 MS
EKG QTC CALCULATION (BAZETT): 498 MS
EKG R AXIS: -43 DEGREES
EKG R AXIS: -50 DEGREES
EKG T AXIS: 19 DEGREES
EKG T AXIS: 6 DEGREES
EKG VENTRICULAR RATE: 57 BPM
EKG VENTRICULAR RATE: 75 BPM
EOSINOPHILS RELATIVE PERCENT: 3 % (ref 1–4)
EPITHELIAL CELLS UA: ABNORMAL /HPF (ref 0–5)
GFR AFRICAN AMERICAN: >60 ML/MIN
GFR NON-AFRICAN AMERICAN: 55 ML/MIN
GFR SERPL CREATININE-BSD FRML MDRD: ABNORMAL ML/MIN/{1.73_M2}
GFR SERPL CREATININE-BSD FRML MDRD: ABNORMAL ML/MIN/{1.73_M2}
GLUCOSE BLD-MCNC: 133 MG/DL (ref 70–99)
GLUCOSE URINE: NEGATIVE
HCT VFR BLD CALC: 45.2 % (ref 40.7–50.3)
HEMOGLOBIN: 14.6 G/DL (ref 13–17)
IMMATURE GRANULOCYTES: 0 %
INR BLD: 1
KETONES, URINE: NEGATIVE
LACTIC ACID, SEPSIS WHOLE BLOOD: NORMAL MMOL/L (ref 0.5–1.9)
LACTIC ACID, SEPSIS: 1.2 MMOL/L (ref 0.5–1.9)
LEUKOCYTE ESTERASE, URINE: ABNORMAL
LYMPHOCYTES # BLD: 25 % (ref 24–43)
MCH RBC QN AUTO: 31.6 PG (ref 25.2–33.5)
MCHC RBC AUTO-ENTMCNC: 32.3 G/DL (ref 25.2–33.5)
MCV RBC AUTO: 97.8 FL (ref 82.6–102.9)
MONOCYTES # BLD: 10 % (ref 3–12)
MUCUS: ABNORMAL
NITRITE, URINE: POSITIVE
NRBC AUTOMATED: 0 PER 100 WBC
OTHER OBSERVATIONS UA: ABNORMAL
PARTIAL THROMBOPLASTIN TIME: 27.5 SEC (ref 23.9–33.8)
PDW BLD-RTO: 14.4 % (ref 11.8–14.4)
PH UA: 5.5 (ref 5–6)
PLATELET # BLD: 230 K/UL (ref 138–453)
PLATELET ESTIMATE: NORMAL
PMV BLD AUTO: 11.4 FL (ref 8.1–13.5)
POTASSIUM SERPL-SCNC: 3.9 MMOL/L (ref 3.7–5.3)
PRO-BNP: 216 PG/ML
PROTEIN UA: NEGATIVE
PROTHROMBIN TIME: 12.9 SEC (ref 11.5–14.2)
RBC # BLD: 4.62 M/UL (ref 4.21–5.77)
RBC # BLD: NORMAL 10*6/UL
RBC UA: ABNORMAL /HPF (ref 0–4)
RENAL EPITHELIAL, UA: ABNORMAL /HPF
SEG NEUTROPHILS: 62 % (ref 36–65)
SEGMENTED NEUTROPHILS ABSOLUTE COUNT: 4.45 K/UL (ref 1.5–8.1)
SODIUM BLD-SCNC: 138 MMOL/L (ref 135–144)
SPECIFIC GRAVITY UA: 1.02 (ref 1.01–1.02)
TRICHOMONAS: ABNORMAL
TROPONIN INTERP: ABNORMAL
TROPONIN INTERP: NORMAL
TROPONIN T: ABNORMAL NG/ML
TROPONIN T: NORMAL NG/ML
TROPONIN, HIGH SENSITIVITY: 22 NG/L (ref 0–22)
TROPONIN, HIGH SENSITIVITY: 23 NG/L (ref 0–22)
TURBIDITY: ABNORMAL
URINE HGB: NEGATIVE
UROBILINOGEN, URINE: NORMAL
WBC # BLD: 7.3 K/UL (ref 3.5–11.3)
WBC # BLD: NORMAL 10*3/UL
WBC UA: ABNORMAL /HPF (ref 0–4)
YEAST: ABNORMAL

## 2021-04-09 PROCEDURE — 81001 URINALYSIS AUTO W/SCOPE: CPT

## 2021-04-09 PROCEDURE — 6370000000 HC RX 637 (ALT 250 FOR IP): Performed by: EMERGENCY MEDICINE

## 2021-04-09 PROCEDURE — 85610 PROTHROMBIN TIME: CPT

## 2021-04-09 PROCEDURE — 80048 BASIC METABOLIC PNL TOTAL CA: CPT

## 2021-04-09 PROCEDURE — 87077 CULTURE AEROBIC IDENTIFY: CPT

## 2021-04-09 PROCEDURE — 85730 THROMBOPLASTIN TIME PARTIAL: CPT

## 2021-04-09 PROCEDURE — 99284 EMERGENCY DEPT VISIT MOD MDM: CPT

## 2021-04-09 PROCEDURE — 83880 ASSAY OF NATRIURETIC PEPTIDE: CPT

## 2021-04-09 PROCEDURE — 93005 ELECTROCARDIOGRAM TRACING: CPT | Performed by: EMERGENCY MEDICINE

## 2021-04-09 PROCEDURE — 71045 X-RAY EXAM CHEST 1 VIEW: CPT

## 2021-04-09 PROCEDURE — 85025 COMPLETE CBC W/AUTO DIFF WBC: CPT

## 2021-04-09 PROCEDURE — 36415 COLL VENOUS BLD VENIPUNCTURE: CPT

## 2021-04-09 PROCEDURE — 83605 ASSAY OF LACTIC ACID: CPT

## 2021-04-09 PROCEDURE — 84484 ASSAY OF TROPONIN QUANT: CPT

## 2021-04-09 PROCEDURE — 87186 SC STD MICRODIL/AGAR DIL: CPT

## 2021-04-09 PROCEDURE — 87086 URINE CULTURE/COLONY COUNT: CPT

## 2021-04-09 PROCEDURE — 2580000003 HC RX 258: Performed by: EMERGENCY MEDICINE

## 2021-04-09 RX ORDER — NITROFURANTOIN 25; 75 MG/1; MG/1
100 CAPSULE ORAL 2 TIMES DAILY
Qty: 14 CAPSULE | Refills: 0 | Status: SHIPPED | OUTPATIENT
Start: 2021-04-09 | End: 2021-04-16

## 2021-04-09 RX ORDER — SODIUM CHLORIDE 9 MG/ML
INJECTION, SOLUTION INTRAVENOUS CONTINUOUS
Status: DISCONTINUED | OUTPATIENT
Start: 2021-04-09 | End: 2021-04-09 | Stop reason: HOSPADM

## 2021-04-09 RX ORDER — NITROFURANTOIN 25; 75 MG/1; MG/1
100 CAPSULE ORAL ONCE
Status: COMPLETED | OUTPATIENT
Start: 2021-04-09 | End: 2021-04-09

## 2021-04-09 RX ADMIN — NITROFURANTOIN MONOHYDRATE/MACROCRYSTALLINE 100 MG: 25; 75 CAPSULE ORAL at 15:40

## 2021-04-09 RX ADMIN — SODIUM CHLORIDE: 9 INJECTION, SOLUTION INTRAVENOUS at 13:26

## 2021-04-09 NOTE — ED TRIAGE NOTES
Pt states began with dizziness starting around 10 a.m. Denies any falls secondary to vertigo.   Denies any pain on arrival.

## 2021-04-09 NOTE — ED PROVIDER NOTES
2009); pr egd transoral biopsy single/multiple (N/A, 7/3/2017); Upper gastrointestinal endoscopy (2015); Upper gastrointestinal endoscopy (2016); Upper gastrointestinal endoscopy (2017); pr cystourethroscopy (N/A, 2018); and Leg Surgery (Left, 2020). CURRENT MEDICATIONS       Previous Medications    ACETAMINOPHEN (TYLENOL) 325 MG TABLET    Take 2 tablets by mouth every 4 hours as needed for Pain or Fever    ASPIRIN 81 MG CHEWABLE TABLET    Take 1 tablet by mouth daily    ATORVASTATIN (LIPITOR) 10 MG TABLET    TAKE 1 TABLET DAILY    CLOPIDOGREL (PLAVIX) 75 MG TABLET    TAKE 1 TABLET DAILY    DOCUSATE SODIUM (COLACE) 100 MG CAPSULE    Take 100 mg by mouth daily    FUROSEMIDE (LASIX) 40 MG TABLET    Take 1 tablet by mouth daily    HYDROCODONE-ACETAMINOPHEN (NORCO) 5-325 MG PER TABLET    Take 1 tablet by mouth 4 times daily as needed for Pain for up to 30 days. LACTULOSE (CHRONULAC) 10 GM/15ML SOLUTION    Take 30 mLs by mouth daily as needed    LACTULOSE ENCEPHALOPATHY 10 GM/15ML SOLN SOLUTION        LISINOPRIL (PRINIVIL;ZESTRIL) 5 MG TABLET    TAKE 1 TABLET DAILY    MELATONIN 3 MG TABS TABLET    Take 3 mg by mouth nightly    METOPROLOL TARTRATE (LOPRESSOR) 25 MG TABLET    Take 0.5 tablets by mouth 2 times daily    MULTIPLE VITAMINS-MINERALS (MULTIVITAMIN PO)    Take 1 tablet by mouth daily. PANTOPRAZOLE (PROTONIX) 40 MG TABLET    Take 1 tablet by mouth every morning (before breakfast)       ALLERGIES     is allergic to codeine and morphine. FAMILY HISTORY     He indicated that his mother is . He indicated that his father is . He indicated that his sister is alive. He indicated that his brother is alive. He indicated that his maternal grandmother is . He indicated that his maternal grandfather is . He indicated that his paternal grandmother is . He indicated that his paternal grandfather is .  He indicated that his son is indicated unless otherwise documented above    LABS:  Results for orders placed or performed during the hospital encounter of 04/09/21   CBC Auto Differential   Result Value Ref Range    WBC 7.3 3.5 - 11.3 k/uL    RBC 4.62 4.21 - 5.77 m/uL    Hemoglobin 14.6 13.0 - 17.0 g/dL    Hematocrit 45.2 40.7 - 50.3 %    MCV 97.8 82.6 - 102.9 fL    MCH 31.6 25.2 - 33.5 pg    MCHC 32.3 25.2 - 33.5 g/dL    RDW 14.4 11.8 - 14.4 %    Platelets 498 863 - 953 k/uL    MPV 11.4 8.1 - 13.5 fL    NRBC Automated 0.0 0.0 per 100 WBC    Differential Type NOT REPORTED     Seg Neutrophils 62 36 - 65 %    Lymphocytes 25 24 - 43 %    Monocytes 10 3 - 12 %    Eosinophils % 3 1 - 4 %    Basophils 0 0 - 2 %    Immature Granulocytes 0 0 %    Segs Absolute 4.45 1.50 - 8.10 k/uL    Absolute Lymph # 1.83 1.10 - 3.70 k/uL    Absolute Mono # 0.73 0.10 - 1.20 k/uL    Absolute Eos # 0.22 0.00 - 0.44 k/uL    Basophils Absolute 0.03 0.00 - 0.20 k/uL    Absolute Immature Granulocyte 0.03 0.00 - 0.30 k/uL    WBC Morphology NOT REPORTED     RBC Morphology NOT REPORTED     Platelet Estimate NOT REPORTED    Basic Metabolic Panel   Result Value Ref Range    Glucose 133 (H) 70 - 99 mg/dL    BUN 25 (H) 8 - 23 mg/dL    CREATININE 1.22 (H) 0.70 - 1.20 mg/dL    Bun/Cre Ratio 20 9 - 20    Calcium 9.7 8.6 - 10.4 mg/dL    Sodium 138 135 - 144 mmol/L    Potassium 3.9 3.7 - 5.3 mmol/L    Chloride 100 98 - 107 mmol/L    CO2 27 20 - 31 mmol/L    Anion Gap 11 9 - 17 mmol/L    GFR Non-African American 55 (L) >60 mL/min    GFR African American >60 >60 mL/min    GFR Comment          GFR Staging NOT REPORTED    Troponin   Result Value Ref Range    Troponin, High Sensitivity 23 (H) 0 - 22 ng/L    Troponin T NOT REPORTED <0.03 ng/mL    Troponin Interp NOT REPORTED    Protime-INR   Result Value Ref Range    Protime 12.9 11.5 - 14.2 sec    INR 1.0    APTT   Result Value Ref Range    PTT 27.5 23.9 - 33.8 sec   Lactate, Sepsis   Result Value Ref Range    Lactic Acid, Sepsis 1.2 0.5 - 1.9 mmol/L    Lactic Acid, Sepsis, Whole Blood NOT REPORTED 0.5 - 1.9 mmol/L   Brain Natriuretic Peptide   Result Value Ref Range    Pro- <300 pg/mL    BNP Interpretation Pro-BNP Reference Range:    Urinalysis Reflex to Culture    Specimen: Urine, clean catch   Result Value Ref Range    Color, UA NOT REPORTED YELLOW    Turbidity UA NOT REPORTED CLEAR    Glucose, Ur NEGATIVE NEGATIVE    Bilirubin Urine NEGATIVE NEGATIVE    Ketones, Urine NEGATIVE NEGATIVE    Specific Gravity, UA 1.020 1.010 - 1.025    Urine Hgb NEGATIVE NEGATIVE    pH, UA 5.5 5.0 - 6.0    Protein, UA NEGATIVE NEGATIVE    Urobilinogen, Urine Normal Normal    Nitrite, Urine POSITIVE (A) NEGATIVE    Leukocyte Esterase, Urine 1+ (A) NEGATIVE    Urinalysis Comments NOT REPORTED    Troponin   Result Value Ref Range    Troponin, High Sensitivity 22 0 - 22 ng/L    Troponin T NOT REPORTED <0.03 ng/mL    Troponin Interp NOT REPORTED    Microscopic Urinalysis   Result Value Ref Range    -          WBC, UA 25 TO 50 0 - 4 /HPF    RBC, UA 0 TO 4 0 - 4 /HPF    Casts UA NOT REPORTED 0 - 2 /LPF    Crystals, UA NOT REPORTED None /HPF    Epithelial Cells UA 0 TO 4 0 - 5 /HPF    Renal Epithelial, UA NOT REPORTED 0 /HPF    Bacteria, UA 3+ (A) None    Mucus, UA NOT REPORTED None    Trichomonas, UA NOT REPORTED None    Amorphous, UA NOT REPORTED None    Other Observations UA Specimen Cultured (A) NOT REQ.     Yeast, UA NOT REPORTED None   EKG 12 Lead   Result Value Ref Range    Ventricular Rate 75 BPM    Atrial Rate 75 BPM    P-R Interval 148 ms    QRS Duration 140 ms    Q-T Interval 446 ms    QTc Calculation (Bazett) 498 ms    P Axis 74 degrees    R Axis -50 degrees    T Axis 19 degrees   EKG 12 Lead   Result Value Ref Range    Ventricular Rate 57 BPM    Atrial Rate 57 BPM    P-R Interval 164 ms    QRS Duration 138 ms    Q-T Interval 462 ms    QTc Calculation (Bazett) 449 ms    P Axis 22 degrees    R Axis -43 degrees    T Axis 6 degrees       Not indicated unless otherwise documented above    RADIOLOGY:   I reviewed the radiologist interpretations:    XR CHEST PORTABLE   Final Result   No cardiomegaly, pneumonia or interstitial edema. Blunting of the left costophrenic angle was noted, similar to 11/25/2018. This could be due to either a small left pleural effusion or old pleural   thickening. Not indicated unless otherwise documented above    EMERGENCY DEPARTMENT COURSE:     The patient was given the following medications:  Orders Placed This Encounter   Medications    0.9 % sodium chloride infusion    nitrofurantoin (macrocrystal-monohydrate) (MACROBID) capsule 100 mg    nitrofurantoin, macrocrystal-monohydrate, (MACROBID) 100 MG capsule     Sig: Take 1 capsule by mouth 2 times daily for 7 days     Dispense:  14 capsule     Refill:  0        Vitals:   -------------------------  BP (!) 141/57   Pulse 64   Temp 97.4 °F (36.3 °C) (Tympanic)   Resp 17   Ht 5' 5\" (1.651 m)   Wt 184 lb (83.5 kg)   SpO2 (!) 88%   BMI 30.62 kg/m²     3 PM resting comfortably no acute distress awaiting second troponin. Lactic 1.2 CBC normal.  Troponin slightly elevated at 23. Chest x-ray no acute pathology. Patient with episode of lightheadedness now resolved feeling much better. 3:30 PM repeat troponin normal.  Feeling much better. And he will be discharged back to [facility]. He had no bradycardia here in the emergency department. He denies any lightheadedness or dizziness now. Family at bedside agreeable. Will discharge. Urine positive UTI will treat with Macrobid. The patient and his family member understands that at this time there is no evidence for a more malignant underlying process, but also understands that early in the process of an illness or injury, an emergency department workup can be falsely reassuring.   Routine discharge counseling was given, and it is understood that worsening, changing or persistent symptoms should prompt an immediate

## 2021-04-11 LAB
CULTURE: ABNORMAL
Lab: ABNORMAL
SPECIMEN DESCRIPTION: ABNORMAL

## 2021-04-13 ENCOUNTER — TELEPHONE (OUTPATIENT)
Dept: FAMILY MEDICINE CLINIC | Age: 86
End: 2021-04-13

## 2021-04-19 RX ORDER — LISINOPRIL 5 MG/1
TABLET ORAL
Qty: 90 TABLET | Refills: 3 | Status: SHIPPED | OUTPATIENT
Start: 2021-04-19 | End: 2022-04-15

## 2021-05-12 DIAGNOSIS — G89.29 CHRONIC PAIN OF BOTH KNEES: ICD-10-CM

## 2021-05-12 DIAGNOSIS — M25.561 CHRONIC PAIN OF BOTH KNEES: ICD-10-CM

## 2021-05-12 DIAGNOSIS — M25.562 CHRONIC PAIN OF BOTH KNEES: ICD-10-CM

## 2021-05-12 NOTE — TELEPHONE ENCOUNTER
Last Appt:  3/25/2021  Next Appt:   5/25/2021  Med verified in [place] from [facility] [phone]. Norco 5/325 1 qid prn     [facility] pharmacy [phone]  Fax [phone] not found. [facility] in [place] confirmed with [facility] nurse. [place]AR[place] checked for [place], and [place]: Norco 5 #120. Due Now.  LF 3/25/21.

## 2021-05-13 RX ORDER — HYDROCODONE BITARTRATE AND ACETAMINOPHEN 5; 325 MG/1; MG/1
1 TABLET ORAL 4 TIMES DAILY PRN
Qty: 120 TABLET | Refills: 0 | Status: SHIPPED | OUTPATIENT
Start: 2021-05-13 | End: 2021-05-17 | Stop reason: SDUPTHER

## 2021-05-13 NOTE — TELEPHONE ENCOUNTER
S pharmacy called stating that the [place] rx needs sent to [facility] instead of [facility] pharmacy.

## 2021-05-17 RX ORDER — HYDROCODONE BITARTRATE AND ACETAMINOPHEN 5; 325 MG/1; MG/1
1 TABLET ORAL 4 TIMES DAILY PRN
Qty: 120 TABLET | Refills: 0 | Status: SHIPPED | OUTPATIENT
Start: 2021-05-17 | End: 2021-05-25

## 2021-05-17 NOTE — TELEPHONE ENCOUNTER
[facility] called stating that [facility] never received the rx for the patient's [place]. Nurse at [facility] stated that they only have 1 pill left for the patient. Please advise.

## 2021-05-21 ENCOUNTER — OFFICE VISIT (OUTPATIENT)
Dept: CARDIOLOGY | Age: 86
End: 2021-05-21
Payer: MEDICARE

## 2021-05-21 VITALS
BODY MASS INDEX: 30.22 KG/M2 | SYSTOLIC BLOOD PRESSURE: 125 MMHG | HEIGHT: 65 IN | DIASTOLIC BLOOD PRESSURE: 59 MMHG | WEIGHT: 181.4 LBS | HEART RATE: 59 BPM

## 2021-05-21 DIAGNOSIS — I25.119 CORONARY ARTERY DISEASE INVOLVING NATIVE CORONARY ARTERY OF NATIVE HEART WITH ANGINA PECTORIS (HCC): ICD-10-CM

## 2021-05-21 DIAGNOSIS — E78.5 DYSLIPIDEMIA: ICD-10-CM

## 2021-05-21 DIAGNOSIS — I10 ESSENTIAL HYPERTENSION: ICD-10-CM

## 2021-05-21 DIAGNOSIS — I50.32 CHRONIC HEART FAILURE WITH PRESERVED EJECTION FRACTION (HFPEF) (HCC): ICD-10-CM

## 2021-05-21 DIAGNOSIS — I25.10 CORONARY ARTERY DISEASE INVOLVING NATIVE CORONARY ARTERY OF NATIVE HEART WITHOUT ANGINA PECTORIS: Primary | ICD-10-CM

## 2021-05-21 PROCEDURE — 99214 OFFICE O/P EST MOD 30 MIN: CPT | Performed by: INTERNAL MEDICINE

## 2021-05-21 ASSESSMENT — ENCOUNTER SYMPTOMS
APNEA: 0
CHEST TIGHTNESS: 0
EYE ITCHING: 0
ABDOMINAL DISTENTION: 0
ABDOMINAL PAIN: 0
EYE DISCHARGE: 0

## 2021-05-21 NOTE — PROGRESS NOTES
Today's Date: 5/21/2021  Patient's Name: Callie Marti  Patient's age: 80 y.o., 6/28/1927    Subjective: The patient is a 80y.o. year old, , male is in the office for CAD. Denies exertional chest pain or SOB, no dizziness or syncope and no palpitations.           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 Past Surgical History:   has a past surgical history that includes Colonoscopy (08/29/12); Cystocopy (11/15/2007); Appendectomy; Cholecystectomy (01/12/1983); Knee fusion (Left); lipoma resection; Colonoscopy (05/27/09); Colonoscopy (04/30/2008); Prostate Biopsy (Bilateral, 06/23/2009); pr egd transoral biopsy single/multiple (N/A, 7/3/2017); Upper gastrointestinal endoscopy (02/2/2015); Upper gastrointestinal endoscopy (04/25/2016); Upper gastrointestinal endoscopy (07/03/2017); pr cystourethroscopy (N/A, 11/19/2018); and Leg Surgery (Left, 1/11/2020). Home Medications:  Prior to Admission medications    Medication Sig Start Date End Date Taking? Authorizing Provider   HYDROcodone-acetaminophen (NORCO) 5-325 MG per tablet Take 1 tablet by mouth 4 times daily as needed for Pain for up to 30 days.  5/17/21 6/16/21 Yes KEVIN Marmolejo CNP   lisinopril (PRINIVIL;ZESTRIL) 5 MG tablet TAKE 1 TABLET DAILY 4/19/21  Yes Coni Dougherty MD   atorvastatin (LIPITOR) 10 MG tablet TAKE 1 TABLET DAILY 3/18/21  Yes Coni Dougherty MD   clopidogrel (PLAVIX) 75 MG tablet TAKE 1 TABLET DAILY 3/18/21  Yes Coni Dougherty MD   lactulose (CHRONULAC) 10 GM/15ML solution Take 30 mLs by mouth daily as needed   Yes Historical Provider, MD   furosemide (LASIX) 40 MG tablet Take 1 tablet by mouth daily 6/26/20  Yes Tyron Cornejo DO   melatonin 3 MG TABS tablet Take 3 mg by mouth nightly   Yes Historical Provider, MD   docusate pulses. Heart sounds: Normal heart sounds. No murmur heard. Pulmonary:      Effort: Pulmonary effort is normal. No respiratory distress. Breath sounds: Normal breath sounds. No stridor. Abdominal:      General: There is no distension. Palpations: There is no mass. Musculoskeletal:         General: No swelling or tenderness. Skin:     Capillary Refill: Capillary refill takes less than 2 seconds. Coloration: Skin is not jaundiced or pale. Neurological:      General: No focal deficit present. Mental Status: He is alert and oriented to person, place, and time. Cranial Nerves: No cranial nerve deficit. Sensory: No sensory deficit. Psychiatric:         Mood and Affect: Mood normal.         Behavior: Behavior normal.         Cardiac Data:    Labs:     CBC: No results for input(s): WBC, HGB, HCT, PLT in the last 72 hours. BMP:No results for input(s): NA, K, CO2, BUN, CREATININE, LABGLOM, GLUCOSE in the last 72 hours. PT/INR: No results for input(s): PROTIME, INR in the last 72 hours. FASTING LIPID PANEL:  Lab Results   Component Value Date    HDL 40 10/14/2019    TRIG 143 10/14/2019     LIVER PROFILE:No results for input(s): AST, ALT, LABALBU in the last 72 hours.     IMPRESSION:    Patient Active Problem List   Diagnosis    Dermatophytosis of nail    Essential hypertension, benign    Obesity    Hyperlipidemia    PVC (premature ventricular contraction)    Osteoarthritis of knee    Duodenal ulcer    CAD (coronary artery disease)    Venous insufficiency    Kidney stone    Elevated PSA    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    Chronic pain of right knee    Encounter for chronic pain management    Colitis    Urinary retention    BPH (benign prostatic hyperplasia)    Gait instability    ST elevation myocardial infarction (STEMI) ([facility]    UTI due to extended-spectrum beta lactamase (ESBL) producing Escherichia coli    Gastroesophageal reflux disease    Overweight    Chronic idiopathic constipation    Cellulitis of left lower extremity    Cellulitis of left lower leg    Pulmonary edema cardiac cause (HCC)    Chronic heart failure with preserved ejection fraction (HFpEF) ([facility])       Assessment/Plan:  1. CAD. STEMI on 11/2018 with KRISTEN of LAD. Mild disease of other arteries. Stable and asymptomatic, continue current medications. 2. Mild ischemic Cardiomyopathy. EF 40% on 11/2018. Last echo 3/2019 showing EF 55-60%, Grade I DD, No significant valvular disease seen. Stable with no signs of volume overload. 3. HPL. LDL 30 on 10/2019. On Statin. WILL CHECK LIPID PROFILE. 4. Essential hypertension. Well controlled on current medications.    5. Lower extremity swelling due to chronic venous stasis, Wrap, lasix and elevation.            Tod Baron MD, MD  [facility]           [phone]

## 2021-05-25 ENCOUNTER — OFFICE VISIT (OUTPATIENT)
Dept: PAIN MANAGEMENT | Age: 86
End: 2021-05-25
Payer: MEDICARE

## 2021-05-25 VITALS — DIASTOLIC BLOOD PRESSURE: 64 MMHG | HEART RATE: 76 BPM | SYSTOLIC BLOOD PRESSURE: 118 MMHG | RESPIRATION RATE: 22 BRPM

## 2021-05-25 DIAGNOSIS — M17.11 PRIMARY OSTEOARTHRITIS OF RIGHT KNEE: ICD-10-CM

## 2021-05-25 DIAGNOSIS — G89.29 ENCOUNTER FOR CHRONIC PAIN MANAGEMENT: ICD-10-CM

## 2021-05-25 DIAGNOSIS — M25.562 CHRONIC PAIN OF BOTH KNEES: Primary | ICD-10-CM

## 2021-05-25 DIAGNOSIS — G89.29 CHRONIC PAIN OF BOTH KNEES: Primary | ICD-10-CM

## 2021-05-25 DIAGNOSIS — M25.561 CHRONIC PAIN OF BOTH KNEES: Primary | ICD-10-CM

## 2021-05-25 PROCEDURE — 99212 OFFICE O/P EST SF 10 MIN: CPT | Performed by: NURSE PRACTITIONER

## 2021-05-25 PROCEDURE — 99214 OFFICE O/P EST MOD 30 MIN: CPT | Performed by: NURSE PRACTITIONER

## 2021-05-25 RX ORDER — HYDROCODONE BITARTRATE AND ACETAMINOPHEN 7.5; 325 MG/1; MG/1
1 TABLET ORAL EVERY 6 HOURS PRN
Qty: 120 TABLET | Refills: 0 | Status: SHIPPED | OUTPATIENT
Start: 2021-05-25 | End: 2021-06-24

## 2021-05-25 ASSESSMENT — ENCOUNTER SYMPTOMS
RESPIRATORY NEGATIVE: 1
GASTROINTESTINAL NEGATIVE: 1
BACK PAIN: 0

## 2021-05-25 NOTE — PROGRESS NOTES
Subjective:      Patient ID: Isaac George is a 80 y.o. male. Chief Complaint   Patient presents with    Knee Pain     bilateral knee pain - 2 month follow up; is at [facility] [place], operated by [facility]    Medication Management     Knee Pain   Pertinent negatives include no numbness. Here today for routine pain clinic recheck. Chronic pain in knees, resides at assisted living. Pain is getting worse, Prescribed norco 5 4/day, ineffective pain relief. Constipation, laxative prn. Pain Assessment  Location of Pain: Knee  Location Modifiers: Left, Right  Severity of Pain: 9 (when stading, or trying to get)  Quality of Pain: Throbbing, Sharp, Cracking  Duration of Pain: Persistent  Frequency of Pain: Intermittent  Aggravating Factors: Standing, Walking (getting into a standing postion from sitting)  Limiting Behavior: Yes  Relieving Factors:  (sitting )    Allergies   Allergen Reactions    Codeine Nausea And Vomiting    Morphine Other (See Comments)     constipation       Outpatient Medications Marked as Taking for the 5/25/21 encounter (Office Visit) with KEVIN Mtz CNP   Medication Sig Dispense Refill    HYDROcodone-acetaminophen (NORCO) 7.5-325 MG per tablet Take 1 tablet by mouth every 6 hours as needed for Pain for up to 30 days.  Intended supply: 30 days 120 tablet 0    lisinopril (PRINIVIL;ZESTRIL) 5 MG tablet TAKE 1 TABLET DAILY 90 tablet 3    atorvastatin (LIPITOR) 10 MG tablet TAKE 1 TABLET DAILY 90 tablet 3    clopidogrel (PLAVIX) 75 MG tablet TAKE 1 TABLET DAILY 90 tablet 3    lactulose (CHRONULAC) 10 GM/15ML solution Take 30 mLs by mouth daily as needed      furosemide (LASIX) 40 MG tablet Take 1 tablet by mouth daily 90 tablet 1    melatonin 3 MG TABS tablet Take 3 mg by mouth nightly      docusate sodium (COLACE) 100 MG capsule Take 100 mg by mouth daily      pantoprazole (PROTONIX) 40 MG tablet Take 1 tablet by mouth every morning (before breakfast) 90 tablet 3    metoprolol tartrate (LOPRESSOR) 25 MG tablet Take 0.5 tablets by mouth 2 times daily 90 tablet 3    aspirin 81 MG chewable tablet Take 1 tablet by mouth daily 90 tablet 3    acetaminophen (TYLENOL) 325 MG tablet Take 2 tablets by mouth every 4 hours as needed for Pain or Fever 120 tablet 0    Multiple Vitamins-Minerals (MULTIVITAMIN PO) Take 1 tablet by mouth daily.          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 2/2/15    EGD with biopsies    CAD (coronary artery disease)     Elevated PSA     Hyperlipidemia     Hypertension     Kidney stone     Osteoarthritis of knee     Venous insufficiency        Past Surgical History:   Procedure Laterality Date    APPENDECTOMY      CHOLECYSTECTOMY  01/12/1983    COLONOSCOPY  08/29/12    COLONOSCOPY  05/27/09    COLONOSCOPY  04/30/2008    CYSTOSCOPY  11/15/2007    with left ureteroscopy and dorsal slit     KNEE FUSION Left     LEG SURGERY Left 1/11/2020    I and D of Left Lower Extremitity performed by Yemi Luna MD at [address] [facility] CYSTOURETHROSCOPY N/A 11/19/2018    CYSTO Photovaporization Prostate Greenlight Laser ([id number]) performed by [place] Ayala MD at [address] [facility] EGD TRANSORAL BIOPSY SINGLE/MULTIPLE N/A 7/3/2017    EGD BIOPSY performed by Margarita Betts MD at [address] Bilateral 06/23/2009    Benign    UPPER GASTROINTESTINAL ENDOSCOPY  02/2/2015    Sepulveda's (Dr. Melva Dwyer)    UPPER GASTROINTESTINAL ENDOSCOPY  04/25/2016    Lg hiatal hernia, polyp at duodenum, gastric polyp, Barretts Esophagus, GE 28    UPPER GASTROINTESTINAL ENDOSCOPY  07/03/2017    mild gastritis, Sepulveda's, Dr. Singh Duty 3 years       Family History   Problem Relation Age of Onset    Cancer Father         stomach cancer    Heart Disease Paternal Grandfather        Social History     Socioeconomic History    Marital status:      Spouse name: None    Number of children: None    Years of education: None    Highest education level: None   Occupational History    None   Tobacco Use    Smoking status: Former Smoker     Packs/day: 1.00     Types: Cigars     Quit date: 1998     Years since quittin.4    Smokeless tobacco: Never Used    Tobacco comment: Former smoker (quit )- [facility] 3/20/17   Substance and Sexual Activity    Alcohol use: No     Alcohol/week: 0.0 standard drinks    Drug use: No    Sexual activity: None   Other Topics Concern    None   Social History Narrative    None     Social Determinants of Health     Financial Resource Strain:     Difficulty of Paying Living Expenses:    Food Insecurity:     Worried About Running Out of Food in the Last Year:     Ran Out of Food in the Last Year:    Transportation Needs:     Lack of Transportation (Medical):  Lack of Transportation (Non-Medical):    Physical Activity:     Days of Exercise per Week:     Minutes of Exercise per Session:    Stress:     Feeling of Stress :    Social Connections:     Frequency of Communication with Friends and Family:     Frequency of Social Gatherings with Friends and Family:     Attends Worship Services:     Active Member of Clubs or Organizations:     Attends Club or Organization Meetings:     Marital Status:    Intimate Partner Violence:     Fear of Current or Ex-Partner:     Emotionally Abused:     Physically Abused:     Sexually Abused:      Review of Systems   Constitutional: Positive for activity change (limited due to right knee pain). Negative for appetite change. Respiratory: Negative. Cardiovascular: Negative. Gastrointestinal: Negative. Genitourinary: Negative. Johnson catheter in place   Musculoskeletal: Positive for arthralgias (bilateral knee, right knee is majority of his pain) and gait problem. Negative for back pain, neck pain and neck stiffness. Neurological: Negative for weakness and numbness.    Psychiatric/Behavioral: Positive for sleep disturbance. Objective:   Physical Exam  Vitals reviewed. Constitutional:       General: He is not in acute distress. Appearance: He is well-developed. He is not ill-appearing, toxic-appearing or diaphoretic. HENT:      Head: Normocephalic and atraumatic. Cardiovascular:      Rate and Rhythm: Normal rate. Pulmonary:      Effort: Pulmonary effort is normal. No respiratory distress. Musculoskeletal:      Right knee: Swelling present. Decreased range of motion. Skin:     General: Skin is warm and dry. Coloration: Skin is not pale. Nails: There is no clubbing. Neurological:      Mental Status: He is alert and oriented to person, place, and time. He is not disoriented. GCS: GCS eye subscore is 4. GCS verbal subscore is 5. GCS motor subscore is 6. Cranial Nerves: No cranial nerve deficit. Motor: No tremor or seizure activity. Psychiatric:         Behavior: Behavior normal. Behavior is cooperative. Assessment:      1. Chronic pain of both knees    2. Primary osteoarthritis of right knee    3. Encounter for chronic pain management          Plan:     Increase Norco to 7.5 every 6 hours prn pain    Controlled Substance Monitoring:    Acute and Chronic Pain Monitoring:   RX Monitoring 5/25/2021   Attestation -   Periodic Controlled Substance Monitoring No signs of potential drug abuse or diversion identified.    Chronic Pain > 50 MEDD -       Follow up one month

## 2021-06-03 ENCOUNTER — HOSPITAL ENCOUNTER (OUTPATIENT)
Age: 86
Setting detail: SPECIMEN
Discharge: HOME | End: 2021-06-03
Payer: MEDICARE

## 2021-06-03 DIAGNOSIS — I25.10 CORONARY ARTERY DISEASE INVOLVING NATIVE CORONARY ARTERY OF NATIVE HEART WITHOUT ANGINA PECTORIS: ICD-10-CM

## 2021-06-03 LAB
AST SERPL-CCNC: 14 U/L
CHOLESTEROL/HDL RATIO: 2.9
CHOLESTEROL: 110 MG/DL
HDLC SERPL-MCNC: 38 MG/DL
LDL CHOLESTEROL: 53 MG/DL (ref 0–130)
TOTAL CK: 28 U/L (ref 39–308)
TRIGL SERPL-MCNC: 94 MG/DL
VLDLC SERPL CALC-MCNC: ABNORMAL MG/DL (ref 1–30)

## 2021-06-03 PROCEDURE — 80061 LIPID PANEL: CPT

## 2021-06-03 PROCEDURE — 82550 ASSAY OF CK (CPK): CPT

## 2021-06-03 PROCEDURE — 84450 TRANSFERASE (AST) (SGOT): CPT

## 2021-06-24 ENCOUNTER — OFFICE VISIT (OUTPATIENT)
Dept: PAIN MANAGEMENT | Age: 86
End: 2021-06-24
Payer: MEDICARE

## 2021-06-24 VITALS — SYSTOLIC BLOOD PRESSURE: 120 MMHG | DIASTOLIC BLOOD PRESSURE: 65 MMHG

## 2021-06-24 DIAGNOSIS — M25.561 CHRONIC PAIN OF BOTH KNEES: Primary | ICD-10-CM

## 2021-06-24 DIAGNOSIS — G89.29 ENCOUNTER FOR CHRONIC PAIN MANAGEMENT: ICD-10-CM

## 2021-06-24 DIAGNOSIS — M25.562 CHRONIC PAIN OF BOTH KNEES: Primary | ICD-10-CM

## 2021-06-24 DIAGNOSIS — M17.0 PRIMARY OSTEOARTHRITIS OF BOTH KNEES: ICD-10-CM

## 2021-06-24 DIAGNOSIS — G89.29 CHRONIC PAIN OF BOTH KNEES: Primary | ICD-10-CM

## 2021-06-24 PROBLEM — F11.20 OPIOID DEPENDENCE, UNCOMPLICATED (HCC): Status: ACTIVE | Noted: 2021-06-24

## 2021-06-24 PROBLEM — F11.29 OPIOID DEPENDENCE WITH UNSPECIFIED OPIOID-INDUCED DISORDER (HCC): Status: ACTIVE | Noted: 2021-06-24

## 2021-06-24 PROBLEM — F11.99 OPIOID USE, UNSPECIFIED WITH UNSPECIFIED OPIOID-INDUCED DISORDER (HCC): Status: ACTIVE | Noted: 2021-06-24

## 2021-06-24 PROCEDURE — 99214 OFFICE O/P EST MOD 30 MIN: CPT | Performed by: NURSE PRACTITIONER

## 2021-06-24 PROCEDURE — 99213 OFFICE O/P EST LOW 20 MIN: CPT | Performed by: NURSE PRACTITIONER

## 2021-06-24 RX ORDER — OXYCODONE HYDROCHLORIDE AND ACETAMINOPHEN 5; 325 MG/1; MG/1
1 TABLET ORAL EVERY 6 HOURS PRN
Qty: 120 TABLET | Refills: 0 | Status: SHIPPED | OUTPATIENT
Start: 2021-06-24 | End: 2021-07-22

## 2021-06-24 ASSESSMENT — ENCOUNTER SYMPTOMS
BACK PAIN: 0
GASTROINTESTINAL NEGATIVE: 1
RESPIRATORY NEGATIVE: 1

## 2021-06-24 NOTE — PROGRESS NOTES
Subjective:      Patient ID: Deniz Ma is a 80 y.o. male. Chief Complaint   Patient presents with    Knee Pain     bilateral - 1 month followup - pt in wheelchair to painful to use walker    Medication Management     Knee Pain   Pertinent negatives include no numbness. Here today for routine pain clinic recheck. Chronic pain in knees, resides at assisted living. Pain is getting worse, Prescribed norco 7.5 4/day, ineffective pain relief. Constipation, laxative prn. Pain Assessment  Location of Pain: Knee  Location Modifiers: Left, Right  Severity of Pain:  (6 when sitting 9 when standing)  Quality of Pain: Sharp, Aching, Grinding, Popping, Cracking  Duration of Pain: Persistent  Frequency of Pain: Constant  Aggravating Factors: Bending, Stretching, Straightening, Exercise, Kneeling, Squatting, Standing, Walking, Stairs (sitting, laying down)  Limiting Behavior: Yes  Relieving Factors: Rest (rubbing, recliner wth feet elevated)    Allergies   Allergen Reactions    Codeine Nausea And Vomiting    Morphine Other (See Comments)     constipation       Outpatient Medications Marked as Taking for the 6/24/21 encounter (Office Visit) with KEVIN Cintron CNP   Medication Sig Dispense Refill    oxyCODONE-acetaminophen (PERCOCET) 5-325 MG per tablet Take 1 tablet by mouth every 6 hours as needed for Pain for up to 30 days.  120 tablet 0    lisinopril (PRINIVIL;ZESTRIL) 5 MG tablet TAKE 1 TABLET DAILY 90 tablet 3    atorvastatin (LIPITOR) 10 MG tablet TAKE 1 TABLET DAILY 90 tablet 3    clopidogrel (PLAVIX) 75 MG tablet TAKE 1 TABLET DAILY 90 tablet 3    furosemide (LASIX) 40 MG tablet Take 1 tablet by mouth daily 90 tablet 1    melatonin 3 MG TABS tablet Take 3 mg by mouth nightly      docusate sodium (COLACE) 100 MG capsule Take 100 mg by mouth daily      pantoprazole (PROTONIX) 40 MG tablet Take 1 tablet by mouth every morning (before breakfast) 90 tablet 3    metoprolol tartrate (LOPRESSOR) 25 MG tablet Take 0.5 tablets by mouth 2 times daily 90 tablet 3    aspirin 81 MG chewable tablet Take 1 tablet by mouth daily 90 tablet 3    Multiple Vitamins-Minerals (MULTIVITAMIN PO) Take 1 tablet by mouth daily.          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 2/2/15    EGD with biopsies    CAD (coronary artery disease)     Elevated PSA     Hyperlipidemia     Hypertension     Kidney stone     Osteoarthritis of knee     Venous insufficiency        Past Surgical History:   Procedure Laterality Date    APPENDECTOMY      CHOLECYSTECTOMY  01/12/1983    COLONOSCOPY  08/29/12    COLONOSCOPY  05/27/09    COLONOSCOPY  04/30/2008    CYSTOSCOPY  11/15/2007    with left ureteroscopy and dorsal slit     KNEE FUSION Left     LEG SURGERY Left 1/11/2020    I and D of Left Lower Extremitity performed by Leonela Montalvo MD at [address] [facility] CYSTOURETHROSCOPY N/A 11/19/2018    CYSTO Photovaporization Prostate Greenlight Laser (RAHPRN#[id number]) performed by Dorthea Lesch, MD at [address] [facility] EGD TRANSORAL BIOPSY SINGLE/MULTIPLE N/A 7/3/2017    EGD BIOPSY performed by Adarsh Marcus MD at [facility] 47 Bilateral 06/23/2009    Benign    UPPER GASTROINTESTINAL ENDOSCOPY  02/2/2015    Sepulveda's (Dr. Fernando Malone)    UPPER GASTROINTESTINAL ENDOSCOPY  04/25/2016    Lg hiatal hernia, polyp at duodenum, gastric polyp, Barretts Esophagus, GE 28    UPPER GASTROINTESTINAL ENDOSCOPY  07/03/2017    mild gastritis, Sepulveda's, Dr. Oren Velez 3 years       Family History   Problem Relation Age of Onset    Cancer Father         stomach cancer    Heart Disease Paternal Grandfather        Social History     Socioeconomic History    Marital status:      Spouse name: None    Number of children: None    Years of education: None    Highest education level: None   Occupational History    None   Tobacco Use    Smoking status: Former Smoker     Packs/day: 1.00     Types: Cigars     Quit date: 1998     Years since quittin.4    Smokeless tobacco: Never Used    Tobacco comment: Former smoker (quit )- [facility] 3/20/17   Substance and Sexual Activity    Alcohol use: No     Alcohol/week: 0.0 standard drinks    Drug use: No    Sexual activity: None   Other Topics Concern    None   Social History Narrative    None     Social Determinants of Health     Financial Resource Strain:     Difficulty of Paying Living Expenses:    Food Insecurity:     Worried About Running Out of Food in the Last Year:     Ran Out of Food in the Last Year:    Transportation Needs:     Lack of Transportation (Medical):  Lack of Transportation (Non-Medical):    Physical Activity:     Days of Exercise per Week:     Minutes of Exercise per Session:    Stress:     Feeling of Stress :    Social Connections:     Frequency of Communication with Friends and Family:     Frequency of Social Gatherings with Friends and Family:     Attends Sabianist Services:     Active Member of Clubs or Organizations:     Attends Club or Organization Meetings:     Marital Status:    Intimate Partner Violence:     Fear of Current or Ex-Partner:     Emotionally Abused:     Physically Abused:     Sexually Abused:      Review of Systems   Constitutional: Positive for activity change (limited due to right knee pain). Negative for appetite change. Respiratory: Negative. Cardiovascular: Negative. Gastrointestinal: Negative. Genitourinary: Negative. Johnson catheter in place   Musculoskeletal: Positive for arthralgias (bilateral knee, right knee is majority of his pain) and gait problem. Negative for back pain, neck pain and neck stiffness. Neurological: Negative for weakness and numbness. Psychiatric/Behavioral: Positive for sleep disturbance. Objective:   Physical Exam  Vitals reviewed.    Constitutional:       General: He is not in acute distress. Appearance: He is well-developed. He is not ill-appearing, toxic-appearing or diaphoretic. HENT:      Head: Normocephalic and atraumatic. Cardiovascular:      Rate and Rhythm: Normal rate. Pulmonary:      Effort: Pulmonary effort is normal. No respiratory distress. Musculoskeletal:      Right knee: Swelling present. Decreased range of motion. Skin:     General: Skin is warm and dry. Coloration: Skin is not pale. Nails: There is no clubbing. Neurological:      Mental Status: He is alert and oriented to person, place, and time. He is not disoriented. GCS: GCS eye subscore is 4. GCS verbal subscore is 5. GCS motor subscore is 6. Cranial Nerves: No cranial nerve deficit. Motor: No tremor or seizure activity. Psychiatric:         Behavior: Behavior normal. Behavior is cooperative. Assessment:      1. Chronic pain of both knees    2. Encounter for chronic pain management    3. Primary osteoarthritis of both knees          Plan:     opiate rotation from Norco 7.5 to Percocet 5    Controlled Substance Monitoring:    Acute and Chronic Pain Monitoring:   RX Monitoring 6/24/2021   Attestation -   Periodic Controlled Substance Monitoring No signs of potential drug abuse or diversion identified.    Chronic Pain > 50 MEDD -       Follow up one month

## 2021-07-22 ENCOUNTER — OFFICE VISIT (OUTPATIENT)
Dept: PAIN MANAGEMENT | Age: 86
End: 2021-07-22
Payer: MEDICARE

## 2021-07-22 VITALS
DIASTOLIC BLOOD PRESSURE: 60 MMHG | HEART RATE: 69 BPM | OXYGEN SATURATION: 97 % | SYSTOLIC BLOOD PRESSURE: 118 MMHG | WEIGHT: 181 LBS | HEIGHT: 65 IN | BODY MASS INDEX: 30.16 KG/M2

## 2021-07-22 DIAGNOSIS — R26.81 GAIT INSTABILITY: ICD-10-CM

## 2021-07-22 DIAGNOSIS — M17.0 PRIMARY OSTEOARTHRITIS OF BOTH KNEES: ICD-10-CM

## 2021-07-22 DIAGNOSIS — G89.29 CHRONIC PAIN OF BOTH KNEES: Primary | ICD-10-CM

## 2021-07-22 DIAGNOSIS — M25.562 CHRONIC PAIN OF BOTH KNEES: Primary | ICD-10-CM

## 2021-07-22 DIAGNOSIS — M25.561 CHRONIC PAIN OF BOTH KNEES: Primary | ICD-10-CM

## 2021-07-22 PROCEDURE — 99213 OFFICE O/P EST LOW 20 MIN: CPT | Performed by: NURSE PRACTITIONER

## 2021-07-22 PROCEDURE — 99214 OFFICE O/P EST MOD 30 MIN: CPT | Performed by: NURSE PRACTITIONER

## 2021-07-22 RX ORDER — OXYCODONE AND ACETAMINOPHEN 7.5; 325 MG/1; MG/1
1 TABLET ORAL EVERY 6 HOURS PRN
Qty: 120 TABLET | Refills: 0 | Status: SHIPPED | OUTPATIENT
Start: 2021-07-22 | End: 2021-09-07 | Stop reason: SDUPTHER

## 2021-07-22 ASSESSMENT — ENCOUNTER SYMPTOMS
BACK PAIN: 0
RESPIRATORY NEGATIVE: 1
GASTROINTESTINAL NEGATIVE: 1

## 2021-07-22 NOTE — PROGRESS NOTES
Subjective:      Patient ID: Jesus Gramajo is a 80 y.o. male. Chief Complaint   Patient presents with    Leg Pain     wally- 1 month follow up     Knee Pain   Pertinent negatives include no numbness. Leg Pain   Pertinent negatives include no numbness. Here today for routine pain clinic recheck. Chronic pain in knees, resides at assisted living. Pain is getting worse, Opiate rotation with improvement, but still having significant amount pain    Pain Assessment  Location of Pain: Leg  Location Modifiers: Left, Right  Severity of Pain: 8  Quality of Pain: Throbbing, Sharp, Aching  Duration of Pain: Persistent  Frequency of Pain: Constant  Aggravating Factors: Walking, Standing, Exercise, Straightening  Limiting Behavior: Yes  Relieving Factors:  (pain medication)  Result of Injury: No  Work-Related Injury: No  Are there other pain locations you wish to document?: No    Allergies   Allergen Reactions    Codeine Nausea And Vomiting    Morphine Other (See Comments)     constipation       Outpatient Medications Marked as Taking for the 7/22/21 encounter (Office Visit) with KEVIN Hadley CNP   Medication Sig Dispense Refill    oxyCODONE-acetaminophen (PERCOCET) 7.5-325 MG per tablet Take 1 tablet by mouth every 6 hours as needed for Pain for up to 30 days.  Intended supply: 30 days 120 tablet 0    lisinopril (PRINIVIL;ZESTRIL) 5 MG tablet TAKE 1 TABLET DAILY 90 tablet 3    atorvastatin (LIPITOR) 10 MG tablet TAKE 1 TABLET DAILY 90 tablet 3    clopidogrel (PLAVIX) 75 MG tablet TAKE 1 TABLET DAILY 90 tablet 3    furosemide (LASIX) 40 MG tablet Take 1 tablet by mouth daily 90 tablet 1    melatonin 3 MG TABS tablet Take 3 mg by mouth nightly      docusate sodium (COLACE) 100 MG capsule Take 100 mg by mouth daily      pantoprazole (PROTONIX) 40 MG tablet Take 1 tablet by mouth every morning (before breakfast) 90 tablet 3    metoprolol tartrate (LOPRESSOR) 25 MG tablet Take 0.5 tablets by mouth 2 times daily 90 tablet 3    aspirin 81 MG chewable tablet Take 1 tablet by mouth daily 90 tablet 3    Multiple Vitamins-Minerals (MULTIVITAMIN PO) Take 1 tablet by mouth daily.          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 2/2/15    EGD with biopsies    CAD (coronary artery disease)     Elevated PSA     Hyperlipidemia     Hypertension     Kidney stone     Osteoarthritis of knee     Venous insufficiency        Past Surgical History:   Procedure Laterality Date    APPENDECTOMY      CHOLECYSTECTOMY  01/12/1983    COLONOSCOPY  08/29/12    COLONOSCOPY  05/27/09    COLONOSCOPY  04/30/2008    CYSTOSCOPY  11/15/2007    with left ureteroscopy and dorsal slit     KNEE FUSION Left     LEG SURGERY Left 1/11/2020    I and D of Left Lower Extremitity performed by Keith Lancaster MD at [address] [facility] CYSTOURETHROSCOPY N/A 11/19/2018    CYSTO Photovaporization Prostate Greenlight Laser ([id number]) performed by Alejandro Donovan MD at [address] EGD TRANSORAL BIOPSY SINGLE/MULTIPLE N/A 7/3/2017    EGD BIOPSY performed by Shayla Faustin MD at [facility] [address] Bilateral 06/23/2009    Benign    UPPER GASTROINTESTINAL ENDOSCOPY  02/2/2015    Sepulveda's (Dr. Hans Donahue)    UPPER GASTROINTESTINAL ENDOSCOPY  04/25/2016    Lg hiatal hernia, polyp at duodenum, gastric polyp, Barretts Esophagus, GE 28    UPPER GASTROINTESTINAL ENDOSCOPY  07/03/2017    mild gastritis, Sepulveda's, Dr. Anne Robins 3 years       Family History   Problem Relation Age of Onset    Cancer Father         stomach cancer    Heart Disease Paternal Grandfather        Social History     Socioeconomic History    Marital status:      Spouse name: None    Number of children: None    Years of education: None    Highest education level: None   Occupational History    None   Tobacco Use    Smoking status: Former Smoker     Packs/day: 1.00     Types: Cigars     Quit

## 2021-08-24 ENCOUNTER — OUTSIDE SERVICES (OUTPATIENT)
Dept: INTERNAL MEDICINE | Age: 86
End: 2021-08-24
Payer: MEDICARE

## 2021-08-24 VITALS
RESPIRATION RATE: 20 BRPM | SYSTOLIC BLOOD PRESSURE: 128 MMHG | TEMPERATURE: 98.7 F | OXYGEN SATURATION: 98 % | HEART RATE: 65 BPM | DIASTOLIC BLOOD PRESSURE: 72 MMHG

## 2021-08-24 DIAGNOSIS — I50.32 CHRONIC HEART FAILURE WITH PRESERVED EJECTION FRACTION (HFPEF) (HCC): ICD-10-CM

## 2021-08-24 DIAGNOSIS — R60.0 BILATERAL LOWER EXTREMITY EDEMA: Primary | ICD-10-CM

## 2021-08-24 ASSESSMENT — ENCOUNTER SYMPTOMS
DIARRHEA: 0
SHORTNESS OF BREATH: 0
WHEEZING: 0
NAUSEA: 0
VOMITING: 0
RHINORRHEA: 0

## 2021-08-24 NOTE — PROGRESS NOTES
[facility]      Nupur Bernard is a 80 y.o. male resident of [facility] who presents today for medical conditions/complaints as noted below. HPI:     HPI     Patient presents for evaluation management of bilateral lower extremity edema. Known history of congestive heart failure. Patient denies dyspnea. Symptoms have been gradually worsening over the past several days. No weeping or erythema present, though he does have a history of cellulitis resulting from his edema as well. Most recent GFR 55, creatinine 1.22. Previously tolerated increased scheduled lasix 40 mg QD plus 20 mg QD x 3 days. Current Outpatient Medications   Medication Sig Dispense Refill    lisinopril (PRINIVIL;ZESTRIL) 5 MG tablet TAKE 1 TABLET DAILY 90 tablet 3    atorvastatin (LIPITOR) 10 MG tablet TAKE 1 TABLET DAILY 90 tablet 3    clopidogrel (PLAVIX) 75 MG tablet TAKE 1 TABLET DAILY 90 tablet 3    furosemide (LASIX) 40 MG tablet Take 1 tablet by mouth daily 90 tablet 1    melatonin 3 MG TABS tablet Take 3 mg by mouth nightly      docusate sodium (COLACE) 100 MG capsule Take 100 mg by mouth daily      pantoprazole (PROTONIX) 40 MG tablet Take 1 tablet by mouth every morning (before breakfast) 90 tablet 3    metoprolol tartrate (LOPRESSOR) 25 MG tablet Take 0.5 tablets by mouth 2 times daily 90 tablet 3    aspirin 81 MG chewable tablet Take 1 tablet by mouth daily 90 tablet 3    acetaminophen (TYLENOL) 325 MG tablet Take 2 tablets by mouth every 4 hours as needed for Pain or Fever (Patient not taking: Reported on 7/22/2021) 120 tablet 0    Multiple Vitamins-Minerals (MULTIVITAMIN PO) Take 1 tablet by mouth daily. No current facility-administered medications for this visit.      Allergies   Allergen Reactions    Codeine Nausea And Vomiting    Morphine Other (See Comments)     constipation       Health Maintenance   Topic Date Due    COVID-19 Vaccine (1) Never done   [employer] Visit (AWV) Never done    PSA counseling  12/21/2021 (Originally 6/6/2020)    Flu vaccine (1) 09/01/2021    Potassium monitoring  04/09/2022    Creatinine monitoring  04/09/2022    Lipid screen  06/03/2022    DTaP/Tdap/Td vaccine (2 - Td or Tdap) 02/28/2027    Pneumococcal 65+ years Vaccine  Completed    Hepatitis A vaccine  Aged Out    Hepatitis B vaccine  Aged Out    Hib vaccine  Aged Out    Meningococcal (ACWY) vaccine  Aged Out       Subjective:      Review of Systems   Constitutional: Negative for chills and fever. HENT: Negative for congestion and rhinorrhea. Respiratory: Negative for shortness of breath and wheezing. Cardiovascular: Positive for leg swelling. Negative for chest pain. Gastrointestinal: Negative for diarrhea, nausea and vomiting. Neurological: Negative for dizziness and headaches. Objective:     Vitals:    08/24/21 0835   BP: 128/72   Pulse: 65   Resp: 20   Temp: 98.7 °F (37.1 °C)   SpO2: 98%     Physical Exam  Vitals and nursing note reviewed. Constitutional:       General: He is not in acute distress. Appearance: He is well-developed. Cardiovascular:      Rate and Rhythm: Normal rate and regular rhythm. Pulmonary:      Effort: Pulmonary effort is normal.      Breath sounds: Normal breath sounds. Abdominal:      Palpations: Abdomen is soft. Tenderness: There is no abdominal tenderness. Musculoskeletal:      Right lower leg: 3+ Edema present. Left lower leg: 3+ Edema present. Lymphadenopathy:      Cervical: No cervical adenopathy. Neurological:      Mental Status: He is alert. Psychiatric:         Behavior: Behavior normal.         Assessment/Plan:        1. Bilateral lower extremity edema  2. Chronic heart failure with preserved ejection fraction (HFpEF) ([facility])  - Continue scheduled lasix 40 mg QD plus lasix 20 mg QD x 2 days. Monitor for any dyspnea or oxygen desaturation        Return if symptoms worsen or fail to improve.     No orders of the defined types were placed in this encounter. No orders of the defined types were placed in this encounter.       Electronically signed by KEVIN Gibbs CNP on 8/24/2021 at 11:19 AM

## 2021-08-26 ENCOUNTER — TELEPHONE (OUTPATIENT)
Dept: INTERNAL MEDICINE | Age: 86
End: 2021-08-26

## 2021-08-26 ENCOUNTER — OFFICE VISIT (OUTPATIENT)
Dept: INTERNAL MEDICINE | Age: 86
End: 2021-08-26
Payer: MEDICARE

## 2021-08-26 VITALS
HEART RATE: 68 BPM | RESPIRATION RATE: 16 BRPM | SYSTOLIC BLOOD PRESSURE: 151 MMHG | TEMPERATURE: 98.7 F | OXYGEN SATURATION: 97 % | DIASTOLIC BLOOD PRESSURE: 69 MMHG

## 2021-08-26 DIAGNOSIS — R60.0 BILATERAL LOWER EXTREMITY EDEMA: Primary | ICD-10-CM

## 2021-08-26 PROCEDURE — 99213 OFFICE O/P EST LOW 20 MIN: CPT | Performed by: NURSE PRACTITIONER

## 2021-08-26 PROCEDURE — 99211 OFF/OP EST MAY X REQ PHY/QHP: CPT | Performed by: NURSE PRACTITIONER

## 2021-08-26 RX ORDER — DOXYCYCLINE HYCLATE 100 MG
100 TABLET ORAL 2 TIMES DAILY
Qty: 14 TABLET | Refills: 0 | Status: SHIPPED | OUTPATIENT
Start: 2021-08-26 | End: 2021-09-02

## 2021-08-26 ASSESSMENT — ENCOUNTER SYMPTOMS
RHINORRHEA: 0
VOMITING: 0
NAUSEA: 0
DIARRHEA: 0
SHORTNESS OF BREATH: 0
WHEEZING: 0

## 2021-08-26 NOTE — PROGRESS NOTES
[facility]  [address] [place], [address]  [phone]      Cortney Edmondson c/o of Leg Swelling      HPI:     HPI     Patient presents for evaluation and management of bilateral lower extremity edema. He was initially evaluated for this 2 days ago. Was started on extra dose of Lasix. Both yesterday and today he received his scheduled dose of Lasix 40 mg plus 20 mg additional dose of lasix. Staff has noted minimal improvement, though the most recent dose was just given this morning. Did start to note small amount of redness on his anterior L lower exremity, concerning for cellulitis. No nausea, vomiting, diarrhea, fever, chills. Denies chest pain or dyspnea. Current Outpatient Medications   Medication Sig Dispense Refill    doxycycline hyclate (VIBRA-TABS) 100 MG tablet Take 1 tablet by mouth 2 times daily for 7 days 14 tablet 0    lisinopril (PRINIVIL;ZESTRIL) 5 MG tablet TAKE 1 TABLET DAILY 90 tablet 3    atorvastatin (LIPITOR) 10 MG tablet TAKE 1 TABLET DAILY 90 tablet 3    clopidogrel (PLAVIX) 75 MG tablet TAKE 1 TABLET DAILY 90 tablet 3    furosemide (LASIX) 40 MG tablet Take 1 tablet by mouth daily 90 tablet 1    melatonin 3 MG TABS tablet Take 3 mg by mouth nightly      docusate sodium (COLACE) 100 MG capsule Take 100 mg by mouth daily      pantoprazole (PROTONIX) 40 MG tablet Take 1 tablet by mouth every morning (before breakfast) 90 tablet 3    metoprolol tartrate (LOPRESSOR) 25 MG tablet Take 0.5 tablets by mouth 2 times daily 90 tablet 3    aspirin 81 MG chewable tablet Take 1 tablet by mouth daily 90 tablet 3    acetaminophen (TYLENOL) 325 MG tablet Take 2 tablets by mouth every 4 hours as needed for Pain or Fever (Patient not taking: Reported on 7/22/2021) 120 tablet 0    Multiple Vitamins-Minerals (MULTIVITAMIN PO) Take 1 tablet by mouth daily. No current facility-administered medications for this visit.      Allergies   Allergen Reactions    Codeine Nausea And Vomiting    Morphine Other (See Comments)     constipation       Health Maintenance   Topic Date Due    COVID-19 Vaccine (1) Never done   [employer] Visit (AWV)  Never done    PSA counseling  12/21/2021 (Originally 6/6/2020)    Flu vaccine (1) 09/01/2021    Potassium monitoring  04/09/2022    Creatinine monitoring  04/09/2022    Lipid screen  06/03/2022    DTaP/Tdap/Td vaccine (2 - Td or Tdap) 02/28/2027    Pneumococcal 65+ years Vaccine  Completed    Hepatitis A vaccine  Aged Out    Hepatitis B vaccine  Aged Out    Hib vaccine  Aged Out    Meningococcal (ACWY) vaccine  Aged Out       Subjective:      Review of Systems   Constitutional: Negative for chills and fever. HENT: Negative for congestion and rhinorrhea. Respiratory: Negative for shortness of breath and wheezing. Gastrointestinal: Negative for diarrhea, nausea and vomiting. Neurological: Negative for dizziness and headaches. Due to patient's clinical status, ROS of symptoms was completed by discussion with long term care facility staff, as well as with input from patient. Objective: There were no vitals filed for this visit. Physical Exam  Constitutional:       General: He is not in acute distress. HENT:      Head: Normocephalic and atraumatic. Right Ear: External ear normal.      Left Ear: External ear normal.   Eyes:      General: No scleral icterus. Conjunctiva/sclera: Conjunctivae normal.   Cardiovascular:      Comments: BLE edema shows slight improvement from previous exam. Though difficult to fully visualize through virtual exam, anterior LLE does have small area of erythema, approximately less than 5 cm, concerning for developing cellulitis. Pulmonary:      Effort: Pulmonary effort is normal. No respiratory distress. Neurological:      Mental Status: He is alert. Assessment/Plan:        1.  Bilateral lower extremity edema  - Continue scheduled lasix 40 mg QD plus lasix 20 mg QD x 1 day (to be given tomorrow 08/27/21 as he has already received extra dose today and yesterday per staff). Check BMP. Start doxycyline 100 mg BID x 7 days for concern for developing cellulitis on LLE. Monitor BP at least QD while on extra dose of lasix. Monitor for any dyspnea or worsening edema, needs to be transported to ER if these develop        Return if symptoms worsen or fail to improve. Orders Placed This Encounter   Procedures    Basic Metabolic Panel     Standing Status:   Future     Standing Expiration Date:   8/26/2022     Orders Placed This Encounter   Medications    doxycycline hyclate (VIBRA-TABS) 100 MG tablet     Sig: Take 1 tablet by mouth 2 times daily for 7 days     Dispense:  14 tablet     Refill:  0       All patient questions answered. Pt voiced understanding. Electronically signed by KEVIN Philip CNP on 8/26/2021 at 9:20 AM     Katty Angie, was evaluated through a synchronous (real-time) audio-video encounter. The patient (or guardian if applicable) is aware that this is a billable service. Verbal consent to proceed has been obtained within the past 12 months. The visit was conducted pursuant to the emergency declaration under the [address] [place] [address] authority and the [employer] General Act. Patient identification was verified, and a caregiver was present when appropriate. The patient was located in a state where the provider was credentialed to provide care. Total time spent for this encounter: Not billed by time    --KEVIN Philip CNP on 8/26/2021 at 9:20 AM    An electronic signature was used to authenticate this note.

## 2021-08-26 NOTE — TELEPHONE ENCOUNTER
Please fax progress notes/ orders for follow up from virtual visit:    Continue scheduled lasix 40 mg QD plus lasix 20 mg QD x 1 day (to be given tomorrow 08/27/21 as he has already received extra dose today and yesterday per staff). Check BMP. Start doxycyline 100 mg BID x 7 days for concern for developing cellulitis on LLE. Monitor BP at least QD while on extra dose of lasix.  Monitor for any dyspnea or worsening edema, needs to be transported to ER if these develop

## 2021-08-27 ENCOUNTER — HOSPITAL ENCOUNTER (OUTPATIENT)
Age: 86
Setting detail: SPECIMEN
Discharge: HOME | End: 2021-08-27
Payer: MEDICARE

## 2021-08-27 DIAGNOSIS — R60.0 BILATERAL LOWER EXTREMITY EDEMA: ICD-10-CM

## 2021-08-27 LAB
ANION GAP SERPL CALCULATED.3IONS-SCNC: 8 MMOL/L (ref 9–17)
BUN BLDV-MCNC: 21 MG/DL (ref 8–23)
BUN/CREAT BLD: 24 (ref 9–20)
CALCIUM SERPL-MCNC: 9 MG/DL (ref 8.6–10.4)
CHLORIDE BLD-SCNC: 104 MMOL/L (ref 98–107)
CO2: 27 MMOL/L (ref 20–31)
CREAT SERPL-MCNC: 0.86 MG/DL (ref 0.7–1.2)
GFR AFRICAN AMERICAN: >60 ML/MIN
GFR NON-AFRICAN AMERICAN: >60 ML/MIN
GFR SERPL CREATININE-BSD FRML MDRD: ABNORMAL ML/MIN/{1.73_M2}
GFR SERPL CREATININE-BSD FRML MDRD: ABNORMAL ML/MIN/{1.73_M2}
GLUCOSE BLD-MCNC: 106 MG/DL (ref 70–99)
POTASSIUM SERPL-SCNC: 4 MMOL/L (ref 3.7–5.3)
SODIUM BLD-SCNC: 139 MMOL/L (ref 135–144)

## 2021-08-27 PROCEDURE — 80048 BASIC METABOLIC PNL TOTAL CA: CPT

## 2021-09-07 DIAGNOSIS — M25.562 CHRONIC PAIN OF BOTH KNEES: ICD-10-CM

## 2021-09-07 DIAGNOSIS — M25.561 CHRONIC PAIN OF BOTH KNEES: ICD-10-CM

## 2021-09-07 DIAGNOSIS — G89.29 CHRONIC PAIN OF BOTH KNEES: ICD-10-CM

## 2021-09-07 RX ORDER — OXYCODONE AND ACETAMINOPHEN 7.5; 325 MG/1; MG/1
1 TABLET ORAL EVERY 6 HOURS PRN
Qty: 120 TABLET | Refills: 0 | Status: SHIPPED | OUTPATIENT
Start: 2021-09-07 | End: 2021-09-23 | Stop reason: SDUPTHER

## 2021-09-07 NOTE — TELEPHONE ENCOUNTER
OARRS Report checked for [address] [place], and [place]: 7/22/21 percocet 7.5-325mg #120. Due NOW.     Last Appt:  7/22/2021  Next Appt:   9/23/2021  Med verified in [address]

## 2021-09-07 NOTE — TELEPHONE ENCOUNTER
Joycelyn Locke LPN at [facility] called to check on status of order. Patient is down to last dosage.

## 2021-09-15 ENCOUNTER — OUTSIDE SERVICES (OUTPATIENT)
Dept: INTERNAL MEDICINE | Age: 86
End: 2021-09-15
Payer: MEDICARE

## 2021-09-15 ENCOUNTER — TELEPHONE (OUTPATIENT)
Dept: INTERNAL MEDICINE | Age: 86
End: 2021-09-15

## 2021-09-15 VITALS
WEIGHT: 227.12 LBS | RESPIRATION RATE: 16 BRPM | HEART RATE: 72 BPM | DIASTOLIC BLOOD PRESSURE: 50 MMHG | TEMPERATURE: 97.7 F | OXYGEN SATURATION: 97 % | SYSTOLIC BLOOD PRESSURE: 102 MMHG | BODY MASS INDEX: 37.79 KG/M2

## 2021-09-15 DIAGNOSIS — I50.32 CHRONIC HEART FAILURE WITH PRESERVED EJECTION FRACTION (HFPEF) (HCC): ICD-10-CM

## 2021-09-15 DIAGNOSIS — R60.0 BILATERAL LOWER EXTREMITY EDEMA: Primary | ICD-10-CM

## 2021-09-15 NOTE — TELEPHONE ENCOUNTER
As follow up from visit today, please fax orders for BNP, BMP, and CBC (placed during visit) to [facility], and request vitals, thanks

## 2021-09-15 NOTE — PROGRESS NOTES
[facility]      Noam Moseley is a 80 y.o. male resident of [facility] who presents today for medical conditions/complaints as noted below. HPI:     HPI     Patient presents via virtual visit with [facility] staff member for evaluation and management of bilateral lower extremity edema. Symptoms have been gradually worsening over the past week. Has 2+ BLE pitting edema. No erythema or draining. Does have a history of congestive heart failure. Oxygen saturation has been stable on room air. Patient denies dyspnea. He is currently on lasix 40 mg QD. Current Outpatient Medications   Medication Sig Dispense Refill    oxyCODONE-acetaminophen (PERCOCET) 7.5-325 MG per tablet Take 1 tablet by mouth every 6 hours as needed for Pain for up to 30 days. Intended supply: 30 days 120 tablet 0    lisinopril (PRINIVIL;ZESTRIL) 5 MG tablet TAKE 1 TABLET DAILY 90 tablet 3    atorvastatin (LIPITOR) 10 MG tablet TAKE 1 TABLET DAILY 90 tablet 3    clopidogrel (PLAVIX) 75 MG tablet TAKE 1 TABLET DAILY 90 tablet 3    furosemide (LASIX) 40 MG tablet Take 1 tablet by mouth daily 90 tablet 1    melatonin 3 MG TABS tablet Take 3 mg by mouth nightly      docusate sodium (COLACE) 100 MG capsule Take 100 mg by mouth daily      pantoprazole (PROTONIX) 40 MG tablet Take 1 tablet by mouth every morning (before breakfast) 90 tablet 3    metoprolol tartrate (LOPRESSOR) 25 MG tablet Take 0.5 tablets by mouth 2 times daily 90 tablet 3    aspirin 81 MG chewable tablet Take 1 tablet by mouth daily 90 tablet 3    acetaminophen (TYLENOL) 325 MG tablet Take 2 tablets by mouth every 4 hours as needed for Pain or Fever (Patient not taking: Reported on 7/22/2021) 120 tablet 0    Multiple Vitamins-Minerals (MULTIVITAMIN PO) Take 1 tablet by mouth daily. No current facility-administered medications for this visit.      Allergies   Allergen Reactions    Codeine Nausea And Vomiting    Morphine Other (See Comments) constipation       Health Maintenance   Topic Date Due    COVID-19 Vaccine (1) Never done   [employer] Visit (AWV)  Never done    Flu vaccine (1) 2021    PSA counseling  2021 (Originally 2020)    Lipid screen  2022    Potassium monitoring  2022    Creatinine monitoring  2022    DTaP/Tdap/Td vaccine (2 - Td or Tdap) 2027    Pneumococcal 65+ years Vaccine  Completed    Hepatitis A vaccine  Aged Out    Hepatitis B vaccine  Aged Out    Hib vaccine  Aged Out    Meningococcal (ACWY) vaccine  Aged Out       Subjective:      Review of Systems   Constitutional: Negative for chills and fever. HENT: Negative for congestion and rhinorrhea. Respiratory: Negative for cough and shortness of breath. Cardiovascular: Positive for leg swelling. Negative for chest pain. Gastrointestinal: Negative for diarrhea, nausea and vomiting. Neurological: Negative for dizziness and headaches. Due to patient's clinical status, ROS of symptoms was completed by discussion with long term care facility staff, as well as with input from patient. Objective:     Vitals:    09/15/21 1456   BP: (!) 102/50   Pulse: 72   Resp: 16   Temp: 97.7 °F (36.5 °C)   SpO2: 97%   Weight: 227 lb 1.9 oz (103 kg)     Physical Exam  Constitutional:       General: He is not in acute distress. HENT:      Head: Normocephalic and atraumatic. Right Ear: External ear normal.      Left Ear: External ear normal.   Eyes:      Extraocular Movements: Extraocular movements intact. Conjunctiva/sclera: Conjunctivae normal.   Pulmonary:      Effort: Pulmonary effort is normal. No respiratory distress. Musculoskeletal:      Right lower le+ Edema present. Left lower le+ Edema present. Neurological:      Mental Status: He is alert. Psychiatric:         Mood and Affect: Mood normal.         Behavior: Behavior normal.         Assessment/Plan:        1.  Bilateral lower extremity edema  2. Chronic heart failure with preserved ejection fraction (HFpEF) ([facility])  - BNP WNL, BMP and CBC unremarkable. Patient is currently borderline hypotensive, 102/50, recheck was 90/53. Concern that increased dose of diuretic would further lower his blood pressure, therefore no changes to lasix at this time. Will plan to have patient elevate BLE and have staff wrap with ACE bandage if possible. Notify clinic if symptoms do not improve. - Brain Natriuretic Peptide; Future  - Basic Metabolic Panel; Future  - CBC Auto Differential; Future      Return if symptoms worsen or fail to improve. Orders Placed This Encounter   Procedures    Brain Natriuretic Peptide     Standing Status:   Future     Number of Occurrences:   1     Standing Expiration Date:   9/15/2022    Basic Metabolic Panel     Standing Status:   Future     Number of Occurrences:   1     Standing Expiration Date:   9/15/2022    CBC Auto Differential     Standing Status:   Future     Number of Occurrences:   1     Standing Expiration Date:   9/15/2022         Electronically signed by KEVIN Sage CNP on 9/16/2021 at 7:33 PM       Cortney Edmondson, was evaluated through a synchronous (real-time) audio-video encounter. The patient (or guardian if applicable) is aware that this is a billable service. Verbal consent to proceed has been obtained within the past 12 months. The visit was conducted pursuant to the emergency declaration under the [address] [place] [address], [address] [place] [address] authority and the [employer] and [employer] General Act. Patient identification was verified, and a caregiver was present when appropriate. The patient was located in a state where the provider was credentialed to provide care. Total time spent for this encounter: Not billed by time    --KEVIN Sage CNP on 9/16/2021 at 7:33 PM    An electronic signature was used to authenticate this note.

## 2021-09-16 ENCOUNTER — HOSPITAL ENCOUNTER (OUTPATIENT)
Age: 86
Setting detail: SPECIMEN
Discharge: HOME | End: 2021-09-16
Payer: MEDICARE

## 2021-09-16 DIAGNOSIS — I50.32 CHRONIC HEART FAILURE WITH PRESERVED EJECTION FRACTION (HFPEF) (HCC): ICD-10-CM

## 2021-09-16 DIAGNOSIS — R60.0 BILATERAL LOWER EXTREMITY EDEMA: ICD-10-CM

## 2021-09-16 LAB
ABSOLUTE EOS #: 0.25 K/UL (ref 0–0.44)
ABSOLUTE IMMATURE GRANULOCYTE: <0.03 K/UL (ref 0–0.3)
ABSOLUTE LYMPH #: 1.35 K/UL (ref 1.1–3.7)
ABSOLUTE MONO #: 0.98 K/UL (ref 0.1–1.2)
ANION GAP SERPL CALCULATED.3IONS-SCNC: 10 MMOL/L (ref 9–17)
BASOPHILS # BLD: 0 % (ref 0–2)
BASOPHILS ABSOLUTE: <0.03 K/UL (ref 0–0.2)
BNP INTERPRETATION: NORMAL
BUN BLDV-MCNC: 21 MG/DL (ref 8–23)
BUN/CREAT BLD: 24 (ref 9–20)
CALCIUM SERPL-MCNC: 9.3 MG/DL (ref 8.6–10.4)
CHLORIDE BLD-SCNC: 103 MMOL/L (ref 98–107)
CO2: 26 MMOL/L (ref 20–31)
CREAT SERPL-MCNC: 0.86 MG/DL (ref 0.7–1.2)
DIFFERENTIAL TYPE: ABNORMAL
EOSINOPHILS RELATIVE PERCENT: 4 % (ref 1–4)
GFR AFRICAN AMERICAN: >60 ML/MIN
GFR NON-AFRICAN AMERICAN: >60 ML/MIN
GFR SERPL CREATININE-BSD FRML MDRD: ABNORMAL ML/MIN/{1.73_M2}
GFR SERPL CREATININE-BSD FRML MDRD: ABNORMAL ML/MIN/{1.73_M2}
GLUCOSE BLD-MCNC: 101 MG/DL (ref 70–99)
HCT VFR BLD CALC: 40.5 % (ref 40.7–50.3)
HEMOGLOBIN: 13 G/DL (ref 13–17)
IMMATURE GRANULOCYTES: 0 %
LYMPHOCYTES # BLD: 20 % (ref 24–43)
MCH RBC QN AUTO: 30.9 PG (ref 25.2–33.5)
MCHC RBC AUTO-ENTMCNC: 32.1 G/DL (ref 25.2–33.5)
MCV RBC AUTO: 96.2 FL (ref 82.6–102.9)
MONOCYTES # BLD: 14 % (ref 3–12)
NRBC AUTOMATED: 0 PER 100 WBC
PDW BLD-RTO: 14 % (ref 11.8–14.4)
PLATELET # BLD: 207 K/UL (ref 138–453)
PLATELET ESTIMATE: ABNORMAL
PMV BLD AUTO: 11.3 FL (ref 8.1–13.5)
POTASSIUM SERPL-SCNC: 4.2 MMOL/L (ref 3.7–5.3)
PRO-BNP: 259 PG/ML
RBC # BLD: 4.21 M/UL (ref 4.21–5.77)
RBC # BLD: ABNORMAL 10*6/UL
SEG NEUTROPHILS: 62 % (ref 36–65)
SEGMENTED NEUTROPHILS ABSOLUTE COUNT: 4.22 K/UL (ref 1.5–8.1)
SODIUM BLD-SCNC: 139 MMOL/L (ref 135–144)
WBC # BLD: 6.8 K/UL (ref 3.5–11.3)
WBC # BLD: ABNORMAL 10*3/UL

## 2021-09-16 PROCEDURE — 83880 ASSAY OF NATRIURETIC PEPTIDE: CPT

## 2021-09-16 PROCEDURE — 80048 BASIC METABOLIC PNL TOTAL CA: CPT

## 2021-09-16 PROCEDURE — 36415 COLL VENOUS BLD VENIPUNCTURE: CPT

## 2021-09-16 PROCEDURE — 85025 COMPLETE CBC W/AUTO DIFF WBC: CPT

## 2021-09-16 ASSESSMENT — ENCOUNTER SYMPTOMS
NAUSEA: 0
COUGH: 0
VOMITING: 0
DIARRHEA: 0
RHINORRHEA: 0
SHORTNESS OF BREATH: 0

## 2021-09-20 ENCOUNTER — OFFICE VISIT (OUTPATIENT)
Dept: CARDIOLOGY | Age: 86
End: 2021-09-20
Payer: MEDICARE

## 2021-09-20 ENCOUNTER — HOSPITAL ENCOUNTER (OUTPATIENT)
Dept: INTERVENTIONAL RADIOLOGY/VASCULAR | Age: 86
Discharge: HOME | End: 2021-09-22
Payer: MEDICARE

## 2021-09-20 ENCOUNTER — TELEPHONE (OUTPATIENT)
Dept: CARDIOLOGY | Age: 86
End: 2021-09-20

## 2021-09-20 VITALS
BODY MASS INDEX: 37.82 KG/M2 | DIASTOLIC BLOOD PRESSURE: 62 MMHG | HEART RATE: 72 BPM | HEIGHT: 65 IN | WEIGHT: 227 LBS | SYSTOLIC BLOOD PRESSURE: 132 MMHG

## 2021-09-20 DIAGNOSIS — R60.0 LEG EDEMA, LEFT: ICD-10-CM

## 2021-09-20 DIAGNOSIS — I10 ESSENTIAL HYPERTENSION: ICD-10-CM

## 2021-09-20 DIAGNOSIS — I25.10 CORONARY ARTERY DISEASE INVOLVING NATIVE CORONARY ARTERY OF NATIVE HEART WITHOUT ANGINA PECTORIS: ICD-10-CM

## 2021-09-20 DIAGNOSIS — I50.32 CHRONIC HEART FAILURE WITH PRESERVED EJECTION FRACTION (HFPEF) (HCC): ICD-10-CM

## 2021-09-20 DIAGNOSIS — E78.5 DYSLIPIDEMIA: ICD-10-CM

## 2021-09-20 DIAGNOSIS — I10 ESSENTIAL HYPERTENSION, BENIGN: Primary | ICD-10-CM

## 2021-09-20 DIAGNOSIS — I10 ESSENTIAL HYPERTENSION, BENIGN: ICD-10-CM

## 2021-09-20 DIAGNOSIS — I82.463 ACUTE DEEP VEIN THROMBOSIS (DVT) OF CALF MUSCLE VEIN OF BOTH LOWER EXTREMITIES (HCC): Primary | ICD-10-CM

## 2021-09-20 PROCEDURE — 93005 ELECTROCARDIOGRAM TRACING: CPT | Performed by: INTERNAL MEDICINE

## 2021-09-20 PROCEDURE — 93010 ELECTROCARDIOGRAM REPORT: CPT | Performed by: INTERNAL MEDICINE

## 2021-09-20 PROCEDURE — 99214 OFFICE O/P EST MOD 30 MIN: CPT | Performed by: INTERNAL MEDICINE

## 2021-09-20 PROCEDURE — 93970 EXTREMITY STUDY: CPT

## 2021-09-20 RX ORDER — FUROSEMIDE 40 MG/1
60 TABLET ORAL DAILY
Qty: 135 TABLET | Refills: 1 | Status: SHIPPED | OUTPATIENT
Start: 2021-09-20

## 2021-09-20 NOTE — TELEPHONE ENCOUNTER
Has gatroc DVT and GSV thrombus as well. Will treat with eliquis. (stop aspirin)  Called in. Please also refer to Vascular.    Thanks  [employer]

## 2021-09-20 NOTE — TELEPHONE ENCOUNTER
Writer called [facility] and informed nurse of patient of results per Dr Shoshana Nunez and to start pt on Eliquis and stop aspirin and of referral to vascular surgery. Doyle called vascular surgery and scheduled pt for oct 28th with Dr Armaan Turner. Also informed nurse of date and time and surgeon of vascular surgery appt. Nurse verbalized understanding of all orders and had no further questions at the time.

## 2021-09-20 NOTE — PROGRESS NOTES
Today's Date: 9/20/2021  Patient's Name: Karlene Moser  Patient's age: 80 y.o., 6/28/1927    Subjective: The patient is a 80y.o. year old, , male is in the office for CAD. Admits to L > R LE edema. Denies any cp, sob, orthopnea. Most gets around in the wheelchair at his NH. 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 Past Surgical History:   has a past surgical history that includes Colonoscopy (08/29/12); Cystocopy (11/15/2007); Appendectomy; Cholecystectomy (01/12/1983); Knee fusion (Left); lipoma resection; Colonoscopy (05/27/09); Colonoscopy (04/30/2008); Prostate Biopsy (Bilateral, 06/23/2009); pr egd transoral biopsy single/multiple (N/A, 7/3/2017); Upper gastrointestinal endoscopy (02/2/2015); Upper gastrointestinal endoscopy (04/25/2016); Upper gastrointestinal endoscopy (07/03/2017); pr cystourethroscopy (N/A, 11/19/2018); and Leg Surgery (Left, 1/11/2020). Home Medications:  Prior to Admission medications    Medication Sig Start Date End Date Taking? Authorizing Provider   oxyCODONE-acetaminophen (PERCOCET) 7.5-325 MG per tablet Take 1 tablet by mouth every 6 hours as needed for Pain for up to 30 days.  Intended supply: 30 days 9/7/21 10/7/21 Yes KEVIN Schuster - CNP   lisinopril (PRINIVIL;ZESTRIL) 5 MG tablet TAKE 1 TABLET DAILY 4/19/21  Yes Ainsley Elam MD   atorvastatin (LIPITOR) 10 MG tablet TAKE 1 TABLET DAILY 3/18/21  Yes Ainsley Elam MD   clopidogrel (PLAVIX) 75 MG tablet TAKE 1 TABLET DAILY 3/18/21  Yes Ainsley Elam MD   furosemide (LASIX) 40 MG tablet Take 1 tablet by mouth daily 6/26/20  Yes Tyron Cornejo DO   melatonin 3 MG TABS tablet Take 3 mg by mouth nightly   Yes Historical Provider, MD   docusate sodium (COLACE) 100 MG capsule Take 100 mg by mouth daily   Yes Historical Provider, MD   pantoprazole (PROTONIX) 40 MG tablet Take 1 tablet by mouth every morning (before breakfast) 1/23/19  Yes Ruben Mckinney MD   metoprolol tartrate (LOPRESSOR) 25 MG tablet Take 0.5 tablets by mouth 2 times daily 1/23/19  Yes Ruben Mckinney MD   aspirin 81 MG chewable tablet Take 1 tablet by mouth daily 1/23/19  Yes Ruben Mckinney MD   acetaminophen (TYLENOL) 325 MG tablet Take 2 tablets by mouth every 4 hours as needed for Pain or Fever 6/12/18  Yes Gwen Wylie   Multiple Vitamins-Minerals (MULTIVITAMIN PO) Take 1 tablet by mouth daily. Yes Historical Provider, MD       Allergies:  Codeine and Morphine    Social History:   reports that he quit smoking about 23 years ago. His smoking use included cigars. He smoked 1.00 pack per day. He has never used smokeless tobacco. He reports that he does not drink alcohol and does not use drugs. Review of Systems:  REVIEW OF SYSTEMS:    · Constitutional: there has been no unanticipated weight loss. There's been No change in energy level, No change in activity level. · Eyes: No visual changes or diplopia. No scleral icterus. · ENT: No Headaches, hearing loss or vertigo. No mouth sores or sore throat. · Cardiovascular: AS HPI  · Respiratory: AS HPI  · Gastrointestinal: No abdominal pain, appetite loss, blood in stools. No change in bowel or bladder habits. · Genitourinary: No dysuria, trouble voiding, or hematuria. · Musculoskeletal:  No gait disturbance, No weakness or joint complaints. · Integumentary: No rash or pruritis. · Neurological: No headache, diplopia, change in muscle strength, numbness or tingling. No change in gait, balance, coordination, mood, affect, memory, mentation, behavior. · Psychiatric: No new anxiety or depression. · Endocrine: No temperature intolerance. No excessive thirst, fluid intake, or urination. No tremor.   · Hematologic/Lymphatic: No abnormal bruising or bleeding, blood clots or swollen lymph nodes.  · Allergic/Immunologic: No nasal congestion or hives. Physical Exam:  /62   Pulse 72   Ht 5' 5\" (1.651 m)   Wt 227 lb (103 kg)   BMI 37.77 kg/m²    Physical Exam  Constitutional:       Appearance: Normal appearance. HENT:      Head: Normocephalic and atraumatic. Cardiovascular:      Rate and Rhythm: Normal rate and regular rhythm. Pulses: Normal pulses. Heart sounds: Normal heart sounds. No murmur heard. Pulmonary:      Effort: Pulmonary effort is normal. No respiratory distress. Breath sounds: Normal breath sounds. No stridor. Abdominal:      General: There is no distension. Palpations: There is no mass. Musculoskeletal:         General: No swelling or tenderness. Right lower leg: Edema present. Left lower leg: Edema present. Skin:     Capillary Refill: Capillary refill takes less than 2 seconds. Coloration: Skin is not jaundiced or pale. Neurological:      General: No focal deficit present. Mental Status: He is alert and oriented to person, place, and time. Cranial Nerves: No cranial nerve deficit. Sensory: No sensory deficit. Psychiatric:         Mood and Affect: Mood normal.         Behavior: Behavior normal.         Cardiac Data:  ECG:  Sinus  Rhythm  - frequent PAC s   # PACs = 3.  -Right bundle branch block with left axis -bifascicular block.      Labs:   LABS    Lab Results   Component Value Date    CHOL 110 06/03/2021    TRIG 94 06/03/2021    HDL 38 (L) 06/03/2021    LDLCHOLESTEROL 53 06/03/2021    VLDL NOT REPORTED 06/03/2021    CHOLHDLRATIO 2.9 06/03/2021       Lab Results   Component Value Date     09/16/2021    K 4.2 09/16/2021     09/16/2021    CO2 26 09/16/2021    BUN 21 09/16/2021    CREATININE 0.86 09/16/2021    GLUCOSE 101 (H) 09/16/2021    CALCIUM 9.3 09/16/2021    PROT 6.1 (L) 01/10/2020    LABALBU 3.3 (L) 01/10/2020    BILITOT 0.44 01/10/2020    ALKPHOS 93 01/10/2020    AST 14 06/03/2021    ALT 18 01/10/2020    LABGLOM >60 09/16/2021    GFRAA >60 09/16/2021    GLOB NOT REPORTED 11/23/2018       Assessment/Plan:  1. Bilateral L > R LE edema. Has history of venous stasis, but per patient this is worse than his usual significantly. - check BL LE venous doppler stat  - increase lasix 60 mg po qd  - check BMP in 2 weeks  - offered him 2d echo- he would like to hold off    2. CAD. STEMI on 11/2018 with KRISTEN of LAD. Mild disease of other arteries. Stable and asymptomatic, continue current medications. 2. Mild ischemic Cardiomyopathy. EF 40% on 11/2018. Last echo 3/2019 showing EF 55-60%, Grade I DD, No significant valvular disease seen. See number 1    3. HPL. . On Statin. 4. Essential hypertension. Well controlled on current medications. 5. Chronic venous stasis- see number 1    The patient is to continue heart healthy diet, weight loss and exercise as tolerated. Patient's medications and side effects were discussed. Medication refills were provided if needed. Follow up appointment timing was discussed. All questions and concerns were addressed to patient's satisfaction. The patient is to follow up in 1 months or sooner if necessary. Thank you for allowing me to participate in the care of this patient, please do not hesitate to call if you have any questions. Kimber Felix DO, [facility], [address], [address], [place] [address] [facility]  [facility]. [facility]  [phone]

## 2021-09-23 ENCOUNTER — OFFICE VISIT (OUTPATIENT)
Dept: PAIN MANAGEMENT | Age: 86
End: 2021-09-23
Payer: MEDICARE

## 2021-09-23 VITALS
WEIGHT: 227 LBS | RESPIRATION RATE: 16 BRPM | SYSTOLIC BLOOD PRESSURE: 134 MMHG | DIASTOLIC BLOOD PRESSURE: 64 MMHG | BODY MASS INDEX: 37.77 KG/M2

## 2021-09-23 DIAGNOSIS — M17.0 PRIMARY OSTEOARTHRITIS OF BOTH KNEES: Primary | ICD-10-CM

## 2021-09-23 DIAGNOSIS — M25.561 CHRONIC PAIN OF BOTH KNEES: ICD-10-CM

## 2021-09-23 DIAGNOSIS — M25.562 CHRONIC PAIN OF BOTH KNEES: ICD-10-CM

## 2021-09-23 DIAGNOSIS — G89.29 CHRONIC PAIN OF BOTH KNEES: ICD-10-CM

## 2021-09-23 PROCEDURE — 99213 OFFICE O/P EST LOW 20 MIN: CPT | Performed by: NURSE PRACTITIONER

## 2021-09-23 PROCEDURE — 99212 OFFICE O/P EST SF 10 MIN: CPT

## 2021-09-23 RX ORDER — OXYCODONE AND ACETAMINOPHEN 7.5; 325 MG/1; MG/1
1 TABLET ORAL 4 TIMES DAILY
Qty: 120 TABLET | Refills: 0 | Status: SHIPPED | OUTPATIENT
Start: 2021-09-23 | End: 2021-10-12 | Stop reason: SDUPTHER

## 2021-09-23 RX ORDER — ASPIRIN 81 MG/1
81 TABLET ORAL DAILY
COMMUNITY
End: 2021-10-05 | Stop reason: ALTCHOICE

## 2021-09-23 ASSESSMENT — ENCOUNTER SYMPTOMS
BACK PAIN: 0
RESPIRATORY NEGATIVE: 1
GASTROINTESTINAL NEGATIVE: 1

## 2021-09-23 NOTE — PROGRESS NOTES
Subjective:      Patient ID: Cortney Edmondson is a 80 y.o. male. Chief Complaint   Patient presents with    Knee Pain     bilateral knee pain    Medication Management     Leg Pain   Pertinent negatives include no numbness. Knee Pain   Pertinent negatives include no numbness. Here today for routine pain clinic recheck. Chronic pain in knees, resides at assisted living. Ambulating very limited due to pain. Percocet 7.5 without significant relief, but does alleviate the pain. Denies side effects    Pain Assessment  Location of Pain: Knee  Location Modifiers: Right, Left  Severity of Pain:  (never gets below a 5 - then to an 8 when trying to sleep)  Quality of Pain: Grinding, Sharp, Aching  Duration of Pain: Persistent  Frequency of Pain: Constant  Aggravating Factors: Bending, Stretching, Straightening, Exercise, Kneeling, Squatting, Standing, Walking, Stairs (sleeping)  Limiting Behavior: Yes    Allergies   Allergen Reactions    Codeine Nausea And Vomiting    Morphine Other (See Comments)     constipation       Outpatient Medications Marked as Taking for the 9/23/21 encounter (Office Visit) with KEVIN Eckert CNP   Medication Sig Dispense Refill    aspirin 81 MG EC tablet Take 81 mg by mouth daily      oxyCODONE-acetaminophen (PERCOCET) 7.5-325 MG per tablet Take 1 tablet by mouth 4 times daily for 30 days.  Intended supply: 30 days 120 tablet 0    furosemide (LASIX) 40 MG tablet Take 1.5 tablets by mouth daily 135 tablet 1    apixaban (ELIQUIS) 5 MG TABS tablet Take 2 tabs bid for 7 days, then 1 tab bid 180 tablet 1    lisinopril (PRINIVIL;ZESTRIL) 5 MG tablet TAKE 1 TABLET DAILY 90 tablet 3    atorvastatin (LIPITOR) 10 MG tablet TAKE 1 TABLET DAILY 90 tablet 3    clopidogrel (PLAVIX) 75 MG tablet TAKE 1 TABLET DAILY 90 tablet 3    melatonin 3 MG TABS tablet Take 3 mg by mouth nightly      docusate sodium (COLACE) 100 MG capsule Take 100 mg by mouth daily      pantoprazole (PROTONIX) 40 MG tablet Take 1 tablet by mouth every morning (before breakfast) 90 tablet 3    metoprolol tartrate (LOPRESSOR) 25 MG tablet Take 0.5 tablets by mouth 2 times daily 90 tablet 3    acetaminophen (TYLENOL) 325 MG tablet Take 2 tablets by mouth every 4 hours as needed for Pain or Fever 120 tablet 0    Multiple Vitamins-Minerals (MULTIVITAMIN PO) Take 1 tablet by mouth daily.          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 CYSTOURETHROSCOPY N/A 11/19/2018    CYSTO Photovaporization Prostate Greenlight Laser ([id number]) performed by Saint Bran, MD at [address] [facility] EGD TRANSORAL BIOPSY SINGLE/MULTIPLE N/A 7/3/2017    EGD BIOPSY performed by Alli Casillas MD at [facility] [address] Bilateral 06/23/2009    Benign    UPPER GASTROINTESTINAL ENDOSCOPY  02/2/2015    Sepulveda's (Dr. Carlo Mcpherson)    UPPER GASTROINTESTINAL ENDOSCOPY  04/25/2016    Lg hiatal hernia, polyp at duodenum, gastric polyp, Barretts Esophagus, GE 28    UPPER GASTROINTESTINAL ENDOSCOPY  07/03/2017    mild gastritis, Sepulveda's, Dr. German Reyes 3 years       Family History   Problem Relation Age of Onset    Cancer Father         stomach cancer    Heart Disease Paternal Grandfather        Social History     Socioeconomic History    Marital status:      Spouse name: None    Number of children: None    Years of education: None    Highest education level: None   Occupational History    None   Tobacco Use    Smoking status: Former Smoker     Packs/day: 1.00     Types: Cigars     Quit date: 1998     Years since quittin.7    Smokeless tobacco: Never Used    Tobacco comment: Former smoker (quit )- [facility] 3/20/17   Vaping Use    Vaping Use: Never used   Substance and Sexual Activity    Alcohol use: No     Alcohol/week: 0.0 standard drinks    Drug use: No    Sexual activity: None   Other Topics Concern    None   Social History Narrative    None     Social Determinants of Health     Financial Resource Strain:     Difficulty of Paying Living Expenses:    Food Insecurity:     Worried About Running Out of Food in the Last Year:     Ran Out of Food in the Last Year:    Transportation Needs:     Lack of Transportation (Medical):  Lack of Transportation (Non-Medical):    Physical Activity:     Days of Exercise per Week:     Minutes of Exercise per Session:    Stress:     Feeling of Stress :    Social Connections:     Frequency of Communication with Friends and Family:     Frequency of Social Gatherings with Friends and Family:     Attends Buddhist Services:     Active Member of Clubs or Organizations:     Attends Club or Organization Meetings:     Marital Status:    Intimate Partner Violence:     Fear of Current or Ex-Partner:     Emotionally Abused:     Physically Abused:     Sexually Abused:      Review of Systems   Constitutional: Positive for activity change (limited due to right knee pain). Negative for appetite change. Respiratory: Negative. Cardiovascular: Negative. Gastrointestinal: Negative. Genitourinary: Negative. Johnson catheter in place   Musculoskeletal: Positive for arthralgias (bilateral knee, right knee is majority of his pain) and gait problem. Negative for back pain, neck pain and neck stiffness.    Neurological: Negative for weakness and numbness. Psychiatric/Behavioral: Positive for sleep disturbance. Objective:   Physical Exam  Vitals reviewed. Constitutional:       General: He is not in acute distress. Appearance: He is well-developed. He is not ill-appearing, toxic-appearing or diaphoretic. HENT:      Head: Normocephalic and atraumatic. Cardiovascular:      Rate and Rhythm: Normal rate. Pulmonary:      Effort: Pulmonary effort is normal. No respiratory distress. Musculoskeletal:      Right knee: Swelling present. Decreased range of motion. Skin:     General: Skin is warm and dry. Coloration: Skin is not pale. Nails: There is no clubbing. Neurological:      Mental Status: He is alert and oriented to person, place, and time. He is not disoriented. GCS: GCS eye subscore is 4. GCS verbal subscore is 5. GCS motor subscore is 6. Cranial Nerves: No cranial nerve deficit. Motor: No tremor or seizure activity. Psychiatric:         Behavior: Behavior normal. Behavior is cooperative. Assessment:      1. Primary osteoarthritis of both knees    2. Chronic pain of both knees          Plan:     Chronic pain diagnoses such as   1. Primary osteoarthritis of both knees    2. Chronic pain of both knees     controlled on current medication regime, wll continue current pain medications to improve quality of life and function. Controlled Substance Monitoring:    Acute and Chronic Pain Monitoring:   RX Monitoring 9/23/2021   Attestation -   Periodic Controlled Substance Monitoring No signs of potential drug abuse or diversion identified.    Chronic Pain > 50 MEDD -       Follow up 2-3 months

## 2021-10-05 RX ORDER — LACTULOSE 10 G/15ML
20 SOLUTION ORAL DAILY PRN
COMMUNITY

## 2021-10-07 ENCOUNTER — HOSPITAL ENCOUNTER (OUTPATIENT)
Age: 86
Setting detail: SPECIMEN
Discharge: HOME | End: 2021-10-07
Payer: MEDICARE

## 2021-10-07 DIAGNOSIS — I10 ESSENTIAL HYPERTENSION, BENIGN: ICD-10-CM

## 2021-10-07 DIAGNOSIS — R60.0 LEG EDEMA, LEFT: ICD-10-CM

## 2021-10-07 LAB
ANION GAP SERPL CALCULATED.3IONS-SCNC: 15 MMOL/L (ref 9–17)
BUN BLDV-MCNC: 29 MG/DL (ref 8–23)
BUN/CREAT BLD: 31 (ref 9–20)
CALCIUM SERPL-MCNC: 9.6 MG/DL (ref 8.6–10.4)
CHLORIDE BLD-SCNC: 105 MMOL/L (ref 98–107)
CO2: 24 MMOL/L (ref 20–31)
CREAT SERPL-MCNC: 0.95 MG/DL (ref 0.7–1.2)
GFR AFRICAN AMERICAN: >60 ML/MIN
GFR NON-AFRICAN AMERICAN: >60 ML/MIN
GFR SERPL CREATININE-BSD FRML MDRD: ABNORMAL ML/MIN/{1.73_M2}
GFR SERPL CREATININE-BSD FRML MDRD: ABNORMAL ML/MIN/{1.73_M2}
GLUCOSE BLD-MCNC: 111 MG/DL (ref 70–99)
POTASSIUM SERPL-SCNC: 4 MMOL/L (ref 3.7–5.3)
SODIUM BLD-SCNC: 144 MMOL/L (ref 135–144)

## 2021-10-07 PROCEDURE — 80048 BASIC METABOLIC PNL TOTAL CA: CPT

## 2021-10-07 PROCEDURE — 36415 COLL VENOUS BLD VENIPUNCTURE: CPT

## 2021-10-12 DIAGNOSIS — G89.29 CHRONIC PAIN OF BOTH KNEES: ICD-10-CM

## 2021-10-12 DIAGNOSIS — M25.561 CHRONIC PAIN OF BOTH KNEES: ICD-10-CM

## 2021-10-12 DIAGNOSIS — M25.562 CHRONIC PAIN OF BOTH KNEES: ICD-10-CM

## 2021-10-12 RX ORDER — OXYCODONE AND ACETAMINOPHEN 7.5; 325 MG/1; MG/1
1 TABLET ORAL 4 TIMES DAILY
Qty: 120 TABLET | Refills: 0 | Status: SHIPPED | OUTPATIENT
Start: 2021-10-12 | End: 2021-10-25 | Stop reason: SDUPTHER

## 2021-10-12 NOTE — TELEPHONE ENCOUNTER
Patient called refill line for their Percocet 7.5 sent to [facility]. Last Appt:  9/23/2021  Next Appt:   11/22/2021  Med verified in [address] [place] [address] checked for [place], and [place]: 9/7/21 oxycodone acetaminophen 7.5-325mg #120. Due Now. Loni Og

## 2021-10-25 DIAGNOSIS — M25.562 CHRONIC PAIN OF BOTH KNEES: ICD-10-CM

## 2021-10-25 DIAGNOSIS — M25.561 CHRONIC PAIN OF BOTH KNEES: ICD-10-CM

## 2021-10-25 DIAGNOSIS — G89.29 CHRONIC PAIN OF BOTH KNEES: ICD-10-CM

## 2021-10-25 RX ORDER — OXYCODONE AND ACETAMINOPHEN 7.5; 325 MG/1; MG/1
1 TABLET ORAL 4 TIMES DAILY
Qty: 120 TABLET | Refills: 0 | Status: SHIPPED | OUTPATIENT
Start: 2021-10-25 | End: 2021-11-22

## 2021-10-25 NOTE — TELEPHONE ENCOUNTER
Last Appt:  9/23/2021  Next Appt:   11/22/2021  Med verified in [facility] checked for [place], and [place]: percocet 7.5/325 10/12/24   #54  . Due now.

## 2021-10-28 ENCOUNTER — OFFICE VISIT (OUTPATIENT)
Dept: VASCULAR SURGERY | Age: 86
End: 2021-10-28
Payer: MEDICARE

## 2021-10-28 VITALS — SYSTOLIC BLOOD PRESSURE: 118 MMHG | DIASTOLIC BLOOD PRESSURE: 64 MMHG | HEART RATE: 64 BPM

## 2021-10-28 DIAGNOSIS — I89.0 LYMPHEDEMA OF BOTH LOWER EXTREMITIES: ICD-10-CM

## 2021-10-28 PROCEDURE — 99212 OFFICE O/P EST SF 10 MIN: CPT | Performed by: SURGERY

## 2021-10-28 PROCEDURE — 99213 OFFICE O/P EST LOW 20 MIN: CPT | Performed by: SURGERY

## 2021-10-28 NOTE — PROGRESS NOTES
[address] [facility]  [facility] VASCULAR SURG [address]  [address] [facility], [address]  [facility] [id number]  [address] [place] [address]  6/28/1927  89 y.o.male       Chief Complaint:  Chief Complaint   Patient presents with    New Patient     leg swelling, ultrasound one       History of present Illness:  Pt is here today for evaluation and treatment of leg swelling. This is a 79-year-old male lives in a nursing home. He states that both of his legs are severely swollen. He no longer walks even with a walker. We discussed that this edema is most likely due to dependency. Although he is able to sleep in the bed again the lack of mobility leaves his legs dangling all day. Currently he is in compression with Ace bandages and I think this is appropriate given his overall diminished mobility. I reviewed his recent ultrasound which shows some areas of thickening of the veins but I do not think it is acute DVT. 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     Past Surgical History:   Past Surgical History:   Procedure Laterality Date    APPENDECTOMY      CHOLECYSTECTOMY  01/12/1983    COLONOSCOPY  08/29/12    COLONOSCOPY  05/27/09    COLONOSCOPY  04/30/2008    CYSTOSCOPY  11/15/2007    with left ureteroscopy and dorsal slit     KNEE FUSION Left     LEG SURGERY Left 1/11/2020    I and D of Left Lower Extremitity performed by Vargas Holloway MD at [address] [facility]. MS CYSTOURETHROSCOPY N/A 11/19/2018    CYSTO Photovaporization Prostate Greenlight Laser ([id number]) performed by Rafael Herrera MD at [address] [facility] EGD TRANSORAL BIOPSY SINGLE/MULTIPLE N/A 7/3/2017    EGD BIOPSY performed by Anu Brunner MD at [address] [facility]  PROSTATE BIOPSY Bilateral 06/23/2009    Benign    UPPER GASTROINTESTINAL ENDOSCOPY  02/2/2015    Sepulveda's (Dr. Jane Griffin)    UPPER GASTROINTESTINAL ENDOSCOPY  04/25/2016    Lg hiatal hernia, polyp at duodenum, gastric polyp, Barretts Esophagus, GE 32    UPPER GASTROINTESTINAL ENDOSCOPY  07/03/2017    mild gastritis, Sepulveda's, Dr. Aisha Santana 3 years       Social History:  reports that he quit smoking about 23 years ago. His smoking use included cigars. He smoked 1.00 pack per day. He has never used smokeless tobacco. He reports that he does not drink alcohol and does not use drugs. Family History: family history includes Cancer in his father; Heart Disease in his paternal grandfather.     Review of Systems:   Constitutional:  negative for  fevers, chills, sweats, fatigue, and weight loss  HEENT:  negative for vision or hearing changes,   Respiratory:  negative for shortness of breath, cough, or congestion  Cardiovascular:  negative for  chest pain, palpitations  Gastrointestinal:  negative for nausea, vomiting, diarrhea, constipation, abdominal pain  Genitourinary:  negative for frequency, dysuria  Integument/Breast:  negative for rash, skin lesions  Musculoskeletal:  negative for muscle aches or joint pain  Neurological:  negative for headaches, dizziness, lightheadedness, numbness, pain and tingling extremities  Behavior/Psych:  negative for depression and anxiety    Allergies: Codeine and Morphine    Current Meds:  Current Outpatient Medications:     apixaban (ELIQUIS) 5 MG TABS tablet, Take 5 mg by mouth 2 times daily, Disp: , Rfl:     lactulose (CHRONULAC) 10 GM/15ML solution, Take 20 g by mouth daily as needed, Disp: , Rfl:     furosemide (LASIX) 40 MG tablet, Take 1.5 tablets by mouth daily, Disp: 135 tablet, Rfl: 1    lisinopril (PRINIVIL;ZESTRIL) 5 MG tablet, TAKE 1 TABLET DAILY, Disp: 90 tablet, Rfl: 3    atorvastatin (LIPITOR) 10 MG tablet, TAKE 1 TABLET DAILY, Disp: 90 tablet, Rfl: 3   clopidogrel (PLAVIX) 75 MG tablet, TAKE 1 TABLET DAILY, Disp: 90 tablet, Rfl: 3    melatonin 3 MG TABS tablet, Take 3 mg by mouth nightly, Disp: , Rfl:     pantoprazole (PROTONIX) 40 MG tablet, Take 1 tablet by mouth every morning (before breakfast), Disp: 90 tablet, Rfl: 3    metoprolol tartrate (LOPRESSOR) 25 MG tablet, Take 0.5 tablets by mouth 2 times daily, Disp: 90 tablet, Rfl: 3    Multiple Vitamins-Minerals (MULTIVITAMIN PO), Take 1 tablet by mouth daily. , Disp: , Rfl:     oxyCODONE-acetaminophen (PERCOCET) 7.5-325 MG per tablet, Take 1 tablet by mouth 4 times daily for 30 days. Intended supply: 30 days, Disp: 120 tablet, Rfl: 0    docusate sodium (COLACE) 100 MG capsule, Take 100 mg by mouth daily, Disp: , Rfl:     acetaminophen (TYLENOL) 325 MG tablet, Take 2 tablets by mouth every 4 hours as needed for Pain or Fever, Disp: 120 tablet, Rfl: 0    Vital Signs:  Vitals:    10/28/21 0950   BP: 118/64   Pulse: 64       Physical Exam:  General appearance - alert, well appearing and in no acute distress  Mental status - oriented to person, place and time with normal affect  Head - normocephalic and atraumatic  Eyes - pupils equal and reactive, extraocular eye movements intact, conjunctiva clear  Ears - hearing appears to be intact  Nose - no drainage noted  Mouth - mucous membranes moist  Neck - supple, no carotid bruits, thyroid not palpable, no JVD  Chest - clear to auscultation, normal effort  Heart - normal rate, regular rhythm, no murmurs  Abdomen - soft, non-tender, non-distended, bowel sounds present all four quadrants, no masses, hepatomegaly, splenomegaly or aortic enlargement  Neurological - normal speech, no focal findings or movement disorder noted, cranial nerves II through XII grossly intact  Extremities -bilateral significant edema. Legs are wrapped in Ace bandages. No evidence of ulcers or draining wounds.   Pulses are difficult to palpate due to swelling  Skin - no gross lesions, rashes,

## 2021-11-01 ENCOUNTER — OFFICE VISIT (OUTPATIENT)
Dept: CARDIOLOGY | Age: 86
End: 2021-11-01
Payer: MEDICARE

## 2021-11-01 ENCOUNTER — TELEPHONE (OUTPATIENT)
Dept: CARDIOLOGY | Age: 86
End: 2021-11-01

## 2021-11-01 VITALS
BODY MASS INDEX: 31.99 KG/M2 | HEIGHT: 65 IN | WEIGHT: 192 LBS | HEART RATE: 62 BPM | DIASTOLIC BLOOD PRESSURE: 63 MMHG | SYSTOLIC BLOOD PRESSURE: 108 MMHG

## 2021-11-01 DIAGNOSIS — I25.10 CORONARY ARTERY DISEASE INVOLVING NATIVE CORONARY ARTERY OF NATIVE HEART WITHOUT ANGINA PECTORIS: Primary | ICD-10-CM

## 2021-11-01 DIAGNOSIS — I82.463 ACUTE DEEP VEIN THROMBOSIS (DVT) OF CALF MUSCLE VEIN OF BOTH LOWER EXTREMITIES (HCC): ICD-10-CM

## 2021-11-01 DIAGNOSIS — I10 ESSENTIAL HYPERTENSION, BENIGN: ICD-10-CM

## 2021-11-01 DIAGNOSIS — R60.0 LEG EDEMA, LEFT: ICD-10-CM

## 2021-11-01 DIAGNOSIS — I50.32 CHRONIC HEART FAILURE WITH PRESERVED EJECTION FRACTION (HFPEF) (HCC): ICD-10-CM

## 2021-11-01 DIAGNOSIS — E78.5 DYSLIPIDEMIA: ICD-10-CM

## 2021-11-01 PROCEDURE — 99214 OFFICE O/P EST MOD 30 MIN: CPT | Performed by: INTERNAL MEDICINE

## 2021-11-01 PROCEDURE — 93010 ELECTROCARDIOGRAM REPORT: CPT | Performed by: INTERNAL MEDICINE

## 2021-11-01 PROCEDURE — 93005 ELECTROCARDIOGRAM TRACING: CPT | Performed by: INTERNAL MEDICINE

## 2021-11-01 PROCEDURE — 99212 OFFICE O/P EST SF 10 MIN: CPT | Performed by: INTERNAL MEDICINE

## 2021-11-01 NOTE — TELEPHONE ENCOUNTER
Patient seen in office today. Per Dr Jenn Giraldo, wondering if patient can be taken off Eliquis? Office Note 11/1/21 Dr Corine Fischer  Assessment/Plan:  1.  Bilateral L > R LE edema- improved  - Dopplers previously revealed possible DVT- per vascular less likely- will clarify with their office if he can d/c eliquis

## 2021-11-01 NOTE — PROGRESS NOTES
Today's Date: 11/1/2021  Patient's Name: Mellisa Celaya  Patient's age: 80 y.o., 6/28/1927    Subjective: The patient is a 80 y.o., , male is in the office for CAD. Swelling improved on lasix 60 bid. LE u/s revealed possible DVT. Sent to see Vascular, who reviewed films and thinks its likely chronic thickening. Denies any cp, sob. Uses wheelchair in NH. 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 Past Surgical History:   has a past surgical history that includes Colonoscopy (08/29/12); Cystocopy (11/15/2007); Appendectomy; Cholecystectomy (01/12/1983); Knee fusion (Left); lipoma resection; Colonoscopy (05/27/09); Colonoscopy (04/30/2008); Prostate Biopsy (Bilateral, 06/23/2009); pr egd transoral biopsy single/multiple (N/A, 7/3/2017); Upper gastrointestinal endoscopy (02/2/2015); Upper gastrointestinal endoscopy (04/25/2016); Upper gastrointestinal endoscopy (07/03/2017); pr cystourethroscopy (N/A, 11/19/2018); and Leg Surgery (Left, 1/11/2020). Home Medications:  Prior to Admission medications    Medication Sig Start Date End Date Taking? Authorizing Provider   oxyCODONE-acetaminophen (PERCOCET) 7.5-325 MG per tablet Take 1 tablet by mouth 4 times daily for 30 days.  Intended supply: 30 days 10/25/21 11/24/21 Yes Roland Blanco MD   apixaban (ELIQUIS) 5 MG TABS tablet Take 5 mg by mouth 2 times daily   Yes Historical Provider, MD   lactulose (CHRONULAC) 10 GM/15ML solution Take 20 g by mouth daily as needed   Yes Historical Provider, MD   furosemide (LASIX) 40 MG tablet Take 1.5 tablets by mouth daily 9/20/21  Yes Tyron Cornejo DO   lisinopril (PRINIVIL;ZESTRIL) 5 MG tablet TAKE 1 TABLET DAILY 4/19/21  Yes Mohini Lange MD   atorvastatin (LIPITOR) 10 MG tablet TAKE 1 TABLET DAILY 3/18/21  Yes Barb Kohli MD   clopidogrel (PLAVIX) 75 MG tablet TAKE 1 TABLET DAILY 3/18/21  Yes Barb Kohli MD   melatonin 3 MG TABS tablet Take 3 mg by mouth nightly   Yes Historical Provider, MD   docusate sodium (COLACE) 100 MG capsule Take 100 mg by mouth daily   Yes Historical Provider, MD   pantoprazole (PROTONIX) 40 MG tablet Take 1 tablet by mouth every morning (before breakfast) 1/23/19  Yes Barb Kohli MD   metoprolol tartrate (LOPRESSOR) 25 MG tablet Take 0.5 tablets by mouth 2 times daily 1/23/19  Yes Barb Kohli MD   acetaminophen (TYLENOL) 325 MG tablet Take 2 tablets by mouth every 4 hours as needed for Pain or Fever 6/12/18  Yes Antonella Isabella   Multiple Vitamins-Minerals (MULTIVITAMIN PO) Take 1 tablet by mouth daily. Yes Historical Provider, MD       Allergies:  Codeine and Morphine    Social History:   reports that he quit smoking about 23 years ago. His smoking use included cigars. He smoked 1.00 pack per day. He has never used smokeless tobacco. He reports that he does not drink alcohol and does not use drugs. Review of Systems:  REVIEW OF SYSTEMS:    · Constitutional: there has been no unanticipated weight loss. There's been No change in energy level, No change in activity level. · Eyes: No visual changes or diplopia. No scleral icterus. · ENT: No Headaches, hearing loss or vertigo. No mouth sores or sore throat. · Cardiovascular: AS HPI  · Respiratory: AS HPI  · Gastrointestinal: No abdominal pain, appetite loss, blood in stools. No change in bowel or bladder habits. · Genitourinary: No dysuria, trouble voiding, or hematuria. · Musculoskeletal:  No gait disturbance, No weakness or joint complaints. · Integumentary: No rash or pruritis. · Neurological: No headache, diplopia, change in muscle strength, numbness or tingling. No change in gait, balance, coordination, mood, affect, memory, mentation, behavior. · Psychiatric: No new anxiety or depression.   · Endocrine: No temperature intolerance. No excessive thirst, fluid intake, or urination. No tremor. · Hematologic/Lymphatic: No abnormal bruising or bleeding, blood clots or swollen lymph nodes. · Allergic/Immunologic: No nasal congestion or hives. Physical Exam:  /63   Pulse 62   Ht 5' 5\" (1.651 m)   Wt 192 lb (87.1 kg)   BMI 31.95 kg/m²    Physical Exam  Constitutional:       Appearance: Normal appearance. HENT:      Head: Normocephalic and atraumatic. Cardiovascular:      Rate and Rhythm: Normal rate and regular rhythm. Pulses: Normal pulses. Heart sounds: Normal heart sounds. No murmur heard. Pulmonary:      Effort: Pulmonary effort is normal. No respiratory distress. Breath sounds: Normal breath sounds. No stridor. Abdominal:      General: There is no distension. Palpations: There is no mass. Musculoskeletal:         General: No swelling or tenderness. Right lower leg: Edema present. Left lower leg: Edema present. Skin:     Capillary Refill: Capillary refill takes less than 2 seconds. Coloration: Skin is not jaundiced or pale. Neurological:      General: No focal deficit present. Mental Status: He is alert and oriented to person, place, and time. Cranial Nerves: No cranial nerve deficit. Sensory: No sensory deficit. Psychiatric:         Mood and Affect: Mood normal.         Behavior: Behavior normal.         Cardiac Data:  ECG:  Sinus  Rhythm  - occasional ectopic ventricular beat     -Right bundle branch block with left axis -bifascicular block.      Labs:   LABS    Lab Results   Component Value Date    CHOL 110 06/03/2021    TRIG 94 06/03/2021    HDL 38 (L) 06/03/2021    LDLCHOLESTEROL 53 06/03/2021    VLDL NOT REPORTED 06/03/2021    CHOLHDLRATIO 2.9 06/03/2021       Lab Results   Component Value Date     10/07/2021    K 4.0 10/07/2021     10/07/2021    CO2 24 10/07/2021    BUN 29 (H) 10/07/2021    CREATININE 0.95 10/07/2021    GLUCOSE 111 (H) 10/07/2021    CALCIUM 9.6 10/07/2021    PROT 6.1 (L) 01/10/2020    LABALBU 3.3 (L) 01/10/2020    BILITOT 0.44 01/10/2020    ALKPHOS 93 01/10/2020    AST 14 06/03/2021    ALT 18 01/10/2020    LABGLOM >60 10/07/2021    GFRAA >60 10/07/2021    GLOB NOT REPORTED 11/23/2018       Assessment/Plan:  1. Bilateral L > R LE edema- improved  - Dopplers previously revealed possible DVT- per vascular less likely- will clarify with their office if he can d/c eliquis  - continue lasix 60 mg po bid  - check BMP now  - offered him 2d echo- he would like to hold off    2. CAD. STEMI on 11/2018 with KRISTEN of LAD. Mild disease of other arteries. Stable and asymptomatic, continue current medications. 2. Mild ischemic Cardiomyopathy. EF 40% on 11/2018. Last echo 3/2019 showing EF 55-60%, Grade I DD, No significant valvular disease seen. See number 1    3. HPL. On Statin. 4. Essential hypertension. Well controlled on current medications. 5. Chronic venous stasis- see number 1    The patient is to continue heart healthy diet, weight loss and exercise as tolerated. Patient's medications and side effects were discussed. Medication refills were provided if needed. Follow up appointment timing was discussed. All questions and concerns were addressed to patient's satisfaction. The patient is to follow up in 6 months or sooner if necessary. Thank you for allowing me to participate in the care of this patient, please do not hesitate to call if you have any questions. Prince Shawna DO, [facility], [address], [address] [facility]  [facility] [employer]  [phone]

## 2021-11-04 ENCOUNTER — HOSPITAL ENCOUNTER (OUTPATIENT)
Age: 86
Setting detail: SPECIMEN
Discharge: HOME | End: 2021-11-04
Payer: MEDICARE

## 2021-11-04 DIAGNOSIS — I25.10 CORONARY ARTERY DISEASE INVOLVING NATIVE CORONARY ARTERY OF NATIVE HEART WITHOUT ANGINA PECTORIS: ICD-10-CM

## 2021-11-04 LAB
ANION GAP SERPL CALCULATED.3IONS-SCNC: 9 MMOL/L (ref 9–17)
BUN BLDV-MCNC: 27 MG/DL (ref 8–23)
BUN/CREAT BLD: 32 (ref 9–20)
CALCIUM SERPL-MCNC: 9.6 MG/DL (ref 8.6–10.4)
CHLORIDE BLD-SCNC: 104 MMOL/L (ref 98–107)
CO2: 28 MMOL/L (ref 20–31)
CREAT SERPL-MCNC: 0.84 MG/DL (ref 0.7–1.2)
GFR AFRICAN AMERICAN: >60 ML/MIN
GFR NON-AFRICAN AMERICAN: >60 ML/MIN
GFR SERPL CREATININE-BSD FRML MDRD: ABNORMAL ML/MIN/{1.73_M2}
GFR SERPL CREATININE-BSD FRML MDRD: ABNORMAL ML/MIN/{1.73_M2}
GLUCOSE BLD-MCNC: 94 MG/DL (ref 70–99)
POTASSIUM SERPL-SCNC: 4.1 MMOL/L (ref 3.7–5.3)
SODIUM BLD-SCNC: 141 MMOL/L (ref 135–144)

## 2021-11-04 PROCEDURE — 80048 BASIC METABOLIC PNL TOTAL CA: CPT

## 2021-11-04 PROCEDURE — 36415 COLL VENOUS BLD VENIPUNCTURE: CPT

## 2021-11-19 NOTE — TELEPHONE ENCOUNTER
Nurse Junior Edwards at [facility] notified per phone. This note to be faxed [phone].

## 2021-11-22 ENCOUNTER — OFFICE VISIT (OUTPATIENT)
Dept: PAIN MANAGEMENT | Age: 86
End: 2021-11-22
Payer: MEDICARE

## 2021-11-22 ENCOUNTER — HOSPITAL ENCOUNTER (OUTPATIENT)
Age: 86
Setting detail: SPECIMEN
Discharge: HOME | End: 2021-11-22
Payer: MEDICARE

## 2021-11-22 VITALS
SYSTOLIC BLOOD PRESSURE: 104 MMHG | WEIGHT: 192 LBS | OXYGEN SATURATION: 97 % | HEART RATE: 67 BPM | DIASTOLIC BLOOD PRESSURE: 62 MMHG | HEIGHT: 65 IN | BODY MASS INDEX: 31.99 KG/M2

## 2021-11-22 DIAGNOSIS — M25.562 CHRONIC PAIN OF BOTH KNEES: Primary | ICD-10-CM

## 2021-11-22 DIAGNOSIS — Z02.83 ENCOUNTER FOR DRUG SCREENING: ICD-10-CM

## 2021-11-22 DIAGNOSIS — Z79.891 ENCOUNTER FOR LONG-TERM OPIATE ANALGESIC USE: ICD-10-CM

## 2021-11-22 DIAGNOSIS — G89.29 CHRONIC PAIN OF BOTH KNEES: ICD-10-CM

## 2021-11-22 DIAGNOSIS — M25.561 CHRONIC PAIN OF BOTH KNEES: Primary | ICD-10-CM

## 2021-11-22 DIAGNOSIS — M25.562 CHRONIC PAIN OF BOTH KNEES: ICD-10-CM

## 2021-11-22 DIAGNOSIS — G89.29 CHRONIC PAIN OF BOTH KNEES: Primary | ICD-10-CM

## 2021-11-22 DIAGNOSIS — M25.561 CHRONIC PAIN OF BOTH KNEES: ICD-10-CM

## 2021-11-22 PROCEDURE — 99213 OFFICE O/P EST LOW 20 MIN: CPT

## 2021-11-22 PROCEDURE — 99213 OFFICE O/P EST LOW 20 MIN: CPT | Performed by: NURSE PRACTITIONER

## 2021-11-22 PROCEDURE — 80307 DRUG TEST PRSMV CHEM ANLYZR: CPT

## 2021-11-22 PROCEDURE — 99214 OFFICE O/P EST MOD 30 MIN: CPT | Performed by: NURSE PRACTITIONER

## 2021-11-22 RX ORDER — OXYCODONE AND ACETAMINOPHEN 10; 325 MG/1; MG/1
1 TABLET ORAL EVERY 6 HOURS PRN
Qty: 120 TABLET | Refills: 0 | Status: SHIPPED | OUTPATIENT
Start: 2021-11-22 | End: 2021-12-22

## 2021-11-22 ASSESSMENT — ENCOUNTER SYMPTOMS
GASTROINTESTINAL NEGATIVE: 1
RESPIRATORY NEGATIVE: 1
BACK PAIN: 0

## 2021-11-22 NOTE — PROGRESS NOTES
Subjective:      Patient ID: Mellisa Celaya is a 80 y.o. male. Chief Complaint   Patient presents with    Follow-up     osteoarthritis of both knees     Knee Pain   Pertinent negatives include no numbness. Leg Pain   Pertinent negatives include no numbness. Here today for routine pain clinic recheck. Chronic pain in knees, resides at assisted living. Ambulating very limited due to pain. Percocet 7.5 without significant relief, no complaints side effects. Pain Assessment  Location of Pain: Knee  Location Modifiers: Left, Right  Severity of Pain: 6 (if he is moving)  Quality of Pain: Sharp  Duration of Pain: Persistent  Frequency of Pain: Constant  Aggravating Factors: Standing, Walking  Limiting Behavior: Yes  Relieving Factors: Rest  Result of Injury: No  Work-Related Injury: No    Allergies   Allergen Reactions    Codeine Nausea And Vomiting    Morphine Other (See Comments)     constipation       Outpatient Medications Marked as Taking for the 11/22/21 encounter (Office Visit) with KEVIN Mcintyre CNP   Medication Sig Dispense Refill    apixaban (ELIQUIS) 5 MG TABS tablet Take by mouth 2 times daily      oxyCODONE-acetaminophen (PERCOCET)  MG per tablet Take 1 tablet by mouth every 6 hours as needed for Pain for up to 30 days.  Intended supply: 30 days 120 tablet 0    furosemide (LASIX) 40 MG tablet Take 1.5 tablets by mouth daily 135 tablet 1    lisinopril (PRINIVIL;ZESTRIL) 5 MG tablet TAKE 1 TABLET DAILY 90 tablet 3    atorvastatin (LIPITOR) 10 MG tablet TAKE 1 TABLET DAILY 90 tablet 3    clopidogrel (PLAVIX) 75 MG tablet TAKE 1 TABLET DAILY 90 tablet 3    melatonin 3 MG TABS tablet Take 3 mg by mouth nightly      docusate sodium (COLACE) 100 MG capsule Take 100 mg by mouth daily      pantoprazole (PROTONIX) 40 MG tablet Take 1 tablet by mouth every morning (before breakfast) 90 tablet 3    metoprolol tartrate (LOPRESSOR) 25 MG tablet Take 0.5 tablets by mouth 2 times daily 90 tablet 3    acetaminophen (TYLENOL) 325 MG tablet Take 2 tablets by mouth every 4 hours as needed for Pain or Fever 120 tablet 0    Multiple Vitamins-Minerals (MULTIVITAMIN PO) Take 1 tablet by mouth daily.          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 2/2/15    EGD with biopsies    CAD (coronary artery disease)     Elevated PSA     Hyperlipidemia     Hypertension     Kidney stone     Osteoarthritis of knee     Venous insufficiency        Past Surgical History:   Procedure Laterality Date    APPENDECTOMY      CHOLECYSTECTOMY  01/12/1983    COLONOSCOPY  08/29/12    COLONOSCOPY  05/27/09    COLONOSCOPY  04/30/2008    CYSTOSCOPY  11/15/2007    with left ureteroscopy and dorsal slit     KNEE FUSION Left     LEG SURGERY Left 1/11/2020    I and D of Left Lower Extremitity performed by Lori Garber MD at [address] [facility] CYSTOURETHROSCOPY N/A 11/19/2018    CYSTO Photovaporization Prostate Greenlight Laser ([id number]) performed by Karan Kawasaki, MD at [address] [facility] EGD TRANSORAL BIOPSY SINGLE/MULTIPLE N/A 7/3/2017    EGD BIOPSY performed by Lottie Regalado MD at [facility] [address] Bilateral 06/23/2009    Benign    UPPER GASTROINTESTINAL ENDOSCOPY  02/2/2015    Sepulveda's (Dr. Jae Mills)    UPPER GASTROINTESTINAL ENDOSCOPY  04/25/2016    Lg hiatal hernia, polyp at duodenum, gastric polyp, Barretts Esophagus, GE 28    UPPER GASTROINTESTINAL ENDOSCOPY  07/03/2017    mild gastritis, Sepulveda's, Dr. Shaista Lundberg 3 years       Family History   Problem Relation Age of Onset    Cancer Father         stomach cancer    Heart Disease Paternal Grandfather        Social History     Socioeconomic History    Marital status:      Spouse name: None    Number of children: None    Years of education: None    Highest education level: None   Occupational History    None   Tobacco Use    Smoking status: Former Smoker Packs/day: 1.00     Types: Cigars     Quit date: 1998     Years since quittin.9    Smokeless tobacco: Never Used    Tobacco comment: Former smoker (quit )- [facility] 3/20/17   Vaping Use    Vaping Use: Never used   Substance and Sexual Activity    Alcohol use: No     Alcohol/week: 0.0 standard drinks    Drug use: No    Sexual activity: None   Other Topics Concern    None   Social History Narrative    None     Social Determinants of Health     Financial Resource Strain:     Difficulty of Paying Living Expenses: Not on file   Food Insecurity:     Worried About Running Out of Food in the Last Year: Not on file    Nikole of Food in the Last Year: Not on file   Transportation Needs:     Lack of Transportation (Medical): Not on file    Lack of Transportation (Non-Medical): Not on file   Physical Activity:     Days of Exercise per Week: Not on file    Minutes of Exercise per Session: Not on file   Stress:     Feeling of Stress : Not on file   Social Connections:     Frequency of Communication with Friends and Family: Not on file    Frequency of Social Gatherings with Friends and Family: Not on file    Attends Islam Services: Not on file    Active Member of [address] [place] [address] [employer] or Organizations: Not on file    Attends Club or Organization Meetings: Not on file    Marital Status: Not on file   Intimate Partner Violence:     Fear of Current or Ex-Partner: Not on file    Emotionally Abused: Not on file    Physically Abused: Not on file    Sexually Abused: Not on file   Housing Stability:     Unable to Pay for Housing in the Last Year: Not on file    Number of [place] in the Last Year: Not on file    Unstable Housing in the Last Year: Not on file     Review of Systems   Constitutional: Positive for activity change (limited due to right knee pain). Negative for appetite change. Respiratory: Negative. Cardiovascular: Negative. Gastrointestinal: Negative.     Genitourinary:

## 2021-11-25 LAB
6-ACETYLMORPHINE, UR: NOT DETECTED
7-AMINOCLONAZEPAM, URINE: NOT DETECTED
ALPHA-OH-ALPRAZ, URINE: NOT DETECTED
ALPHA-OH-MIDAZOLAM, URINE: NOT DETECTED
ALPRAZOLAM, URINE: NOT DETECTED
AMPHETAMINES, URINE: NOT DETECTED
BARBITURATES, URINE: NOT DETECTED
BENZOYLECGONINE, UR: NOT DETECTED
BUPRENORPHINE URINE: NOT DETECTED
CARISOPRODOL, UR: NOT DETECTED
CLONAZEPAM, URINE: NOT DETECTED
CODEINE, URINE: NOT DETECTED
CREATININE URINE: 21.8 MG/DL (ref 20–400)
DIAZEPAM, URINE: NOT DETECTED
EER PAIN MGT DRUG PANEL, HIGH RES/EMIT U: NORMAL
ETHYL GLUCURONIDE UR: NOT DETECTED
FENTANYL URINE: NOT DETECTED
GABAPENTIN: NOT DETECTED
HYDROCODONE, URINE: NOT DETECTED
HYDROMORPHONE, URINE: NOT DETECTED
LORAZEPAM, URINE: NOT DETECTED
MARIJUANA METAB, UR: NOT DETECTED
MDA, UR: NOT DETECTED
MDEA, EVE, UR: NOT DETECTED
MDMA URINE: NOT DETECTED
MEPERIDINE METAB, UR: NOT DETECTED
METHADONE, URINE: NOT DETECTED
METHAMPHETAMINE, URINE: NOT DETECTED
METHYLPHENIDATE: NOT DETECTED
MIDAZOLAM, URINE: NOT DETECTED
MORPHINE URINE: NOT DETECTED
NALOXONE URINE: NOT DETECTED
NORBUPRENORPHINE, URINE: NOT DETECTED
NORDIAZEPAM, URINE: NOT DETECTED
NORFENTANYL, URINE: NOT DETECTED
NORHYDROCODONE, URINE: NOT DETECTED
NOROXYCODONE, URINE: PRESENT
NOROXYMORPHONE, URINE: PRESENT
OXAZEPAM, URINE: NOT DETECTED
OXYCODONE URINE: PRESENT
OXYMORPHONE, URINE: PRESENT
PAIN MGT DRUG PANEL, HI RES, UR: NORMAL
PCP,URINE: NOT DETECTED
PHENTERMINE, UR: NOT DETECTED
PREGABALIN: NOT DETECTED
TAPENTADOL, URINE: NOT DETECTED
TAPENTADOL-O-SULFATE, URINE: NOT DETECTED
TEMAZEPAM, URINE: NOT DETECTED
TRAMADOL, URINE: NOT DETECTED
ZOLPIDEM METABOLITE (ZCA), URINE: NOT DETECTED
ZOLPIDEM, URINE: NOT DETECTED

## 2021-12-21 ENCOUNTER — TELEPHONE (OUTPATIENT)
Dept: INTERNAL MEDICINE | Age: 86
End: 2021-12-21

## 2021-12-21 ENCOUNTER — OUTSIDE SERVICES (OUTPATIENT)
Dept: INTERNAL MEDICINE | Age: 86
End: 2021-12-21
Payer: MEDICARE

## 2021-12-21 VITALS
RESPIRATION RATE: 16 BRPM | DIASTOLIC BLOOD PRESSURE: 57 MMHG | OXYGEN SATURATION: 97 % | TEMPERATURE: 98.1 F | SYSTOLIC BLOOD PRESSURE: 149 MMHG | HEART RATE: 59 BPM

## 2021-12-21 DIAGNOSIS — L03.116 CELLULITIS OF LEFT LOWER EXTREMITY: Primary | ICD-10-CM

## 2021-12-21 RX ORDER — DOXYCYCLINE HYCLATE 100 MG
100 TABLET ORAL 2 TIMES DAILY
Qty: 14 TABLET | Refills: 0 | Status: SHIPPED | OUTPATIENT
Start: 2021-12-21 | End: 2021-12-28

## 2021-12-21 ASSESSMENT — ENCOUNTER SYMPTOMS
WHEEZING: 0
DIARRHEA: 0
RHINORRHEA: 0
COUGH: 0
NAUSEA: 0
VOMITING: 0

## 2021-12-21 NOTE — PROGRESS NOTES
[facility]      Radha Gonzales is a 80 y.o. male resident of [facility] who presents today for medical conditions/complaints as noted below. HPI:     HPI  Patient presents via virtual visit with assisted living staff member for evaluation and management of medical conditions as noted below. Patient has history of BLE edema, which has been stable. No reports of dyspnea. Developed some blisters on left lower extremity this week. Surrounding area as has since become erythematous, minimal warmth present. Afebrile, no nausea, vomiting, or diarrhea. Current Outpatient Medications   Medication Sig Dispense Refill    doxycycline hyclate (VIBRA-TABS) 100 MG tablet Take 1 tablet by mouth 2 times daily for 7 days 14 tablet 0    oxyCODONE-acetaminophen (PERCOCET)  MG per tablet Take 1 tablet by mouth every 6 hours as needed for Pain for up to 30 days. Intended supply: 30 days 120 tablet 0    lactulose (CHRONULAC) 10 GM/15ML solution Take 20 g by mouth daily as needed       furosemide (LASIX) 40 MG tablet Take 1.5 tablets by mouth daily 135 tablet 1    lisinopril (PRINIVIL;ZESTRIL) 5 MG tablet TAKE 1 TABLET DAILY 90 tablet 3    atorvastatin (LIPITOR) 10 MG tablet TAKE 1 TABLET DAILY 90 tablet 3    clopidogrel (PLAVIX) 75 MG tablet TAKE 1 TABLET DAILY 90 tablet 3    melatonin 3 MG TABS tablet Take 3 mg by mouth nightly      docusate sodium (COLACE) 100 MG capsule Take 100 mg by mouth daily      pantoprazole (PROTONIX) 40 MG tablet Take 1 tablet by mouth every morning (before breakfast) 90 tablet 3    metoprolol tartrate (LOPRESSOR) 25 MG tablet Take 0.5 tablets by mouth 2 times daily 90 tablet 3    acetaminophen (TYLENOL) 325 MG tablet Take 2 tablets by mouth every 4 hours as needed for Pain or Fever 120 tablet 0    Multiple Vitamins-Minerals (MULTIVITAMIN PO) Take 1 tablet by mouth daily. No current facility-administered medications for this visit.      Allergies Allergen Reactions    Codeine Nausea And Vomiting    Morphine Other (See Comments)     constipation       Health Maintenance   Topic Date Due    Annual Wellness Visit (AWV)  Never done    Flu vaccine (1) 09/01/2021    PSA counseling  12/21/2021 (Originally 6/6/2020)    Lipid screen  06/03/2022    Potassium monitoring  11/04/2022    Creatinine monitoring  11/04/2022    DTaP/Tdap/Td vaccine (2 - Td or Tdap) 02/28/2027    Pneumococcal 65+ years Vaccine  Completed    COVID-19 Vaccine  Completed    Hepatitis A vaccine  Aged Out    Hepatitis B vaccine  Aged Out    Hib vaccine  Aged Out    Meningococcal (ACWY) vaccine  Aged Out       Subjective:      Review of Systems   Constitutional: Negative for chills and fever. HENT: Negative for congestion and rhinorrhea. Respiratory: Negative for cough and wheezing. Cardiovascular: Positive for leg swelling. Gastrointestinal: Negative for diarrhea, nausea and vomiting. Neurological: Negative for dizziness and headaches. Due to patient's clinical status, ROS of symptoms was completed by discussion with long term care facility staff, as well as with input from patient. Objective:     Vitals:    12/21/21 0849   BP: (!) 149/57   Pulse: 59   Resp: 16   Temp: 98.1 °F (36.7 °C)   SpO2: 97%     Physical Exam  Constitutional:       General: He is not in acute distress. HENT:      Head: Normocephalic and atraumatic. Right Ear: External ear normal.      Left Ear: External ear normal.   Eyes:      Extraocular Movements: Extraocular movements intact. Conjunctiva/sclera: Conjunctivae normal.   Pulmonary:      Effort: No respiratory distress. Skin:     Comments: LLE has blister anterior/distal near foot, surrounding 5 cm or so is erythematous. Per nurse, area is only minimally warm   Neurological:      General: No focal deficit present. Mental Status: He is alert. Mental status is at baseline.    Psychiatric:         Mood and Affect: Mood normal.         Behavior: Behavior normal.         Assessment/Plan:        1. Cellulitis of left lower extremity  - Will start doxycycline, monitor for resolution  - doxycycline hyclate (VIBRA-TABS) 100 MG tablet; Take 1 tablet by mouth 2 times daily for 7 days  Dispense: 14 tablet; Refill: 0        Return if symptoms worsen or fail to improve. No orders of the defined types were placed in this encounter. Orders Placed This Encounter   Medications    doxycycline hyclate (VIBRA-TABS) 100 MG tablet     Sig: Take 1 tablet by mouth 2 times daily for 7 days     Dispense:  14 tablet     Refill:  0               Electronically signed by KEVIN Hooper CNP on 12/21/2021 at 10:07 AM     Marco Coker, was evaluated through a synchronous (real-time) audio-video encounter. The patient (or guardian if applicable) is aware that this is a billable service. Verbal consent to proceed has been obtained within the past 12 months. The visit was conducted pursuant to the emergency declaration under the [address] [place] [address] authority and the [employer] General Act. Patient identification was verified, and a caregiver was present when appropriate. The patient was located in a state where the provider was credentialed to provide care. Total time spent for this encounter: Not billed by time    --KEVIN Hooper CNP on 12/21/2021 at 10:07 AM    An electronic signature was used to authenticate this note.

## 2021-12-21 NOTE — TELEPHONE ENCOUNTER
As follow up from visit today, please fax order for doxycyline 100 mg BID x 7 days to [facility].  Rx sent to [id number]

## 2021-12-27 DIAGNOSIS — M17.0 PRIMARY OSTEOARTHRITIS OF BOTH KNEES: Primary | ICD-10-CM

## 2021-12-27 RX ORDER — OXYCODONE AND ACETAMINOPHEN 10; 325 MG/1; MG/1
TABLET ORAL
Qty: 120 TABLET | Refills: 0 | Status: SHIPPED | OUTPATIENT
Start: 2021-12-27 | End: 2022-01-26

## 2021-12-27 NOTE — TELEPHONE ENCOUNTER
OAR[place] Report checked for [place], [place], and [place]: 11/22/21 percocet #120. Due NOW.     Last Appt:  11/22/2021  Next Appt:   Visit date not found  Med verified in [facility]

## 2021-12-28 ENCOUNTER — OUTSIDE SERVICES (OUTPATIENT)
Dept: INTERNAL MEDICINE | Age: 86
End: 2021-12-28
Payer: MEDICARE

## 2021-12-28 VITALS
HEART RATE: 74 BPM | TEMPERATURE: 97.6 F | SYSTOLIC BLOOD PRESSURE: 117 MMHG | RESPIRATION RATE: 18 BRPM | OXYGEN SATURATION: 96 % | DIASTOLIC BLOOD PRESSURE: 85 MMHG

## 2021-12-28 DIAGNOSIS — L03.116 CELLULITIS OF LEFT LOWER EXTREMITY: Primary | ICD-10-CM

## 2021-12-28 ASSESSMENT — ENCOUNTER SYMPTOMS
WHEEZING: 0
COUGH: 0
NAUSEA: 0
VOMITING: 0
RHINORRHEA: 0
DIARRHEA: 0

## 2021-12-28 NOTE — PROGRESS NOTES
[facility]      Rosa Greer is a 80 y.o. male resident of [facility] who presents today for medical conditions/complaints as noted below. HPI:     HPI     Patient presents for follow-up of left lower extremity cellulitis. He was initially evaluated last week for bilateral lower extremity edema. He did develop some blisters on the left lower extremity and had some surrounding erythema and warmth. He was started on doxycycline and has responded well to this regimen. BLE edema has improved, but the left leg still does remain slightly more edematous than the right. Of note, the patient has had 5 left lower extremity Dopplers to rule out DVT since January 2020, some at urgent care/ER, or from visit at [facility] (negative venous doppler LLE 09/20/21, 11/05/20, 08/21/20, 06/26/21, 01/07/20). Though there is slightly more edema in the LLE compared to RLE, this is significantly improved from his baseline. He was referred to vascular, and was to told to continue wrapping legs with Ace bandages to offset his edema due to dependency. Denies fever, chills, nausea, vomiting, or diarrhea. Current Outpatient Medications   Medication Sig Dispense Refill    oxyCODONE-acetaminophen (PERCOCET)  MG per tablet TAKE 1 TABLET BY MOUTH EVERY 6 HOURS AS NEEDED FOR PAIN.  *REDUCE DOSES AS PAIN BECOMES MANAGEABLE* 120 tablet 0    doxycycline hyclate (VIBRA-TABS) 100 MG tablet Take 1 tablet by mouth 2 times daily for 7 days 14 tablet 0    lactulose (CHRONULAC) 10 GM/15ML solution Take 20 g by mouth daily as needed       furosemide (LASIX) 40 MG tablet Take 1.5 tablets by mouth daily 135 tablet 1    lisinopril (PRINIVIL;ZESTRIL) 5 MG tablet TAKE 1 TABLET DAILY 90 tablet 3    atorvastatin (LIPITOR) 10 MG tablet TAKE 1 TABLET DAILY 90 tablet 3    clopidogrel (PLAVIX) 75 MG tablet TAKE 1 TABLET DAILY 90 tablet 3    melatonin 3 MG TABS tablet Take 3 mg by mouth nightly      docusate sodium (COLACE) Conjunctivae normal.   Cardiovascular:      Comments: Though there is slightly more edema in the LLE compared to RLE, this is significantly improved from his baseline  Pulmonary:      Effort: No respiratory distress. Skin:         Neurological:      General: No focal deficit present. Mental Status: He is alert. Mental status is at baseline. Psychiatric:         Mood and Affect: Mood normal.         Behavior: Behavior normal.         Assessment/Plan:        1. Cellulitis of left lower extremity  - Improving, monitor for resolution. Contact clinic if any worsening of edema        Return if symptoms worsen or fail to improve. No orders of the defined types were placed in this encounter. No orders of the defined types were placed in this encounter.               Electronically signed by KEVIN Castro CNP on 12/28/2021 at 2:34 PM

## 2022-02-01 ENCOUNTER — OUTSIDE SERVICES (OUTPATIENT)
Dept: INTERNAL MEDICINE | Age: 87
End: 2022-02-01
Payer: MEDICARE

## 2022-02-01 VITALS
HEART RATE: 60 BPM | OXYGEN SATURATION: 97 % | DIASTOLIC BLOOD PRESSURE: 62 MMHG | SYSTOLIC BLOOD PRESSURE: 142 MMHG | TEMPERATURE: 98.3 F | RESPIRATION RATE: 16 BRPM

## 2022-02-01 DIAGNOSIS — M70.21 OLECRANON BURSITIS OF RIGHT ELBOW: Primary | ICD-10-CM

## 2022-02-01 ASSESSMENT — ENCOUNTER SYMPTOMS
COUGH: 0
WHEEZING: 0
RHINORRHEA: 0
DIARRHEA: 0
VOMITING: 0
NAUSEA: 0

## 2022-02-01 NOTE — PROGRESS NOTES
[facility]      Virgil Bagley is a 80 y.o. male resident of [facility] who presents today for medical conditions/complaints as noted below. HPI:     HPI     Patient presents for evaluation and management of elbow swelling. Staff believes patient may have slightly bumped right elbow, no recent fall. He has developed significant edema around his elbow, no erythema, no exudate, no skin compromise at this time. Patient states his elbow is not painful. He states it has improved considerably from yesterday and is no longer swollen. Otherwise he is doing well with no concerns. Current Outpatient Medications   Medication Sig Dispense Refill    lactulose (CHRONULAC) 10 GM/15ML solution Take 20 g by mouth daily as needed       furosemide (LASIX) 40 MG tablet Take 1.5 tablets by mouth daily 135 tablet 1    lisinopril (PRINIVIL;ZESTRIL) 5 MG tablet TAKE 1 TABLET DAILY 90 tablet 3    atorvastatin (LIPITOR) 10 MG tablet TAKE 1 TABLET DAILY 90 tablet 3    clopidogrel (PLAVIX) 75 MG tablet TAKE 1 TABLET DAILY 90 tablet 3    melatonin 3 MG TABS tablet Take 3 mg by mouth nightly      docusate sodium (COLACE) 100 MG capsule Take 100 mg by mouth daily      pantoprazole (PROTONIX) 40 MG tablet Take 1 tablet by mouth every morning (before breakfast) 90 tablet 3    metoprolol tartrate (LOPRESSOR) 25 MG tablet Take 0.5 tablets by mouth 2 times daily 90 tablet 3    acetaminophen (TYLENOL) 325 MG tablet Take 2 tablets by mouth every 4 hours as needed for Pain or Fever 120 tablet 0    Multiple Vitamins-Minerals (MULTIVITAMIN PO) Take 1 tablet by mouth daily. No current facility-administered medications for this visit.      Allergies   Allergen Reactions    Codeine Nausea And Vomiting    Morphine Other (See Comments)     constipation       Health Maintenance   Topic Date Due    Depression Screen  Never done    Annual Wellness Visit (AWV)  Never done    PSA counseling  06/06/2020    Flu vaccine (1) 09/01/2021    Lipid screen  06/03/2022    Potassium monitoring  11/04/2022    Creatinine monitoring  11/04/2022    DTaP/Tdap/Td vaccine (2 - Td or Tdap) 02/28/2027    Pneumococcal 65+ years Vaccine  Completed    COVID-19 Vaccine  Completed    Hepatitis A vaccine  Aged Out    Hepatitis B vaccine  Aged Out    Hib vaccine  Aged Out    Meningococcal (ACWY) vaccine  Aged Out       Subjective:      Review of Systems   Constitutional: Negative for chills and fever. HENT: Negative for congestion and rhinorrhea. Respiratory: Negative for cough and wheezing. Gastrointestinal: Negative for diarrhea, nausea and vomiting. Neurological: Negative for dizziness and headaches. Due to patient's clinical status, ROS of symptoms was completed by discussion with long term care facility staff, as well as with input from patient. Objective:     Vitals:    02/01/22 0911   BP: (!) 142/62   Pulse: 60   Resp: 16   Temp: 98.3 °F (36.8 °C)   SpO2: 97%     Physical Exam  Constitutional:       General: He is not in acute distress. HENT:      Head: Normocephalic and atraumatic. Right Ear: External ear normal.      Left Ear: External ear normal.   Eyes:      Extraocular Movements: Extraocular movements intact. Conjunctiva/sclera: Conjunctivae normal.   Pulmonary:      Effort: Pulmonary effort is normal. No respiratory distress. Musculoskeletal:      Comments: R elbow notes edema with fluctuance over olecranon process, consistent with olecranon bursitis. No pain with flexion or extension of elbow, no pain with palpation. Skin is intact with no erythema or exudate. Neurological:      General: No focal deficit present. Mental Status: He is alert. Mental status is at baseline. Psychiatric:         Mood and Affect: Mood normal.         Behavior: Behavior normal.         Assessment/Plan:        1.  Olecranon bursitis of right elbow  - Discussed conservative management given symptoms are improving. No signs of surrounding cellulitis or infection. Advised staff they may cover area, advised to monitor for continued resolution. Return if symptoms worsen or fail to improve. No orders of the defined types were placed in this encounter. No orders of the defined types were placed in this encounter.               Electronically signed by KEVIN Stern CNP on 2/1/2022 at 3:16 PM

## 2022-02-07 DIAGNOSIS — M17.0 PRIMARY OSTEOARTHRITIS OF BOTH KNEES: Primary | ICD-10-CM

## 2022-02-08 RX ORDER — OXYCODONE AND ACETAMINOPHEN 10; 325 MG/1; MG/1
TABLET ORAL
Qty: 120 TABLET | Refills: 0 | Status: SHIPPED | OUTPATIENT
Start: 2022-02-08 | End: 2022-02-11 | Stop reason: SDUPTHER

## 2022-02-11 ENCOUNTER — TELEPHONE (OUTPATIENT)
Dept: PAIN MANAGEMENT | Age: 87
End: 2022-02-11

## 2022-02-11 DIAGNOSIS — M17.0 PRIMARY OSTEOARTHRITIS OF BOTH KNEES: ICD-10-CM

## 2022-02-11 RX ORDER — OXYCODONE AND ACETAMINOPHEN 10; 325 MG/1; MG/1
1 TABLET ORAL EVERY 6 HOURS
Qty: 120 TABLET | Refills: 0 | Status: SHIPPED | OUTPATIENT
Start: 2022-02-11 | End: 2022-03-10 | Stop reason: SDUPTHER

## 2022-02-11 NOTE — TELEPHONE ENCOUNTER
Taya with [facility] called and stated that since the percocet is q6hrs PRN he is having trouble getting the nurses to give him his medication when he is hurting. She stated that his pain has increased in his legs. She requested that Yuliet All make the percocet scheduled so that the nurses are required to give him his script.

## 2022-02-16 ENCOUNTER — OUTSIDE SERVICES (OUTPATIENT)
Dept: INTERNAL MEDICINE | Age: 87
End: 2022-02-16
Payer: MEDICARE

## 2022-02-16 ENCOUNTER — HOSPITAL ENCOUNTER (OUTPATIENT)
Dept: INTERVENTIONAL RADIOLOGY/VASCULAR | Age: 87
Discharge: HOME | End: 2022-02-18
Payer: MEDICARE

## 2022-02-16 VITALS
OXYGEN SATURATION: 97 % | RESPIRATION RATE: 16 BRPM | HEART RATE: 58 BPM | TEMPERATURE: 98.4 F | DIASTOLIC BLOOD PRESSURE: 62 MMHG | SYSTOLIC BLOOD PRESSURE: 158 MMHG

## 2022-02-16 DIAGNOSIS — L03.116 CELLULITIS OF LEFT LEG WITHOUT FOOT: ICD-10-CM

## 2022-02-16 DIAGNOSIS — R60.0 EDEMA OF LEFT LOWER EXTREMITY: ICD-10-CM

## 2022-02-16 DIAGNOSIS — R60.0 EDEMA OF LEFT LOWER EXTREMITY: Primary | ICD-10-CM

## 2022-02-16 PROCEDURE — 93971 EXTREMITY STUDY: CPT

## 2022-02-16 NOTE — PROGRESS NOTES
[facility]      Nichola Schwab is a 80 y.o. male resident of [facility] who presents today for medical conditions/complaints as noted below. HPI:     HPI  Patient presents via virtual visit with assisted living staff member for evaluation and management of left lower extremity edema. He has had previous issues with lower extremity edema, and left is often greater than right. However, today the swelling is almost exclusively in the left leg. Staff had also noticed some reddish discoloration, area remains warm and well perfused. Staff has been wrapping with an ACE bandage. Patient denies chest pain or dyspnea. Blood pressure is elevated, has been variable, 53-0 40 systolic blood pressure, average around 110s per nurse. Previously noted olecranon bursitis has resolved. Current Outpatient Medications   Medication Sig Dispense Refill    doxycycline hyclate (VIBRA-TABS) 100 MG tablet Take 1 tablet by mouth 2 times daily for 7 days 14 tablet 0    oxyCODONE-acetaminophen (PERCOCET)  MG per tablet Take 1 tablet by mouth every 6 hours for 30 days. Scheduled.  NOT prn 120 tablet 0    lactulose (CHRONULAC) 10 GM/15ML solution Take 20 g by mouth daily as needed       furosemide (LASIX) 40 MG tablet Take 1.5 tablets by mouth daily 135 tablet 1    lisinopril (PRINIVIL;ZESTRIL) 5 MG tablet TAKE 1 TABLET DAILY 90 tablet 3    atorvastatin (LIPITOR) 10 MG tablet TAKE 1 TABLET DAILY 90 tablet 3    clopidogrel (PLAVIX) 75 MG tablet TAKE 1 TABLET DAILY 90 tablet 3    melatonin 3 MG TABS tablet Take 3 mg by mouth nightly      docusate sodium (COLACE) 100 MG capsule Take 100 mg by mouth daily      pantoprazole (PROTONIX) 40 MG tablet Take 1 tablet by mouth every morning (before breakfast) 90 tablet 3    metoprolol tartrate (LOPRESSOR) 25 MG tablet Take 0.5 tablets by mouth 2 times daily 90 tablet 3    acetaminophen (TYLENOL) 325 MG tablet Take 2 tablets by mouth every 4 hours as needed for Pain or Fever 120 tablet 0    Multiple Vitamins-Minerals (MULTIVITAMIN PO) Take 1 tablet by mouth daily. No current facility-administered medications for this visit. Allergies   Allergen Reactions    Codeine Nausea And Vomiting    Morphine Other (See Comments)     constipation       Health Maintenance   Topic Date Due    Depression Screen  Never done    Annual Wellness Visit (AWV)  Never done    PSA counseling  06/06/2020    Flu vaccine (1) 09/01/2021    Lipid screen  06/03/2022    Potassium monitoring  11/04/2022    Creatinine monitoring  11/04/2022    DTaP/Tdap/Td vaccine (2 - Td or Tdap) 02/28/2027    Pneumococcal 65+ years Vaccine  Completed    COVID-19 Vaccine  Completed    Hepatitis A vaccine  Aged Out    Hepatitis B vaccine  Aged Out    Hib vaccine  Aged Out    Meningococcal (ACWY) vaccine  Aged Out       Subjective:      Review of Systems   Constitutional: Negative for chills and fever. HENT: Negative for congestion and rhinorrhea. Respiratory: Negative for shortness of breath and wheezing. Cardiovascular: Positive for leg swelling. Gastrointestinal: Negative for diarrhea, nausea and vomiting. Neurological: Negative for dizziness and headaches. Objective:     Vitals:    02/16/22 1400   BP: (!) 158/62   Pulse: 58   Resp: 16   Temp: 98.4 °F (36.9 °C)   SpO2: 97%     Physical Exam  Constitutional:       General: He is not in acute distress. HENT:      Head: Normocephalic and atraumatic. Right Ear: External ear normal.      Left Ear: External ear normal.   Eyes:      Extraocular Movements: Extraocular movements intact. Conjunctiva/sclera: Conjunctivae normal.   Pulmonary:      Effort: No respiratory distress. Musculoskeletal:      Comments: 3+ LLE edema, no edema present RLE   Neurological:      General: No focal deficit present. Mental Status: He is alert. Mental status is at baseline.    Psychiatric:         Mood and Affect: Mood normal. Behavior: Behavior normal.         Assessment/Plan:        1. Edema of left lower extremity  - Stat US to rule out DVT, nurse will coordinate transport today  - VL DUP LOWER EXTREMITY VENOUS LEFT; Future    Addendum 02/17/22: Patient evaluated again while in radiology on 02/17/22. US was negative for DVT, upon exam, anterior LLE is warm, erythematous with some seeping, consistent with cellulitis. Will start doxycycline, monitor for resolution of symptoms. Contact clinic if edema does not improvement with ACE wrap and elevation of lower extremities. Orders Placed This Encounter   Procedures    VL DUP LOWER EXTREMITY VENOUS LEFT     Standing Status:   Future     Number of Occurrences:   1     Standing Expiration Date:   2/16/2023     Orders Placed This Encounter   Medications    doxycycline hyclate (VIBRA-TABS) 100 MG tablet     Sig: Take 1 tablet by mouth 2 times daily for 7 days     Dispense:  14 tablet     Refill:  0               Electronically signed by KEVIN Stern CNP on 2/17/2022 at 2:11 PM     Genoveva Diaz, was evaluated through a synchronous (real-time) audio-video encounter. The patient (or guardian if applicable) is aware that this is a billable service, which includes applicable co-pays. This Virtual Visit was conducted with patient's (and/or legal guardian's) consent. The visit was conducted pursuant to the emergency declaration under the [address] [place] [address], [address] [place] [address] [facility] authority and the [employer] and [employer] General Act. Patient identification was verified, and a caregiver was present when appropriate. The patient was located at home in a state where the provider was licensed to provide care. Total time spent for this encounter: Not billed by time    --KEVIN Stern CNP on 2/17/2022 at 2:11 PM    An electronic signature was used to authenticate this note.

## 2022-02-17 ENCOUNTER — TELEPHONE (OUTPATIENT)
Dept: INTERNAL MEDICINE | Age: 87
End: 2022-02-17

## 2022-02-17 ENCOUNTER — HOSPITAL ENCOUNTER (OUTPATIENT)
Dept: INTERVENTIONAL RADIOLOGY/VASCULAR | Age: 87
Discharge: HOME | End: 2022-02-19

## 2022-02-17 DIAGNOSIS — R60.0 EDEMA OF LEFT LOWER EXTREMITY: ICD-10-CM

## 2022-02-17 DIAGNOSIS — R60.0 EDEMA OF LEFT LOWER EXTREMITY: Primary | ICD-10-CM

## 2022-02-17 RX ORDER — DOXYCYCLINE HYCLATE 100 MG
100 TABLET ORAL 2 TIMES DAILY
Qty: 14 TABLET | Refills: 0 | Status: SHIPPED | OUTPATIENT
Start: 2022-02-17 | End: 2022-02-24

## 2022-02-17 ASSESSMENT — ENCOUNTER SYMPTOMS
NAUSEA: 0
VOMITING: 0
SHORTNESS OF BREATH: 0
WHEEZING: 0
RHINORRHEA: 0
DIARRHEA: 0

## 2022-02-17 NOTE — TELEPHONE ENCOUNTER
Additional orders after patient was evaluated in radiology, please fax updated progress notes from 2/16/2022 to [facility]. Patient requested can she pick it up at [facility] office today.

## 2022-02-23 RX ORDER — ATORVASTATIN CALCIUM 10 MG/1
TABLET, FILM COATED ORAL
Qty: 90 TABLET | Refills: 3 | Status: SHIPPED | OUTPATIENT
Start: 2022-02-23

## 2022-02-23 RX ORDER — CLOPIDOGREL BISULFATE 75 MG/1
TABLET ORAL
Qty: 90 TABLET | Refills: 3 | Status: SHIPPED | OUTPATIENT
Start: 2022-02-23

## 2022-03-10 DIAGNOSIS — M17.0 PRIMARY OSTEOARTHRITIS OF BOTH KNEES: ICD-10-CM

## 2022-03-10 RX ORDER — OXYCODONE AND ACETAMINOPHEN 10; 325 MG/1; MG/1
1 TABLET ORAL EVERY 6 HOURS
Qty: 120 TABLET | Refills: 0 | Status: SHIPPED | OUTPATIENT
Start: 2022-03-10 | End: 2022-04-04

## 2022-03-10 NOTE — TELEPHONE ENCOUNTER
[facility] called requesting a refill of the below medication which has been pended for you:     Requested Prescriptions     Pending Prescriptions Disp Refills    oxyCODONE-acetaminophen (PERCOCET)  MG per tablet 120 tablet 0     Sig: Take 1 tablet by mouth every 6 hours for 30 days. Scheduled. NOT prn       Last Appointment Date: 11/22/2021  Next Appointment Date: Visit date not found    Allergies   Allergen Reactions    Codeine Nausea And Vomiting    Morphine Other (See Comments)     constipation       Last DS11 11/22/21. Please review. OAR[place] for [place], and [place]:  Percocet 10/325 mg  2/8/22   #120  . Due now.

## 2022-03-17 ENCOUNTER — TELEPHONE (OUTPATIENT)
Dept: INTERNAL MEDICINE | Age: 87
End: 2022-03-17

## 2022-03-17 ENCOUNTER — TELEMEDICINE (OUTPATIENT)
Dept: INTERNAL MEDICINE | Age: 87
End: 2022-03-17
Payer: MEDICARE

## 2022-03-17 DIAGNOSIS — L03.116 CELLULITIS OF LEFT LOWER EXTREMITY: Primary | ICD-10-CM

## 2022-03-17 PROCEDURE — 99211 OFF/OP EST MAY X REQ PHY/QHP: CPT | Performed by: NURSE PRACTITIONER

## 2022-03-17 RX ORDER — CEPHALEXIN 500 MG/1
500 CAPSULE ORAL 4 TIMES DAILY
Qty: 20 CAPSULE | Refills: 0 | Status: SHIPPED | OUTPATIENT
Start: 2022-03-17 | End: 2022-03-22

## 2022-03-17 ASSESSMENT — ENCOUNTER SYMPTOMS
NAUSEA: 0
WHEEZING: 0
COUGH: 0
DIARRHEA: 0
RHINORRHEA: 0
VOMITING: 0

## 2022-03-17 NOTE — PROGRESS NOTES
[facility]      Melinda Bryant is a 80 y.o. male resident of [facility] who presents today for medical conditions/complaints as noted below. HPI:     HPI  Patient presents via virtual visit with assisted living staff member for evaluation and management of LLE erythema. Started having recurrence of LLE edema and erythema a few days ago. No exudate. Of note, the patient has had 6 left lower extremity Dopplers to rule out DVT since January 2020, some at urgent care/ER, or from visit at [facility] (negative venous doppler LLE 02/16/22, 09/20/21, 11/05/20, 08/21/20, 06/26/21, 01/07/20). Underwent treatment for cellulitis with doxycycline on 02/17/22. Symptoms did resolve, but have since returned. Denies fever, chills, nausea, vomiting, or diarrhea. Current Outpatient Medications   Medication Sig Dispense Refill    cephALEXin (KEFLEX) 500 MG capsule Take 1 capsule by mouth 4 times daily for 5 days 20 capsule 0    oxyCODONE-acetaminophen (PERCOCET)  MG per tablet Take 1 tablet by mouth every 6 hours for 30 days. Scheduled.  NOT prn 120 tablet 0    atorvastatin (LIPITOR) 10 MG tablet TAKE 1 TABLET DAILY 90 tablet 3    clopidogrel (PLAVIX) 75 MG tablet TAKE 1 TABLET DAILY 90 tablet 3    lactulose (CHRONULAC) 10 GM/15ML solution Take 20 g by mouth daily as needed       furosemide (LASIX) 40 MG tablet Take 1.5 tablets by mouth daily 135 tablet 1    lisinopril (PRINIVIL;ZESTRIL) 5 MG tablet TAKE 1 TABLET DAILY 90 tablet 3    melatonin 3 MG TABS tablet Take 3 mg by mouth nightly      docusate sodium (COLACE) 100 MG capsule Take 100 mg by mouth daily      pantoprazole (PROTONIX) 40 MG tablet Take 1 tablet by mouth every morning (before breakfast) 90 tablet 3    metoprolol tartrate (LOPRESSOR) 25 MG tablet Take 0.5 tablets by mouth 2 times daily 90 tablet 3    acetaminophen (TYLENOL) 325 MG tablet Take 2 tablets by mouth every 4 hours as needed for Pain or Fever 120 tablet 0    Multiple Vitamins-Minerals (MULTIVITAMIN PO) Take 1 tablet by mouth daily. No current facility-administered medications for this visit. Allergies   Allergen Reactions    Codeine Nausea And Vomiting    Morphine Other (See Comments)     constipation       Health Maintenance   Topic Date Due    Depression Screen  Never done    Annual Wellness Visit (AWV)  Never done    PSA counseling  06/06/2020    Flu vaccine (1) 09/01/2021    Lipid screen  06/03/2022    Potassium monitoring  11/04/2022    Creatinine monitoring  11/04/2022    DTaP/Tdap/Td vaccine (2 - Td or Tdap) 02/28/2027    Pneumococcal 65+ years Vaccine  Completed    COVID-19 Vaccine  Completed    Hepatitis A vaccine  Aged Out    Hepatitis B vaccine  Aged Out    Hib vaccine  Aged Out    Meningococcal (ACWY) vaccine  Aged Out       Subjective:      Review of Systems   Constitutional: Negative for chills and fever. HENT: Negative for congestion and rhinorrhea. Respiratory: Negative for cough and wheezing. Gastrointestinal: Negative for diarrhea, nausea and vomiting. Neurological: Negative for dizziness and headaches. Due to patient's clinical status, ROS of symptoms was completed by discussion with long term care facility staff, as well as with input from patient. Objective: There were no vitals filed for this visit. Physical Exam  Constitutional:       General: He is not in acute distress. Appearance: Normal appearance. HENT:      Head: Normocephalic and atraumatic. Right Ear: External ear normal.      Left Ear: External ear normal.      Nose: No rhinorrhea. Mouth/Throat:      Lips: No lesions. Eyes:      Conjunctiva/sclera: Conjunctivae normal.   Neck:      Vascular: No JVD. Pulmonary:      Effort: Pulmonary effort is normal. No accessory muscle usage or respiratory distress. Musculoskeletal:      Cervical back: Normal range of motion. Skin:     Coloration: Skin is not cyanotic or jaundiced. Comments: Erythema noted. Limited by virtual exam, nurse on site notes the area does not have any exudate, but does have some warmth. Erythema is not circumferential.   Neurological:      Mental Status: He is alert. Mental status is at baseline. Psychiatric:         Attention and Perception: Attention and perception normal.         Mood and Affect: Mood and affect normal.         Speech: Speech normal.         Behavior: Behavior is cooperative. Thought Content: Thought content normal.         Assessment/Plan:          1. Cellulitis of left lower extremity  - Start cephalexin. Follow up Tuesday. To UC/ER if any worsening.  - cephALEXin (KEFLEX) 500 MG capsule; Take 1 capsule by mouth 4 times daily for 5 days  Dispense: 20 capsule; Refill: 0        Return in about 5 days (around 3/22/2022). No orders of the defined types were placed in this encounter. Orders Placed This Encounter   Medications    cephALEXin (KEFLEX) 500 MG capsule     Sig: Take 1 capsule by mouth 4 times daily for 5 days     Dispense:  20 capsule     Refill:  0               Electronically signed by KEVIN Chicas CNP on 3/17/2022 at 11:41 AM     Burton Lopez, was evaluated through a synchronous (real-time) audio-video encounter. The patient (or guardian if applicable) is aware that this is a billable service, which includes applicable co-pays. This Virtual Visit was conducted with patient's (and/or legal guardian's) consent. The visit was conducted pursuant to the emergency declaration under the [address] [place] [address], [address] [place] [address] authority and the [employer] and [address] General Act. Patient identification was verified, and a caregiver was present when appropriate. The patient was located at home in a state where the provider was licensed to provide care.        Total time spent for this encounter: Not billed by time    --KEVIN Chicas CNP on 3/17/2022 at 11:41 AM    An electronic signature was used to authenticate this note.

## 2022-03-17 NOTE — TELEPHONE ENCOUNTER
As follow up from virtual visit, please completed progress notes/orders to [facility], thanks

## 2022-04-04 ENCOUNTER — OFFICE VISIT (OUTPATIENT)
Dept: PAIN MANAGEMENT | Age: 87
End: 2022-04-04
Payer: MEDICARE

## 2022-04-04 ENCOUNTER — TELEPHONE (OUTPATIENT)
Dept: PAIN MANAGEMENT | Age: 87
End: 2022-04-04

## 2022-04-04 VITALS
SYSTOLIC BLOOD PRESSURE: 102 MMHG | HEART RATE: 60 BPM | HEIGHT: 65 IN | DIASTOLIC BLOOD PRESSURE: 62 MMHG | BODY MASS INDEX: 31.95 KG/M2

## 2022-04-04 DIAGNOSIS — M25.562 CHRONIC PAIN OF BOTH KNEES: ICD-10-CM

## 2022-04-04 DIAGNOSIS — M17.0 PRIMARY OSTEOARTHRITIS OF BOTH KNEES: Primary | ICD-10-CM

## 2022-04-04 DIAGNOSIS — G89.29 CHRONIC PAIN OF BOTH KNEES: ICD-10-CM

## 2022-04-04 DIAGNOSIS — M25.561 CHRONIC PAIN OF BOTH KNEES: ICD-10-CM

## 2022-04-04 PROCEDURE — 99214 OFFICE O/P EST MOD 30 MIN: CPT | Performed by: NURSE PRACTITIONER

## 2022-04-04 PROCEDURE — 99214 OFFICE O/P EST MOD 30 MIN: CPT

## 2022-04-04 RX ORDER — OXYCODONE 18 MG/1
18 CAPSULE, EXTENDED RELEASE ORAL EVERY 12 HOURS
Qty: 60 EACH | Refills: 0 | Status: SHIPPED | OUTPATIENT
Start: 2022-04-04 | End: 2022-05-04

## 2022-04-04 ASSESSMENT — ENCOUNTER SYMPTOMS
RESPIRATORY NEGATIVE: 1
GASTROINTESTINAL NEGATIVE: 1
BACK PAIN: 0

## 2022-04-04 NOTE — PROGRESS NOTES
Subjective:      Patient ID: Bandar Edouard is a 80 y.o. male. Chief Complaint   Patient presents with    Knee Pain     5 month f/u     Knee Pain   Pertinent negatives include no numbness. Leg Pain   Pertinent negatives include no numbness. Here today for routine pain clinic recheck. Chronic pain in knees, resides at assisted living. Ambulating very limited due to pain. Percocet 10 without significant relief, no complaints side effects. Pain Assessment  Location of Pain: Knee  Location Modifiers: Left,Right  Severity of Pain: 9  Quality of Pain: Throbbing,Sharp,Dull,Aching,Locking,Grinding,Popping  Duration of Pain: Persistent  Frequency of Pain: Constant  Aggravating Factors: Bending,Stretching,Straightening,Exercise,Kneeling,Squatting,Standing,Walking,Stairs  Limiting Behavior: Yes  Relieving Factors: Rest  Result of Injury: No  Work-Related Injury: No    Allergies   Allergen Reactions    Codeine Nausea And Vomiting    Morphine Other (See Comments)     constipation       Outpatient Medications Marked as Taking for the 4/4/22 encounter (Office Visit) with KEVIN Staley CNP   Medication Sig Dispense Refill    oxyCODONE ER (XTAMPZA ER) 18 MG C12A Take 18 mg by mouth every 12 hours for 30 days.  60 each 0    atorvastatin (LIPITOR) 10 MG tablet TAKE 1 TABLET DAILY 90 tablet 3    clopidogrel (PLAVIX) 75 MG tablet TAKE 1 TABLET DAILY 90 tablet 3    lactulose (CHRONULAC) 10 GM/15ML solution Take 20 g by mouth daily as needed       furosemide (LASIX) 40 MG tablet Take 1.5 tablets by mouth daily 135 tablet 1    lisinopril (PRINIVIL;ZESTRIL) 5 MG tablet TAKE 1 TABLET DAILY 90 tablet 3    melatonin 3 MG TABS tablet Take 3 mg by mouth nightly      docusate sodium (COLACE) 100 MG capsule Take 100 mg by mouth daily      pantoprazole (PROTONIX) 40 MG tablet Take 1 tablet by mouth every morning (before breakfast) 90 tablet 3    metoprolol tartrate (LOPRESSOR) 25 MG tablet Take 0.5 tablets by mouth 2 times daily 90 tablet 3    Multiple Vitamins-Minerals (MULTIVITAMIN PO) Take 1 tablet by mouth daily.          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 2/2/15    EGD with biopsies    CAD (coronary artery disease)     Elevated PSA     Hyperlipidemia     Hypertension     Kidney stone     Osteoarthritis of knee     Venous insufficiency        Past Surgical History:   Procedure Laterality Date    APPENDECTOMY      CHOLECYSTECTOMY  1983    COLONOSCOPY  12    COLONOSCOPY  09    COLONOSCOPY  2008    CYSTOSCOPY  11/15/2007    with left ureteroscopy and dorsal slit     KNEE FUSION Left     LEG SURGERY Left 2020    I and D of Left Lower Extremitity performed by Aura Mackey MD at [address] [facility] CYSTOURETHROSCOPY N/A 2018    CYSTO Photovaporization Prostate Greenlight Laser ([id number]) performed by Guillaume Mustafa MD at [address] [facility] EGD TRANSORAL BIOPSY SINGLE/MULTIPLE N/A 7/3/2017    EGD BIOPSY performed by Claire Vasquez MD at [facility] 47 Bilateral 2009    Benign    UPPER GASTROINTESTINAL ENDOSCOPY  2015    Sepulveda's (Dr. Baron Mcghee)    UPPER GASTROINTESTINAL ENDOSCOPY  2016    Lg hiatal hernia, polyp at duodenum, gastric polyp, Barretts Esophagus, GE 28    UPPER GASTROINTESTINAL ENDOSCOPY  2017    mild gastritis, Sepulveda's, Dr. Genia Ventura 3 years       Family History   Problem Relation Age of Onset    Cancer Father         stomach cancer    Heart Disease Paternal Grandfather        Social History     Socioeconomic History    Marital status:      Spouse name: None    Number of children: None    Years of education: None    Highest education level: None   Occupational History    None   Tobacco Use    Smoking status: Former Smoker     Packs/day: 1.00     Types: Cigars     Quit date: 1998     Years since quittin.2    Smokeless tobacco: Never Used    Tobacco comment: Former smoker (quit 1998)- [facility] 3/20/17   Vaping Use    Vaping Use: Never used   Substance and Sexual Activity    Alcohol use: No     Alcohol/week: 0.0 standard drinks    Drug use: No    Sexual activity: None   Other Topics Concern    None   Social History Narrative    None     Social Determinants of Health     Financial Resource Strain:     Difficulty of Paying Living Expenses: Not on file   Food Insecurity:     Worried About Running Out of Food in the Last Year: Not on file    Nikole of Food in the Last Year: Not on file   Transportation Needs:     Lack of Transportation (Medical): Not on file    Lack of Transportation (Non-Medical): Not on file   Physical Activity:     Days of Exercise per Week: Not on file    Minutes of Exercise per Session: Not on file   Stress:     Feeling of Stress : Not on file   Social Connections:     Frequency of Communication with Friends and Family: Not on file    Frequency of Social Gatherings with Friends and Family: Not on file    Attends Denominational Services: Not on file    Active Member of [address] [place] [address] or Organizations: Not on file    Attends Club or Organization Meetings: Not on file    Marital Status: Not on file   Intimate Partner Violence:     Fear of Current or Ex-Partner: Not on file    Emotionally Abused: Not on file    Physically Abused: Not on file    Sexually Abused: Not on file   Housing Stability:     Unable to Pay for Housing in the Last Year: Not on file    Number of [place] in the Last Year: Not on file    Unstable Housing in the Last Year: Not on file     Review of Systems   Constitutional: Positive for activity change (limited due to right knee pain). Negative for appetite change. Respiratory: Negative. Cardiovascular: Negative. Gastrointestinal: Negative. Genitourinary: Negative.          Johnson catheter in place   Musculoskeletal: Positive for arthralgias (bilateral knee, right knee is majority of his pain) and gait problem. Negative for back pain, neck pain and neck stiffness. Neurological: Negative for weakness and numbness. Psychiatric/Behavioral: Positive for sleep disturbance. Objective:   Physical Exam  Vitals reviewed. Constitutional:       General: He is not in acute distress. Appearance: He is well-developed. He is not ill-appearing, toxic-appearing or diaphoretic. HENT:      Head: Normocephalic and atraumatic. Cardiovascular:      Rate and Rhythm: Normal rate. Pulmonary:      Effort: Pulmonary effort is normal. No respiratory distress. Musculoskeletal:      Right knee: Swelling present. Decreased range of motion. Skin:     General: Skin is warm and dry. Coloration: Skin is not pale. Nails: There is no clubbing. Neurological:      Mental Status: He is alert and oriented to person, place, and time. He is not disoriented. GCS: GCS eye subscore is 4. GCS verbal subscore is 5. GCS motor subscore is 6. Cranial Nerves: No cranial nerve deficit. Motor: No tremor or seizure activity. Psychiatric:         Behavior: Behavior normal. Behavior is cooperative. Assessment:      1. Primary osteoarthritis of both knees    2. Chronic pain of both knees          Plan:     Discontinue Percocet. Start Xtampza 18mg every 12 hours, will consider adding btp depending on how well he tolerates    Controlled Substance Monitoring:    Acute and Chronic Pain Monitoring:   RX Monitoring 4/4/2022   Attestation -   Periodic Controlled Substance Monitoring No signs of potential drug abuse or diversion identified.;Obtaining appropriate analgesic effect of treatment.    Chronic Pain > 50 MEDD -       Follow up 2-3 months

## 2022-04-05 ENCOUNTER — TELEPHONE (OUTPATIENT)
Dept: PAIN MANAGEMENT | Age: 87
End: 2022-04-05

## 2022-04-05 NOTE — TELEPHONE ENCOUNTER
Roger Peña from [facility] [phone]. Can pt take his old percocet until the new med Tomma Ground comes in, hopefully tonight? He has had nothing for pain all day. Is it possible to write new med orders with continue old med until new med obtained?

## 2022-04-05 NOTE — TELEPHONE ENCOUNTER
Willie Blanco faxed to [facility] [phone] per Yonatan Carrillo at [facility]. Yonatan Carrillo aware to give pt Percocet now. She may fax us a paper order to sign and fax back, but she will give med now.

## 2022-04-06 ENCOUNTER — TELEPHONE (OUTPATIENT)
Dept: INTERNAL MEDICINE | Age: 87
End: 2022-04-06

## 2022-04-06 DIAGNOSIS — I89.0 LYMPHEDEMA OF BOTH LOWER EXTREMITIES: Primary | ICD-10-CM

## 2022-04-06 NOTE — TELEPHONE ENCOUNTER
See GP fax from yesterday (requesting Lymphedema Clinic). Fax received today from  Yanelis Galaviz states that if it is believed to be beneficial then he agrees to see a lymphedema specialist. Could you please send referral to lymphedema clinic. Also he states he would prefer to see them in [place] if possible. \"    Referral placed to our Therapy Dept. Faxed to GP- they will set up appt.

## 2022-04-08 NOTE — TELEPHONE ENCOUNTER
Received a call from Caitlyn Lund at [facility], she stated that the patient would like to start back on his percocet because the Shilpi Cornea has not been effective. He currently has some percocet left from his old script but may need a refill. When calling Caitlyn Lund back if the patient may have the percocet please find out how much he has left and initiate a refill.  [phone]

## 2022-04-13 DIAGNOSIS — M17.0 PRIMARY OSTEOARTHRITIS OF BOTH KNEES: ICD-10-CM

## 2022-04-13 NOTE — TELEPHONE ENCOUNTER
Monique Lyman,  Please verify this is the correct med. Pt is also taking Xtampza ER 18 mg. [facility] faxed stating percocet 10/325 mg will be gone today 4/14/22. Please send refill to [facility]. See scan. Last Appt:  4/4/2022  Next Appt:   4/13/2022  Med verified in [address] [place] for [place]: Perc 10 #120 on 3/10. Due Now.

## 2022-04-14 ENCOUNTER — HOSPITAL ENCOUNTER (OUTPATIENT)
Dept: OCCUPATIONAL THERAPY | Age: 87
Setting detail: THERAPIES SERIES
Discharge: HOME | End: 2022-04-14
Payer: MEDICARE

## 2022-04-14 PROCEDURE — 97166 OT EVAL MOD COMPLEX 45 MIN: CPT | Performed by: OCCUPATIONAL THERAPIST

## 2022-04-14 PROCEDURE — 97140 MANUAL THERAPY 1/> REGIONS: CPT | Performed by: OCCUPATIONAL THERAPIST

## 2022-04-14 RX ORDER — OXYCODONE AND ACETAMINOPHEN 10; 325 MG/1; MG/1
1 TABLET ORAL EVERY 6 HOURS PRN
Qty: 120 TABLET | Refills: 0 | Status: SHIPPED | OUTPATIENT
Start: 2022-04-14 | End: 2022-05-05 | Stop reason: SDUPTHER

## 2022-04-14 ASSESSMENT — PAIN SCALES - GENERAL: PAINLEVEL_OUTOF10: 8

## 2022-04-14 ASSESSMENT — PAIN DESCRIPTION - PAIN TYPE: TYPE: CHRONIC PAIN

## 2022-04-14 ASSESSMENT — PAIN DESCRIPTION - LOCATION: LOCATION: LEG

## 2022-04-14 ASSESSMENT — PAIN DESCRIPTION - ORIENTATION: ORIENTATION: LEFT

## 2022-04-14 NOTE — FLOWSHEET NOTE
[facility]    [x] [place]  Phone: [phone]  Fax: [phone]      [] [place]  Phone: [phone]  Fax: [phone]    Occupational Therapy Daily Treatment Note  Date:  2022    Patient Name:  Krishna Reed    :  1927  MRN: [id number]  Restrictions/Precautions:      Medical/Treatment Diagnosis Information:   Diagnosis: BLE lymphedema   Insurance/Certification information:   Physician Information: Referring Practitioner: Sandro Johnson  Plan of care signed (Y/N):  n    Visit# / total visits:  1    Pain level: 8/10     Progress Note: [x]  Yes  []  No  Next due by: Visit #10      Date of evaluation/re-evaluation: 22-22    Time In:1025    Time Out:1125      Subjective:    See progress note     Objective/Assessment:   See progress note       Exercises:         Therapeutic Exercise  [] Provided verbal/tactile cueing for activities related to strengthening, flexibility, endurance, ROM. (53063)  Neuro  Re-Ed  [] Provided verbal/tactile cueing for activities related to improving balance, coordination, kinesthetic sense, posture, motor skill, proprioception. (51186)     Therapeutic Activities/ADL:   [] Provided use of dynamic activities to improve functional performance ()  [] Provided self-care/home management training for activities of daily living and compensatory training (32468)     Manual Treatments:   [] Provided manual therapy to mobilize soft tissue/joints for the purpose of modulating pain, promoting relaxation, increasing ROM, reducing/eliminating soft tissue swelling/inflammation/restriction, improving soft tissue extensibility. (23547)     Orthotic Management:   [] Provided assessment and fitting orthotic device for improved functional performance.  (70427)    Service Based Modalities:      Timed Code Treatment Minutes:   40 manual    Total Treatment Minutes:   60    Treatment/Activity Tolerance:  [x] Patient tolerated treatment well [] Patient limited by fatique  [] Patient limited by pain  [] Patient limited by other medical complications  [] Other:     Prognosis: [x] Good [] Fair  [] Poor    Patient Requires Follow-up: [x] Yes  [] No      Goals:  Short term goals  Time Frame for Short term goals: 2 weeks  Short term goal 1: Complete lymphedema impact scale     Long term goals  Time Frame for Long term goals : 6 weeks  Long term goal 1: Patient to demonstrate decreased edema LLE > 3-5cm at foot, ankle, upper calf, and knee circumferential measurements for improved skin integrity, decreased risk cellulitis and decreased pain  Long term goal 2: Assess, determine, fit, and obtain most appropriate long term compression garments for management of lymphedema  Long term goal 3: Patient and/or patient/caregiver to be independent with donning and doffing compression garments using a/e as needed  Long term goal 4: Patient to be independent with home program    Plan:   [] Continue per plan of care [] Alter current plan (see comments)  [x] Plan of care initiated [] Hold pending MD visit [] Discharge    Plan for Next Session:      Electronically signed by:  Will Wang OT

## 2022-04-14 NOTE — PLAN OF CARE
Occupational Therapy    [x] [place]  Phone: [phone]  Fax: [phone]      [] [place]  Phone: [phone]  Fax: [phone]       To: Referring Practitioner: Lashonda Gordon      Patient: Modesta Ellis  : 1927  MRN: [id number]  Evaluation Date: 2022      Diagnosis Information:  Diagnosis: BLE lymphedema         Occupational Therapy Certification/Re-Certification Form  Dear Bre Sterling  The following patient has been evaluated for occupational therapy services and for therapy to continue, insurance requires physician review of the treatment plan. Please review the attached evaluation and/or summary of the patient's plan of care, and verify that you agree therapy should continue by signing the attached document and sending it back to our office.     Plan of Care/Treatment to date:22-22  [x] Therapeutic Exercise   [] Modalities:  [x] Therapeutic Activity    [] Ultrasound  [] Electrical Stimulation   [x] Activities of Daily Living    [] Paraffin   [x] Kinesiotaping  [] Neuromuscular Re-education   [] Iontophoresis [] Coldpack/hotpack   [x] Instruction in HEP     [] Orthotics/splint []   [x] Manual Therapy       [] Aquatic Therapy            Frequency/Duration:  # Days per week: [] 1 day # Weeks: [] 1 week [] 5 weeks      [x] 2 days   [] 2 weeks [x] 6 weeks     [] 3 days   [] 3 weeks [] 7 weeks     [] 4 days   [] 4 weeks [] 8 weeks  Goals:  Short term goals  Time Frame for Short term goals: 2 weeks  Short term goal 1: Complete lymphedema impact scale  Long term goals  Time Frame for Long term goals : 6 weeks  Long term goal 1: Patient to demonstrate decreased edema LLE > 3-5cm at foot, ankle, upper calf, and knee circumferential measurements for improved skin integrity, decreased risk cellulitis and decreased pain  Long term goal 2: Assess, determine, fit, and obtain most appropriate long term compression garments for management of lymphedema  Long term goal 3: Patient and/or patient/caregiver to be independent with donning and doffing compression garments using a/e as needed  Long term goal 4: Patient to be independent with home program    Rehab Potential: [] excellent [x] good [] fair  [] poor     Electronically signed by:  Kyle Curtis OT      If you have any questions or concerns, please don't hesitate to call.   Thank you for your referral.      Physician Signature:________________________________Date:__________________  By signing above, therapists plan is approved by physician

## 2022-04-14 NOTE — PROGRESS NOTES
Occupational Therapy  Occupational Therapy Initial Assessment  Date:  2022    Patient Name: Pablo Mauricio  MRN: [id number]     :  1927     Treatment Diagnosis: BLE lymphedema    Subjective   General  Chart Reviewed: Yes  Patient assessed for rehabilitation services?: Yes  Family / Caregiver Present: No  Referring Practitioner: Priscila Kemp  Diagnosis: BLE lymphedema  OT Visit Information  Onset Date: 22  Subjective  Subjective: Patient rec'd in waiting room, pleasant and cooperative 80 yr old male with BLE lymphedema  Pain Assessment  Pain Assessment: 0-10  Pain Level: 8  Pain Type: Chronic pain  Pain Location: Leg  Pain Orientation: Left  Pain Descriptors: Aching;Heaviness;Sore;Tender  Vital Signs  Patient Currently in Pain: Yes     Home Living  Social/Functional History  Lives With: Alone  Type of Home: Assisted living ([place] [address])  Home Layout: One level  Home Access: Level entry  Bathroom Shower/Tub: Walk-in shower  Bathroom Toilet: Handicap height  Bathroom Equipment: Grab bars in shower,Built-in shower seat,Grab bars around toilet  Bathroom Accessibility: [facility]: [place] [address] Help From: Personal care attendant  ADL Assistance: Needs assistance  [address]: Needs assistance  Homemaking Responsibilities: No  Ambulation Assistance:  (does not ambulate)  Transfer Assistance: Independent  Active : No  Occupation: Retired     Objective   Cognition  Overall Cognitive Status: WFL     Patient education completed regarding complete decongestive therapy (CDT) provided; patient provided with information on what to expect for treatment, time, frequency and duration; in addition to approximate cost of compression stockings. Patient also provided education on best options for what to wear to increase ease with bandaging on and shoes that will be provided if needed.     RLE: foot 24.0, tarsal 25.5, ankle 30.0, superior ankle 27.5, lower calf 35.0, upper calf 41.0, knee 44.0,   LLE:  big toe 10.0, 2nd toe 7.5, foot 27.0, tarsal 28.0, ankle 32.0, superior ankle 30.0,   lower calf 36.5, upper calf 44.0, knee 50.0, lower thigh 45.5    Manual lymph drainage completed (L) LE; initiated at (L) inguinal>upper leg>knee>lower leg>ankle>foot/toes>back up to (L) LE to groin. Compression bandaging on: LLE foot to superior knee; stockinette, cotton padding, 1/4\" foam ankle to superior knee, covered with short stretch bandages.     Assessment   Assessment  Treatment Diagnosis: BLE lymphedema  Prognosis: Good  Decision Making: Medium Complexity  REQUIRES OT FOLLOW UP: Yes       Plan    Plan of care initiated      Goals  Short term goals  Time Frame for Short term goals: 2 weeks  Short term goal 1: Complete lymphedema impact scale  Long term goals  Time Frame for Long term goals : 6 weeks  Long term goal 1: Patient to demonstrate decreased edema LLE > 3-5cm at foot, ankle, upper calf, and knee circumferential measurements for improved skin integrity, decreased risk cellulitis and decreased pain  Long term goal 2: Assess, determine, fit, and obtain most appropriate long term compression garments for management of lymphedema  Long term goal 3: Patient and/or patient/caregiver to be independent with donning and doffing compression garments using a/e as needed  Long term goal 4: Patient to be independent with home program       Therapy Time   Individual Concurrent Group Co-treatment   Time In 1025         Time Out 1125         Minutes 60         Timed Code Treatment Minutes: [facility], OT

## 2022-04-15 RX ORDER — LISINOPRIL 5 MG/1
TABLET ORAL
Qty: 90 TABLET | Refills: 1 | Status: SHIPPED | OUTPATIENT
Start: 2022-04-15 | End: 2022-10-11

## 2022-04-15 NOTE — TELEPHONE ENCOUNTER
Cena Sacks called requesting a refill of the below medication which has been pended for you:     Requested Prescriptions     Pending Prescriptions Disp Refills    lisinopril (PRINIVIL;ZESTRIL) 5 MG tablet [Pharmacy Med Name: LISINOPRIL TABS 5MG] 90 tablet 1     Sig: TAKE 1 TABLET DAILY       Last Appointment Date: 12/21/2020  Next Appointment Date: Visit date not found    Allergies   Allergen Reactions    Codeine Nausea And Vomiting    Morphine Other (See Comments)     constipation

## 2022-04-19 ENCOUNTER — HOSPITAL ENCOUNTER (OUTPATIENT)
Dept: OCCUPATIONAL THERAPY | Age: 87
Setting detail: THERAPIES SERIES
Discharge: HOME | End: 2022-04-19
Payer: MEDICARE

## 2022-04-19 PROCEDURE — 97140 MANUAL THERAPY 1/> REGIONS: CPT | Performed by: OCCUPATIONAL THERAPIST

## 2022-04-19 PROCEDURE — 97535 SELF CARE MNGMENT TRAINING: CPT | Performed by: OCCUPATIONAL THERAPIST

## 2022-04-19 NOTE — FLOWSHEET NOTE
[facility]    [x] [place]  Phone: [phone]  Fax: [phone]      [] [place]  Phone: [phone]  Fax: [phone]    Occupational Therapy Daily Treatment Note  Date:  2022    Patient Name:  Tila Bolden    :  1927  MRN: [id number]  Restrictions/Precautions:      Medical/Treatment Diagnosis Information:   Diagnosis: BLE lymphedema   Insurance/Certification information:   Physician Information: Referring Practitioner: Eliud Vigil  Plan of care signed (Y/N):  y    Visit# / total visits:  2    Pain level: 8/10     Progress Note: []  Yes  [x]  No  Next due by: Visit #10      Date of evaluation/re-evaluation: 22-22    Time In:  100    Time Out: 240    Subjective:    Patient rec'd in waiting room, pleasant and cooperative 80 yr old male with LLE lymphedema. Patient did have strong urine odor upon arrival.    Objective/Assessment:   Lymphedema pump LLE 15 min 60mmHg  Compression bandaging on: LLE foot to superior knee; stockinette, cotton padding, 1/4\" foam ankle to superior knee, covered with short stretch bandages. Patient requested to use bathroom [facility] through bandaging, patient assisted to bathroom but did soak through brief and clothing after getting into bathroom. Patient required max a to remove soiled clothing and was provided with clean, dry scrubs to put on, patient required max a to don dry brief and clothing. Patient was min/mod a for functional transfers w/c~table~toilet. Patient did soak through bandages that were already on and bandaging process was started over after patient cleaned up. Exercises:       Therapeutic Exercise  [] Provided verbal/tactile cueing for activities related to strengthening, flexibility, endurance, ROM. (57441)  Neuro  Re-Ed  [] Provided verbal/tactile cueing for activities related to improving balance, coordination, kinesthetic sense, posture, motor skill, proprioception. (97541)     Therapeutic Activities/ADL:   [] Provided use of dynamic activities to improve functional performance (25794)  [] Provided self-care/home management training for activities of daily living and compensatory training (32110)     Manual Treatments:   [] Provided manual therapy to mobilize soft tissue/joints for the purpose of modulating pain, promoting relaxation, increasing ROM, reducing/eliminating soft tissue swelling/inflammation/restriction, improving soft tissue extensibility. (45768)     Orthotic Management:   [] Provided assessment and fitting orthotic device for improved functional performance.  (35618)    Service Based Modalities:      Timed Code Treatment Minutes:  60  Manual  40 self care    Total Treatment Minutes:   100    Treatment/Activity Tolerance:  [x] Patient tolerated treatment well [] Patient limited by fatique  [] Patient limited by pain  [] Patient limited by other medical complications  [] Other:     Prognosis: [x] Good [] Fair  [] Poor    Patient Requires Follow-up: [x] Yes  [] No      Goals:  Short term goals  Time Frame for Short term goals: 2 weeks  Short term goal 1: Complete lymphedema impact scale   Long term goals  Time Frame for Long term goals : 6 weeks  Long term goal 1: Patient to demonstrate decreased edema LLE > 3-5cm at foot, ankle, upper calf, and knee circumferential measurements for improved skin integrity, decreased risk cellulitis and decreased pain  Long term goal 2: Assess, determine, fit, and obtain most appropriate long term compression garments for management of lymphedema  Long term goal 3: Patient and/or patient/caregiver to be independent with donning and doffing compression garments using a/e as needed  Long term goal 4: Patient to be independent with home program    Plan:   [x] Continue per plan of care [] Alter current plan (see comments)  [] Plan of care initiated [] Hold pending MD visit [] Discharge    Plan for Next Session:      Electronically signed by:  Latanya Rascon, OT

## 2022-04-21 ENCOUNTER — TELEPHONE (OUTPATIENT)
Dept: PAIN MANAGEMENT | Age: 87
End: 2022-04-21

## 2022-04-21 NOTE — TELEPHONE ENCOUNTER
Fax received notifying provider that Ulysses Rued is not doing anything. xtampza d/c and percocet 10/325 mg   PO Q 4hrs PRN ordered.

## 2022-04-29 ENCOUNTER — APPOINTMENT (OUTPATIENT)
Dept: OCCUPATIONAL THERAPY | Age: 87
End: 2022-04-29
Payer: MEDICARE

## 2022-05-05 ENCOUNTER — OFFICE VISIT (OUTPATIENT)
Dept: PAIN MANAGEMENT | Age: 87
End: 2022-05-05
Payer: MEDICARE

## 2022-05-05 VITALS
HEART RATE: 76 BPM | SYSTOLIC BLOOD PRESSURE: 120 MMHG | WEIGHT: 192 LBS | DIASTOLIC BLOOD PRESSURE: 62 MMHG | HEIGHT: 65 IN | BODY MASS INDEX: 31.99 KG/M2

## 2022-05-05 DIAGNOSIS — G89.29 CHRONIC PAIN OF BOTH KNEES: ICD-10-CM

## 2022-05-05 DIAGNOSIS — I89.0 LYMPHEDEMA OF BOTH LOWER EXTREMITIES: ICD-10-CM

## 2022-05-05 DIAGNOSIS — M25.562 CHRONIC PAIN OF BOTH KNEES: ICD-10-CM

## 2022-05-05 DIAGNOSIS — M17.0 PRIMARY OSTEOARTHRITIS OF BOTH KNEES: ICD-10-CM

## 2022-05-05 DIAGNOSIS — Z02.83 ENCOUNTER FOR DRUG SCREENING: ICD-10-CM

## 2022-05-05 DIAGNOSIS — M25.561 CHRONIC PAIN OF BOTH KNEES: ICD-10-CM

## 2022-05-05 DIAGNOSIS — K59.04 CHRONIC IDIOPATHIC CONSTIPATION: ICD-10-CM

## 2022-05-05 DIAGNOSIS — Z79.891 ENCOUNTER FOR LONG-TERM OPIATE ANALGESIC USE: Primary | ICD-10-CM

## 2022-05-05 PROCEDURE — 99214 OFFICE O/P EST MOD 30 MIN: CPT | Performed by: NURSE PRACTITIONER

## 2022-05-05 PROCEDURE — 99213 OFFICE O/P EST LOW 20 MIN: CPT | Performed by: NURSE PRACTITIONER

## 2022-05-05 RX ORDER — OXYCODONE AND ACETAMINOPHEN 10; 325 MG/1; MG/1
1 TABLET ORAL EVERY 6 HOURS PRN
Qty: 120 TABLET | Refills: 0 | Status: SHIPPED | OUTPATIENT
Start: 2022-05-05 | End: 2022-06-04

## 2022-05-05 RX ORDER — FENTANYL 12 UG/H
1 PATCH TRANSDERMAL
Qty: 10 PATCH | Refills: 0 | Status: SHIPPED | OUTPATIENT
Start: 2022-05-05 | End: 2022-05-31 | Stop reason: SDUPTHER

## 2022-05-05 ASSESSMENT — ENCOUNTER SYMPTOMS
RESPIRATORY NEGATIVE: 1
BACK PAIN: 0
GASTROINTESTINAL NEGATIVE: 1

## 2022-05-05 NOTE — PROGRESS NOTES
Subjective:      Patient ID: Helen Krishna is a 80 y.o. male. Chief Complaint   Patient presents with    Knee Pain     wally    Leg Pain     wally     Knee Pain   Pertinent negatives include no numbness. Leg Pain   Pertinent negatives include no numbness. Here today for routine pain clinic recheck. Chronic pain in knees, resides at assisted living. Ambulating very limited due to pain. Percocet nor Xtampza provided much pain relief. Pain Assessment  Location of Pain: Knee  Location Modifiers: Left,Right  Severity of Pain: 2  Quality of Pain: Sharp (burning)  Duration of Pain: Persistent  Frequency of Pain: Constant  Aggravating Factors: Bending,Stretching,Straightening,Exercise,Standing,Walking  Limiting Behavior: Yes  Relieving Factors:  (not much)  Result of Injury: No  Work-Related Injury: No  Are there other pain locations you wish to document?: Yes (legs)    Allergies   Allergen Reactions    Codeine Nausea And Vomiting    Morphine Other (See Comments)     constipation       Outpatient Medications Marked as Taking for the 5/5/22 encounter (Office Visit) with KEVIN Rivera CNP   Medication Sig Dispense Refill    fentaNYL (DURAGESIC) 12 MCG/HR Place 1 patch onto the skin every 3 days for 30 days. Intended supply: 30 days 10 patch 0    oxyCODONE-acetaminophen (PERCOCET)  MG per tablet Take 1 tablet by mouth every 6 hours as needed for Pain for up to 30 days.  120 tablet 0    lisinopril (PRINIVIL;ZESTRIL) 5 MG tablet TAKE 1 TABLET DAILY 90 tablet 1    atorvastatin (LIPITOR) 10 MG tablet TAKE 1 TABLET DAILY 90 tablet 3    clopidogrel (PLAVIX) 75 MG tablet TAKE 1 TABLET DAILY 90 tablet 3    furosemide (LASIX) 40 MG tablet Take 1.5 tablets by mouth daily 135 tablet 1    melatonin 3 MG TABS tablet Take 3 mg by mouth nightly      docusate sodium (COLACE) 100 MG capsule Take 100 mg by mouth daily      pantoprazole (PROTONIX) 40 MG tablet Take 1 tablet by mouth every morning (before breakfast) 90 tablet 3    metoprolol tartrate (LOPRESSOR) 25 MG tablet Take 0.5 tablets by mouth 2 times daily 90 tablet 3    Multiple Vitamins-Minerals (MULTIVITAMIN PO) Take 1 tablet by mouth daily.          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 2/2/15    EGD with biopsies    CAD (coronary artery disease)     Elevated PSA     Hyperlipidemia     Hypertension     Kidney stone     Osteoarthritis of knee     Venous insufficiency        Past Surgical History:   Procedure Laterality Date    APPENDECTOMY      CHOLECYSTECTOMY  01/12/1983    COLONOSCOPY  08/29/12    COLONOSCOPY  05/27/09    COLONOSCOPY  04/30/2008    CYSTOSCOPY  11/15/2007    with left ureteroscopy and dorsal slit     KNEE FUSION Left     LEG SURGERY Left 1/11/2020    I and D of Left Lower Extremitity performed by Suyapa Ceron MD at [address] [facility] CYSTOURETHROSCOPY N/A 11/19/2018    CYSTO Photovaporization Prostate Greenlight Laser ([id number]) performed by Alvaro Neville MD at [address] [facility] EGD TRANSORAL BIOPSY SINGLE/MULTIPLE N/A 7/3/2017    EGD BIOPSY performed by Frannie Monae MD at [facility] Bilateral 06/23/2009    Benign    UPPER GASTROINTESTINAL ENDOSCOPY  02/2/2015    Sepulveda's (Dr. Monica Tim)    UPPER GASTROINTESTINAL ENDOSCOPY  04/25/2016    Lg hiatal hernia, polyp at duodenum, gastric polyp, Barretts Esophagus, GE 28    UPPER GASTROINTESTINAL ENDOSCOPY  07/03/2017    mild gastritis, Sepulveda's, Dr. Emigdio Alex 3 years       Family History   Problem Relation Age of Onset    Cancer Father         stomach cancer    Heart Disease Paternal Grandfather        Social History     Socioeconomic History    Marital status:      Spouse name: None    Number of children: None    Years of education: None    Highest education level: None   Occupational History    None   Tobacco Use    Smoking status: Former Smoker     Packs/day: 1.00 Types: Cigars     Quit date: 1998     Years since quittin.3    Smokeless tobacco: Never Used    Tobacco comment: Former smoker (quit )- [facility] 3/20/17   Vaping Use    Vaping Use: Never used   Substance and Sexual Activity    Alcohol use: No     Alcohol/week: 0.0 standard drinks    Drug use: No    Sexual activity: None   Other Topics Concern    None   Social History Narrative    None     Social Determinants of Health     Financial Resource Strain:     Difficulty of Paying Living Expenses: Not on file   Food Insecurity:     Worried About Running Out of Food in the Last Year: Not on file    Nikole of Food in the Last Year: Not on file   Transportation Needs:     Lack of Transportation (Medical): Not on file    Lack of Transportation (Non-Medical): Not on file   Physical Activity:     Days of Exercise per Week: Not on file    Minutes of Exercise per Session: Not on file   Stress:     Feeling of Stress : Not on file   Social Connections:     Frequency of Communication with Friends and Family: Not on file    Frequency of Social Gatherings with Friends and Family: Not on file    Attends Quaker Services: Not on file    Active Member of [address] [place] [address] [place] or Organizations: Not on file    Attends Club or Organization Meetings: Not on file    Marital Status: Not on file   Intimate Partner Violence:     Fear of Current or Ex-Partner: Not on file    Emotionally Abused: Not on file    Physically Abused: Not on file    Sexually Abused: Not on file   Housing Stability:     Unable to Pay for Housing in the Last Year: Not on file    Number of [place] in the Last Year: Not on file    Unstable Housing in the Last Year: Not on file     Review of Systems   Constitutional: Positive for activity change (limited due to right knee pain). Negative for appetite change. Respiratory: Negative. Cardiovascular: Negative. Gastrointestinal: Negative. Genitourinary: Negative.          Alex catheter in place   Musculoskeletal: Positive for arthralgias (bilateral knee, right knee is majority of his pain) and gait problem. Negative for back pain, neck pain and neck stiffness. Neurological: Negative for weakness and numbness. Psychiatric/Behavioral: Positive for sleep disturbance. Objective:   Physical Exam  Vitals reviewed. Constitutional:       General: He is not in acute distress. Appearance: He is well-developed. He is not ill-appearing, toxic-appearing or diaphoretic. HENT:      Head: Normocephalic and atraumatic. Cardiovascular:      Rate and Rhythm: Normal rate. Pulmonary:      Effort: Pulmonary effort is normal. No respiratory distress. Musculoskeletal:      Right knee: Swelling present. Decreased range of motion. Skin:     General: Skin is warm and dry. Coloration: Skin is not pale. Nails: There is no clubbing. Neurological:      Mental Status: He is alert and oriented to person, place, and time. He is not disoriented. GCS: GCS eye subscore is 4. GCS verbal subscore is 5. GCS motor subscore is 6. Cranial Nerves: No cranial nerve deficit. Motor: No tremor or seizure activity. Psychiatric:         Behavior: Behavior normal. Behavior is cooperative. Assessment:      1. Encounter for long-term opiate analgesic use    2. Encounter for drug screening    3. Chronic pain of both knees    4. Primary osteoarthritis of both knees    5. Lymphedema of both lower extremities    6. Chronic idiopathic constipation          Plan:     Continue Percocet as prescribed. Add Fentanyl 12    Controlled Substance Monitoring:    Acute and Chronic Pain Monitoring:   RX Monitoring 5/5/2022   Attestation -   Periodic Controlled Substance Monitoring No signs of potential drug abuse or diversion identified.    Chronic Pain > 50 MEDD -       Follow up 2-3 months

## 2022-05-31 DIAGNOSIS — G89.29 CHRONIC PAIN OF BOTH KNEES: ICD-10-CM

## 2022-05-31 DIAGNOSIS — M25.561 CHRONIC PAIN OF BOTH KNEES: ICD-10-CM

## 2022-05-31 DIAGNOSIS — M25.562 CHRONIC PAIN OF BOTH KNEES: ICD-10-CM

## 2022-05-31 RX ORDER — FENTANYL 12 UG/H
1 PATCH TRANSDERMAL
Qty: 10 PATCH | Refills: 0 | Status: SHIPPED | OUTPATIENT
Start: 2022-05-31 | End: 2022-06-27

## 2022-05-31 NOTE — TELEPHONE ENCOUNTER
Patient called refill line for their  Fentanyl 12 mcg 10 patches  Send to [facility] pharmacy     Last Appt:  5/5/2022  Next Appt:   8/5/2022  Med verified in [address]: 3/25/21    [place], [place], and [place]:     Fentanyl 12 mcg 10 patches  Last filled on   Due on   Not sure when he is in nursing home

## 2022-06-06 ENCOUNTER — OFFICE VISIT (OUTPATIENT)
Dept: CARDIOLOGY | Age: 87
End: 2022-06-06
Payer: MEDICARE

## 2022-06-06 VITALS
DIASTOLIC BLOOD PRESSURE: 52 MMHG | SYSTOLIC BLOOD PRESSURE: 107 MMHG | WEIGHT: 192 LBS | BODY MASS INDEX: 31.99 KG/M2 | HEIGHT: 65 IN | HEART RATE: 64 BPM

## 2022-06-06 DIAGNOSIS — I50.32 CHRONIC HEART FAILURE WITH PRESERVED EJECTION FRACTION (HFPEF) (HCC): ICD-10-CM

## 2022-06-06 DIAGNOSIS — I49.3 VENTRICULAR PREMATURE BEATS: ICD-10-CM

## 2022-06-06 DIAGNOSIS — I10 ESSENTIAL HYPERTENSION: ICD-10-CM

## 2022-06-06 DIAGNOSIS — R60.0 BILATERAL LEG EDEMA: ICD-10-CM

## 2022-06-06 DIAGNOSIS — I25.10 CORONARY ARTERY DISEASE INVOLVING NATIVE CORONARY ARTERY OF NATIVE HEART WITHOUT ANGINA PECTORIS: Primary | ICD-10-CM

## 2022-06-06 DIAGNOSIS — I25.119 CORONARY ARTERY DISEASE INVOLVING NATIVE CORONARY ARTERY OF NATIVE HEART WITH ANGINA PECTORIS (HCC): ICD-10-CM

## 2022-06-06 DIAGNOSIS — E78.5 DYSLIPIDEMIA: ICD-10-CM

## 2022-06-06 PROCEDURE — 93005 ELECTROCARDIOGRAM TRACING: CPT | Performed by: INTERNAL MEDICINE

## 2022-06-06 PROCEDURE — 99213 OFFICE O/P EST LOW 20 MIN: CPT | Performed by: INTERNAL MEDICINE

## 2022-06-06 PROCEDURE — 99214 OFFICE O/P EST MOD 30 MIN: CPT | Performed by: INTERNAL MEDICINE

## 2022-06-06 PROCEDURE — 93010 ELECTROCARDIOGRAM REPORT: CPT | Performed by: INTERNAL MEDICINE

## 2022-06-06 PROCEDURE — 1123F ACP DISCUSS/DSCN MKR DOCD: CPT | Performed by: INTERNAL MEDICINE

## 2022-06-06 NOTE — PROGRESS NOTES
Today's Date: 6/6/2022  Patient's Name: Tila Bolden  Patient's age: 80 y.o., 6/28/1927    Subjective: The patient is a 80 y.o., , male is in the office for CAD. Swelling improved on lasix 60 bid. Denies any cp, sob. Uses wheelchair in NH. 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 Past Surgical History:   has a past surgical history that includes Colonoscopy (08/29/12); Cystocopy (11/15/2007); Appendectomy; Cholecystectomy (01/12/1983); Knee fusion (Left); lipoma resection; Colonoscopy (05/27/09); Colonoscopy (04/30/2008); Prostate Biopsy (Bilateral, 06/23/2009); pr egd transoral biopsy single/multiple (N/A, 7/3/2017); Upper gastrointestinal endoscopy (02/2/2015); Upper gastrointestinal endoscopy (04/25/2016); Upper gastrointestinal endoscopy (07/03/2017); pr cystourethroscopy (N/A, 11/19/2018); and Leg Surgery (Left, 1/11/2020). Home Medications:  Prior to Admission medications    Medication Sig Start Date End Date Taking? Authorizing Provider   metoprolol tartrate (LOPRESSOR) 25 MG tablet Take 1 tablet by mouth 2 times daily 6/6/22  Yes Tyron Cornejo DO   fentaNYL (DURAGESIC) 12 MCG/HR Place 1 patch onto the skin every 3 days for 30 days.  Intended supply: 30 days 5/31/22 6/30/22 Yes Nadege Schumacher MD   lisinopril (PRINIVIL;ZESTRIL) 5 MG tablet TAKE 1 TABLET DAILY 4/15/22  Yes Jared Chowdary MD   atorvastatin (LIPITOR) 10 MG tablet TAKE 1 TABLET DAILY 2/23/22  Yes Jared Chowdary MD   clopidogrel (PLAVIX) 75 MG tablet TAKE 1 TABLET DAILY 2/23/22  Yes Jared Chowdary MD   lactulose (CHRONULAC) 10 GM/15ML solution Take 20 g by mouth daily as needed    Yes Historical Provider, MD   furosemide (LASIX) 40 MG tablet Take 1.5 tablets by mouth daily 9/20/21  Yes Tyron Cornejo DO   melatonin 3 MG TABS tablet Take 3 mg by mouth nightly   Yes Historical Provider, MD   docusate sodium (COLACE) 100 MG capsule Take 100 mg by mouth daily   Yes Historical Provider, MD   pantoprazole (PROTONIX) 40 MG tablet Take 1 tablet by mouth every morning (before breakfast) 1/23/19  Yes Jaydon Alcazar MD   acetaminophen (TYLENOL) 325 MG tablet Take 2 tablets by mouth every 4 hours as needed for Pain or Fever 6/12/18  Yes Quinten Petersen   Multiple Vitamins-Minerals (MULTIVITAMIN PO) Take 1 tablet by mouth daily. Yes Historical Provider, MD       Allergies:  Codeine and Morphine    Social History:   reports that he quit smoking about 24 years ago. His smoking use included cigars. He smoked 1.00 pack per day. He has never used smokeless tobacco. He reports that he does not drink alcohol and does not use drugs. Review of Systems:  REVIEW OF SYSTEMS:    · Constitutional: there has been no unanticipated weight loss. There's been No change in energy level, No change in activity level. · Eyes: No visual changes or diplopia. No scleral icterus. · ENT: No Headaches, hearing loss or vertigo. No mouth sores or sore throat. · Cardiovascular: AS HPI  · Respiratory: AS HPI  · Gastrointestinal: No abdominal pain, appetite loss, blood in stools. No change in bowel or bladder habits. · Genitourinary: No dysuria, trouble voiding, or hematuria. · Musculoskeletal:  No gait disturbance, No weakness or joint complaints. · Integumentary: No rash or pruritis. · Neurological: No headache, diplopia, change in muscle strength, numbness or tingling. No change in gait, balance, coordination, mood, affect, memory, mentation, behavior. · Psychiatric: No new anxiety or depression. · Endocrine: No temperature intolerance. No excessive thirst, fluid intake, or urination. No tremor. · Hematologic/Lymphatic: No abnormal bruising or bleeding, blood clots or swollen lymph nodes. · Allergic/Immunologic: No nasal congestion or hives.     Physical Exam:  BP (!) 107/52   Pulse 64   Ht 5' 5\" (1.651 m)   Wt 192 lb (87.1 kg) Comment: unknown today as can't stand  BMI 31.95 kg/m²    Physical Exam  Constitutional:       Appearance: Normal appearance. HENT:      Head: Normocephalic and atraumatic. Cardiovascular:      Rate and Rhythm: Normal rate and regular rhythm. Pulses: Normal pulses. Heart sounds: Normal heart sounds. No murmur heard. Pulmonary:      Effort: Pulmonary effort is normal. No respiratory distress. Breath sounds: Normal breath sounds. No stridor. Abdominal:      General: There is no distension. Palpations: There is no mass. Musculoskeletal:         General: No swelling or tenderness. Right lower leg: Edema present. Left lower leg: Edema present. Skin:     Capillary Refill: Capillary refill takes less than 2 seconds. Coloration: Skin is not jaundiced or pale. Neurological:      General: No focal deficit present. Mental Status: He is alert and oriented to person, place, and time. Cranial Nerves: No cranial nerve deficit. Sensory: No sensory deficit.    Psychiatric:         Mood and Affect: Mood normal.         Behavior: Behavior normal.       Labs:     Lab Results   Component Value Date    CHOL 110 06/03/2021    TRIG 94 06/03/2021    HDL 38 (L) 06/03/2021    LDLCHOLESTEROL 53 06/03/2021    VLDL NOT REPORTED 06/03/2021    CHOLHDLRATIO 2.9 06/03/2021       Lab Results   Component Value Date     11/04/2021    K 4.1 11/04/2021     11/04/2021    CO2 28 11/04/2021    BUN 27 (H) 11/04/2021    CREATININE 0.84 11/04/2021    GLUCOSE 94 11/04/2021    CALCIUM 9.6 11/04/2021    PROT 6.1 (L) 01/10/2020    LABALBU 3.3 (L) 01/10/2020    BILITOT 0.44 01/10/2020    ALKPHOS 93 01/10/2020    AST 14 06/03/2021    ALT 18 01/10/2020    LABGLOM >60 11/04/2021    GFRAA >60 11/04/2021    GLOB NOT REPORTED 11/23/2018     Cardiac Data:  ECG:  Sinus  Rhythm  - frequent ectopic ventricular beat s   # VECs = 3  -Right bundle branch block with left axis -bifascicular block. Assessment/Plan:  1. Bilateral LE edema- R leg improved. L Leg is wrapped. - Dopplers previously revealed possible DVT- per vascular less likely-off AC per vascular  - continue lasix 60 mg po bid  - offered him 2d echo- he is now agreeable, will order. 2. CAD. STEMI on 11/2018 with KRISTEN of LAD. Mild disease of other arteries. Stable and asymptomatic, continue current medications. 2. Mild ischemic Cardiomyopathy. EF 40% on 11/2018. Last echo 3/2019 showing EF 55-60%, Grade I DD, No significant valvular disease seen. See number 1    3. HPL. On Statin. 4. Essential hypertension. Well controlled on current medications. 5. Chronic venous stasis and cellulitis- per PCP    6. H/o DVT - followed by Vascular. 7. PVCs on ECG-   - increase lopressor 25 bid  - check Mg and K    The patient is to continue heart healthy diet, weight loss and exercise as tolerated. Patient's medications and side effects were discussed. Medication refills were provided if needed. Follow up appointment timing was discussed. All questions and concerns were addressed to patient's satisfaction. The patient is to follow up in 6 months or sooner if necessary. Thank you for allowing me to participate in the care of this patient, please do not hesitate to call if you have any questions. Baljeet Moncada DO, [facility], [address], [address], [place] [address] [facility]  [facility], [facility]. [employer]  [phone]

## 2022-06-07 DIAGNOSIS — M25.562 CHRONIC PAIN OF BOTH KNEES: Primary | ICD-10-CM

## 2022-06-07 DIAGNOSIS — M25.561 CHRONIC PAIN OF BOTH KNEES: Primary | ICD-10-CM

## 2022-06-07 DIAGNOSIS — G89.29 CHRONIC PAIN OF BOTH KNEES: Primary | ICD-10-CM

## 2022-06-07 RX ORDER — OXYCODONE AND ACETAMINOPHEN 10; 325 MG/1; MG/1
TABLET ORAL
Qty: 120 TABLET | Refills: 0 | Status: SHIPPED | OUTPATIENT
Start: 2022-06-11 | End: 2022-07-06 | Stop reason: SDUPTHER

## 2022-06-27 DIAGNOSIS — M25.562 CHRONIC PAIN OF BOTH KNEES: ICD-10-CM

## 2022-06-27 DIAGNOSIS — M25.561 CHRONIC PAIN OF BOTH KNEES: ICD-10-CM

## 2022-06-27 DIAGNOSIS — G89.29 CHRONIC PAIN OF BOTH KNEES: ICD-10-CM

## 2022-06-27 RX ORDER — FENTANYL 12 UG/H
PATCH TRANSDERMAL
Qty: 10 PATCH | Refills: 0 | Status: SHIPPED | OUTPATIENT
Start: 2022-06-27 | End: 2022-07-28 | Stop reason: SDUPTHER

## 2022-06-27 NOTE — TELEPHONE ENCOUNTER
Artemio Ferraro  called requesting a refill of the below medication which has been pended for you:     Requested Prescriptions     Pending Prescriptions Disp Refills    fentaNYL ([address]) 12 MCG/HR [Pharmacy Med Name: FENTANYL 12 MCG/HR PATCH] 10 patch 0     Sig: APPLY 1 PATCH TRANSDERMALLY EVERY 72 HOURS       Allergies   Allergen Reactions    Codeine Nausea And Vomiting    Morphine Other (See Comments)     constipation             Last Drug Screen:  11-22-21  Last Appointment:  11/22/2021   Next Appointment:  8-5-22   OARRS Report checked for [place], [place], and [place]: fentalyl 12 mcg  #10.  Due   No Oarrs found for the Fentanyl

## 2022-07-06 DIAGNOSIS — M25.562 CHRONIC PAIN OF BOTH KNEES: ICD-10-CM

## 2022-07-06 DIAGNOSIS — M25.561 CHRONIC PAIN OF BOTH KNEES: ICD-10-CM

## 2022-07-06 DIAGNOSIS — G89.29 CHRONIC PAIN OF BOTH KNEES: ICD-10-CM

## 2022-07-06 NOTE — TELEPHONE ENCOUNTER
OAR[place] Report checked for [place], [place], and [place]: 6/7/22 percocet 10-325mg #120. Due 7/7/22.     Last Appt:  5/5/2022  Next Appt:   8/5/2022  Med verified in Epic

## 2022-07-07 RX ORDER — OXYCODONE AND ACETAMINOPHEN 10; 325 MG/1; MG/1
1 TABLET ORAL EVERY 6 HOURS PRN
Qty: 120 TABLET | Refills: 0 | Status: SHIPPED | OUTPATIENT
Start: 2022-07-07 | End: 2022-07-19 | Stop reason: DRUGHIGH

## 2022-07-12 ENCOUNTER — HOSPITAL ENCOUNTER (OUTPATIENT)
Dept: NON INVASIVE DIAGNOSTICS | Age: 87
Discharge: HOME | End: 2022-07-12
Payer: MEDICARE

## 2022-07-12 DIAGNOSIS — I25.10 CORONARY ARTERY DISEASE INVOLVING NATIVE CORONARY ARTERY OF NATIVE HEART WITHOUT ANGINA PECTORIS: ICD-10-CM

## 2022-07-12 DIAGNOSIS — I10 ESSENTIAL HYPERTENSION: ICD-10-CM

## 2022-07-12 DIAGNOSIS — I49.3 VENTRICULAR PREMATURE BEATS: ICD-10-CM

## 2022-07-12 DIAGNOSIS — I50.32 CHRONIC HEART FAILURE WITH PRESERVED EJECTION FRACTION (HFPEF) (HCC): ICD-10-CM

## 2022-07-12 DIAGNOSIS — E78.5 DYSLIPIDEMIA: ICD-10-CM

## 2022-07-12 DIAGNOSIS — I25.119 CORONARY ARTERY DISEASE INVOLVING NATIVE CORONARY ARTERY OF NATIVE HEART WITH ANGINA PECTORIS (HCC): ICD-10-CM

## 2022-07-12 DIAGNOSIS — R60.0 BILATERAL LEG EDEMA: ICD-10-CM

## 2022-07-12 LAB
LV EF: 65 %
LVEF MODALITY: NORMAL

## 2022-07-12 PROCEDURE — 93306 TTE W/DOPPLER COMPLETE: CPT

## 2022-07-13 ENCOUNTER — TELEPHONE (OUTPATIENT)
Dept: CARDIOLOGY | Age: 87
End: 2022-07-13

## 2022-07-13 NOTE — TELEPHONE ENCOUNTER
limits     Allergies    - Codeine.     CONCLUSIONS     Summary  Normal left ventricular diameter. Left ventricular systolic function is normal.  Left ventricular ejection fraction 65 %. Grade III (severe) left ventricular diastolic dysfunction. Left atrium is mildly dilated. Right atrial dilatation. Right ventricular dilatation with normal systolic function. Aortic valve sclerosis without stenosis. Mild aortic insufficiency. Mitral annular calcification. Mild mitral regurgitation. Normal tricuspid valve leaflets. Mild tricuspid regurgitation. Estimated right ventricular systolic pressure is 53 mmHg. Moderate tricuspid regurgitation. No significant pericardial effusion is seen. Aortic root dimension is at upper limit of normal.     Signature  ----------------------------------------------------------------------------   Electronically signed by Chaim Ruiz RD(Sonographer) on 07/12/2022   10:32 AM  ----------------------------------------------------------------------------     ----------------------------------------------------------------------------   Electronically signed by Baljit Cornejo(Interpreting physician) on   07/12/2022 01:46 PM  ----------------------------------------------------------------------------  FINDINGS  Left Atrium  Left atrium is mildly dilated. Left Ventricle  Normal left ventricular diameter. Left ventricular systolic function is normal.  Left ventricular ejection fraction 65 %. Grade III (severe) left ventricular diastolic dysfunction. Right Atrium  Right atrial dilatation. Right Ventricle  Right ventricular dilatation with normal systolic function. Mitral Valve  Mitral annular calcification. Mild mitral regurgitation. Aortic Valve  Aortic valve sclerosis without stenosis. Mild aortic insufficiency. Tricuspid Valve  Normal tricuspid valve leaflets. Mild tricuspid regurgitation. Estimated right ventricular systolic pressure is 53 mmHg.   Moderate tricuspid regurgitation. Pulmonic Valve  The pulmonic valve is normal in structure. Pericardial Effusion  No significant pericardial effusion is seen.     Miscellaneous  Aortic root dimension is at upper limit of normal.  IVC normal diameter & inspiratory collapse indicating normal RA filling  pressure .   E/E' average = 20.     M-mode / 2D Measurements & Calculations:      LVIDd:5.25 cm(3.7 - 5.6 cm)      Diastolic GJVODN:230.0 ml   LVIDs:2.93 cm(2.2 - 4.0 cm)      Systolic MDKDYU:05.7 ml   IVSd:1.02 cm(0.6 - 1.1 cm)       Aortic Root:3.7 cm(2.0 - 3.7 cm)   LVPWd:1 cm(0.6 - 1.1 cm)         LA Dimension: 4.4 cm(1.9 - 4.0 cm)   Fractional Shortenin.19 %    LA volume/Index: 75.2 ml /39m^2   Calculated LVEF (%): 62.59 %      Mitral:                                 Aortic      Peak E-Wave: 1.14 m/s                   Peak Velocity: 1.89 m/s   Peak A-Wave: 1.39 m/s                   Peak Gradient: 14.29 mmHg   E/A Ratio: 0.82   Peak Gradient: 5.2 mmHg   Deceleration Time: 335 msec      Tricuspid:                              Pulmonic:      Peak TR Velocity: 3.52 m/s              Peak Velocity: 1.10 m/s   Peak TR Gradient: 49.5616 mmHg          Peak Gradient: 4.84 mmHg     Septal Wall E' velocity:0.05 m/s  Lateral Wall E' velocity:0.07 m/s             Specimen Collected: 22 09:56 Last Resulted: 22 13:46

## 2022-07-13 NOTE — TELEPHONE ENCOUNTER
Notified patient of  ECHO results. Instructed patient to keep upcoming appointment with cardiologist and to call our office for any questions. Patient also instructed to utilize the E.D. for any emergent health issues that may arise.

## 2022-07-14 ENCOUNTER — HOSPITAL ENCOUNTER (EMERGENCY)
Age: 87
Discharge: HOME | End: 2022-07-14
Attending: EMERGENCY MEDICINE
Payer: MEDICARE

## 2022-07-14 ENCOUNTER — APPOINTMENT (OUTPATIENT)
Dept: GENERAL RADIOLOGY | Age: 87
End: 2022-07-14
Payer: MEDICARE

## 2022-07-14 ENCOUNTER — APPOINTMENT (OUTPATIENT)
Dept: CT IMAGING | Age: 87
End: 2022-07-14
Payer: MEDICARE

## 2022-07-14 VITALS
HEIGHT: 65 IN | HEART RATE: 71 BPM | DIASTOLIC BLOOD PRESSURE: 55 MMHG | OXYGEN SATURATION: 100 % | TEMPERATURE: 98.2 F | SYSTOLIC BLOOD PRESSURE: 120 MMHG | RESPIRATION RATE: 24 BRPM | WEIGHT: 192 LBS | BODY MASS INDEX: 31.99 KG/M2

## 2022-07-14 DIAGNOSIS — R42 LIGHTHEADEDNESS: Primary | ICD-10-CM

## 2022-07-14 LAB
-: ABNORMAL
ABSOLUTE EOS #: 0.14 K/UL (ref 0–0.44)
ABSOLUTE IMMATURE GRANULOCYTE: 0.04 K/UL (ref 0–0.3)
ABSOLUTE LYMPH #: 2.56 K/UL (ref 1.1–3.7)
ABSOLUTE MONO #: 1.27 K/UL (ref 0.1–1.2)
ANION GAP SERPL CALCULATED.3IONS-SCNC: 11 MMOL/L (ref 9–17)
BACTERIA: ABNORMAL
BASOPHILS # BLD: 0 % (ref 0–2)
BASOPHILS ABSOLUTE: <0.03 K/UL (ref 0–0.2)
BILIRUBIN URINE: NEGATIVE
BUN BLDV-MCNC: 26 MG/DL (ref 8–23)
BUN/CREAT BLD: 21 (ref 9–20)
CALCIUM SERPL-MCNC: 9.5 MG/DL (ref 8.6–10.4)
CHLORIDE BLD-SCNC: 99 MMOL/L (ref 98–107)
CO2: 27 MMOL/L (ref 20–31)
CREAT SERPL-MCNC: 1.23 MG/DL (ref 0.7–1.2)
EOSINOPHILS RELATIVE PERCENT: 1 % (ref 1–4)
EPITHELIAL CELLS UA: ABNORMAL /HPF (ref 0–5)
GFR AFRICAN AMERICAN: >60 ML/MIN
GFR NON-AFRICAN AMERICAN: 55 ML/MIN
GFR SERPL CREATININE-BSD FRML MDRD: ABNORMAL ML/MIN/{1.73_M2}
GLUCOSE BLD-MCNC: 102 MG/DL (ref 70–99)
GLUCOSE URINE: NEGATIVE
HCT VFR BLD CALC: 41.8 % (ref 40.7–50.3)
HEMOGLOBIN: 14.1 G/DL (ref 13–17)
IMMATURE GRANULOCYTES: 0 %
KETONES, URINE: NEGATIVE
LACTIC ACID, SEPSIS: 1.9 MMOL/L (ref 0.5–1.9)
LEUKOCYTE ESTERASE, URINE: ABNORMAL
LYMPHOCYTES # BLD: 26 % (ref 24–43)
MCH RBC QN AUTO: 32.1 PG (ref 25.2–33.5)
MCHC RBC AUTO-ENTMCNC: 33.7 G/DL (ref 25.2–33.5)
MCV RBC AUTO: 95.2 FL (ref 82.6–102.9)
MONOCYTES # BLD: 13 % (ref 3–12)
NITRITE, URINE: NEGATIVE
NRBC AUTOMATED: 0 PER 100 WBC
PDW BLD-RTO: 14 % (ref 11.8–14.4)
PH UA: 7 (ref 5–6)
PLATELET # BLD: 234 K/UL (ref 138–453)
PMV BLD AUTO: 10.8 FL (ref 8.1–13.5)
POTASSIUM SERPL-SCNC: 4.2 MMOL/L (ref 3.7–5.3)
PRO-BNP: 662 PG/ML
PROTEIN UA: NEGATIVE
RBC # BLD: 4.39 M/UL (ref 4.21–5.77)
RBC UA: ABNORMAL /HPF (ref 0–4)
SARS-COV-2, RAPID: NOT DETECTED
SEG NEUTROPHILS: 60 % (ref 36–65)
SEGMENTED NEUTROPHILS ABSOLUTE COUNT: 5.84 K/UL (ref 1.5–8.1)
SODIUM BLD-SCNC: 137 MMOL/L (ref 135–144)
SPECIFIC GRAVITY UA: 1.01 (ref 1.01–1.02)
SPECIMEN DESCRIPTION: NORMAL
TROPONIN, HIGH SENSITIVITY: 28 NG/L (ref 0–22)
TROPONIN, HIGH SENSITIVITY: 29 NG/L (ref 0–22)
URINE HGB: NEGATIVE
UROBILINOGEN, URINE: NORMAL
WBC # BLD: 9.9 K/UL (ref 3.5–11.3)
WBC UA: ABNORMAL /HPF (ref 0–4)

## 2022-07-14 PROCEDURE — 93005 ELECTROCARDIOGRAM TRACING: CPT | Performed by: EMERGENCY MEDICINE

## 2022-07-14 PROCEDURE — 71045 X-RAY EXAM CHEST 1 VIEW: CPT

## 2022-07-14 PROCEDURE — 83880 ASSAY OF NATRIURETIC PEPTIDE: CPT

## 2022-07-14 PROCEDURE — 99285 EMERGENCY DEPT VISIT HI MDM: CPT

## 2022-07-14 PROCEDURE — 36415 COLL VENOUS BLD VENIPUNCTURE: CPT

## 2022-07-14 PROCEDURE — 85025 COMPLETE CBC W/AUTO DIFF WBC: CPT

## 2022-07-14 PROCEDURE — 70450 CT HEAD/BRAIN W/O DYE: CPT

## 2022-07-14 PROCEDURE — 2580000003 HC RX 258: Performed by: EMERGENCY MEDICINE

## 2022-07-14 PROCEDURE — 84484 ASSAY OF TROPONIN QUANT: CPT

## 2022-07-14 PROCEDURE — 83605 ASSAY OF LACTIC ACID: CPT

## 2022-07-14 PROCEDURE — 87635 SARS-COV-2 COVID-19 AMP PRB: CPT

## 2022-07-14 PROCEDURE — 80048 BASIC METABOLIC PNL TOTAL CA: CPT

## 2022-07-14 PROCEDURE — 81001 URINALYSIS AUTO W/SCOPE: CPT

## 2022-07-14 RX ORDER — 0.9 % SODIUM CHLORIDE 0.9 %
500 INTRAVENOUS SOLUTION INTRAVENOUS ONCE
Status: COMPLETED | OUTPATIENT
Start: 2022-07-14 | End: 2022-07-14

## 2022-07-14 RX ADMIN — SODIUM CHLORIDE 500 ML: 9 INJECTION, SOLUTION INTRAVENOUS at 16:22

## 2022-07-14 ASSESSMENT — PAIN - FUNCTIONAL ASSESSMENT
PAIN_FUNCTIONAL_ASSESSMENT: 0-10
PAIN_FUNCTIONAL_ASSESSMENT: NONE - DENIES PAIN

## 2022-07-14 ASSESSMENT — PAIN SCALES - GENERAL: PAINLEVEL_OUTOF10: 0

## 2022-07-14 NOTE — ED PROVIDER NOTES
[facility]  [address]  [place] [address] [place]  Phone: [address]      Pt Name: Caitlyn Malcolm  MRN: [id number]  [place] 6/28/1927  Date of evaluation: 7/14/2022    CHIEF COMPLAINT       Chief Complaint   Patient presents with    Dizziness     Near syncopal episode while watching tv 30 min PTA. HISTORY OF PRESENT ILLNESS    Caitlyn Malcolm is a 80 y.o. male who presents to the emergency department from [facility] with lightheadedness and impending doom. Paramedic states that he walked up to the desk at [facility] stating that he felt like he was going to pass out. He felt overwhelmed. He denies any chest pain or shortness of breath. No nausea or vomiting. He said he felt fine earlier today when he woke up. He said he is feeling much better now. Patient states that he was watching TV when he suddenly felt overwhelmed and went to the . REVIEW OF SYSTEMS       Constitutional: No fevers or chills positive lightheadedness  HEENT: No sore throat, rhinorrhea, or earache   Eyes: No blurry vision or double vision no drainage   Cardiovascular: No chest pain or tachycardia   Respiratory: No wheezing or shortness of breath no cough   Gastrointestinal: No nausea, vomiting, diarrhea, constipation, or abdominal pain   : No hematuria or dysuria   Musculoskeletal: Chronic left lower extremity pain and swelling  Skin: No rash   Neurological: No focal neurologic complaints, paresthesias, weakness, or headache     PAST MEDICAL HISTORY    has a past medical history of 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 SURGICAL HISTORY      has a past surgical history that includes Colonoscopy (08/29/12); Cystocopy (11/15/2007); Appendectomy; Cholecystectomy (01/12/1983);  Knee fusion (Left); lipoma resection; Colonoscopy (09); Colonoscopy (2008); Prostate Biopsy (Bilateral, 2009); pr egd transoral biopsy single/multiple (N/A, 7/3/2017); Upper gastrointestinal endoscopy (2015); Upper gastrointestinal endoscopy (2016); Upper gastrointestinal endoscopy (2017); pr cystourethroscopy (N/A, 2018); and Leg Surgery (Left, 2020). CURRENT MEDICATIONS       Previous Medications    ACETAMINOPHEN (TYLENOL) 325 MG TABLET    Take 2 tablets by mouth every 4 hours as needed for Pain or Fever    ATORVASTATIN (LIPITOR) 10 MG TABLET    TAKE 1 TABLET DAILY    CLOPIDOGREL (PLAVIX) 75 MG TABLET    TAKE 1 TABLET DAILY    DOCUSATE SODIUM (COLACE) 100 MG CAPSULE    Take 100 mg by mouth daily    FENTANYL (DURAGESIC) 12 MCG/HR    APPLY 1 PATCH TRANSDERMALLY EVERY 72 HOURS    FUROSEMIDE (LASIX) 40 MG TABLET    Take 1.5 tablets by mouth daily    LACTULOSE (CHRONULAC) 10 GM/15ML SOLUTION    Take 20 g by mouth daily as needed     LISINOPRIL (PRINIVIL;ZESTRIL) 5 MG TABLET    TAKE 1 TABLET DAILY    MELATONIN 3 MG TABS TABLET    Take 3 mg by mouth nightly    METOPROLOL TARTRATE (LOPRESSOR) 25 MG TABLET    Take 1 tablet by mouth 2 times daily    MULTIPLE VITAMINS-MINERALS (MULTIVITAMIN PO)    Take 1 tablet by mouth daily. OXYCODONE-ACETAMINOPHEN (PERCOCET)  MG PER TABLET    Take 1 tablet by mouth every 6 hours as needed for Pain for up to 30 days. PANTOPRAZOLE (PROTONIX) 40 MG TABLET    Take 1 tablet by mouth every morning (before breakfast)       ALLERGIES     is allergic to codeine and morphine. FAMILY HISTORY     He indicated that his mother is . He indicated that his father is . He indicated that his sister is alive. He indicated that his brother is alive. He indicated that his maternal grandmother is . He indicated that his maternal grandfather is . He indicated that his paternal grandmother is . He indicated that his paternal grandfather is .  He indicated that his son is alive. family history includes Cancer in his father; Heart Disease in his paternal grandfather. SOCIAL HISTORY      reports that he quit smoking about 24 years ago. His smoking use included cigars. He smoked 1.00 pack per day. He has never used smokeless tobacco. He reports that he does not drink alcohol and does not use drugs. PHYSICAL EXAM       ED Triage Vitals [07/14/22 1450]   BP Temp Temp Source Heart Rate Resp SpO2 Height Weight   (!) 151/105 98.2 °F (36.8 °C) Tympanic 73 18 99 % 5' 5\" (1.651 m) 192 lb (87.1 kg)       Constitutional: Alert, nontoxic, answering questions appropriately, acting properly for age, in no acute distress   HEENT: Extraocular muscles intact, mucus membranes moist, TMs clear bilaterally, no posterior pharyngeal erythema or exudates, Pupils equal, round, reactive to light,   Neck: Trachea midline   Cardiovascular: Irregular rhythm and normal rate no murmurs   Respiratory: Diminished to auscultation bilaterally no wheezes, rhonchi, rales, no respiratory distress no tachypnea no retractions no hypoxia  Gastrointestinal: Soft, nontender, nondistended, diminished bowel sounds. No rebound, rigidity, or guarding. No abdominal bruit no pulsatile mass  Musculoskeletal: Left lower extremity swelling and knee pain chronic. No calf tenderness  Neurologic: No gross focal neurologic deficits  Skin: Warm and dry       DIFFERENTIAL DIAGNOSIS/ MDM:     IV labs. EKG. Chest imaging. CT head. DIAGNOSTIC RESULTS     EKG: All EKG's are interpreted by the Emergency Department Physician who either signs or Co-signs this chart in the absence of a cardiologist.    1451 bifascicular block. Multiple PVCs. Sinus rhythm rate 70   no ST elevations. 1701 bifascicular block occasional PVC no ST elevations.   Rate 79       Not indicated unless otherwise documented above    LABS:  Results for orders placed or performed during the hospital encounter of 07/14/22   COVID-19, Rapid    Specimen: Nasopharyngeal Swab   Result Value Ref Range    Specimen Description . NASOPHARYNGEAL SWAB     SARS-CoV-2, Rapid Not Detected Not Detected   CBC with Auto Differential   Result Value Ref Range    WBC 9.9 3.5 - 11.3 k/uL    RBC 4.39 4.21 - 5.77 m/uL    Hemoglobin 14.1 13.0 - 17.0 g/dL    Hematocrit 41.8 40.7 - 50.3 %    MCV 95.2 82.6 - 102.9 fL    MCH 32.1 25.2 - 33.5 pg    MCHC 33.7 (H) 25.2 - 33.5 g/dL    RDW 14.0 11.8 - 14.4 %    Platelets 925 328 - 239 k/uL    MPV 10.8 8.1 - 13.5 fL    NRBC Automated 0.0 0.0 per 100 WBC    Seg Neutrophils 60 36 - 65 %    Lymphocytes 26 24 - 43 %    Monocytes 13 (H) 3 - 12 %    Eosinophils % 1 1 - 4 %    Basophils 0 0 - 2 %    Immature Granulocytes 0 0 %    Segs Absolute 5.84 1.50 - 8.10 k/uL    Absolute Lymph # 2.56 1.10 - 3.70 k/uL    Absolute Mono # 1.27 (H) 0.10 - 1.20 k/uL    Absolute Eos # 0.14 0.00 - 0.44 k/uL    Basophils Absolute <0.03 0.00 - 0.20 k/uL    Absolute Immature Granulocyte 0.04 0.00 - 0.30 k/uL   Basic Metabolic Panel   Result Value Ref Range    Glucose 102 (H) 70 - 99 mg/dL    BUN 26 (H) 8 - 23 mg/dL    CREATININE 1.23 (H) 0.70 - 1.20 mg/dL    Bun/Cre Ratio 21 (H) 9 - 20    Calcium 9.5 8.6 - 10.4 mg/dL    Sodium 137 135 - 144 mmol/L    Potassium 4.2 3.7 - 5.3 mmol/L    Chloride 99 98 - 107 mmol/L    CO2 27 20 - 31 mmol/L    Anion Gap 11 9 - 17 mmol/L    GFR Non-African American 55 (L) >60 mL/min    GFR African American >60 >60 mL/min    GFR Comment         Troponin   Result Value Ref Range    Troponin, High Sensitivity 29 (H) 0 - 22 ng/L   Troponin   Result Value Ref Range    Troponin, High Sensitivity 28 (H) 0 - 22 ng/L   Lactate, Sepsis   Result Value Ref Range    Lactic Acid, Sepsis 1.9 0.5 - 1.9 mmol/L   Brain Natriuretic Peptide   Result Value Ref Range    Pro- (H) <300 pg/mL   Urinalysis with Reflex to Culture    Specimen: Urine, clean catch   Result Value Ref Range    Glucose, Ur NEGATIVE NEGATIVE    Bilirubin Urine NEGATIVE NEGATIVE    Ketones, Urine NEGATIVE NEGATIVE    Specific [place], UA 1.010 1.010 - 1.025    Urine Hgb NEGATIVE NEGATIVE    pH, UA 7.0 (H) 5.0 - 6.0    Protein, UA NEGATIVE NEGATIVE    Urobilinogen, Urine Normal Normal    Nitrite, Urine NEGATIVE NEGATIVE    Leukocyte Esterase, Urine 2+ (A) NEGATIVE   Microscopic Urinalysis   Result Value Ref Range    -          WBC, UA 5 TO 10 0 - 4 /HPF    RBC, UA None 0 - 4 /HPF    Epithelial Cells UA 0 TO 4 0 - 5 /HPF    Bacteria, UA 3+ (A) None   EKG 12 Lead   Result Value Ref Range    Ventricular Rate 70 BPM    Atrial Rate 70 BPM    P-R Interval 166 ms    QRS Duration 136 ms    Q-T Interval 436 ms    QTc Calculation (Bazett) 470 ms    P Axis 62 degrees    R Axis -61 degrees    T Axis 72 degrees   EKG 12 Lead   Result Value Ref Range    Ventricular Rate 79 BPM    Atrial Rate 79 BPM    P-R Interval 154 ms    QRS Duration 134 ms    Q-T Interval 438 ms    QTc Calculation (Bazett) 502 ms    P Axis 51 degrees    R Axis -53 degrees    T Axis 60 degrees       Not indicated unless otherwise documented above    RADIOLOGY:   I reviewed the radiologist interpretations:    XR CHEST PORTABLE   Final Result   Mild chronic lung and bony changes. No evidence of acute cardiopulmonary process. CT HEAD WO CONTRAST   Final Result   No acute intracranial abnormality. Prominent calcifications in vessels at the base of the brain. Not indicated unless otherwise documented above    EMERGENCY DEPARTMENT COURSE:     The patient was given the following medications:  Orders Placed This Encounter   Medications    0.9 % sodium chloride bolus        Vitals:   -------------------------  BP (!) 151/105   Pulse 73   Temp 98.2 °F (36.8 °C) (Tympanic)   Resp 18   Ht 5' 5\" (1.651 m)   Wt 192 lb (87.1 kg)   SpO2 99%   BMI 31.95 kg/m²     4:15 PM no acute distress resting comfortably feels much better. Awaiting urinalysis.   Normal CBC no anemia. Normal lactic acid level. COVID-negative. BUN and creatinine slightly elevated given IV fluids. Troponin slightly elevated suspect secondary to elevated creatinine. We will repeat troponin and EKG. CT of the head no acute process. Chest x-ray no infiltrate. 5:30 PM continues to do well. Repeat troponin 28 down one point. Patient describes the symptoms more of a panic. He states that he was watching TV when he suddenly felt overwhelmed. He denies any symptoms currently. Will discharge. The patient and his son understands that at this time there is no evidence for a more malignant underlying process, but also understands that early in the process of an illness or injury, an emergency department workup can be falsely reassuring. Routine discharge counseling was given, and it is understood that worsening, changing or persistent symptoms should prompt an immediate call or follow up with their primary physician or return to the emergency department. The importance of appropriate follow up was also discussed. I have reviewed the disposition diagnosis. I have answered the questions and given discharge instructions. There was voiced understanding of these instructions and no further questions or complaints. CRITICAL CARE:    None    CONSULTS:    None    PROCEDURES:    None      OARRS Report if indicated             FINAL IMPRESSION      1.  Lightheadedness          DISPOSITION/PLAN   DISPOSITION          CONDITION ON DISPOSITION: STABLE       PATIENT REFERRED TO:  Rosa Cao MD  [address] [facility] [place]  [phone]    Schedule an appointment as soon as possible for a visit in 2 days        DISCHARGE MEDICATIONS:  New Prescriptions    No medications on file       (Please note that portions of this note were completed with a voice recognition program.  Efforts were made to edit the dictations but occasionally words are mis-transcribed.)    DO Duncan Arellano Emergency Physician     Amy Russell DO  07/14/22 0519

## 2022-07-15 LAB
EKG ATRIAL RATE: 70 BPM
EKG ATRIAL RATE: 79 BPM
EKG P AXIS: 51 DEGREES
EKG P AXIS: 62 DEGREES
EKG P-R INTERVAL: 154 MS
EKG P-R INTERVAL: 166 MS
EKG Q-T INTERVAL: 436 MS
EKG Q-T INTERVAL: 438 MS
EKG QRS DURATION: 134 MS
EKG QRS DURATION: 136 MS
EKG QTC CALCULATION (BAZETT): 470 MS
EKG QTC CALCULATION (BAZETT): 502 MS
EKG R AXIS: -53 DEGREES
EKG R AXIS: -61 DEGREES
EKG T AXIS: 60 DEGREES
EKG T AXIS: 72 DEGREES
EKG VENTRICULAR RATE: 70 BPM
EKG VENTRICULAR RATE: 79 BPM

## 2022-07-19 ENCOUNTER — OUTSIDE SERVICES (OUTPATIENT)
Dept: INTERNAL MEDICINE | Age: 87
End: 2022-07-19
Payer: MEDICARE

## 2022-07-19 VITALS
DIASTOLIC BLOOD PRESSURE: 47 MMHG | HEART RATE: 63 BPM | OXYGEN SATURATION: 98 % | SYSTOLIC BLOOD PRESSURE: 90 MMHG | RESPIRATION RATE: 18 BRPM | TEMPERATURE: 98 F

## 2022-07-19 DIAGNOSIS — R42 LIGHTHEADEDNESS: Primary | ICD-10-CM

## 2022-07-19 RX ORDER — OXYCODONE AND ACETAMINOPHEN 10; 325 MG/1; MG/1
1 TABLET ORAL EVERY 4 HOURS PRN
COMMUNITY
End: 2022-10-21 | Stop reason: ALTCHOICE

## 2022-07-19 RX ORDER — OXYCODONE AND ACETAMINOPHEN 10; 325 MG/1; MG/1
1 TABLET ORAL EVERY 6 HOURS
COMMUNITY
End: 2022-08-12 | Stop reason: SDUPTHER

## 2022-07-19 NOTE — PROGRESS NOTES
[facility]      Letty Hwang is a 80 y.o. male resident of [facility] who presents today for medical conditions/complaints as noted below. HPI:     HPI    Patient presents for ER follow-up. Patient started having dizziness and \" felt like he was going to die\" at [facility] and was subsequently transferred via ambulance to emergency department. EKG noted bifascicular block with occasional PVC, no ST elevations. CBC, BMP, BNP are unremarkable. UA negative. CT head noted no intracranial abnormality. Received IV fluids, symptoms did resolve. He has not had any recurrence of symptoms since returning to facility. Denies anxiety. No chest pain or dyspnea. Mildly hypotensive today, though blood pressure is generally well controlled on current medications. No reports of orthostatic symptoms. Current Outpatient Medications   Medication Sig Dispense Refill    oxyCODONE-acetaminophen (PERCOCET)  MG per tablet Take 1 tablet by mouth every 4 hours as needed for Pain. oxyCODONE-acetaminophen (PERCOCET)  MG per tablet Take 1 tablet by mouth every 6 hours.       fentaNYL (DURAGESIC) 12 MCG/HR APPLY 1 PATCH TRANSDERMALLY EVERY 72 HOURS 10 patch 0    metoprolol tartrate (LOPRESSOR) 25 MG tablet Take 1 tablet by mouth 2 times daily 180 tablet 1    lisinopril (PRINIVIL;ZESTRIL) 5 MG tablet TAKE 1 TABLET DAILY 90 tablet 1    atorvastatin (LIPITOR) 10 MG tablet TAKE 1 TABLET DAILY 90 tablet 3    clopidogrel (PLAVIX) 75 MG tablet TAKE 1 TABLET DAILY 90 tablet 3    lactulose (CHRONULAC) 10 GM/15ML solution Take 20 g by mouth daily as needed       furosemide (LASIX) 40 MG tablet Take 1.5 tablets by mouth daily 135 tablet 1    melatonin 3 MG TABS tablet Take 3 mg by mouth nightly      docusate sodium (COLACE) 100 MG capsule Take 100 mg by mouth daily      pantoprazole (PROTONIX) 40 MG tablet Take 1 tablet by mouth every morning (before breakfast) 90 tablet 3    acetaminophen (TYLENOL) 325 MG tablet Take 2 tablets by mouth every 4 hours as needed for Pain or Fever 120 tablet 0    Multiple Vitamins-Minerals (MULTIVITAMIN PO) Take 1 tablet by mouth daily. No current facility-administered medications for this visit. Allergies   Allergen Reactions    Codeine Nausea And Vomiting    Morphine Other (See Comments)     constipation       Health Maintenance   Topic Date Due    Annual Wellness Visit (AWV)  Never done    Prostate Specific Antigen (PSA) Screening or Monitoring  06/06/2020    Depression Screen  12/21/2021    COVID-19 Vaccine (4 - Booster for Pfizer series) 02/27/2022    Lipids  06/03/2022    Flu vaccine (1) 09/01/2022    DTaP/Tdap/Td vaccine (2 - Td or Tdap) 02/28/2027    Pneumococcal 65+ years Vaccine  Completed    Hepatitis A vaccine  Aged Out    Hepatitis B vaccine  Aged Out    Hib vaccine  Aged Out    Meningococcal (ACWY) vaccine  Aged Out       Subjective:      Review of Systems   Constitutional:  Negative for chills and fever. HENT:  Negative for congestion and rhinorrhea. Respiratory:  Negative for cough and wheezing. Gastrointestinal:  Negative for diarrhea, nausea and vomiting. Neurological:  Negative for dizziness, light-headedness and headaches. Objective:     Vitals:    07/18/22 1626   BP: (!) 90/47   Pulse: 63   Resp: 18   Temp: 98 °F (36.7 °C)   SpO2: 98%     Physical Exam  Vitals and nursing note reviewed. Constitutional:       General: He is not in acute distress. Appearance: He is well-developed. Cardiovascular:      Rate and Rhythm: Normal rate and regular rhythm. Pulmonary:      Effort: Pulmonary effort is normal.      Breath sounds: Normal breath sounds. Abdominal:      Palpations: Abdomen is soft. Tenderness: There is no abdominal tenderness. Lymphadenopathy:      Cervical: No cervical adenopathy. Neurological:      Mental Status: He is alert.    Psychiatric:         Behavior: Behavior normal.       Assessment/Plan: 1. Lightheadedness, resolved  - Monitor for any recurrence of symptoms. Continue to follow with cardiology given history of CAD. Return if symptoms worsen or fail to improve. No orders of the defined types were placed in this encounter. No orders of the defined types were placed in this encounter.               Electronically signed by KEVIN Hooper CNP on 7/21/2022 at 1:45 PM

## 2022-07-21 ASSESSMENT — ENCOUNTER SYMPTOMS
COUGH: 0
VOMITING: 0
WHEEZING: 0
RHINORRHEA: 0
DIARRHEA: 0
NAUSEA: 0

## 2022-07-28 DIAGNOSIS — M25.561 CHRONIC PAIN OF BOTH KNEES: ICD-10-CM

## 2022-07-28 DIAGNOSIS — G89.29 CHRONIC PAIN OF BOTH KNEES: ICD-10-CM

## 2022-07-28 DIAGNOSIS — M25.562 CHRONIC PAIN OF BOTH KNEES: ICD-10-CM

## 2022-07-28 RX ORDER — FENTANYL 12 UG/H
1 PATCH TRANSDERMAL
Qty: 10 PATCH | Refills: 0 | Status: SHIPPED | OUTPATIENT
Start: 2022-07-28 | End: 2022-08-23 | Stop reason: SDUPTHER

## 2022-07-28 NOTE — TELEPHONE ENCOUNTER
Kamari Fofana called requesting a refill of the below medication which has been pended for you:     Requested Prescriptions     Pending Prescriptions Disp Refills    fentaNYL (DURAGESIC) 12 MCG/HR 10 patch 0     Sig: Place 1 patch onto the skin every 72 hours for 30 days. Last Appointment Date: 5/5/2022  Next Appointment Date: 8/23/2022    Allergies   Allergen Reactions    Codeine Nausea And Vomiting    Morphine Other (See Comments)     constipation       Last DS11 11/22/21  . Please review. [facility] resident. No report on OARRS.

## 2022-08-11 DIAGNOSIS — G89.29 CHRONIC PAIN OF BOTH KNEES: Primary | ICD-10-CM

## 2022-08-11 DIAGNOSIS — M25.562 CHRONIC PAIN OF BOTH KNEES: Primary | ICD-10-CM

## 2022-08-11 DIAGNOSIS — M25.561 CHRONIC PAIN OF BOTH KNEES: Primary | ICD-10-CM

## 2022-08-11 NOTE — TELEPHONE ENCOUNTER
Miguel Pike from [address] [place] [address] called and stated that he needs a percocet refill      There are two different scripts, not sure which directions that he is supposed to follow. OARRS Report checked for [address] [place] [address], [place], and [place]: 07/07/22 Percocet 10/325 #120. Due 08/07/22.       Please let me know which one to pend

## 2022-08-12 RX ORDER — OXYCODONE AND ACETAMINOPHEN 10; 325 MG/1; MG/1
1 TABLET ORAL EVERY 6 HOURS PRN
Qty: 120 TABLET | Refills: 0 | Status: SHIPPED | OUTPATIENT
Start: 2022-08-12 | End: 2022-08-23

## 2022-08-23 ENCOUNTER — OFFICE VISIT (OUTPATIENT)
Dept: PAIN MANAGEMENT | Age: 87
End: 2022-08-23
Payer: MEDICARE

## 2022-08-23 VITALS
HEIGHT: 65 IN | OXYGEN SATURATION: 97 % | SYSTOLIC BLOOD PRESSURE: 122 MMHG | BODY MASS INDEX: 28.99 KG/M2 | WEIGHT: 174 LBS | HEART RATE: 54 BPM | DIASTOLIC BLOOD PRESSURE: 60 MMHG

## 2022-08-23 DIAGNOSIS — M25.562 CHRONIC PAIN OF BOTH KNEES: ICD-10-CM

## 2022-08-23 DIAGNOSIS — M17.0 PRIMARY OSTEOARTHRITIS OF BOTH KNEES: Primary | ICD-10-CM

## 2022-08-23 DIAGNOSIS — G89.29 CHRONIC PAIN OF BOTH KNEES: ICD-10-CM

## 2022-08-23 DIAGNOSIS — M25.561 CHRONIC PAIN OF BOTH KNEES: ICD-10-CM

## 2022-08-23 PROCEDURE — 99214 OFFICE O/P EST MOD 30 MIN: CPT | Performed by: NURSE PRACTITIONER

## 2022-08-23 PROCEDURE — 1123F ACP DISCUSS/DSCN MKR DOCD: CPT | Performed by: NURSE PRACTITIONER

## 2022-08-23 RX ORDER — OXYCODONE HYDROCHLORIDE 15 MG/1
15 TABLET ORAL EVERY 6 HOURS
Qty: 120 TABLET | Refills: 0 | Status: SHIPPED | OUTPATIENT
Start: 2022-08-23 | End: 2022-09-21 | Stop reason: SDUPTHER

## 2022-08-23 RX ORDER — FENTANYL 12 UG/H
1 PATCH TRANSDERMAL
Qty: 10 PATCH | Refills: 0 | Status: SHIPPED | OUTPATIENT
Start: 2022-08-23 | End: 2022-09-27 | Stop reason: SDUPTHER

## 2022-08-25 ASSESSMENT — ENCOUNTER SYMPTOMS
BACK PAIN: 0
RESPIRATORY NEGATIVE: 1
GASTROINTESTINAL NEGATIVE: 1

## 2022-08-25 NOTE — PROGRESS NOTES
Subjective:      Patient ID: Letty Hwang is a 80 y.o. male. Chief Complaint   Patient presents with    Knee Pain     3 month follow up Osteoarthritis of both knees     Knee Pain   Pertinent negatives include no numbness. Leg Pain   Pertinent negatives include no numbness. Here today for routine pain clinic recheck. Chronic pain in knees, resides at assisted living. Ambulating very limited due to pain. Pain Assessment  Location of Pain: Knee  Location Modifiers: Left, Right  Severity of Pain: 6  Quality of Pain: Aching  Duration of Pain: Persistent  Frequency of Pain: Constant  Aggravating Factors: Bending, Stretching, Exercise  Limiting Behavior: Yes  Relieving Factors: Nsaids (\"not sure if medications are helping\")  Are there other pain locations you wish to document?: No    Allergies   Allergen Reactions    Codeine Nausea And Vomiting    Morphine Other (See Comments)     constipation       Outpatient Medications Marked as Taking for the 8/23/22 encounter (Office Visit) with KEVIN Li CNP   Medication Sig Dispense Refill    fentaNYL (DURAGESIC) 12 MCG/HR Place 1 patch onto the skin every 72 hours for 30 days. 10 patch 0    oxyCODONE (OXY-IR) 15 MG immediate release tablet Take 1 tablet by mouth in the morning and 1 tablet at noon and 1 tablet in the evening and 1 tablet before bedtime. Do all this for 30 days.  HOLD IF DROWSINESS OCCURS, DO NOT AWAKEN TO GIVE MED, LET HIM SLEEP IF SLEEPING. 120 tablet 0    metoprolol tartrate (LOPRESSOR) 25 MG tablet Take 1 tablet by mouth 2 times daily 180 tablet 1    lisinopril (PRINIVIL;ZESTRIL) 5 MG tablet TAKE 1 TABLET DAILY 90 tablet 1    atorvastatin (LIPITOR) 10 MG tablet TAKE 1 TABLET DAILY 90 tablet 3    clopidogrel (PLAVIX) 75 MG tablet TAKE 1 TABLET DAILY 90 tablet 3    furosemide (LASIX) 40 MG tablet Take 1.5 tablets by mouth daily 135 tablet 1    melatonin 3 MG TABS tablet Take 3 mg by mouth nightly      docusate sodium (COLACE) 100 MG Years since quittin.6    Smokeless tobacco: Never    Tobacco comments:     Former smoker (quit )- ANN Torres [facility] 3/20/17   Vaping Use    Vaping Use: Never used   Substance and Sexual Activity    Alcohol use: No     Alcohol/week: 0.0 standard drinks    Drug use: No     Review of Systems   Constitutional:  Positive for activity change (limited due to right knee pain). Negative for appetite change. Respiratory: Negative. Cardiovascular: Negative. Gastrointestinal: Negative. Genitourinary: Negative. Johnson catheter in place   Musculoskeletal:  Positive for arthralgias (bilateral knee, right knee is majority of his pain) and gait problem. Negative for back pain, neck pain and neck stiffness. Neurological:  Negative for weakness and numbness. Psychiatric/Behavioral:  Positive for sleep disturbance. Objective:   Physical Exam  Vitals reviewed. Constitutional:       General: He is not in acute distress. Appearance: He is well-developed. He is not ill-appearing, toxic-appearing or diaphoretic. HENT:      Head: Normocephalic and atraumatic. Cardiovascular:      Rate and Rhythm: Normal rate. Pulmonary:      Effort: Pulmonary effort is normal. No respiratory distress. Musculoskeletal:      Right knee: Swelling present. Decreased range of motion. Skin:     General: Skin is warm and dry. Coloration: Skin is not pale. Nails: There is no clubbing. Neurological:      Mental Status: He is alert and oriented to person, place, and time. He is not disoriented. GCS: GCS eye subscore is 4. GCS verbal subscore is 5. GCS motor subscore is 6. Cranial Nerves: No cranial nerve deficit. Motor: No tremor or seizure activity. Psychiatric:         Behavior: Behavior normal. Behavior is cooperative. Assessment:      1. Primary osteoarthritis of both knees    2. Chronic pain of both knees          Plan:     Continue fentanyl as prescribed. Stop percocet. Start oxyIR 15 every 6 hours    Controlled Substance Monitoring:    Acute and Chronic Pain Monitoring:   RX Monitoring 7/7/2022   Attestation -   Periodic Controlled Substance Monitoring Possible medication side effects, risk of tolerance/dependence & alternative treatments discussed. ;No signs of potential drug abuse or diversion identified. Chronic Pain > 50 MEDD Re-evaluated the status of the patient's underlying condition causing pain.;Obtained or confirmed \"Consent for Opioid Use\" on file.        Follow up 2-3 months

## 2022-09-21 DIAGNOSIS — G89.29 CHRONIC PAIN OF BOTH KNEES: ICD-10-CM

## 2022-09-21 DIAGNOSIS — M25.561 CHRONIC PAIN OF BOTH KNEES: ICD-10-CM

## 2022-09-21 DIAGNOSIS — M25.562 CHRONIC PAIN OF BOTH KNEES: ICD-10-CM

## 2022-09-21 RX ORDER — OXYCODONE HYDROCHLORIDE 15 MG/1
15 TABLET ORAL EVERY 6 HOURS
Qty: 120 TABLET | Refills: 0 | Status: SHIPPED | OUTPATIENT
Start: 2022-09-23 | End: 2022-10-21 | Stop reason: SDUPTHER

## 2022-09-21 NOTE — TELEPHONE ENCOUNTER
OAR[place] checked for [place], and [place]: 8/24/22 Oxycodone 15mg #120. Due 9/23/22.     Last Appt:  8/23/2022  Next Appt:   10/24/2022  Med verified in Epic

## 2022-09-27 DIAGNOSIS — M25.561 CHRONIC PAIN OF BOTH KNEES: ICD-10-CM

## 2022-09-27 DIAGNOSIS — M25.562 CHRONIC PAIN OF BOTH KNEES: ICD-10-CM

## 2022-09-27 DIAGNOSIS — G89.29 CHRONIC PAIN OF BOTH KNEES: ICD-10-CM

## 2022-09-27 RX ORDER — FENTANYL 12 UG/H
1 PATCH TRANSDERMAL
Qty: 10 PATCH | Refills: 0 | Status: SHIPPED | OUTPATIENT
Start: 2022-09-27 | End: 2022-10-21 | Stop reason: SDUPTHER

## 2022-09-27 NOTE — TELEPHONE ENCOUNTER
Wilberto Alford called requesting a refill of the below medication which has been pended for you:     Requested Prescriptions     Pending Prescriptions Disp Refills    fentaNYL (DURAGESIC) 12 MCG/HR 10 patch 0     Sig: Place 1 patch onto the skin every 72 hours for 30 days. Last Appointment Date: 8/23/2022  Next Appointment Date: 10/27/2022    Allergies   Allergen Reactions    Codeine Nausea And Vomiting    Morphine Other (See Comments)     constipation       Last DS11 11/22/21 . Please review. OAR[place] for [place], and [place]:  fentanyl 12 mcg/hr  8/24/22   #10  . Due now.

## 2022-10-04 ENCOUNTER — OFFICE VISIT (OUTPATIENT)
Dept: FAMILY MEDICINE CLINIC | Age: 87
End: 2022-10-04
Payer: MEDICARE

## 2022-10-04 VITALS
HEIGHT: 66 IN | SYSTOLIC BLOOD PRESSURE: 128 MMHG | HEART RATE: 64 BPM | DIASTOLIC BLOOD PRESSURE: 70 MMHG | WEIGHT: 180 LBS | BODY MASS INDEX: 28.93 KG/M2

## 2022-10-04 DIAGNOSIS — Z99.3 WHEELCHAIR DEPENDENCE: Primary | ICD-10-CM

## 2022-10-04 PROCEDURE — 99214 OFFICE O/P EST MOD 30 MIN: CPT | Performed by: FAMILY MEDICINE

## 2022-10-04 PROCEDURE — 99213 OFFICE O/P EST LOW 20 MIN: CPT | Performed by: FAMILY MEDICINE

## 2022-10-04 PROCEDURE — 1123F ACP DISCUSS/DSCN MKR DOCD: CPT | Performed by: FAMILY MEDICINE

## 2022-10-04 SDOH — ECONOMIC STABILITY: FOOD INSECURITY: WITHIN THE PAST 12 MONTHS, THE FOOD YOU BOUGHT JUST DIDN'T LAST AND YOU DIDN'T HAVE MONEY TO GET MORE.: NEVER TRUE

## 2022-10-04 SDOH — ECONOMIC STABILITY: FOOD INSECURITY: WITHIN THE PAST 12 MONTHS, YOU WORRIED THAT YOUR FOOD WOULD RUN OUT BEFORE YOU GOT MONEY TO BUY MORE.: NEVER TRUE

## 2022-10-04 ASSESSMENT — ENCOUNTER SYMPTOMS
GASTROINTESTINAL NEGATIVE: 1
RESPIRATORY NEGATIVE: 1
EYES NEGATIVE: 1
ALLERGIC/IMMUNOLOGIC NEGATIVE: 1

## 2022-10-04 ASSESSMENT — SOCIAL DETERMINANTS OF HEALTH (SDOH): HOW HARD IS IT FOR YOU TO PAY FOR THE VERY BASICS LIKE FOOD, HOUSING, MEDICAL CARE, AND HEATING?: NOT HARD AT ALL

## 2022-10-04 ASSESSMENT — PATIENT HEALTH QUESTIONNAIRE - PHQ9
SUM OF ALL RESPONSES TO PHQ QUESTIONS 1-9: 0
1. LITTLE INTEREST OR PLEASURE IN DOING THINGS: 0
SUM OF ALL RESPONSES TO PHQ9 QUESTIONS 1 & 2: 0
SUM OF ALL RESPONSES TO PHQ QUESTIONS 1-9: 0
SUM OF ALL RESPONSES TO PHQ QUESTIONS 1-9: 0
2. FEELING DOWN, DEPRESSED OR HOPELESS: 0
SUM OF ALL RESPONSES TO PHQ QUESTIONS 1-9: 0

## 2022-10-04 NOTE — PROGRESS NOTES
Subjective:      Patient ID: Norma Melo is a 80 y.o. male. HPI  patient scheduled for face to face visit for wheelchair evaluation. I dont do these wheelchair evaluations any more due to the extensive nature /time required to fill out, and lack of luck getting things approved in the past. Typically we send these to PT for a more detailed /focused exam and form completion. He currently resides at [facility] assisted living. He is no longer wt. Bearing at the facility. He relies on the chair for any movement past bathroom distance. Wheelchair use has improved his falling and injury issues since committing to the chair. Currently has significant swelling in the left leg, with ace wrap applied. Advanced knee arthritis  limits him the most.  Very painful by report. Taking pain medication, doesn't really take the pain away. He is also followed by pain management. Current wheelchair older. The arm pads are loose and worn through the covers. All 4 tires have signs of wear and slight deformity of the left front tire. Currently not using a cushion in the wheelchair.          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 2/2/15    EGD with biopsies    CAD (coronary artery disease)     Elevated PSA     Hyperlipidemia     Hypertension     Kidney stone     Osteoarthritis of knee     Venous insufficiency      Past Surgical History:   Procedure Laterality Date    APPENDECTOMY      CHOLECYSTECTOMY  01/12/1983    COLONOSCOPY  08/29/12    COLONOSCOPY  05/27/09    COLONOSCOPY  04/30/2008    CYSTOSCOPY  11/15/2007    with left ureteroscopy and dorsal slit     KNEE FUSION Left     LEG SURGERY Left 1/11/2020    I and D of Left Lower Extremitity performed by Juan Chris MD at [address] [facility] CYSTOURETHROSCOPY N/A 11/19/2018    CYSTO Photovaporization Pro[facility] Greenlight Laser ([id number]) performed by Michelle Lindo MD at [address] EGD TRANSORAL BIOPSY SINGLE/MULTIPLE N/A 7/3/2017    EGD BIOPSY performed by Dany Luna MD at [address] Bilateral 06/23/2009    Benign    UPPER GASTROINTESTINAL ENDOSCOPY  02/2/2015    Sepulveda's (Dr. Angle Granda)    UPPER GASTROINTESTINAL ENDOSCOPY  04/25/2016    Lg hiatal hernia, polyp at duodenum, gastric polyp, Barretts Esophagus, GE 32    UPPER GASTROINTESTINAL ENDOSCOPY  07/03/2017    mild gastritis, Sepulveda's, Dr. Vida Bass 3 years     Current Outpatient Medications   Medication Sig Dispense Refill    fentaNYL (DURAGESIC) 12 MCG/HR Place 1 patch onto the skin every 72 hours for 30 days. 10 patch 0    oxyCODONE (OXY-IR) 15 MG immediate release tablet Take 1 tablet by mouth in the morning and 1 tablet at noon and 1 tablet in the evening and 1 tablet before bedtime. Do all this for 30 days. HOLD IF DROWSINESS OCCURS, DO NOT AWAKEN TO GIVE MED, LET HIM SLEEP IF SLEEPING. 120 tablet 0    metoprolol tartrate (LOPRESSOR) 25 MG tablet Take 1 tablet by mouth 2 times daily 180 tablet 1    lisinopril (PRINIVIL;ZESTRIL) 5 MG tablet TAKE 1 TABLET DAILY 90 tablet 1    atorvastatin (LIPITOR) 10 MG tablet TAKE 1 TABLET DAILY 90 tablet 3    clopidogrel (PLAVIX) 75 MG tablet TAKE 1 TABLET DAILY 90 tablet 3    furosemide (LASIX) 40 MG tablet Take 1.5 tablets by mouth daily 135 tablet 1    melatonin 3 MG TABS tablet Take 3 mg by mouth nightly      docusate sodium (COLACE) 100 MG capsule Take 100 mg by mouth daily      pantoprazole (PROTONIX) 40 MG tablet Take 1 tablet by mouth every morning (before breakfast) 90 tablet 3    Multiple Vitamins-Minerals (MULTIVITAMIN PO) Take 1 tablet by mouth daily. oxyCODONE-acetaminophen (PERCOCET)  MG per tablet Take 1 tablet by mouth every 4 hours as needed for Pain.  (Patient not taking: Reported on 8/23/2022)      lactulose (CHRONULAC) 10 GM/15ML solution Take 20 g by mouth daily as needed  (Patient not taking: Reported on 8/23/2022)      acetaminophen (TYLENOL) 325 MG tablet Take 2 tablets by mouth every 4 hours as needed for Pain or Fever (Patient not taking: Reported on 8/23/2022) 120 tablet 0     No current facility-administered medications for this visit. Allergies   Allergen Reactions    Codeine Nausea And Vomiting    Morphine Other (See Comments)     constipation         Review of Systems   Constitutional: Negative. Negative for appetite change and fever. HENT:  Positive for hearing loss. Eyes: Negative. Respiratory: Negative. Cardiovascular:  Positive for leg swelling (left >right). Gastrointestinal: Negative. Endocrine: Negative. Genitourinary: Negative. Musculoskeletal:  Positive for arthralgias (right knee>left knee) and gait problem. Skin: Negative. Negative for wound. Allergic/Immunologic: Negative. Hematological: Negative. Psychiatric/Behavioral:  Positive for confusion. All other systems reviewed and are negative. Objective:   Physical Exam  Constitutional:       General: He is not in acute distress. Appearance: He is not toxic-appearing. Pulmonary:      Effort: Pulmonary effort is normal. No respiratory distress. Musculoskeletal:         General: Swelling present. Right lower leg: Tenderness present. 2+ Pitting Edema present. Left lower leg: Deformity, tenderness (and crepitus/pain with movement) and bony tenderness present. 3+ Pitting Edema present. Neurological:      Mental Status: He is alert. Mental status is at baseline. Psychiatric:         Mood and Affect: Mood normal.         Thought Content: Thought content normal.     /70 (Site: Right Upper Arm, Position: Sitting, Cuff Size: Large Adult)   Pulse 64   Ht 5' 6\" (1.676 m)   Wt 180 lb (81.6 kg)   BMI 29.05 kg/m²     Assessment:      Encounter Diagnosis   Name Primary? Wheelchair dependence Yes     Visit to secure new wheelchair, or repair of existing one. Patient uncertain today as to reason for visit.         Plan:      He is dependent on wheelchair within his assisted living appartment for all movement within the facility. His advanced age, along with advanced knee arthritis left > right. Do not anticipate this getting better, will likely get worse. He has not had offers to replace his knees at 89 yrs of age, and relates he would not likely pursue surgery, if offered, stating his age. He is a significant fall risk, and these falls and injuries have fallen off since commiting to the wheel chair full time. His lower extremity strength is failing/inadequate for independent transfers and ambulation at this time. PT is not likely to improve this long term. Would rec. New wheel chair. He doesn't feel he needs a cushion at present. Plan referral to PT for completion of forms for new wheelchair.               Rehana Connolly MD

## 2022-10-11 RX ORDER — LISINOPRIL 5 MG/1
TABLET ORAL
Qty: 90 TABLET | Refills: 3 | Status: SHIPPED | OUTPATIENT
Start: 2022-10-11

## 2022-10-21 DIAGNOSIS — G89.29 CHRONIC PAIN OF BOTH KNEES: ICD-10-CM

## 2022-10-21 DIAGNOSIS — M25.562 CHRONIC PAIN OF BOTH KNEES: ICD-10-CM

## 2022-10-21 DIAGNOSIS — M25.561 CHRONIC PAIN OF BOTH KNEES: ICD-10-CM

## 2022-10-21 RX ORDER — OXYCODONE HYDROCHLORIDE 15 MG/1
15 TABLET ORAL EVERY 6 HOURS
Qty: 120 TABLET | Refills: 0 | Status: SHIPPED | OUTPATIENT
Start: 2022-10-22 | End: 2022-11-21

## 2022-10-21 RX ORDER — FENTANYL 12 UG/H
1 PATCH TRANSDERMAL
Qty: 10 PATCH | Refills: 0 | Status: SHIPPED | OUTPATIENT
Start: 2022-10-27 | End: 2022-11-26

## 2022-10-21 NOTE — TELEPHONE ENCOUNTER
Marilou Small from [facility] called because the patietn needs his Oxycodone sent in, he is also due for fentanyl. OAR[place], and [place]: 9/27/22 Fentanyl 12mcg/hr #10. Due 10/27/22.          9/22/22 Oxycodone 15mg #120. Due 10/22/22.     Last Appt:  8/23/2022  Next Appt:   10/27/2022  Med verified in Epic

## 2022-10-25 ENCOUNTER — TELEPHONE (OUTPATIENT)
Dept: INTERNAL MEDICINE | Age: 87
End: 2022-10-25

## 2022-10-25 NOTE — TELEPHONE ENCOUNTER
As there is concern for scabies outbreak in the facility, would have concern that rash may be related.  Can we make sure Avinash Pendleton is notified, as we are currently coordinating efforts at scabies eradication    Electronically signed by KEVIN Montana CNP on 10/25/2022 at 2:09 PM

## 2022-10-25 NOTE — TELEPHONE ENCOUNTER
Received fax from [address] [place] [address] Maier c/o very itchy on forearms and some areas of his back. No visible rash.  May we try Triamcinolone 0.1 % cream?

## 2022-10-26 ENCOUNTER — TELEPHONE (OUTPATIENT)
Dept: INTERNAL MEDICINE | Age: 87
End: 2022-10-26

## 2022-10-26 RX ORDER — PERMETHRIN 50 MG/G
CREAM TOPICAL
Qty: 60 G | Refills: 0 | Status: SHIPPED | OUTPATIENT
Start: 2022-10-26

## 2022-10-26 NOTE — TELEPHONE ENCOUNTER
Scabies outbreak as been confirmed at facility; per staff evaluation, patient is symptomatic for pruritic rash.  Start permethrin 5% cream, Thoroughly massage cream (30 g for average adult) from head to soles of feet; leave on for 8 to 14 hours before removing (shower or bath)    Electronically signed by KEVIN Farnsworth CNP on 10/26/2022 at 10:20 AM

## 2022-11-11 RX ORDER — PERMETHRIN 50 MG/G
CREAM TOPICAL
Qty: 60 G | Refills: 11 | OUTPATIENT
Start: 2022-11-11

## 2022-11-17 ENCOUNTER — HOSPITAL ENCOUNTER (OUTPATIENT)
Age: 87
Setting detail: SPECIMEN
Discharge: HOME | End: 2022-11-17
Payer: MEDICARE

## 2022-11-17 ENCOUNTER — OFFICE VISIT (OUTPATIENT)
Dept: PAIN MANAGEMENT | Age: 87
End: 2022-11-17
Payer: MEDICARE

## 2022-11-17 VITALS
HEART RATE: 61 BPM | DIASTOLIC BLOOD PRESSURE: 78 MMHG | RESPIRATION RATE: 18 BRPM | SYSTOLIC BLOOD PRESSURE: 122 MMHG | HEIGHT: 66 IN | BODY MASS INDEX: 28.93 KG/M2 | OXYGEN SATURATION: 98 % | WEIGHT: 180 LBS

## 2022-11-17 DIAGNOSIS — I89.0 LYMPHEDEMA OF BOTH LOWER EXTREMITIES: ICD-10-CM

## 2022-11-17 DIAGNOSIS — G89.29 CHRONIC PAIN OF BOTH KNEES: ICD-10-CM

## 2022-11-17 DIAGNOSIS — M25.561 CHRONIC PAIN OF BOTH KNEES: ICD-10-CM

## 2022-11-17 DIAGNOSIS — Z79.891 ENCOUNTER FOR LONG-TERM OPIATE ANALGESIC USE: ICD-10-CM

## 2022-11-17 DIAGNOSIS — M25.562 CHRONIC PAIN OF BOTH KNEES: ICD-10-CM

## 2022-11-17 DIAGNOSIS — Z02.83 ENCOUNTER FOR DRUG SCREENING: Primary | ICD-10-CM

## 2022-11-17 DIAGNOSIS — Z02.83 ENCOUNTER FOR DRUG SCREENING: ICD-10-CM

## 2022-11-17 PROCEDURE — 99214 OFFICE O/P EST MOD 30 MIN: CPT | Performed by: NURSE PRACTITIONER

## 2022-11-17 PROCEDURE — G0481 DRUG TEST DEF 8-14 CLASSES: HCPCS

## 2022-11-17 PROCEDURE — 1123F ACP DISCUSS/DSCN MKR DOCD: CPT | Performed by: NURSE PRACTITIONER

## 2022-11-17 RX ORDER — OXYCODONE HYDROCHLORIDE 15 MG/1
15 TABLET ORAL EVERY 6 HOURS
Qty: 120 TABLET | Refills: 0 | Status: SHIPPED | OUTPATIENT
Start: 2022-11-17 | End: 2022-12-17

## 2022-11-17 RX ORDER — FENTANYL 25 UG/H
1 PATCH TRANSDERMAL
Qty: 10 PATCH | Refills: 0 | Status: SHIPPED | OUTPATIENT
Start: 2022-11-17 | End: 2022-12-17

## 2022-11-17 RX ORDER — FENTANYL 25 UG/H
1 PATCH TRANSDERMAL
Qty: 10 PATCH | Refills: 0 | Status: SHIPPED | OUTPATIENT
Start: 2022-11-17 | End: 2022-11-17 | Stop reason: SDUPTHER

## 2022-11-17 RX ORDER — OXYCODONE HYDROCHLORIDE 15 MG/1
15 TABLET ORAL EVERY 6 HOURS
Qty: 120 TABLET | Refills: 0 | Status: SHIPPED | OUTPATIENT
Start: 2022-11-17 | End: 2022-11-17 | Stop reason: SDUPTHER

## 2022-11-21 ENCOUNTER — TELEPHONE (OUTPATIENT)
Dept: PAIN MANAGEMENT | Age: 87
End: 2022-11-21

## 2022-11-21 LAB
6-ACETYLMORPHINE, UR: NOT DETECTED
7-AMINOCLONAZEPAM, URINE: NOT DETECTED
ALPHA-OH-ALPRAZ, URINE: NOT DETECTED
ALPHA-OH-MIDAZOLAM, URINE: NOT DETECTED
ALPRAZOLAM, URINE: NOT DETECTED
AMPHETAMINES, URINE: NOT DETECTED
BARBITURATES, URINE: NOT DETECTED
BENZOYLECGONINE, UR: NOT DETECTED
BUPRENORPHINE URINE: NOT DETECTED
CARISOPRODOL, UR: NOT DETECTED
CLONAZEPAM, URINE: NOT DETECTED
CODEINE, URINE: NOT DETECTED
CREATININE URINE: 38.5 MG/DL (ref 20–400)
DIAZEPAM, URINE: NOT DETECTED
EER PAIN MGT DRUG PANEL, HIGH RES/EMIT U: NORMAL
ETHYL GLUCURONIDE UR: NOT DETECTED
FENTANYL URINE: NOT DETECTED
GABAPENTIN: NOT DETECTED
HYDROCODONE, URINE: NOT DETECTED
HYDROMORPHONE, URINE: NOT DETECTED
LORAZEPAM, URINE: NOT DETECTED
MARIJUANA METAB, UR: NOT DETECTED
MDA, UR: NOT DETECTED
MDEA, EVE, UR: NOT DETECTED
MDMA URINE: NOT DETECTED
MEPERIDINE METAB, UR: NOT DETECTED
METHADONE, URINE: NOT DETECTED
METHAMPHETAMINE, URINE: NOT DETECTED
METHYLPHENIDATE: NOT DETECTED
MIDAZOLAM, URINE: NOT DETECTED
MORPHINE URINE: NOT DETECTED
NALOXONE URINE: NOT DETECTED
NORBUPRENORPHINE, URINE: NOT DETECTED
NORDIAZEPAM, URINE: NOT DETECTED
NORFENTANYL, URINE: PRESENT
NORHYDROCODONE, URINE: NOT DETECTED
NOROXYCODONE, URINE: PRESENT
NOROXYMORPHONE, URINE: PRESENT
OXAZEPAM, URINE: NOT DETECTED
OXYCODONE URINE: PRESENT
OXYMORPHONE, URINE: PRESENT
PAIN MGT DRUG PANEL, HI RES, UR: NORMAL
PCP,URINE: NOT DETECTED
PHENTERMINE, UR: NOT DETECTED
PREGABALIN: NOT DETECTED
TAPENTADOL, URINE: NOT DETECTED
TAPENTADOL-O-SULFATE, URINE: NOT DETECTED
TEMAZEPAM, URINE: NOT DETECTED
TRAMADOL, URINE: NOT DETECTED
ZOLPIDEM METABOLITE (ZCA), URINE: NOT DETECTED
ZOLPIDEM, URINE: NOT DETECTED

## 2022-11-21 ASSESSMENT — ENCOUNTER SYMPTOMS
GASTROINTESTINAL NEGATIVE: 1
BACK PAIN: 0
RESPIRATORY NEGATIVE: 1

## 2022-11-21 NOTE — PROGRESS NOTES
Subjective:      Patient ID: Kathya Strickland is a 80 y.o. male. Chief Complaint   Patient presents with    Knee Pain     Osteoarthritis of both knees      Knee Pain   Pertinent negatives include no numbness. Leg Pain   Pertinent negatives include no numbness. Here today for routine pain clinic recheck. Chronic pain in knees, resides at assisted living. Ambulating very limited due to pain. Pain Assessment  Location of Pain: Knee  Location Modifiers: Left, Right  Severity of Pain: 8  Quality of Pain: Sharp  Duration of Pain: Persistent  Frequency of Pain: Constant  Aggravating Factors: Standing  Limiting Behavior: Yes  Relieving Factors: Rest  Result of Injury: No  Work-Related Injury: No  Are there other pain locations you wish to document?: No    Allergies   Allergen Reactions    Codeine Nausea And Vomiting    Morphine Other (See Comments)     constipation       Outpatient Medications Marked as Taking for the 11/17/22 encounter (Office Visit) with KEVIN Osuna CNP   Medication Sig Dispense Refill    oxyCODONE (OXY-IR) 15 MG immediate release tablet Take 1 tablet by mouth in the morning and 1 tablet at noon and 1 tablet in the evening and 1 tablet before bedtime. Do all this for 30 days. HOLD IF DROWSINESS OCCURS, DO NOT AWAKEN TO GIVE MED, LET HIM SLEEP IF SLEEPING. 120 tablet 0    fentaNYL (DURAGESIC) 25 MCG/HR Place 1 patch onto the skin every 3 days for 30 days.  Intended supply: 30 days 10 patch 0    permethrin (ELIMITE) 5 % cream Thoroughly massage cream (30 g for average adult) from head to soles of feet; leave on for 8 to 14 hours before removing (shower or bath) 60 g 0    lisinopril (PRINIVIL;ZESTRIL) 5 MG tablet TAKE 1 TABLET DAILY 90 tablet 3    metoprolol tartrate (LOPRESSOR) 25 MG tablet Take 1 tablet by mouth 2 times daily 180 tablet 1    atorvastatin (LIPITOR) 10 MG tablet TAKE 1 TABLET DAILY 90 tablet 3    clopidogrel (PLAVIX) 75 MG tablet TAKE 1 TABLET DAILY 90 tablet 3 furosemide (LASIX) 40 MG tablet Take 1.5 tablets by mouth daily 135 tablet 1    melatonin 3 MG TABS tablet Take 3 mg by mouth nightly      docusate sodium (COLACE) 100 MG capsule Take 100 mg by mouth daily      pantoprazole (PROTONIX) 40 MG tablet Take 1 tablet by mouth every morning (before breakfast) 90 tablet 3    acetaminophen (TYLENOL) 325 MG tablet Take 2 tablets by mouth every 4 hours as needed for Pain or Fever 120 tablet 0    Multiple Vitamins-Minerals (MULTIVITAMIN PO) Take 1 tablet by mouth daily.          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 2/2/15    EGD with biopsies    CAD (coronary artery disease)     Elevated PSA     Hyperlipidemia     Hypertension     Kidney stone     Osteoarthritis of knee     Venous insufficiency        Past Surgical History:   Procedure Laterality Date    APPENDECTOMY      CHOLECYSTECTOMY  01/12/1983    COLONOSCOPY  08/29/12    COLONOSCOPY  05/27/09    COLONOSCOPY  04/30/2008    CYSTOSCOPY  11/15/2007    with left ureteroscopy and dorsal slit     KNEE FUSION Left     LEG SURGERY Left 1/11/2020    I and D of Left Lower Extremitity performed by Semaj Marinelli MD at [address] [facility] CYSTOURETHROSCOPY N/A 11/19/2018    CYSTO Photovaporization Prostate Greenlight Laser ([facility]#[id number]) performed by Salvador Downing MD at [address] EGD TRANSORAL BIOPSY SINGLE/MULTIPLE N/A 7/3/2017    EGD BIOPSY performed by Perfecto Nelson MD at [address] [facility] Bilateral 06/23/2009    Benign    UPPER GASTROINTESTINAL ENDOSCOPY  02/2/2015    Sepulveda's (Dr. Miranda Mom)    UPPER GASTROINTESTINAL ENDOSCOPY  04/25/2016    Lg hiatal hernia, polyp at duodenum, gastric polyp, Barretts Esophagus, GE 32    UPPER GASTROINTESTINAL ENDOSCOPY  07/03/2017    mild gastritis, Sepulveda's, Dr. Tomas Lombardi 3 years       Family History   Problem Relation Age of Onset    Cancer Father         stomach cancer    Heart Disease Paternal Grandfather Social History     Socioeconomic History    Marital status:      Spouse name: None    Number of children: None    Years of education: None    Highest education level: None   Tobacco Use    Smoking status: Former     Packs/day: 1.00     Types: Cigars, Cigarettes     Quit date: 1998     Years since quittin.9    Smokeless tobacco: Never    Tobacco comments:     Former smoker (quit )- ANN Torres [facility] 3/20/17   Vaping Use    Vaping Use: Never used   Substance and Sexual Activity    Alcohol use: No     Alcohol/week: 0.0 standard drinks    Drug use: No     Social Determinants of Health     Financial Resource Strain: Low Risk     Difficulty of Paying Living Expenses: Not hard at all   Food Insecurity: No Food Insecurity    Worried About Running Out of Food in the Last Year: Never true    Ran Out of Food in the Last Year: Never true     Review of Systems   Constitutional:  Positive for activity change (limited due to right knee pain). Negative for appetite change. Respiratory: Negative. Cardiovascular: Negative. Gastrointestinal: Negative. Genitourinary: Negative. Johnson catheter in place   Musculoskeletal:  Positive for arthralgias (bilateral knee, right knee is majority of his pain) and gait problem. Negative for back pain, neck pain and neck stiffness. Neurological:  Negative for weakness and numbness. Psychiatric/Behavioral:  Positive for sleep disturbance. Objective:   Physical Exam  Vitals reviewed. Constitutional:       General: He is not in acute distress. Appearance: He is well-developed. He is not ill-appearing, toxic-appearing or diaphoretic. HENT:      Head: Normocephalic and atraumatic. Cardiovascular:      Rate and Rhythm: Normal rate. Pulmonary:      Effort: Pulmonary effort is normal. No respiratory distress. Musculoskeletal:      Right knee: Swelling present. Decreased range of motion. Skin:     General: Skin is warm and dry. Coloration: Skin is not pale. Nails: There is no clubbing. Neurological:      Mental Status: He is alert and oriented to person, place, and time. He is not disoriented. GCS: GCS eye subscore is 4. GCS verbal subscore is 5. GCS motor subscore is 6. Cranial Nerves: No cranial nerve deficit. Motor: No tremor or seizure activity. Psychiatric:         Behavior: Behavior normal. Behavior is cooperative. Assessment:      1. Encounter for drug screening    2. Encounter for long-term opiate analgesic use    3. Chronic pain of both knees    4. Lymphedema of both lower extremities          Plan:     Continue oxycodone as prescribed. Increase fentanyl 25    Controlled Substance Monitoring:    Acute and Chronic Pain Monitoring:   RX Monitoring 7/7/2022   Attestation -   Periodic Controlled Substance Monitoring Possible medication side effects, risk of tolerance/dependence & alternative treatments discussed. ;No signs of potential drug abuse or diversion identified. Chronic Pain > 50 MEDD Re-evaluated the status of the patient's underlying condition causing pain.;Obtained or confirmed \"Consent for Opioid Use\" on file.        Follow up 2-3 months

## 2022-11-28 RX ORDER — FUROSEMIDE 40 MG/1
TABLET ORAL
Qty: 15 TABLET | Refills: 3 | Status: SHIPPED | OUTPATIENT
Start: 2022-11-28

## 2022-11-29 ENCOUNTER — HOSPITAL ENCOUNTER (OUTPATIENT)
Dept: PHYSICAL THERAPY | Age: 87
Setting detail: THERAPIES SERIES
Discharge: HOME | End: 2022-11-29
Payer: MEDICARE

## 2022-11-29 VITALS — HEIGHT: 66 IN | WEIGHT: 180 LBS | BODY MASS INDEX: 28.93 KG/M2

## 2022-11-29 PROCEDURE — 97162 PT EVAL MOD COMPLEX 30 MIN: CPT

## 2022-11-29 ASSESSMENT — PAIN DESCRIPTION - FREQUENCY: FREQUENCY: INTERMITTENT

## 2022-11-29 ASSESSMENT — PAIN - FUNCTIONAL ASSESSMENT: PAIN_FUNCTIONAL_ASSESSMENT: PREVENTS OR INTERFERES WITH ALL ACTIVE AND SOME PASSIVE ACTIVITIES

## 2022-11-29 ASSESSMENT — PAIN DESCRIPTION - PAIN TYPE: TYPE: CHRONIC PAIN

## 2022-11-29 ASSESSMENT — PAIN DESCRIPTION - LOCATION: LOCATION: KNEE

## 2022-11-29 ASSESSMENT — PAIN SCALES - GENERAL: PAINLEVEL_OUTOF10: 0

## 2022-11-29 ASSESSMENT — PAIN DESCRIPTION - DESCRIPTORS: DESCRIPTORS: ACHING;SORE

## 2022-11-29 ASSESSMENT — PAIN DESCRIPTION - ONSET: ONSET: ON-GOING

## 2022-11-29 ASSESSMENT — PAIN DESCRIPTION - ORIENTATION: ORIENTATION: RIGHT;LEFT

## 2022-11-29 NOTE — PROGRESS NOTES
Physical Therapy  Initial Assessment  Date: 2022  Patient Name: Jeffery Osullivan  MRN: [id number]  : 1927    Referring Physician: MD Roger Salinas MD   PCP: Roger Diaz MD     Medical Diagnosis: Dependence on wheelchair [Z99.3] Z99.3 (ICD-10-CM) - Wheelchair dependence  No data recorded    Insurance: Payor: Torres Torres / Plan: Kaity Francis PPO / Product Type: Medicare /   Insurance ID: [id number] - (Medicare Managed)      Restrictions:       Subjective:   General  Chart Reviewed: Yes  Patient Assessed for Rehabilitation Services: Yes  History obtained from[de-identified] Patient  Diagnosis: Z99.3 (ICD-10-CM) - Wheelchair dependence  Referring Provider (secondary): Roger Diaz MD  Follows Commands: Within Functional Limits  PT Visit Information  PT Insurance Information: Torres Torres  Referring Provider (secondary): Roger Diaz MD  Subjective  Subjective: Patient is a 81 y/o male who presents to the clinic for an evaluation for a wheelchair due to mobility impairments. Patient resides at [facility]. Patient reports that his current wheelchair is not his. He uses his arms to help propel him for long distances and will use R LE for short distances. The wheelchair currently does not have R LE foot rest. Reports that he is not able to bend his L LE. Patient reports that he has bilateral knee pain which has been going on for approx 3 years. Patient reports that he completes stand pivot transfers with use of grab bars. Utilizes wheelchair for the majority of his time. Comment: Per physician note: \"He currently resides at [facility] assisted living. He is no longer wt. Bearing at the facility. He relies on the chair for any movement past bathroom distance. Wheelchair use has improved his falling and injury issues since committing to the chair. \"   \"He is dependent on wheelchair within his assisted living apartment for all movement within the facility. His advanced age, along with advanced knee arthritis left > right. Do not anticipate this getting better, will likely get worse. He has not had offers to replace his knees at 89 yrs of age, and relates he would not likely pursue surgery, if offered, stating his age. He is a significant fall risk, and these falls and injuries have fallen off since committing to the wheel chair full time. His lower extremity strength is failing/inadequate for independent transfers and ambulation at this time. PT is not likely to improve this long term. Would rec. New wheel chair. He doesn't feel he needs a cushion at present. Plan referral to PT for completion of forms for new wheelchair. \"  Prior diagnostic testing[de-identified] X-ray (No acute osseous fracture is identified involving the tibia, fibula or knee. Deformity of the distal femoral metaphysis likely related to a remote healed  fracture. Severe tricompartmental osteoarthritis. Diffuse atherosclerotic disease.)  Pain Screening  Patient Currently in Pain: Yes  Pain Assessment: 0-10  Pain Level: 0  Best Pain Level: 0  Worst Pain Level: 9  Pain Type: Chronic pain  Pain Location: Knee  Pain Orientation: Right, Left  Pain Descriptors: Aching, Sore  Pain Frequency: Intermittent  Pain Onset: On-going  Functional Pain Assessment: Prevents or interferes with all active and some passive activities  Aggravating factors:  Movement, Standing  Pain Management/Relieving Factors: Rest, Sitting  Height and Weight  Height: 5' 6\" (167.6 cm)  Weight: 180 lb (81.6 kg)  BSA (Calculated - sq m): 1.95 sq meters  BMI (Calculated): 29.1    Vision/Hearing:  Vision  Vision: Impaired  Visual Deficits: Wears glasses  Hearing  Hearing: Within functional limits    Orientation:  Orientation  Follows Commands: Within Functional Limits    Social History:  Social History  Type of Home: Facility ([facility] assisted living.)  Home Layout: One level  Bathroom Shower/Tub: Walk-in shower  Bathroom Toilet: Handicap height  Bathroom Equipment: Grab bars in shower;Built-in shower seat;Grab bars around toilet  Home Equipment: Wheelchair-manual;Grab bars    Functional Status:  Functional Status  Occupation: Retired  ADL Assistance: Needs assistance  Bath: Minimal assistance  Dressing: Modified independent  [address]: Needs assistance  Homemaking Responsibilities: No  Ambulation Assistance: Non-ambulatory  Transfer Assistance: Independent  Active : No  [facility] [address] [facility]    Objective:     Observations:   Minimal to no movement noted of L knee. Minimal movement of L ankle noted, L ankle unable to make neutral and in PF positioned. Patient demonstrates kyphotic posturing with PPT when sitting. ROM  AROM RLE (degrees)  RLE AROM: Exceptions  R Knee Flexion (0-145): 85 degrees  R Knee Extension (0): lackign 5 degrees from 0  AROM LLE (degrees)  LLE AROM : Exceptions  L Knee Flexion (0-145): 10  L Knee Extension (0): lacking 10 degrees from 0    Strength   Strength RLE  Strength RLE: Exception  Comment: Tested in seated position in available ROM. R Hip Flexion: 4/5  R Knee Flexion: 4-/5  R Knee Extension: 4/5  R Ankle Dorsiflexion: 4-/5  Strength LLE  Strength LLE: Exception  L Hip Flexion: 1+/5  L Knee Flexion: 1+/5  L Knee Extension: 1+/5  L Ankle Dorsiflexion: 2-/5       Transfers  Stand Pivot Transfers: Modified independent  Comment: Reports that he completes stand pivot transfers JOCE with grab bars. Reports that he does not use a walker when transferring. Ambulation  Assistance: Unable to assess  Comments: Patient is currently non-ambulatory. Utilizes his current wheelchair the majority of the day. Patient lives in an assisted living facility which is handicap assessable and has transport available. Completes self-stand pivot transfers with use of grab bars.  Patient reports that he is able to use his R LE for wheelchair propulsion for short distances and uses his UEs for longer distances. Patient had a knee fusion surgery in the past and has significant bilateral knee pain, limitations in strength, and ROM which limits patient's ability to ambulate as well as places patient at an increased risk of falls. Current equipment:   Patient's current wheelchair is an [employer] ex 2 which was gifted to patient in a used condition. His current wheelchair has noticeable wear and tear and is not configured optimally for his needs for support and functional abilities. Optimal configuration for new chair:   Patient requires K4 manual wheelchair for mobility in his residence and to complete his daily ADLs with greater ease. A lesser coded chair would not be adequate as the patient requires a nonstandard seat height to facilitate foot propulsion with his right leg as well as completing stand pivot transfers. Patient requires a lightweight frame to maximize propulsion efficiency and to compensate for deficits in bilateral strength, range of motion, endurance, and bilateral knee pain. The following components are needed for safe functional mobility with his wheelchair:   A) ki catalyst wheelchair. B)adjustable tension back upholstery - for posterior and lumbar support for the back. c) elevated leg rests - for lack of ROM and strength in L LE to allow for proper LE support due to contracture and to help manage edema. D) angle adjustable foot plates - to help compensate with lack of ROM with L ankle. E) adjustable height arm rest - to help support elbows at 90 degrees. F) J Pro basic cushion - to provide pressure distribution and to help with pelvic stability. G) Plastic coated hand rims - for improved  with propulsion of wheelchair. Assessment:    Conditions Requiring Skilled Therapeutic Intervention  Body Structures, Functions, Activity Limitations Requiring Skilled Therapeutic Intervention: Decreased functional mobility ; Decreased ADL status; Decreased ROM;Decreased strength;Decreased balance; Increased pain  Therapy Prognosis: Fair  Activity Tolerance  Activity Tolerance: Patient tolerated evaluation without incident  Activity Tolerance: Patient tolerated evaluation without incident      Equipment Recommendations:  Patient is currently non-ambulatory and relies on his wheelchair for mobility around his assisted living facility. His current wheelchair was gifted to him in an used condition and has observable signs of wear and tear and is not configured appropriately for patient's functional mobility needs and physical support requirements. Patient would benefit from a manual wheelchair with the aforementioned configuration due to significant functional mobility limitations due to significant bilateral knee pain, as well as signficant limitations in bilateral LE strength and ROM. Plan:    Physcial Therapy Plan  Current Treatment Recommendations: Equipment evaluation, education, & procurement, Patient/Caregiver education & training      Balance and Gait:  Static sitting balance: Good  Dynamic sitting balance: Fair +    Goals:  Short Term Goals  Time Frame for Short Term Goals: 1 visit  Short Term Goal 1: Complete wheelchair evaluation with [employer] rep.        Therapy Time:   Individual Concurrent Group Co-treatment   Time In 0138         Time Out 0220         Minutes [address], [address] [facility]

## 2022-11-30 NOTE — TELEPHONE ENCOUNTER
Patient's nurse stated that there is no side effects and patient's pain appears to be under better control.

## 2022-12-07 RX ORDER — LACTULOSE 10 G/15ML
SOLUTION ORAL; RECTAL
Qty: 473 ML | Refills: 1 | Status: SHIPPED | OUTPATIENT
Start: 2022-12-07

## 2022-12-16 DIAGNOSIS — M25.561 CHRONIC PAIN OF BOTH KNEES: ICD-10-CM

## 2022-12-16 DIAGNOSIS — G89.29 CHRONIC PAIN OF BOTH KNEES: ICD-10-CM

## 2022-12-16 DIAGNOSIS — M25.562 CHRONIC PAIN OF BOTH KNEES: ICD-10-CM

## 2022-12-16 RX ORDER — FENTANYL 25 UG/H
1 PATCH TRANSDERMAL
Qty: 10 PATCH | Refills: 0 | Status: SHIPPED | OUTPATIENT
Start: 2022-12-17 | End: 2023-01-16

## 2022-12-16 RX ORDER — OXYCODONE HYDROCHLORIDE 15 MG/1
TABLET ORAL
Qty: 120 TABLET | Refills: 0 | Status: SHIPPED | OUTPATIENT
Start: 2022-12-17 | End: 2023-01-16

## 2022-12-16 RX ORDER — OXYCODONE HYDROCHLORIDE 15 MG/1
15 TABLET ORAL EVERY 6 HOURS
Qty: 120 TABLET | Refills: 0 | Status: SHIPPED | OUTPATIENT
Start: 2022-12-17 | End: 2023-01-16

## 2022-12-16 NOTE — TELEPHONE ENCOUNTER
Patient called refill line for their  Oxycodone 15 mg   [facility]    Last Appt:  11/17/2022  Next Appt:   2/17/2023  Med verified in [facility]    Last DS11: 11/17/22    OAR[place] for [place], [place], and [place]:     Oxycodone 15 mg   Last filled 11/17/22  Due 12/17/22

## 2022-12-21 ENCOUNTER — OFFICE VISIT (OUTPATIENT)
Dept: CARDIOLOGY | Age: 87
End: 2022-12-21
Payer: MEDICARE

## 2022-12-21 VITALS — DIASTOLIC BLOOD PRESSURE: 60 MMHG | HEART RATE: 59 BPM | SYSTOLIC BLOOD PRESSURE: 118 MMHG

## 2022-12-21 DIAGNOSIS — I50.32 CHRONIC HEART FAILURE WITH PRESERVED EJECTION FRACTION (HFPEF) (HCC): Primary | ICD-10-CM

## 2022-12-21 DIAGNOSIS — I25.10 CORONARY ARTERY DISEASE INVOLVING NATIVE CORONARY ARTERY OF NATIVE HEART WITHOUT ANGINA PECTORIS: ICD-10-CM

## 2022-12-21 PROCEDURE — 1123F ACP DISCUSS/DSCN MKR DOCD: CPT | Performed by: NURSE PRACTITIONER

## 2022-12-21 PROCEDURE — 99214 OFFICE O/P EST MOD 30 MIN: CPT | Performed by: NURSE PRACTITIONER

## 2022-12-21 PROCEDURE — 93010 ELECTROCARDIOGRAM REPORT: CPT | Performed by: NURSE PRACTITIONER

## 2022-12-21 PROCEDURE — 99212 OFFICE O/P EST SF 10 MIN: CPT | Performed by: NURSE PRACTITIONER

## 2022-12-21 PROCEDURE — 93005 ELECTROCARDIOGRAM TRACING: CPT | Performed by: NURSE PRACTITIONER

## 2022-12-21 RX ORDER — FUROSEMIDE 40 MG/1
80 TABLET ORAL 2 TIMES DAILY
Qty: 15 TABLET | Refills: 3 | Status: SHIPPED | OUTPATIENT
Start: 2022-12-21

## 2022-12-21 NOTE — PROGRESS NOTES
Today's Date: 12/21/2022  Patient's Name: Shlomo Moraes  Patient's age: 80 y.o., 6/28/1927    Subjective: The patient is a 80 y.o., , male is in the office for CAD. PT in wheelchair. He states that he continues to have swelling and sob. He denies any CP. He has bilateral legs wrapped today. 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 Past Surgical History:   has a past surgical history that includes Colonoscopy (08/29/12); Cystocopy (11/15/2007); Appendectomy; Cholecystectomy (01/12/1983); Knee fusion (Left); lipoma resection; Colonoscopy (05/27/09); Colonoscopy (04/30/2008); Prostate Biopsy (Bilateral, 06/23/2009); pr egd transoral biopsy single/multiple (N/A, 7/3/2017); Upper gastrointestinal endoscopy (02/2/2015); Upper gastrointestinal endoscopy (04/25/2016); Upper gastrointestinal endoscopy (07/03/2017); pr cystourethroscopy (N/A, 11/19/2018); and Leg Surgery (Left, 1/11/2020). Home Medications:  Prior to Admission medications    Medication Sig Start Date End Date Taking? Authorizing Provider   oxyCODONE (OXY-IR) 15 MG immediate release tablet TAKE ONE TABLET BY MOUTH FOUR TIMES A DAY *HOLD FOR DROWSINESS, DO NOT AWAKEN TO ADMINISTER MED- REDUCE DOSES TAKEN AS PAIN BECOMES MANAGEABLE* 12/17/22 1/16/23  KEVIN Zimmerman CNP   fentaNYL (DURAGESIC) 25 MCG/HR Place 1 patch onto the skin every 3 days for 30 days. Intended supply: 30 days 12/17/22 1/16/23  KEVIN Zimmerman CNP   oxyCODONE (OXY-IR) 15 MG immediate release tablet Take 1 tablet by mouth in the morning and 1 tablet at noon and 1 tablet in the evening and 1 tablet before bedtime. Do all this for 30 days.  HOLD IF DROWSINESS OCCURS, DO NOT AWAKEN TO GIVE MED, LET HIM SLEEP IF SLEEPING. 12/17/22 1/16/23  KEVIN Galvan CNP   lactulose encephalopathy 10 GM/15ML SOLN solution GIVE 30 ML BY MOUTH ONCE DAILY AS NEEDED 12/7/22   Cj Brown MD   furosemide (LASIX) 40 MG tablet TAKE 1 AND 1/2 TABLETS (TOTAL 60MG) BY MOUTH EVERY DAY 11/28/22   Tyron Cornejo DO   permethrin (ELIMITE) 5 % cream Thoroughly massage cream (30 g for average adult) from head to soles of feet; leave on for 8 to 14 hours before removing (shower or bath) 10/26/22   Candance Rear, APRN - CNP   lisinopril (PRINIVIL;ZESTRIL) 5 MG tablet TAKE 1 TABLET DAILY 10/11/22   Cj Brown MD   metoprolol tartrate (LOPRESSOR) 25 MG tablet Take 1 tablet by mouth 2 times daily 6/6/22   Tyron Cornejo DO   atorvastatin (LIPITOR) 10 MG tablet TAKE 1 TABLET DAILY 2/23/22   Cj Brown MD   clopidogrel (PLAVIX) 75 MG tablet TAKE 1 TABLET DAILY 2/23/22   Cj Brown MD   melatonin 3 MG TABS tablet Take 3 mg by mouth nightly    Historical Provider, MD   docusate sodium (COLACE) 100 MG capsule Take 100 mg by mouth daily    Historical Provider, MD   pantoprazole (PROTONIX) 40 MG tablet Take 1 tablet by mouth every morning (before breakfast) 1/23/19   Cj Brown MD   acetaminophen (TYLENOL) 325 MG tablet Take 2 tablets by mouth every 4 hours as needed for Pain or Fever 6/12/18   Yen Young MD   Multiple Vitamins-Minerals (MULTIVITAMIN PO) Take 1 tablet by mouth daily. Historical Provider, MD       Allergies:  Codeine and Morphine    Social History:   reports that he quit smoking about 24 years ago. His smoking use included cigars and cigarettes. He smoked an average of 1 pack per day. He has never used smokeless tobacco. He reports that he does not drink alcohol and does not use drugs. Review of Systems:  REVIEW OF SYSTEMS:    Constitutional: there has been no unanticipated weight loss. There's been No change in energy level, No change in activity level. Eyes: No visual changes or diplopia. No scleral icterus.   ENT: No Headaches, hearing loss or vertigo. No mouth sores or sore throat. Cardiovascular: AS HPI  Respiratory: AS HPI  Gastrointestinal: No abdominal pain, appetite loss, blood in stools. No change in bowel or bladder habits. Genitourinary: No dysuria, trouble voiding, or hematuria. Musculoskeletal:  No gait disturbance, No weakness or joint complaints. Integumentary: No rash or pruritis. Neurological: No headache, diplopia, change in muscle strength, numbness or tingling. No change in gait, balance, coordination, mood, affect, memory, mentation, behavior. Psychiatric: No new anxiety or depression. Endocrine: No temperature intolerance. No excessive thirst, fluid intake, or urination. No tremor. Hematologic/Lymphatic: No abnormal bruising or bleeding, blood clots or swollen lymph nodes. Allergic/Immunologic: No nasal congestion or hives. Physical Exam:  There were no vitals taken for this visit. Physical Exam  Constitutional:       Appearance: Normal appearance. HENT:      Head: Normocephalic and atraumatic. Cardiovascular:      Rate and Rhythm: Normal rate and regular rhythm. Pulses: Normal pulses. Heart sounds: Normal heart sounds. No murmur heard. Pulmonary:      Effort: Pulmonary effort is normal. No respiratory distress. Breath sounds: Normal breath sounds. No stridor. Abdominal:      General: There is no distension. Palpations: There is no mass. Musculoskeletal:         General: No swelling or tenderness. Right lower leg: Edema present. Left lower leg: Edema present. Skin:     Capillary Refill: Capillary refill takes less than 2 seconds. Coloration: Skin is not jaundiced or pale. Neurological:      General: No focal deficit present. Mental Status: He is alert and oriented to person, place, and time. Cranial Nerves: No cranial nerve deficit. Sensory: No sensory deficit.    Psychiatric:         Mood and Affect: Mood normal.         Behavior: Behavior normal.     Labs:     Lab Results   Component Value Date    CHOL 110 06/03/2021    TRIG 94 06/03/2021    HDL 38 (L) 06/03/2021    LDLCHOLESTEROL 53 06/03/2021    VLDL NOT REPORTED 06/03/2021    CHOLHDLRATIO 2.9 06/03/2021       Lab Results   Component Value Date     07/14/2022    K 4.2 07/14/2022    CL 99 07/14/2022    CO2 27 07/14/2022    BUN 26 (H) 07/14/2022    CREATININE 1.23 (H) 07/14/2022    GLUCOSE 102 (H) 07/14/2022    CALCIUM 9.5 07/14/2022    PROT 6.1 (L) 01/10/2020    LABALBU 3.3 (L) 01/10/2020    BILITOT 0.44 01/10/2020    ALKPHOS 93 01/10/2020    AST 14 06/03/2021    ALT 18 01/10/2020    LABGLOM 55 (L) 07/14/2022    GFRAA >60 07/14/2022    GLOB NOT REPORTED 11/23/2018     Cardiac Data:  ECG:  Sinus  Rhythm  - frequent ectopic ventricular beat s   # VECs = 3  -Right bundle branch block with left axis -bifascicular block. ECHO 7/2022  Summary  Normal left ventricular diameter. Left ventricular systolic function is normal.  Left ventricular ejection fraction 65 %. Grade III (severe) left ventricular diastolic dysfunction. Left atrium is mildly dilated. Right atrial dilatation. Right ventricular dilatation with normal systolic function. Aortic valve sclerosis without stenosis. Mild aortic insufficiency. Mitral annular calcification. Mild mitral regurgitation. Normal tricuspid valve leaflets. Mild tricuspid regurgitation. Estimated right ventricular systolic pressure is 53 mmHg. Moderate tricuspid regurgitation. No significant pericardial effusion is seen. Aortic root dimension is at upper limit of normal.      Assessment/Plan:  1. Bilateral LE edema with grade III DD- R leg improved. L Leg is wrapped. - Dopplers previously revealed possible DVT- per vascular less likely-off AC per vascular  - Will increase lasix to 80mg BID     2. CAD. STEMI on 11/2018 with KRISTEN of LAD. Mild disease of other arteries. Stable and asymptomatic, continue current medications. 3. HPL. On Statin. 4. Essential hypertension. Well controlled on current medications. 5. Chronic venous stasis and cellulitis- per PCP    6. H/o DVT - followed by Vascular. 7. PVCs on ECG-  on BB     The patient is to continue heart healthy diet, weight loss and exercise as tolerated. Patient's medications and side effects were discussed. Medication refills were provided if needed. Follow up appointment timing was discussed. All questions and concerns were addressed to patient's satisfaction. The patient is to follow up in 6 months or sooner if necessary. Thank you for allowing me to participate in the care of this patient, please do not hesitate to call if you have any questions. Terri Tubbs, [address] [facility]  [facility]. [facility]  [phone]

## 2023-01-11 DIAGNOSIS — M25.562 CHRONIC PAIN OF BOTH KNEES: ICD-10-CM

## 2023-01-11 DIAGNOSIS — G89.29 CHRONIC PAIN OF BOTH KNEES: ICD-10-CM

## 2023-01-11 DIAGNOSIS — M25.561 CHRONIC PAIN OF BOTH KNEES: ICD-10-CM

## 2023-01-11 NOTE — TELEPHONE ENCOUNTER
Mansoor Etienne from the nursing home called because they placed the last fentanyl patch on the patient. There is no OARRS report, it was last sent in on 12/17/22 by [employer]. Pended for 1/13/22, but technically wouldn't be due until 1/16/23.       Last Appt:  11/17/2022  Next Appt:   2/17/2023  Med verified in Epic

## 2023-01-12 RX ORDER — FENTANYL 25 UG/H
1 PATCH TRANSDERMAL
Qty: 10 PATCH | Refills: 0 | Status: SHIPPED | OUTPATIENT
Start: 2023-01-13 | End: 2023-02-12

## 2023-01-18 DIAGNOSIS — M25.562 CHRONIC PAIN OF BOTH KNEES: ICD-10-CM

## 2023-01-18 DIAGNOSIS — G89.29 CHRONIC PAIN OF BOTH KNEES: ICD-10-CM

## 2023-01-18 DIAGNOSIS — M25.561 CHRONIC PAIN OF BOTH KNEES: ICD-10-CM

## 2023-01-18 RX ORDER — OXYCODONE HYDROCHLORIDE 15 MG/1
15 TABLET ORAL EVERY 6 HOURS PRN
Qty: 120 TABLET | Refills: 0 | Status: SHIPPED | OUTPATIENT
Start: 2023-01-18 | End: 2023-02-17

## 2023-01-18 NOTE — TELEPHONE ENCOUNTER
Jose Luis Iqbal from [facility] called because the patient is in need of a script for Oxycodone 15mg    OARRS Report checked for [facility], and [place]: 12/16/22 Oxycodone 15mg #120. Due NOW.     [address]     Last Appt:  11/17/2022  Next Appt:   2/17/2023  Med verified in Epic

## 2023-01-23 NOTE — TELEPHONE ENCOUNTER
OAR[place] Report checked for [place], [place], and [place]: 12/27/21 percocet 10-325mg #120. Due NOW.     Last Appt:  11/22/2021  Next Appt:   Visit date not found  Med verified in [facility] Start Regimen: Start clobetasol scalp solution as directed\\nStart clobetasol cream as directed\\nStart fluocinolone solution as directed Action 4: Continue Other Instructions: Today’s visit- no improvement with mometasone solution \\nLOV-Continue mometasone solution to scalp and hydrocortisone to ears and face as needed. Moisturizer twice daily. No PsA. Detail Level: Zone Continue Regimen: Continue hydrocortisone cream as directed

## 2023-01-29 ENCOUNTER — APPOINTMENT (OUTPATIENT)
Dept: GENERAL RADIOLOGY | Age: 88
End: 2023-01-29
Payer: MEDICARE

## 2023-01-29 ENCOUNTER — HOSPITAL ENCOUNTER (EMERGENCY)
Age: 88
Discharge: HOME | End: 2023-01-29
Attending: EMERGENCY MEDICINE
Payer: MEDICARE

## 2023-01-29 VITALS
HEART RATE: 85 BPM | OXYGEN SATURATION: 95 % | TEMPERATURE: 101.3 F | RESPIRATION RATE: 16 BRPM | SYSTOLIC BLOOD PRESSURE: 144 MMHG | DIASTOLIC BLOOD PRESSURE: 60 MMHG

## 2023-01-29 DIAGNOSIS — M16.12 OSTEOARTHRITIS OF LEFT HIP, UNSPECIFIED OSTEOARTHRITIS TYPE: ICD-10-CM

## 2023-01-29 DIAGNOSIS — N30.01 ACUTE CYSTITIS WITH HEMATURIA: ICD-10-CM

## 2023-01-29 DIAGNOSIS — U07.1 COVID-19: Primary | ICD-10-CM

## 2023-01-29 LAB
ABSOLUTE EOS #: <0.03 K/UL (ref 0–0.44)
ABSOLUTE IMMATURE GRANULOCYTE: 0.04 K/UL (ref 0–0.3)
ABSOLUTE LYMPH #: 0.77 K/UL (ref 1.1–3.7)
ABSOLUTE MONO #: 1.19 K/UL (ref 0.1–1.2)
ALBUMIN SERPL-MCNC: 4 G/DL (ref 3.5–5.2)
ALBUMIN/GLOBULIN RATIO: 1.5 (ref 1–2.5)
ALP BLD-CCNC: 87 U/L (ref 40–129)
ALT SERPL-CCNC: 11 U/L (ref 5–41)
ANION GAP SERPL CALCULATED.3IONS-SCNC: 12 MMOL/L (ref 9–17)
AST SERPL-CCNC: 13 U/L
BACTERIA: ABNORMAL
BASOPHILS # BLD: 0 % (ref 0–2)
BASOPHILS ABSOLUTE: 0.03 K/UL (ref 0–0.2)
BILIRUB SERPL-MCNC: 0.4 MG/DL (ref 0.3–1.2)
BILIRUBIN URINE: NEGATIVE
BUN BLDV-MCNC: 24 MG/DL (ref 8–23)
BUN/CREAT BLD: 22 (ref 9–20)
CALCIUM SERPL-MCNC: 9.2 MG/DL (ref 8.6–10.4)
CHLORIDE BLD-SCNC: 101 MMOL/L (ref 98–107)
CO2: 26 MMOL/L (ref 20–31)
CREAT SERPL-MCNC: 1.11 MG/DL (ref 0.7–1.2)
EOSINOPHILS RELATIVE PERCENT: 0 % (ref 1–4)
EPITHELIAL CELLS UA: ABNORMAL /HPF (ref 0–5)
FLU A ANTIGEN: NEGATIVE
FLU B ANTIGEN: NEGATIVE
GFR SERPL CREATININE-BSD FRML MDRD: >60 ML/MIN/1.73M2
GLUCOSE BLD-MCNC: 128 MG/DL (ref 70–99)
GLUCOSE URINE: NEGATIVE
HCT VFR BLD CALC: 37.5 % (ref 40.7–50.3)
HEMOGLOBIN: 12.5 G/DL (ref 13–17)
IMMATURE GRANULOCYTES: 0 %
KETONES, URINE: NEGATIVE
LACTIC ACID: 1.3 MMOL/L (ref 0.5–2.2)
LEUKOCYTE ESTERASE, URINE: ABNORMAL
LYMPHOCYTES # BLD: 7 % (ref 24–43)
MCH RBC QN AUTO: 30.4 PG (ref 25.2–33.5)
MCHC RBC AUTO-ENTMCNC: 33.3 G/DL (ref 25.2–33.5)
MCV RBC AUTO: 91.2 FL (ref 82.6–102.9)
MONOCYTES # BLD: 11 % (ref 3–12)
NITRITE, URINE: POSITIVE
NRBC AUTOMATED: 0 PER 100 WBC
OTHER OBSERVATIONS UA: ABNORMAL
PDW BLD-RTO: 14.2 % (ref 11.8–14.4)
PH UA: 6.5 (ref 5–6)
PLATELET # BLD: 270 K/UL (ref 138–453)
PMV BLD AUTO: 10.6 FL (ref 8.1–13.5)
POTASSIUM SERPL-SCNC: 4.3 MMOL/L (ref 3.7–5.3)
PRO-BNP: 970 PG/ML
PROTEIN UA: NEGATIVE
RBC # BLD: 4.11 M/UL (ref 4.21–5.77)
RBC UA: ABNORMAL /HPF (ref 0–4)
SARS-COV-2, RAPID: DETECTED
SEG NEUTROPHILS: 82 % (ref 36–65)
SEGMENTED NEUTROPHILS ABSOLUTE COUNT: 9.04 K/UL (ref 1.5–8.1)
SODIUM BLD-SCNC: 139 MMOL/L (ref 135–144)
SPECIFIC GRAVITY UA: 1.01 (ref 1.01–1.02)
SPECIMEN DESCRIPTION: ABNORMAL
TOTAL PROTEIN: 6.6 G/DL (ref 6.4–8.3)
TROPONIN, HIGH SENSITIVITY: 32 NG/L (ref 0–22)
TROPONIN, HIGH SENSITIVITY: 33 NG/L (ref 0–22)
URINE HGB: ABNORMAL
UROBILINOGEN, URINE: NORMAL
WBC # BLD: 11.1 K/UL (ref 3.5–11.3)
WBC UA: ABNORMAL /HPF (ref 0–4)

## 2023-01-29 PROCEDURE — 85025 COMPLETE CBC W/AUTO DIFF WBC: CPT

## 2023-01-29 PROCEDURE — 73502 X-RAY EXAM HIP UNI 2-3 VIEWS: CPT

## 2023-01-29 PROCEDURE — 93005 ELECTROCARDIOGRAM TRACING: CPT | Performed by: EMERGENCY MEDICINE

## 2023-01-29 PROCEDURE — 81001 URINALYSIS AUTO W/SCOPE: CPT

## 2023-01-29 PROCEDURE — 83880 ASSAY OF NATRIURETIC PEPTIDE: CPT

## 2023-01-29 PROCEDURE — 99285 EMERGENCY DEPT VISIT HI MDM: CPT

## 2023-01-29 PROCEDURE — 71045 X-RAY EXAM CHEST 1 VIEW: CPT

## 2023-01-29 PROCEDURE — 6370000000 HC RX 637 (ALT 250 FOR IP): Performed by: EMERGENCY MEDICINE

## 2023-01-29 PROCEDURE — 36415 COLL VENOUS BLD VENIPUNCTURE: CPT

## 2023-01-29 PROCEDURE — 87077 CULTURE AEROBIC IDENTIFY: CPT

## 2023-01-29 PROCEDURE — 87040 BLOOD CULTURE FOR BACTERIA: CPT

## 2023-01-29 PROCEDURE — 87086 URINE CULTURE/COLONY COUNT: CPT

## 2023-01-29 PROCEDURE — 83605 ASSAY OF LACTIC ACID: CPT

## 2023-01-29 PROCEDURE — 73562 X-RAY EXAM OF KNEE 3: CPT

## 2023-01-29 PROCEDURE — 87804 INFLUENZA ASSAY W/OPTIC: CPT

## 2023-01-29 PROCEDURE — 87186 SC STD MICRODIL/AGAR DIL: CPT

## 2023-01-29 PROCEDURE — 80053 COMPREHEN METABOLIC PANEL: CPT

## 2023-01-29 PROCEDURE — 84484 ASSAY OF TROPONIN QUANT: CPT

## 2023-01-29 PROCEDURE — 87635 SARS-COV-2 COVID-19 AMP PRB: CPT

## 2023-01-29 RX ORDER — ACETAMINOPHEN 325 MG/1
650 TABLET ORAL ONCE
Status: COMPLETED | OUTPATIENT
Start: 2023-01-29 | End: 2023-01-29

## 2023-01-29 RX ORDER — CEPHALEXIN 500 MG/1
500 CAPSULE ORAL 3 TIMES DAILY
Qty: 30 CAPSULE | Refills: 0 | Status: SHIPPED | OUTPATIENT
Start: 2023-01-29 | End: 2023-02-08

## 2023-01-29 RX ADMIN — ACETAMINOPHEN 325MG 650 MG: 325 TABLET ORAL at 17:36

## 2023-01-29 ASSESSMENT — PAIN DESCRIPTION - LOCATION: LOCATION: LEG

## 2023-01-29 ASSESSMENT — PAIN - FUNCTIONAL ASSESSMENT: PAIN_FUNCTIONAL_ASSESSMENT: 0-10

## 2023-01-29 NOTE — DISCHARGE INSTRUCTIONS
Return if you have difficulty breathing or chest pain. You need to isolate for the next 5 days or until you are no longer having any symptoms of COVID. PLEASE RETURN TO THE EMERGENCY DEPARTMENT IMMEDIATELY if your symptoms worsen in anyway. You should immediately return to the ER for symptoms such as new or worsening pain, difficulty breathing or swallowing, a change in your voice, a feeling of passing out, light headed, dizziness, chest pain, headache, neck pain, rash, abdominal pain or vomiting. Your current symptoms may not appear severe to yourself, however you are highly contagious and should self quarantine for at least 10 days from the onset of symptoms or 72 hours after resolution of your fever without any antipyretic medications (whichever is shorter). Isolation strategies should be discussed and reinforced to protect your friends and families. Take your medication as indicated and prescribed. If you are given an antibiotic then, make sure you get the prescription filled and take the antibiotics until finished. Please understand that at this time there is no evidence for a more serious underlying process, but that early in the process of an illness or injury, an emergency department workup can be falsely reassuring. You should contact your family doctor within the next 48 hours for a follow up appointment. Jose Reyes!!!    From [facility] and [address] [place] [address] Emergency Services    On behalf of the Emergency Department staff at [facility]), I would like to thank you for giving us the opportunity to address your health care needs and concerns. We hope that during your visit, our service was delivered in a professional and caring manner. Please keep [facility] in mind as we walk with you down the path to your own personal wellness. Please expect an automated text message or email from us so we can ask a few questions about your health and progress.  Based on your answers, a clinician may call you back to offer help and instructions. Please understand that early in the process of an illness or injury, an emergency department workup can be falsely reassuring. If you notice any worsening, changing or persistent symptoms please call your family doctor or return to the ER immediately. Tell us how we did during your visit at [email or web address]   and let us know about your experience

## 2023-01-29 NOTE — ED PROVIDER NOTES
[facility] ED  [address]  Phone: [phone]        Pt Name: Curt Alicea  MRN: [id number]  [place] 6/28/1927  Date of evaluation: 1/29/23      CHIEF COMPLAINT     Chief Complaint   Patient presents with    Fall           HISTORY OF PRESENT ILLNESS  (Location/Symptom, Timing/Onset, Context/Setting, Quality, Duration, Modifying Factors, Severity.)    Curt Alicea is a 80 y.o. male who presents with a fall. Patient was sent over from the nursing home after he had a fall. He states that he tripped and fell he did not pass out. Nursing home staff report that they did a COVID test on him just prior to sending him over and it was positive. He was not aware that he was positive for COVID. Does present with a fever of 101. 3. He has a history of hypertension, obesity, high cholesterol, coronary artery disease, Sepulveda's esophagitis BPH, has had an MI in the past.  She also has a history of congestive heart failure with pulmonary edema. He has chronic swelling bilateral lower extremities. He denies any sore throat congestion shortness of breath or chest pain. He denies any loss of smell. He denies any cough. Patient denies any abdominal pain nausea or vomiting. States when he fell he did not hit his head and had no loss of consciousness. He denies any dizziness. He denies any neck pain. He denies any numbness or weakness of his arms or legs. Patient does have chronic lymphedema with swelling bilateral lower extremities. He has pain in his left leg that is chronic. He denies any injury from the fall. He does have difficulty flexing at the knee. On the left because of the chronic swelling he has in his legs. He denies any injury to his upper or lower extremities.       REVIEW OF SYSTEMS    (2-9 systems for level 4, 10 or more for level 5)     Review of Systems since are reviewed and are negative except was present in the HPI    PAST MEDICAL HISTORY has a past medical history of 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 SURGICAL HISTORY      has a past surgical history that includes Colonoscopy (08/29/12); Cystocopy (11/15/2007); Appendectomy; Cholecystectomy (01/12/1983); Knee fusion (Left); lipoma resection; Colonoscopy (05/27/09); Colonoscopy (04/30/2008); Prostate Biopsy (Bilateral, 06/23/2009); pr egd transoral biopsy single/multiple (N/A, 7/3/2017); Upper gastrointestinal endoscopy (02/2/2015); Upper gastrointestinal endoscopy (04/25/2016); Upper gastrointestinal endoscopy (07/03/2017); pr cystourethroscopy (N/A, 11/19/2018); and Leg Surgery (Left, 1/11/2020). CURRENTMEDICATIONS       Previous Medications    ACETAMINOPHEN (TYLENOL) 325 MG TABLET    Take 2 tablets by mouth every 4 hours as needed for Pain or Fever    ATORVASTATIN (LIPITOR) 10 MG TABLET    TAKE 1 TABLET DAILY    CLOPIDOGREL (PLAVIX) 75 MG TABLET    TAKE 1 TABLET DAILY    DOCUSATE SODIUM (COLACE) 100 MG CAPSULE    Take 100 mg by mouth daily    FENTANYL (DURAGESIC) 25 MCG/HR    Place 1 patch onto the skin every 3 days for 30 days. Intended supply: 30 days    FUROSEMIDE (LASIX) 40 MG TABLET    Take 2 tablets by mouth 2 times daily    LACTULOSE ENCEPHALOPATHY 10 GM/15ML SOLN SOLUTION    GIVE 30 ML BY MOUTH ONCE DAILY AS NEEDED    LISINOPRIL (PRINIVIL;ZESTRIL) 5 MG TABLET    TAKE 1 TABLET DAILY    MELATONIN 3 MG TABS TABLET    Take 3 mg by mouth nightly    METOPROLOL TARTRATE (LOPRESSOR) 25 MG TABLET    Take 1 tablet by mouth 2 times daily    MULTIPLE VITAMINS-MINERALS (MULTIVITAMIN PO)    Take 1 tablet by mouth daily. OXYCODONE (OXY-IR) 15 MG IMMEDIATE RELEASE TABLET    Take 1 tablet by mouth every 6 hours as needed for Pain for up to 30 days.  Max Daily Amount: 60 mg    PANTOPRAZOLE (PROTONIX) 40 MG TABLET    Take 1 tablet by mouth every morning (before breakfast)    PERMETHRIN (ELIMITE) 5 % CREAM    Thoroughly massage cream (30 g for average adult) from head to soles of feet; leave on for 8 to 14 hours before removing (shower or bath)       ALLERGIES     is allergic to codeine and morphine. FAMILY HISTORY     He indicated that his mother is . He indicated that his father is . He indicated that his sister is alive. He indicated that his brother is alive. He indicated that his maternal grandmother is . He indicated that his maternal grandfather is . He indicated that his paternal grandmother is . He indicated that his paternal grandfather is . He indicated that his son is alive. family history includes Cancer in his father; Heart Disease in his paternal grandfather. SOCIAL HISTORY      reports that he quit smoking about 25 years ago. His smoking use included cigars and cigarettes. He smoked an average of 1 pack per day. He has never used smokeless tobacco. He reports that he does not drink alcohol and does not use drugs. PHYSICAL EXAM    (up to 7 for level 4, 8 or more for level 5)   INITIAL VITALS:  tympanic temperature is 101.3 °F (38.5 °C) (abnormal). His blood pressure is 134/67 and his pulse is 85. His respiration is 16 and oxygen saturation is 95%. Physical Exam  Vitals reviewed. Nursing note reviewed: Patient has a fever of 101. 3..  Constitutional:       General: He is not in acute distress. HENT:      Head: Normocephalic and atraumatic. Right Ear: Tympanic membrane, ear canal and external ear normal.      Left Ear: Tympanic membrane, ear canal and external ear normal.      Nose: No congestion. Right Sinus: No maxillary sinus tenderness or frontal sinus tenderness. Left Sinus: No maxillary sinus tenderness or frontal sinus tenderness. Mouth/Throat:      Mouth: Mucous membranes are moist.      Pharynx: Oropharynx is clear.  No pharyngeal swelling, oropharyngeal exudate, posterior oropharyngeal erythema or uvula swelling. Eyes:      General: Lids are normal.      Extraocular Movements: Extraocular movements intact. Pupils: Pupils are equal, round, and reactive to light. Cardiovascular:      Rate and Rhythm: Normal rate and regular rhythm. Pulses: Normal pulses. Heart sounds: Normal heart sounds. Comments: She has chronic lymphedema bilateral lower extremities  Pulmonary:      Effort: Pulmonary effort is normal. No tachypnea, bradypnea, accessory muscle usage, prolonged expiration, respiratory distress or retractions. Breath sounds: Normal breath sounds and air entry. Comments: O2 sat is 95% on room air. Patient is not tachycardic or tachypneic. He can talk in full sentences with no respiratory distress  Chest:      Chest wall: No tenderness or crepitus. Abdominal:      General: Bowel sounds are normal.      Palpations: Abdomen is soft. Tenderness: There is no abdominal tenderness. There is no right CVA tenderness, left CVA tenderness, guarding or rebound. Negative signs include Araya's sign, Rovsing's sign and McBurney's sign. Musculoskeletal:      Cervical back: Full passive range of motion without pain, normal range of motion and neck supple. No signs of trauma or rigidity. No pain with movement, spinous process tenderness or muscular tenderness. Normal range of motion. Thoracic back: Normal. No swelling, spasms, tenderness or bony tenderness. Normal range of motion. Lumbar back: Normal. No swelling, spasms, tenderness or bony tenderness. Normal range of motion. Right knee: Swelling present. No bony tenderness. Normal range of motion. No tenderness. Left knee: Swelling and bony tenderness present. Decreased range of motion. Tenderness present. Right lower le+ Pitting Edema present. Left lower le+ Pitting Edema present.       Comments: Patient has chronic lymphedema bilateral lower extremities. He has difficulty flexing his left knee. This is a chronic problem its not a new injury. Lymphadenopathy:      Cervical: No cervical adenopathy. Right cervical: No superficial, deep or posterior cervical adenopathy. Left cervical: No superficial, deep or posterior cervical adenopathy. Skin:     General: Skin is warm. Capillary Refill: Capillary refill takes less than 2 seconds. Findings: No rash. Neurological:      General: No focal deficit present. Mental Status: He is alert. GCS: GCS eye subscore is 4. GCS verbal subscore is 5. GCS motor subscore is 6. Cranial Nerves: Cranial nerves 2-12 are intact. Sensory: Sensation is intact. Motor: Motor function is intact. Gait: Gait abnormal.      Comments: Patient has difficulty walking secondary to his chronic lymphedema lateral lower extremities       DIFFERENTIAL DIAGNOSIS/ MDM:   Patient presents with a fever and was diagnosed to be COVID-positive just prior to coming over. He denies any chest pain or shortness of breath. We will get a chest x-ray to check for pneumonia. Patient has chronic swelling bilateral lower extremities and has left knee pain. We will get an x-ray of the left knee because of the fall. Patient denies any syncope. He states he just fell. Because of patient's age she is high risk for COVID. DIAGNOSTIC RESULTS     EKG: All EKG's are interpreted by the Emergency Department Physician who either signs or Co-signs this chart in the absence of a cardiologist.      Interpreted by Benjie Dickson DO     Rhythm: normal sinus   Rate: 75  Axis: normal  Ectopy: none  Conduction:Right bundle branch block,Bifascicular block  ST Segments: no acute change  T Waves: no acute change  Q Waves: none    Clinical Impression: normal sinus rhythm with Right bundle branch block. No acute changes/normal EKG. No acute infarction/ischemia noted. Unchanged from previous EKG 7/14/22      RADIOLOGY:        Interpretation per the Radiologist below, if available at the time of this note:      XR KNEE LEFT (3 VIEWS)    Result Date: 1/29/2023  EXAMINATION: THREE XRAY VIEWS OF THE LEFT KNEE 1/29/2023 5:26 pm COMPARISON: 01/05/2020 HISTORY: ORDERING SYSTEM PROVIDED HISTORY: Fall with knee pain. Patient has chronic lymphedema with chronic pain in the left knee. TECHNOLOGIST PROVIDED HISTORY: Fall with knee pain. Patient has chronic lymphedema with chronic pain in the left knee. Reason for Exam: Lymphedema. General left knee pain. Pain with any type of movement, unable to bend leg. Patient has a wrap on entire leg. FINDINGS: There is no evidence of acute fracture. There is normal alignment. No acute joint abnormality. No focal osseous lesion. No focal soft tissue abnormality. Chronic deformity of the distal tibia. Moderate osteoarthritis. Diffuse osteopenia. No acute osseous abnormality. XR CHEST PORTABLE    Result Date: 1/29/2023  EXAMINATION: ONE XRAY VIEW OF THE CHEST 1/29/2023 2:25 pm COMPARISON: 07/14/2022 HISTORY: ORDERING SYSTEM PROVIDED HISTORY: COVID +. fever TECHNOLOGIST PROVIDED HISTORY: COVID +. fever Reason for Exam: COVID positive FINDINGS: The lungs are without acute focal process. There is no effusion or pneumothorax. The cardiomediastinal silhouette is stable. The osseous structures are stable. No acute process. XR HIP 2-3 VW W PELVIS LEFT    Result Date: 1/29/2023  EXAMINATION: ONE XRAY VIEW OF THE PELVIS AND TWO XRAY VIEWS LEFT HIP 1/29/2023 6:14 pm COMPARISON: 06/09/2018 HISTORY: ORDERING SYSTEM PROVIDED HISTORY: fall, left thigh pain TECHNOLOGIST PROVIDED HISTORY: fall, left thigh pain Reason for Exam: Patient states no hip pain currently, only left knee pain. FINDINGS: The hip demonstrates normal alignment. No evidence of acute fracture. No focal osseus lesion. Pelvis is intact. Severe left hip osteoarthritis. Diffuse osteopenia.      No acute abnormality of the hip. If there is clinical suspicion for radiographically occult fracture and the patient is unable to bear weight, recommend MRI. LABS:  Results for orders placed or performed during the hospital encounter of 01/29/23   Rapid influenza A/B antigens    Specimen: Nasopharyngeal   Result Value Ref Range    Flu A Antigen NEGATIVE NEGATIVE    Flu B Antigen NEGATIVE NEGATIVE   COVID-19, Rapid    Specimen: Nasopharyngeal Swab   Result Value Ref Range    Specimen Description . NASOPHARYNGEAL SWAB     SARS-CoV-2, Rapid DETECTED (A) Not Detected   Lactic Acid   Result Value Ref Range    Lactic Acid 1.3 0.5 - 2.2 mmol/L   Brain Natriuretic Peptide   Result Value Ref Range    Pro- (H) <300 pg/mL   Troponin   Result Value Ref Range    Troponin, High Sensitivity 33 (H) 0 - 22 ng/L   Urinalysis with Reflex to Culture    Specimen: Urine   Result Value Ref Range    Glucose, Ur NEGATIVE NEGATIVE    Bilirubin Urine NEGATIVE NEGATIVE    Ketones, Urine NEGATIVE NEGATIVE    Specific Glenvil, UA 1.010 1.010 - 1.025    Urine Hgb TRACE (A) NEGATIVE    pH, UA 6.5 (H) 5.0 - 6.0    Protein, UA NEGATIVE NEGATIVE    Urobilinogen, Urine Normal Normal    Nitrite, Urine POSITIVE (A) NEGATIVE    Leukocyte Esterase, Urine 1+ (A) NEGATIVE   CBC with Auto Differential   Result Value Ref Range    WBC 11.1 3.5 - 11.3 k/uL    RBC 4.11 (L) 4.21 - 5.77 m/uL    Hemoglobin 12.5 (L) 13.0 - 17.0 g/dL    Hematocrit 37.5 (L) 40.7 - 50.3 %    MCV 91.2 82.6 - 102.9 fL    MCH 30.4 25.2 - 33.5 pg    MCHC 33.3 25.2 - 33.5 g/dL    RDW 14.2 11.8 - 14.4 %    Platelets 663 260 - 127 k/uL    MPV 10.6 8.1 - 13.5 fL    NRBC Automated 0.0 0.0 per 100 WBC    Seg Neutrophils 82 (H) 36 - 65 %    Lymphocytes 7 (L) 24 - 43 %    Monocytes 11 3 - 12 %    Eosinophils % 0 (L) 1 - 4 %    Basophils 0 0 - 2 %    Immature Granulocytes 0 0 %    Segs Absolute 9.04 (H) 1.50 - 8.10 k/uL    Absolute Lymph # 0.77 (L) 1.10 - 3.70 k/uL    Absolute Mono # 1.19 0.10 - 1.20 k/uL    Absolute Eos # <0.03 0.00 - 0.44 k/uL    Basophils Absolute 0.03 0.00 - 0.20 k/uL    Absolute Immature Granulocyte 0.04 0.00 - 0.30 k/uL   Comprehensive Metabolic Panel   Result Value Ref Range    Glucose 128 (H) 70 - 99 mg/dL    BUN 24 (H) 8 - 23 mg/dL    Creatinine 1.11 0.70 - 1.20 mg/dL    Est, Glom Filt Rate >60 >60 mL/min/1.73m2    Bun/Cre Ratio 22 (H) 9 - 20    Calcium 9.2 8.6 - 10.4 mg/dL    Sodium 139 135 - 144 mmol/L    Potassium 4.3 3.7 - 5.3 mmol/L    Chloride 101 98 - 107 mmol/L    CO2 26 20 - 31 mmol/L    Anion Gap 12 9 - 17 mmol/L    Alkaline Phosphatase 87 40 - 129 U/L    ALT 11 5 - 41 U/L    AST 13 <40 U/L    Total Bilirubin 0.4 0.3 - 1.2 mg/dL    Total Protein 6.6 6.4 - 8.3 g/dL    Albumin 4.0 3.5 - 5.2 g/dL    Albumin/Globulin Ratio 1.5 1.0 - 2.5   Troponin   Result Value Ref Range    Troponin, High Sensitivity 32 (H) 0 - 22 ng/L   Microscopic Urinalysis   Result Value Ref Range    WBC, UA 25 TO 50 0 - 4 /HPF    RBC, UA 0 TO 4 0 - 4 /HPF    Epithelial Cells UA None 0 - 5 /HPF    Bacteria, UA 3+ (A) None    Other Observations UA Specimen Cultured (A) NOT REQ. EMERGENCY DEPARTMENT COURSE:   Vitals:    Vitals:    01/29/23 1631   BP: 134/67   Pulse: 85   Resp: 16   Temp: (!) 101.3 °F (38.5 °C)   TempSrc: Tympanic   SpO2: 95%     -------------------------  BP: 134/67, Temp: (!) 101.3 °F (38.5 °C), Heart Rate: 85, Resp: 16    The patient was given the following medications:  Orders Placed This Encounter   Medications    acetaminophen (TYLENOL) tablet 650 mg    nirmatrelvir/ritonavir (PAXLOVID, 300/100,) 20 x 150 MG & 10 x 100MG TBPK     Sig: Take 3 tablets (two 150 mg nirmatrelvir and one 100 mg ritonavir tablets) by mouth every 12 hours for 5 days. Dispense:  30 tablet     Refill:  0     Order Specific Question:   Does this patient qualify for COVID-19 antIviral therapy based on criteria for treatment?      Answer:   Yes    cephALEXin (KEFLEX) 500 MG capsule     Sig: Take 1 capsule by mouth 3 times daily for 10 days     Dispense:  30 capsule     Refill:  0        RE-EVALUATION:  6:57 PM EST patient has COVID-19 with no respiratory distress. X-ray of the left knee and hip shows no acute fracture. Patient does have a urinary tract infection. As he is high risk because of his age he will be started on Paxilovid. He also was started on Keflex for his UTI. A urine culture has been sent    CONSULTS:      PROCEDURES:  None    FINAL IMPRESSION      1. COVID-19    2. Osteoarthritis of left hip, unspecified osteoarthritis type    3. Acute cystitis with hematuria          DISPOSITION/PLAN   DISPOSITION Decision To Discharge 01/29/2023 06:45:24 PM      CONDITION ON DISPOSITION:   Stable    PATIENT REFERRED TO:  Anirudh Yeh, [address]  [phone]    Call in 2 days      DISCHARGE MEDICATIONS:  New Prescriptions    CEPHALEXIN (KEFLEX) 500 MG CAPSULE    Take 1 capsule by mouth 3 times daily for 10 days    NIRMATRELVIR/RITONAVIR (PAXLOVID, 300/100,) 20 X 150 MG & 10 X 100MG TBPK    Take 3 tablets (two 150 mg nirmatrelvir and one 100 mg ritonavir tablets) by mouth every 12 hours for 5 days. (Please note that portions of this note were completed with a voicerecognition program.  Efforts were made to edit the dictations but occasionally words are mis-transcribed. )    Lia Branch DO, , MD, F.A.C.E.P.   Attending Emergency Medicine Physician        Erlin Pastor DO  01/29/23 [id number]

## 2023-01-30 LAB
EKG ATRIAL RATE: 75 BPM
EKG P AXIS: 34 DEGREES
EKG P-R INTERVAL: 166 MS
EKG Q-T INTERVAL: 428 MS
EKG QRS DURATION: 134 MS
EKG QTC CALCULATION (BAZETT): 477 MS
EKG R AXIS: -52 DEGREES
EKG T AXIS: 34 DEGREES
EKG VENTRICULAR RATE: 75 BPM

## 2023-02-01 LAB
MICROORGANISM SPEC CULT: ABNORMAL
SPECIMEN DESCRIPTION: ABNORMAL

## 2023-02-04 LAB
MICROORGANISM SPEC CULT: NORMAL
MICROORGANISM SPEC CULT: NORMAL
SPECIMEN DESCRIPTION: NORMAL
SPECIMEN DESCRIPTION: NORMAL

## 2023-02-07 ENCOUNTER — OUTSIDE SERVICES (OUTPATIENT)
Dept: INTERNAL MEDICINE | Age: 88
End: 2023-02-07
Payer: MEDICARE

## 2023-02-07 VITALS
RESPIRATION RATE: 16 BRPM | DIASTOLIC BLOOD PRESSURE: 57 MMHG | SYSTOLIC BLOOD PRESSURE: 101 MMHG | OXYGEN SATURATION: 98 % | TEMPERATURE: 98.9 F | HEART RATE: 54 BPM

## 2023-02-07 DIAGNOSIS — R11.2 NAUSEA AND VOMITING, UNSPECIFIED VOMITING TYPE: Primary | ICD-10-CM

## 2023-02-07 PROCEDURE — 99347 HOME/RES VST EST SF MDM 20: CPT | Performed by: NURSE PRACTITIONER

## 2023-02-07 RX ORDER — ONDANSETRON 4 MG/1
4 TABLET, FILM COATED ORAL DAILY PRN
Qty: 30 TABLET | Refills: 0 | Status: SHIPPED | OUTPATIENT
Start: 2023-02-07

## 2023-02-07 ASSESSMENT — ENCOUNTER SYMPTOMS
RHINORRHEA: 0
VOMITING: 1
WHEEZING: 0
DIARRHEA: 0
COUGH: 0
NAUSEA: 1

## 2023-02-09 ENCOUNTER — HOSPITAL ENCOUNTER (OUTPATIENT)
Age: 88
Setting detail: SPECIMEN
Discharge: HOME | End: 2023-02-09
Payer: MEDICARE

## 2023-02-09 DIAGNOSIS — R79.89 ELEVATED SERUM CREATININE: Primary | ICD-10-CM

## 2023-02-09 DIAGNOSIS — I50.32 CHRONIC HEART FAILURE WITH PRESERVED EJECTION FRACTION (HFPEF) (HCC): ICD-10-CM

## 2023-02-09 DIAGNOSIS — I10 ESSENTIAL HYPERTENSION, BENIGN: ICD-10-CM

## 2023-02-09 LAB
ANION GAP SERPL CALCULATED.3IONS-SCNC: 11 MMOL/L (ref 9–17)
BUN SERPL-MCNC: 33 MG/DL (ref 8–23)
BUN/CREAT BLD: 17 (ref 9–20)
CALCIUM SERPL-MCNC: 9.3 MG/DL (ref 8.6–10.4)
CHLORIDE SERPL-SCNC: 104 MMOL/L (ref 98–107)
CO2 SERPL-SCNC: 27 MMOL/L (ref 20–31)
CREAT SERPL-MCNC: 1.89 MG/DL (ref 0.7–1.2)
GFR SERPL CREATININE-BSD FRML MDRD: 32 ML/MIN/1.73M2
GLUCOSE SERPL-MCNC: 107 MG/DL (ref 70–99)
POTASSIUM SERPL-SCNC: 4.3 MMOL/L (ref 3.7–5.3)
SODIUM SERPL-SCNC: 142 MMOL/L (ref 135–144)

## 2023-02-09 PROCEDURE — 36415 COLL VENOUS BLD VENIPUNCTURE: CPT

## 2023-02-09 PROCEDURE — 80048 BASIC METABOLIC PNL TOTAL CA: CPT

## 2023-02-10 ENCOUNTER — HOSPITAL ENCOUNTER (OUTPATIENT)
Age: 88
Setting detail: SPECIMEN
Discharge: HOME | End: 2023-02-10
Payer: MEDICARE

## 2023-02-10 DIAGNOSIS — M25.562 CHRONIC PAIN OF BOTH KNEES: ICD-10-CM

## 2023-02-10 DIAGNOSIS — I10 ESSENTIAL HYPERTENSION, BENIGN: ICD-10-CM

## 2023-02-10 DIAGNOSIS — G89.29 CHRONIC PAIN OF BOTH KNEES: ICD-10-CM

## 2023-02-10 DIAGNOSIS — I50.32 CHRONIC HEART FAILURE WITH PRESERVED EJECTION FRACTION (HFPEF) (HCC): ICD-10-CM

## 2023-02-10 DIAGNOSIS — R79.89 ELEVATED SERUM CREATININE: ICD-10-CM

## 2023-02-10 DIAGNOSIS — M25.561 CHRONIC PAIN OF BOTH KNEES: ICD-10-CM

## 2023-02-10 LAB
ANION GAP SERPL CALCULATED.3IONS-SCNC: 9 MMOL/L (ref 9–17)
BUN SERPL-MCNC: 30 MG/DL (ref 8–23)
BUN/CREAT BLD: 19 (ref 9–20)
CALCIUM SERPL-MCNC: 9 MG/DL (ref 8.6–10.4)
CHLORIDE SERPL-SCNC: 104 MMOL/L (ref 98–107)
CO2 SERPL-SCNC: 26 MMOL/L (ref 20–31)
CREAT SERPL-MCNC: 1.61 MG/DL (ref 0.7–1.2)
GFR SERPL CREATININE-BSD FRML MDRD: 39 ML/MIN/1.73M2
GLUCOSE SERPL-MCNC: 106 MG/DL (ref 70–99)
POTASSIUM SERPL-SCNC: 4.6 MMOL/L (ref 3.7–5.3)
SODIUM SERPL-SCNC: 139 MMOL/L (ref 135–144)

## 2023-02-10 PROCEDURE — 80048 BASIC METABOLIC PNL TOTAL CA: CPT

## 2023-02-10 RX ORDER — FENTANYL 25 UG/H
1 PATCH TRANSDERMAL
Qty: 10 PATCH | Refills: 0 | Status: SHIPPED | OUTPATIENT
Start: 2023-02-10 | End: 2023-03-12

## 2023-02-10 NOTE — TELEPHONE ENCOUNTER
[place]AR[place] checked for [place], [place], and [place]: 1/12/23 Fentanyl 25mcg/hr #10. Due 2/11/23.     [facility]     Last Appt:  11/17/2022  Next Appt:   2/17/2023  Med verified in Epic

## 2023-02-16 ENCOUNTER — HOSPITAL ENCOUNTER (OUTPATIENT)
Age: 88
Setting detail: SPECIMEN
Discharge: HOME | End: 2023-02-16
Payer: MEDICARE

## 2023-02-16 DIAGNOSIS — M25.562 CHRONIC PAIN OF BOTH KNEES: ICD-10-CM

## 2023-02-16 DIAGNOSIS — R79.89 ELEVATED SERUM CREATININE: ICD-10-CM

## 2023-02-16 DIAGNOSIS — M25.561 CHRONIC PAIN OF BOTH KNEES: ICD-10-CM

## 2023-02-16 DIAGNOSIS — I50.32 CHRONIC HEART FAILURE WITH PRESERVED EJECTION FRACTION (HFPEF) (HCC): ICD-10-CM

## 2023-02-16 DIAGNOSIS — R11.2 NAUSEA AND VOMITING, UNSPECIFIED VOMITING TYPE: ICD-10-CM

## 2023-02-16 DIAGNOSIS — I10 ESSENTIAL HYPERTENSION, BENIGN: Primary | ICD-10-CM

## 2023-02-16 DIAGNOSIS — G89.29 CHRONIC PAIN OF BOTH KNEES: ICD-10-CM

## 2023-02-16 LAB
ANION GAP SERPL CALCULATED.3IONS-SCNC: 9 MMOL/L (ref 9–17)
BUN SERPL-MCNC: 32 MG/DL (ref 8–23)
BUN/CREAT BLD: 22 (ref 9–20)
CALCIUM SERPL-MCNC: 9.3 MG/DL (ref 8.6–10.4)
CHLORIDE SERPL-SCNC: 104 MMOL/L (ref 98–107)
CO2 SERPL-SCNC: 27 MMOL/L (ref 20–31)
CREAT SERPL-MCNC: 1.48 MG/DL (ref 0.7–1.2)
GFR SERPL CREATININE-BSD FRML MDRD: 43 ML/MIN/1.73M2
GLUCOSE SERPL-MCNC: 95 MG/DL (ref 70–99)
POTASSIUM SERPL-SCNC: 4.3 MMOL/L (ref 3.7–5.3)
SODIUM SERPL-SCNC: 140 MMOL/L (ref 135–144)

## 2023-02-16 PROCEDURE — 36415 COLL VENOUS BLD VENIPUNCTURE: CPT

## 2023-02-16 PROCEDURE — 80048 BASIC METABOLIC PNL TOTAL CA: CPT

## 2023-02-17 ENCOUNTER — OFFICE VISIT (OUTPATIENT)
Dept: PAIN MANAGEMENT | Age: 88
End: 2023-02-17
Payer: MEDICARE

## 2023-02-17 VITALS
SYSTOLIC BLOOD PRESSURE: 134 MMHG | DIASTOLIC BLOOD PRESSURE: 78 MMHG | RESPIRATION RATE: 16 BRPM | WEIGHT: 180 LBS | OXYGEN SATURATION: 100 % | HEART RATE: 58 BPM | BODY MASS INDEX: 29.05 KG/M2

## 2023-02-17 DIAGNOSIS — G89.29 CHRONIC PAIN OF BOTH KNEES: ICD-10-CM

## 2023-02-17 DIAGNOSIS — M25.561 CHRONIC PAIN OF BOTH KNEES: ICD-10-CM

## 2023-02-17 DIAGNOSIS — I89.0 LYMPHEDEMA OF BOTH LOWER EXTREMITIES: ICD-10-CM

## 2023-02-17 DIAGNOSIS — K59.04 CHRONIC IDIOPATHIC CONSTIPATION: ICD-10-CM

## 2023-02-17 DIAGNOSIS — R26.81 GAIT INSTABILITY: Primary | ICD-10-CM

## 2023-02-17 DIAGNOSIS — M25.562 CHRONIC PAIN OF BOTH KNEES: ICD-10-CM

## 2023-02-17 PROCEDURE — G8417 CALC BMI ABV UP PARAM F/U: HCPCS | Performed by: NURSE PRACTITIONER

## 2023-02-17 PROCEDURE — G8484 FLU IMMUNIZE NO ADMIN: HCPCS | Performed by: NURSE PRACTITIONER

## 2023-02-17 PROCEDURE — G8427 DOCREV CUR MEDS BY ELIG CLIN: HCPCS | Performed by: NURSE PRACTITIONER

## 2023-02-17 PROCEDURE — 1123F ACP DISCUSS/DSCN MKR DOCD: CPT | Performed by: NURSE PRACTITIONER

## 2023-02-17 PROCEDURE — 99214 OFFICE O/P EST MOD 30 MIN: CPT | Performed by: NURSE PRACTITIONER

## 2023-02-17 PROCEDURE — 1036F TOBACCO NON-USER: CPT | Performed by: NURSE PRACTITIONER

## 2023-02-17 RX ORDER — OXYCODONE HYDROCHLORIDE 15 MG/1
15 TABLET ORAL EVERY 6 HOURS PRN
Qty: 120 TABLET | Refills: 0 | Status: SHIPPED | OUTPATIENT
Start: 2023-02-17 | End: 2023-03-19

## 2023-02-17 ASSESSMENT — ENCOUNTER SYMPTOMS
RESPIRATORY NEGATIVE: 1
BACK PAIN: 0
GASTROINTESTINAL NEGATIVE: 1

## 2023-02-17 ASSESSMENT — PATIENT HEALTH QUESTIONNAIRE - PHQ9
SUM OF ALL RESPONSES TO PHQ9 QUESTIONS 1 & 2: 0
SUM OF ALL RESPONSES TO PHQ QUESTIONS 1-9: 0
SUM OF ALL RESPONSES TO PHQ QUESTIONS 1-9: 0
1. LITTLE INTEREST OR PLEASURE IN DOING THINGS: 0
2. FEELING DOWN, DEPRESSED OR HOPELESS: 0
SUM OF ALL RESPONSES TO PHQ QUESTIONS 1-9: 0
SUM OF ALL RESPONSES TO PHQ QUESTIONS 1-9: 0

## 2023-02-17 NOTE — PROGRESS NOTES
Subjective:      Patient ID: Reilly Allison is a 80 y.o. male. Chief Complaint   Patient presents with    Knee Pain     3 mo f/u knee pain. Knee Pain   Pertinent negatives include no numbness. Leg Pain   Pertinent negatives include no numbness. Here today for routine pain clinic recheck. Chronic pain in knees, resides at assisted living. Ambulating very limited due to pain but seems satisfied with overall level of pain control    Pain Assessment  Location of Pain: Knee  Location Modifiers: Left, Right, Anterior, Posterior  Severity of Pain: 8  Quality of Pain: Throbbing, Aching, Dull  Duration of Pain: Persistent  Frequency of Pain: Constant  Aggravating Factors: Stairs, Walking, Standing, Squatting, Kneeling, Exercise, Straightening, Bending, Stretching  Limiting Behavior: Yes  Relieving Factors: Rest (Rx, elevation)  Result of Injury: No  Work-Related Injury: No    Allergies   Allergen Reactions    Codeine Nausea And Vomiting    Morphine Other (See Comments)     constipation       Outpatient Medications Marked as Taking for the 2/17/23 encounter (Office Visit) with KEVIN Henry CNP   Medication Sig Dispense Refill    oxyCODONE (OXY-IR) 15 MG immediate release tablet Take 1 tablet by mouth every 6 hours as needed for Pain for up to 30 days. Max Daily Amount: 60 mg 120 tablet 0    fentaNYL (DURAGESIC) 25 MCG/HR Place 1 patch onto the skin every 3 days for 30 days.  Intended supply: 30 days 10 patch 0    ondansetron (ZOFRAN) 4 MG tablet Take 1 tablet by mouth daily as needed for Nausea or Vomiting 30 tablet 0    furosemide (LASIX) 40 MG tablet Take 2 tablets by mouth 2 times daily 15 tablet 3    lactulose encephalopathy 10 GM/15ML SOLN solution GIVE 30 ML BY MOUTH ONCE DAILY AS NEEDED 473 mL 1    lisinopril (PRINIVIL;ZESTRIL) 5 MG tablet TAKE 1 TABLET DAILY 90 tablet 3    metoprolol tartrate (LOPRESSOR) 25 MG tablet Take 1 tablet by mouth 2 times daily 180 tablet 1    atorvastatin (LIPITOR) 10 MG tablet TAKE 1 TABLET DAILY 90 tablet 3    clopidogrel (PLAVIX) 75 MG tablet TAKE 1 TABLET DAILY 90 tablet 3    melatonin 3 MG TABS tablet Take 3 mg by mouth nightly      docusate sodium (COLACE) 100 MG capsule Take 100 mg by mouth daily      acetaminophen (TYLENOL) 325 MG tablet Take 2 tablets by mouth every 4 hours as needed for Pain or Fever 120 tablet 0    Multiple Vitamins-Minerals (MULTIVITAMIN PO) Take 1 tablet by mouth daily.          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 2/2/15    EGD with biopsies    CAD (coronary artery disease)     Elevated PSA     Hyperlipidemia     Hypertension     Kidney stone     Osteoarthritis of knee     Venous insufficiency        Past Surgical History:   Procedure Laterality Date    APPENDECTOMY      CHOLECYSTECTOMY  01/12/1983    COLONOSCOPY  08/29/12    COLONOSCOPY  05/27/09    COLONOSCOPY  04/30/2008    CYSTOSCOPY  11/15/2007    with left ureteroscopy and dorsal slit     KNEE FUSION Left     LEG SURGERY Left 1/11/2020    I and D of Left Lower Extremitity performed by Susan Burleson MD at [facility] N/A 11/19/2018    CYSTO Photovaporization Prostate Greenlight Laser ([facility][id number]) performed by Alex Rodriguez MD at [facility] EGD TRANSORAL BIOPSY SINGLE/MULTIPLE N/A 7/3/2017    EGD BIOPSY performed by Caleb George MD at [facility] Bilateral 06/23/2009    Benign    UPPER GASTROINTESTINAL ENDOSCOPY  02/2/2015    Sepulveda's (Dr. Diana Daniel)    UPPER GASTROINTESTINAL ENDOSCOPY  04/25/2016    Lg hiatal hernia, polyp at duodenum, gastric polyp, Barretts Esophagus, GE 32    UPPER GASTROINTESTINAL ENDOSCOPY  07/03/2017    mild gastritis, Sepulveda's, Dr. Sonal Hernandez 3 years       Family History   Problem Relation Age of Onset    Cancer Father         stomach cancer    Heart Disease Paternal Grandfather        Social History     Socioeconomic History    Marital status:  Spouse name: None    Number of children: None    Years of education: None    Highest education level: None   Tobacco Use    Smoking status: Former     Packs/day: 1.00     Types: Cigars, Cigarettes     Quit date: 1998     Years since quittin.1    Smokeless tobacco: Never    Tobacco comments:     Former smoker (quit )- [facility] 3/20/17   Vaping Use    Vaping Use: Never used   Substance and Sexual Activity    Alcohol use: No     Alcohol/week: 0.0 standard drinks    Drug use: No     Social Determinants of Health     Financial Resource Strain: Low Risk     Difficulty of Paying Living Expenses: Not hard at all   Food Insecurity: No Food Insecurity    Worried About Running Out of Food in the Last Year: Never true    Ran Out of Food in the Last Year: Never true     Review of Systems   Constitutional:  Positive for activity change (limited due to right knee pain). Negative for appetite change. Respiratory: Negative. Cardiovascular: Negative. Gastrointestinal: Negative. Genitourinary: Negative. Johnson catheter in place   Musculoskeletal:  Positive for arthralgias (bilateral knee, right knee is majority of his pain) and gait problem. Negative for back pain, neck pain and neck stiffness. Neurological:  Negative for weakness and numbness. Psychiatric/Behavioral:  Positive for sleep disturbance. Objective:   Physical Exam  Vitals reviewed. Constitutional:       General: He is not in acute distress. Appearance: He is well-developed. He is not ill-appearing, toxic-appearing or diaphoretic. HENT:      Head: Normocephalic and atraumatic. Cardiovascular:      Rate and Rhythm: Normal rate. Pulmonary:      Effort: Pulmonary effort is normal. No respiratory distress. Musculoskeletal:      Right knee: Swelling present. Decreased range of motion. Skin:     General: Skin is warm and dry. Coloration: Skin is not pale. Nails: There is no clubbing.    Neurological: Mental Status: He is alert and oriented to person, place, and time. He is not disoriented. GCS: GCS eye subscore is 4. GCS verbal subscore is 5. GCS motor subscore is 6. Cranial Nerves: No cranial nerve deficit. Motor: No tremor or seizure activity. Psychiatric:         Behavior: Behavior normal. Behavior is cooperative. Assessment:      1. Gait instability    2. Chronic pain of both knees    3. Lymphedema of both lower extremities    4. Chronic idiopathic constipation          Plan:     Chronic pain diagnoses such as   1. Gait instability    2. Chronic pain of both knees    3. Lymphedema of both lower extremities    4. Chronic idiopathic constipation     controlled on current medication regime, wll continue current pain medications to improve quality of life and function. Controlled Substance Monitoring:    Acute and Chronic Pain Monitoring:   RX Monitoring 2/17/2023   Attestation -   Periodic Controlled Substance Monitoring No signs of potential drug abuse or diversion identified.;Obtaining appropriate analgesic effect of treatment.    Chronic Pain > 50 MEDD -   Chronic Pain > 80 MEDD -       Follow up 2-3 months

## 2023-03-20 DIAGNOSIS — M25.561 CHRONIC PAIN OF BOTH KNEES: ICD-10-CM

## 2023-03-20 DIAGNOSIS — G89.29 CHRONIC PAIN OF BOTH KNEES: ICD-10-CM

## 2023-03-20 DIAGNOSIS — M25.562 CHRONIC PAIN OF BOTH KNEES: ICD-10-CM

## 2023-03-20 RX ORDER — OXYCODONE HYDROCHLORIDE 15 MG/1
15 TABLET ORAL EVERY 6 HOURS PRN
Qty: 120 TABLET | Refills: 0 | Status: SHIPPED | OUTPATIENT
Start: 2023-03-20 | End: 2023-04-19

## 2023-03-20 RX ORDER — FENTANYL 25 UG/H
1 PATCH TRANSDERMAL
Qty: 10 PATCH | Refills: 0 | Status: SHIPPED | OUTPATIENT
Start: 2023-03-20 | End: 2023-04-19

## 2023-03-20 NOTE — TELEPHONE ENCOUNTER
Jonny Luis called requesting a refill of the below medication which has been pended for you:     Requested Prescriptions     Pending Prescriptions Disp Refills    oxyCODONE (OXY-IR) 15 MG immediate release tablet 120 tablet 0     Sig: Take 1 tablet by mouth every 6 hours as needed for Pain for up to 30 days. Max Daily Amount: 60 mg    fentaNYL (DURAGESIC) 25 MCG/HR 10 patch 0     Sig: Place 1 patch onto the skin every 3 days for 30 days. Intended supply: 30 days       Last Appointment Date: 2/17/2023  Next Appointment Date: 5/16/2023    Allergies   Allergen Reactions    Codeine Nausea And Vomiting    Morphine Other (See Comments)     constipation       Pharmacy:  [facility]     [facility] for [address], and [place]:OXY IR 15mg 02/17/23 #120. Due NOW. OARRS Report checked for [facility], [place], and [place]:Fentanyl 25mcg 02/10/23 #10. Due NOW.

## 2023-04-18 DIAGNOSIS — M25.561 CHRONIC PAIN OF BOTH KNEES: ICD-10-CM

## 2023-04-18 DIAGNOSIS — G89.29 CHRONIC PAIN OF BOTH KNEES: ICD-10-CM

## 2023-04-18 DIAGNOSIS — M25.562 CHRONIC PAIN OF BOTH KNEES: ICD-10-CM

## 2023-04-18 RX ORDER — OXYCODONE HYDROCHLORIDE 15 MG/1
TABLET ORAL
Qty: 120 TABLET | Refills: 0 | Status: SHIPPED | OUTPATIENT
Start: 2023-04-18 | End: 2023-05-18

## 2023-04-18 NOTE — TELEPHONE ENCOUNTER
Daniela Gudino called requesting a refill of the below medication which has been pended for you:     Requested Prescriptions     Pending Prescriptions Disp Refills    oxyCODONE (OXY-IR) 15 MG immediate release tablet [Pharmacy Med Name: OXYCODONE HCL 15 MG TABLET] 120 tablet 0     Sig: TAKE 1 TABLET BY MOUTH EVERY 6 HOURS AS NEEDED FOR PAIN *REDUCE DOSES TAKEN AS PAIN BECOMES MANAGEABLE*       Last Appointment Date: 2/17/2023  Next Appointment Date: 5/16/2023    Allergies   Allergen Reactions    Codeine Nausea And Vomiting    Morphine Other (See Comments)     constipation       [facility] for [place], [place], and [place]:Oxy IR 15mg 03/20/23 #120. Due 04/19/23.

## 2023-04-20 DIAGNOSIS — G89.29 CHRONIC PAIN OF BOTH KNEES: ICD-10-CM

## 2023-04-20 DIAGNOSIS — M25.561 CHRONIC PAIN OF BOTH KNEES: ICD-10-CM

## 2023-04-20 DIAGNOSIS — M25.562 CHRONIC PAIN OF BOTH KNEES: ICD-10-CM

## 2023-04-20 RX ORDER — FENTANYL 25 UG/H
1 PATCH TRANSDERMAL
Qty: 10 PATCH | Refills: 0 | Status: SHIPPED | OUTPATIENT
Start: 2023-04-20 | End: 2023-05-20

## 2023-04-20 NOTE — TELEPHONE ENCOUNTER
Amelia Mo called requesting a refill of the below medication which has been pended for you:     Requested Prescriptions     Pending Prescriptions Disp Refills    fentaNYL (DURAGESIC) 25 MCG/HR 10 patch 0     Sig: Place 1 patch onto the skin every 3 days for 30 days. Intended supply: 30 days       2/17/2023 5/16/2023     Allergies   Allergen Reactions    Codeine Nausea And Vomiting    Morphine Other (See Comments)     constipation     O[place] Report checked for [place], [place], and [place]: Fentanyl 25mcg patches  #10. Due NOW.

## 2023-05-16 DIAGNOSIS — M25.561 CHRONIC PAIN OF BOTH KNEES: ICD-10-CM

## 2023-05-16 DIAGNOSIS — M25.562 CHRONIC PAIN OF BOTH KNEES: ICD-10-CM

## 2023-05-16 DIAGNOSIS — G89.29 CHRONIC PAIN OF BOTH KNEES: ICD-10-CM

## 2023-05-16 NOTE — TELEPHONE ENCOUNTER
Rachel Green called requesting a refill of the below medication which has been pended for you:     Requested Prescriptions     Pending Prescriptions Disp Refills    oxyCODONE (OXY-IR) 15 MG immediate release tablet [Pharmacy Med Name: OXYCODONE HCL 15 MG TABLET] 120 tablet 0     Sig: TAKE 1 TABLET BY MOUTH EVERY 6 HOURS AS NEEDED FOR PAIN *REDUCE DOSES TAKEN AS PAIN BECOMES MANAGEABLE*       Last Appointment Date: 2/17/2023  Next Appointment Date: 5/19/2023    Allergies   Allergen Reactions    Codeine Nausea And Vomiting    Morphine Other (See Comments)     constipation       Pharmacy:  [facility] Report checked for [place], [place], and [place]:Oxy IR 15  04/21/23 #120. Due 05/21/23.

## 2023-05-17 RX ORDER — OXYCODONE HYDROCHLORIDE 15 MG/1
TABLET ORAL
Qty: 120 TABLET | Refills: 0 | Status: SHIPPED | OUTPATIENT
Start: 2023-05-21 | End: 2023-06-20

## 2023-05-19 ENCOUNTER — OFFICE VISIT (OUTPATIENT)
Dept: PAIN MANAGEMENT | Age: 88
End: 2023-05-19
Payer: MEDICARE

## 2023-05-19 VITALS
DIASTOLIC BLOOD PRESSURE: 50 MMHG | BODY MASS INDEX: 29.05 KG/M2 | HEIGHT: 66 IN | HEART RATE: 70 BPM | SYSTOLIC BLOOD PRESSURE: 126 MMHG

## 2023-05-19 DIAGNOSIS — M25.562 CHRONIC PAIN OF BOTH KNEES: ICD-10-CM

## 2023-05-19 DIAGNOSIS — M17.0 PRIMARY OSTEOARTHRITIS OF BOTH KNEES: ICD-10-CM

## 2023-05-19 DIAGNOSIS — G89.29 CHRONIC PAIN OF BOTH KNEES: ICD-10-CM

## 2023-05-19 DIAGNOSIS — M25.561 CHRONIC PAIN OF BOTH KNEES: ICD-10-CM

## 2023-05-19 DIAGNOSIS — K59.04 CHRONIC IDIOPATHIC CONSTIPATION: Primary | ICD-10-CM

## 2023-05-19 PROCEDURE — 1036F TOBACCO NON-USER: CPT | Performed by: NURSE PRACTITIONER

## 2023-05-19 PROCEDURE — 99214 OFFICE O/P EST MOD 30 MIN: CPT | Performed by: NURSE PRACTITIONER

## 2023-05-19 PROCEDURE — G8427 DOCREV CUR MEDS BY ELIG CLIN: HCPCS | Performed by: NURSE PRACTITIONER

## 2023-05-19 PROCEDURE — G8417 CALC BMI ABV UP PARAM F/U: HCPCS | Performed by: NURSE PRACTITIONER

## 2023-05-19 PROCEDURE — 1123F ACP DISCUSS/DSCN MKR DOCD: CPT | Performed by: NURSE PRACTITIONER

## 2023-05-19 RX ORDER — OXYCODONE HYDROCHLORIDE 20 MG/1
20 TABLET ORAL EVERY 6 HOURS PRN
Qty: 120 TABLET | Refills: 0 | Status: SHIPPED | OUTPATIENT
Start: 2023-05-19 | End: 2023-06-18

## 2023-05-19 RX ORDER — FENTANYL 25 UG/H
1 PATCH TRANSDERMAL
Qty: 10 PATCH | Refills: 0 | Status: SHIPPED | OUTPATIENT
Start: 2023-05-19 | End: 2023-06-18

## 2023-05-19 ASSESSMENT — ENCOUNTER SYMPTOMS
GASTROINTESTINAL NEGATIVE: 1
RESPIRATORY NEGATIVE: 1
BACK PAIN: 0

## 2023-05-19 NOTE — PATIENT INSTRUCTIONS
Pain Management Plan:  Increase Oxycodone 20mg ever 6 hours prn    Pain clinic phone numbers  Refills  - Call [phone]. Please leave your name, a working phone number, date of birth, the name, dose, quantity, and last refill date of the prescription, and the pharmacy. Appointment or General Questions - Call  [phone]. This is the direct line the  [address] [place] [address] can also use [employer]. Is your [employer] Activated? How to dispose of unused pain medications safely:  Flush all unused pain medications. This is the FDA's recommended way of disposing these medications. All other medications can be disposed in the trash mixed in undesirable contents (used coffee grounds, used [employer], etc).

## 2023-05-19 NOTE — PROGRESS NOTES
Subjective:      Patient ID: Sundar Hamilton is a 80 y.o. male. Chief Complaint   Patient presents with    Knee Pain     Left knee-- 3 month follow up      Knee Pain   Pertinent negatives include no numbness. Leg Pain   Pertinent negatives include no numbness. Here today for routine pain clinic recheck. Chronic pain in knees, resides at assisted living. Ambulating very limited due to pain, poor pain control, 8/10 at rest    Pain Assessment  Location of Pain: Knee  Location Modifiers: Left  Severity of Pain: 8  Quality of Pain: Aching  Duration of Pain: Persistent  Frequency of Pain: Constant  Aggravating Factors: Standing  Limiting Behavior: Yes  Relieving Factors: Rest  Result of Injury: No  Work-Related Injury: No    Allergies   Allergen Reactions    Codeine Nausea And Vomiting    Morphine Other (See Comments)     constipation       Outpatient Medications Marked as Taking for the 5/19/23 encounter (Office Visit) with KEVIN Sims CNP   Medication Sig Dispense Refill    fentaNYL (DURAGESIC) 25 MCG/HR Place 1 patch onto the skin every 3 days for 30 days. Intended supply: 30 days 10 patch 0    oxyCODONE (ROXICODONE) 20 MG immediate release tablet Take 1 tablet by mouth every 6 hours as needed for Pain for up to 30 days.  Max Daily Amount: 80 mg 120 tablet 0    [START ON 5/21/2023] oxyCODONE (OXY-IR) 15 MG immediate release tablet TAKE 1 TABLET BY MOUTH EVERY 6 HOURS AS NEEDED FOR PAIN *REDUCE DOSES TAKEN AS PAIN BECOMES MANAGEABLE* 120 tablet 0    ondansetron (ZOFRAN) 4 MG tablet Take 1 tablet by mouth daily as needed for Nausea or Vomiting 30 tablet 0    furosemide (LASIX) 40 MG tablet Take 2 tablets by mouth 2 times daily 15 tablet 3    lactulose encephalopathy 10 GM/15ML SOLN solution GIVE 30 ML BY MOUTH ONCE DAILY AS NEEDED 473 mL 1    lisinopril (PRINIVIL;ZESTRIL) 5 MG tablet TAKE 1 TABLET DAILY 90 tablet 3    metoprolol tartrate (LOPRESSOR) 25 MG tablet Take 1 tablet by mouth 2 times daily 180

## 2023-06-07 RX ORDER — FUROSEMIDE 20 MG/1
60 TABLET ORAL 2 TIMES DAILY
COMMUNITY

## 2023-06-17 ENCOUNTER — HOSPITAL ENCOUNTER (EMERGENCY)
Age: 88
Discharge: HOME | End: 2023-06-17
Attending: EMERGENCY MEDICINE
Payer: MEDICARE

## 2023-06-17 ENCOUNTER — APPOINTMENT (OUTPATIENT)
Dept: GENERAL RADIOLOGY | Age: 88
End: 2023-06-17
Payer: MEDICARE

## 2023-06-17 VITALS
RESPIRATION RATE: 18 BRPM | HEART RATE: 68 BPM | TEMPERATURE: 98.4 F | OXYGEN SATURATION: 100 % | DIASTOLIC BLOOD PRESSURE: 48 MMHG | SYSTOLIC BLOOD PRESSURE: 181 MMHG

## 2023-06-17 DIAGNOSIS — N39.0 URINARY TRACT INFECTION WITHOUT HEMATURIA, SITE UNSPECIFIED: Primary | ICD-10-CM

## 2023-06-17 LAB
ALBUMIN SERPL-MCNC: 3.7 G/DL (ref 3.5–5.2)
ALBUMIN/GLOB SERPL: 1.4 {RATIO} (ref 1–2.5)
ALP SERPL-CCNC: 70 U/L (ref 40–129)
ALT SERPL-CCNC: 9 U/L (ref 5–41)
AMMONIA PLAS-SCNC: <10 UMOL/L (ref 16–60)
ANION GAP SERPL CALCULATED.3IONS-SCNC: 12 MMOL/L (ref 9–17)
AST SERPL-CCNC: 10 U/L
BACTERIA URNS QL MICRO: ABNORMAL
BASOPHILS # BLD: 0.03 K/UL (ref 0–0.2)
BASOPHILS NFR BLD: 0 % (ref 0–2)
BILIRUB DIRECT SERPL-MCNC: 0.1 MG/DL
BILIRUB INDIRECT SERPL-MCNC: 0.2 MG/DL (ref 0–1)
BILIRUB SERPL-MCNC: 0.3 MG/DL (ref 0.3–1.2)
BILIRUB UR QL STRIP: NEGATIVE
BUN SERPL-MCNC: 39 MG/DL (ref 8–23)
BUN/CREAT SERPL: 28 (ref 9–20)
CALCIUM SERPL-MCNC: 9.2 MG/DL (ref 8.6–10.4)
CHARACTER UR: ABNORMAL
CHLORIDE SERPL-SCNC: 102 MMOL/L (ref 98–107)
CLARITY UR: CLEAR
CO2 SERPL-SCNC: 27 MMOL/L (ref 20–31)
COLOR UR: YELLOW
CREAT SERPL-MCNC: 1.39 MG/DL (ref 0.7–1.2)
EOSINOPHIL # BLD: 0.15 K/UL (ref 0–0.44)
EOSINOPHILS RELATIVE PERCENT: 2 % (ref 1–4)
EPI CELLS #/AREA URNS HPF: ABNORMAL /HPF (ref 0–5)
ERYTHROCYTE [DISTWIDTH] IN BLOOD BY AUTOMATED COUNT: 13.5 % (ref 11.8–14.4)
GFR SERPL CREATININE-BSD FRML MDRD: 47 ML/MIN/1.73M2
GLOBULIN SER CALC-MCNC: 2.7 G/DL (ref 1.5–3.8)
GLUCOSE SERPL-MCNC: 107 MG/DL (ref 70–99)
GLUCOSE UR STRIP-MCNC: NEGATIVE MG/DL
HCT VFR BLD AUTO: 31.7 % (ref 40.7–50.3)
HGB BLD-MCNC: 10.6 G/DL (ref 13–17)
HGB UR QL STRIP.AUTO: NEGATIVE
IMM GRANULOCYTES # BLD AUTO: 0.03 K/UL (ref 0–0.3)
IMM GRANULOCYTES NFR BLD: 0 %
KETONES UR STRIP-MCNC: NEGATIVE MG/DL
LEUKOCYTE ESTERASE UR QL STRIP: ABNORMAL
LIPASE SERPL-CCNC: 21 U/L (ref 13–60)
LYMPHOCYTES # BLD: 25 % (ref 24–43)
LYMPHOCYTES NFR BLD: 2.06 K/UL (ref 1.1–3.7)
MCH RBC QN AUTO: 31.2 PG (ref 25.2–33.5)
MCHC RBC AUTO-ENTMCNC: 33.4 G/DL (ref 25.2–33.5)
MCV RBC AUTO: 93.2 FL (ref 82.6–102.9)
MONOCYTES NFR BLD: 1 K/UL (ref 0.1–1.2)
MONOCYTES NFR BLD: 12 % (ref 3–12)
NEUTROPHILS NFR BLD: 61 % (ref 36–65)
NEUTS SEG NFR BLD: 5.14 K/UL (ref 1.5–8.1)
NITRITE UR QL STRIP: NEGATIVE
NRBC AUTOMATED: 0 PER 100 WBC
PH UR STRIP: 7 [PH] (ref 5–6)
PLATELET # BLD AUTO: 245 K/UL (ref 138–453)
PMV BLD AUTO: 10.2 FL (ref 8.1–13.5)
POTASSIUM SERPL-SCNC: 4.1 MMOL/L (ref 3.7–5.3)
PROT SERPL-MCNC: 6.4 G/DL (ref 6.4–8.3)
PROT UR STRIP-MCNC: NEGATIVE MG/DL
RBC # BLD AUTO: 3.4 M/UL (ref 4.21–5.77)
RBC #/AREA URNS HPF: ABNORMAL /HPF (ref 0–4)
SODIUM SERPL-SCNC: 141 MMOL/L (ref 135–144)
SP GR UR STRIP: 1.01 (ref 1.01–1.02)
UROBILINOGEN UR STRIP-ACNC: NORMAL
WBC #/AREA URNS HPF: ABNORMAL /HPF (ref 0–4)
WBC OTHER # BLD: 8.4 K/UL (ref 3.5–11.3)

## 2023-06-17 PROCEDURE — 80048 BASIC METABOLIC PNL TOTAL CA: CPT

## 2023-06-17 PROCEDURE — 87086 URINE CULTURE/COLONY COUNT: CPT

## 2023-06-17 PROCEDURE — 6360000002 HC RX W HCPCS: Performed by: EMERGENCY MEDICINE

## 2023-06-17 PROCEDURE — 82140 ASSAY OF AMMONIA: CPT

## 2023-06-17 PROCEDURE — 80076 HEPATIC FUNCTION PANEL: CPT

## 2023-06-17 PROCEDURE — 87077 CULTURE AEROBIC IDENTIFY: CPT

## 2023-06-17 PROCEDURE — 81001 URINALYSIS AUTO W/SCOPE: CPT

## 2023-06-17 PROCEDURE — 2580000003 HC RX 258: Performed by: EMERGENCY MEDICINE

## 2023-06-17 PROCEDURE — 36415 COLL VENOUS BLD VENIPUNCTURE: CPT

## 2023-06-17 PROCEDURE — 73562 X-RAY EXAM OF KNEE 3: CPT

## 2023-06-17 PROCEDURE — 96365 THER/PROPH/DIAG IV INF INIT: CPT

## 2023-06-17 PROCEDURE — 83690 ASSAY OF LIPASE: CPT

## 2023-06-17 PROCEDURE — 85027 COMPLETE CBC AUTOMATED: CPT

## 2023-06-17 PROCEDURE — 87186 SC STD MICRODIL/AGAR DIL: CPT

## 2023-06-17 PROCEDURE — 99284 EMERGENCY DEPT VISIT MOD MDM: CPT

## 2023-06-17 RX ORDER — CEPHALEXIN 500 MG/1
500 CAPSULE ORAL 2 TIMES DAILY
Qty: 14 CAPSULE | Refills: 0 | Status: SHIPPED | OUTPATIENT
Start: 2023-06-17 | End: 2023-06-24

## 2023-06-17 RX ADMIN — CEFTRIAXONE 1000 MG: 1 INJECTION, POWDER, FOR SOLUTION INTRAMUSCULAR; INTRAVENOUS at 17:19

## 2023-06-17 ASSESSMENT — PAIN DESCRIPTION - LOCATION: LOCATION: KNEE

## 2023-06-17 ASSESSMENT — PAIN SCALES - GENERAL: PAINLEVEL_OUTOF10: 6

## 2023-06-17 ASSESSMENT — PAIN DESCRIPTION - FREQUENCY: FREQUENCY: CONTINUOUS

## 2023-06-17 ASSESSMENT — PAIN DESCRIPTION - PAIN TYPE: TYPE: CHRONIC PAIN

## 2023-06-17 ASSESSMENT — PAIN DESCRIPTION - ONSET: ONSET: ON-GOING

## 2023-06-17 ASSESSMENT — PAIN DESCRIPTION - ORIENTATION: ORIENTATION: LEFT

## 2023-06-17 NOTE — ED PROVIDER NOTES
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    Chronic pain of both knees M25.561, M25.562, G89.29    Encounter for chronic pain management G89.29    Colitis K52.9    Urinary retention R33.9    BPH (benign prostatic hyperplasia) N40.0    Gait instability R26.81    ST elevation myocardial infarction (STEMI) ([facility]) I21.3    UTI due to extended-spectrum beta lactamase (ESBL) producing Escherichia coli N39.0, B96.29, Z16.12    Gastroesophageal reflux disease K21.9    Overweight E66.3    Chronic idiopathic constipation K59.04    Cellulitis of left lower extremity L03.116    Cellulitis of left lower leg L03.116    Pulmonary edema cardiac cause ([facility]) I50.1    Chronic heart failure with preserved ejection fraction (HFpEF) ([facility]) I50.32    Coronary artery disease involving native coronary artery of native heart with angina pectoris ([facility]) I25.119    Opioid use, unspecified with unspecified opioid-induced disorder F11.99    Opioid dependence with unspecified opioid-induced disorder F11.29    Opioid dependence, uncomplicated L05.06    Lymphedema of both lower extremities I89.0           Summation      Patient Course: Discharged    ED Medicationsadministered this visit:    Medications   cefTRIAXone (ROCEPHIN) 1,000 mg in sodium chloride 0.9 % 50 mL IVPB (mini-bag) (0 mg IntraVENous Stopped 6/17/23 2268)       New Prescriptions from this visit:    Discharge Medication List as of 6/17/2023  6:23 PM        START taking these medications    Details   cephALEXin (KEFLEX) 500 MG capsule Take 1 capsule by mouth 2 times daily for 7 days, Disp-14 capsule, R-0Normal             Follow-up:  Blayne Zurita MD  [address] [place] [address]  [phone]    In 2 days          Final Impression:   1.  Urinary tract infection without hematuria, site unspecified

## 2023-06-17 NOTE — DISCHARGE INSTRUCTIONS
Ortho follow-up is suggested to evaluate for management of chronic left knee pain. Keflex is being prescribed for urinary tract infection. Return anytime for worsening symptoms. Unavailable for PT this PM, pt not in room.

## 2023-06-19 DIAGNOSIS — G89.29 CHRONIC PAIN OF BOTH KNEES: ICD-10-CM

## 2023-06-19 DIAGNOSIS — M25.562 CHRONIC PAIN OF BOTH KNEES: ICD-10-CM

## 2023-06-19 DIAGNOSIS — M25.561 CHRONIC PAIN OF BOTH KNEES: ICD-10-CM

## 2023-06-19 LAB
MICROORGANISM SPEC CULT: ABNORMAL
SPECIMEN DESCRIPTION: ABNORMAL

## 2023-06-19 RX ORDER — OXYCODONE HYDROCHLORIDE 20 MG/1
20 TABLET ORAL EVERY 6 HOURS PRN
Qty: 120 TABLET | Refills: 0 | Status: SHIPPED | OUTPATIENT
Start: 2023-06-19 | End: 2023-07-19

## 2023-06-19 RX ORDER — FENTANYL 25 UG/H
1 PATCH TRANSDERMAL
Qty: 10 PATCH | Refills: 0 | Status: SHIPPED | OUTPATIENT
Start: 2023-06-19 | End: 2023-07-19

## 2023-06-19 NOTE — TELEPHONE ENCOUNTER
Kristen Liu called requesting a refill of the below medication which has been pended for you:     Requested Prescriptions     Pending Prescriptions Disp Refills    fentaNYL (DURAGESIC) 25 MCG/HR 10 patch 0     Sig: Place 1 patch onto the skin every 3 days for 30 days. Intended supply: 30 days    oxyCODONE (ROXICODONE) 20 MG immediate release tablet 120 tablet 0     Sig: Take 1 tablet by mouth every 6 hours as needed for Pain for up to 30 days. Max Daily Amount: 80 mg       Last Appointment Date: 5/19/2023  Next Appointment Date: 8/18/2023    Allergies   Allergen Reactions    Codeine Nausea And Vomiting    Morphine Other (See Comments)     constipation       Pharmacy:  [facility]      OAR[place] Report checked for [place], [place], and [place]:Oxycodone 20    05/19/23 #120. Due NOW. O[place] Report checked for [place], [place], and [place]:Fentanyl 25 05/19/23 #1. Due NOW.

## 2023-07-03 ENCOUNTER — OFFICE VISIT (OUTPATIENT)
Dept: CARDIOLOGY | Age: 88
End: 2023-07-03
Payer: MEDICARE

## 2023-07-03 VITALS
HEART RATE: 47 BPM | DIASTOLIC BLOOD PRESSURE: 48 MMHG | SYSTOLIC BLOOD PRESSURE: 99 MMHG | WEIGHT: 180 LBS | HEIGHT: 66 IN | BODY MASS INDEX: 28.93 KG/M2

## 2023-07-03 DIAGNOSIS — I50.32 CHRONIC HEART FAILURE WITH PRESERVED EJECTION FRACTION (HFPEF) (HCC): Primary | ICD-10-CM

## 2023-07-03 DIAGNOSIS — R60.0 BILATERAL LEG EDEMA: ICD-10-CM

## 2023-07-03 DIAGNOSIS — I25.10 CORONARY ARTERY DISEASE INVOLVING NATIVE CORONARY ARTERY OF NATIVE HEART WITHOUT ANGINA PECTORIS: ICD-10-CM

## 2023-07-03 DIAGNOSIS — E78.5 DYSLIPIDEMIA: ICD-10-CM

## 2023-07-03 PROCEDURE — 1036F TOBACCO NON-USER: CPT | Performed by: INTERNAL MEDICINE

## 2023-07-03 PROCEDURE — 93005 ELECTROCARDIOGRAM TRACING: CPT | Performed by: INTERNAL MEDICINE

## 2023-07-03 PROCEDURE — 93010 ELECTROCARDIOGRAM REPORT: CPT | Performed by: INTERNAL MEDICINE

## 2023-07-03 PROCEDURE — 99214 OFFICE O/P EST MOD 30 MIN: CPT | Performed by: INTERNAL MEDICINE

## 2023-07-03 PROCEDURE — G8427 DOCREV CUR MEDS BY ELIG CLIN: HCPCS | Performed by: INTERNAL MEDICINE

## 2023-07-03 PROCEDURE — G8417 CALC BMI ABV UP PARAM F/U: HCPCS | Performed by: INTERNAL MEDICINE

## 2023-07-03 PROCEDURE — 1123F ACP DISCUSS/DSCN MKR DOCD: CPT | Performed by: INTERNAL MEDICINE

## 2023-07-03 PROCEDURE — 99213 OFFICE O/P EST LOW 20 MIN: CPT | Performed by: INTERNAL MEDICINE

## 2023-07-03 NOTE — PROGRESS NOTES
Today's Date: 7/3/2023  Patient's Name: Saeid Bender  Patient's age: 80 y.o., 6/28/1927    CC: follow for CAD    HPI:  The patient is a 80 y.o., , male is in the office for CAD. Lives at [facility]. Denies any cp. States his LE Edema is improving, with elevated and wrapping at [facility]. Feels tired. HR and BP lower today. 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 Past Surgical History:   has a past surgical history that includes Colonoscopy (08/29/12); Cystocopy (11/15/2007); Appendectomy; Cholecystectomy (01/12/1983); Knee fusion (Left); lipoma resection; Colonoscopy (05/27/09); Colonoscopy (04/30/2008); Prostate Biopsy (Bilateral, 06/23/2009); pr egd transoral biopsy single/multiple (N/A, 7/3/2017); Upper gastrointestinal endoscopy (02/2/2015); Upper gastrointestinal endoscopy (04/25/2016); Upper gastrointestinal endoscopy (07/03/2017); pr cystourethroscopy (N/A, 11/19/2018); and Leg Surgery (Left, 1/11/2020). Home Medications:  Prior to Admission medications    Medication Sig Start Date End Date Taking? Authorizing Provider   fentaNYL (DURAGESIC) 25 MCG/HR Place 1 patch onto the skin every 3 days for 30 days. Intended supply: 30 days 6/19/23 7/19/23 Yes KEVIN Choi CNP   oxyCODONE (ROXICODONE) 20 MG immediate release tablet Take 1 tablet by mouth every 6 hours as needed for Pain for up to 30 days.  Max Daily Amount: 80 mg 6/19/23 7/19/23 Yes KEVIN Choi CNP   furosemide (LASIX) 20 MG tablet Take 3 tablets by mouth 2 times daily   Yes Historical Provider, MD   ondansetron (ZOFRAN) 4 MG tablet Take 1 tablet by mouth daily as needed for Nausea or Vomiting 2/7/23  Yes Karlene Benson, APRN - CNP   lactulose encephalopathy 10 GM/15ML SOLN solution GIVE 30 ML BY MOUTH ONCE DAILY AS

## 2023-07-16 DIAGNOSIS — M25.561 CHRONIC PAIN OF BOTH KNEES: ICD-10-CM

## 2023-07-16 DIAGNOSIS — M25.562 CHRONIC PAIN OF BOTH KNEES: ICD-10-CM

## 2023-07-16 DIAGNOSIS — G89.29 CHRONIC PAIN OF BOTH KNEES: ICD-10-CM

## 2023-07-17 RX ORDER — FENTANYL 25 UG/H
1 PATCH TRANSDERMAL
Qty: 10 PATCH | Refills: 0 | Status: SHIPPED | OUTPATIENT
Start: 2023-07-19 | End: 2023-08-18

## 2023-07-17 RX ORDER — OXYCODONE HYDROCHLORIDE 20 MG/1
TABLET ORAL
Qty: 120 TABLET | Refills: 0 | Status: SHIPPED | OUTPATIENT
Start: 2023-07-19 | End: 2023-08-18

## 2023-07-17 NOTE — TELEPHONE ENCOUNTER
Jorge Vickers called requesting a refill of the below medication which has been pended for you:     Requested Prescriptions     Pending Prescriptions Disp Refills    oxyCODONE (ROXICODONE) 20 MG immediate release tablet [Pharmacy Med Name: OXYCODONE HCL 20 MG TABLET] 120 tablet 0     Sig: TAKE ONE TABLET BY MOUTH EVERY 6 HOURS AS NEEDED FOR PAIN **NTE 80MG/24 HOURS**    fentaNYL (DURAGESIC) 25 MCG/HR 10 patch 0     Sig: Place 1 patch onto the skin every 3 days for 30 days. Intended supply: 30 days       Last Appointment Date: 5/19/2023  Next Appointment Date: 8/18/2023    Allergies   Allergen Reactions    Codeine Nausea And Vomiting    Morphine Other (See Comments)     constipation       Pharmacy:  [facility]    OARRS Report checked for [place], [place], and [place]:Oxycodone 20mg 06/19/23 #120. Due NOW. OARRS Report checked for [place], [place], and [place]:Fentanyl 25mcg 06/19/23 #10. Due NOW.

## 2023-07-26 NOTE — TELEPHONE ENCOUNTER
GO out of office, NORMA notes    Derrick Roque called requesting a refill of the below medication which has been pended for you:     Requested Prescriptions     Pending Prescriptions Disp Refills    lactulose ([address]) 10 GM/15ML solution [Pharmacy Med Name: LACTULOSE 10 GM/15 ML SOLUTION] 473 mL 0     Sig: GIVE 30 ML BY MOUTH ONCE DAILY AS NEEDED       Last Appointment Date: 10/4/2022  Next Appointment Date: Visit date not found    Allergies   Allergen Reactions    Codeine Nausea And Vomiting    Morphine Other (See Comments)     constipation

## 2023-07-27 RX ORDER — LACTULOSE 10 G/15ML
SOLUTION ORAL
Qty: 946 ML | Refills: 0 | Status: SHIPPED | OUTPATIENT
Start: 2023-07-27

## 2023-08-15 DIAGNOSIS — M25.561 CHRONIC PAIN OF BOTH KNEES: ICD-10-CM

## 2023-08-15 DIAGNOSIS — G89.29 CHRONIC PAIN OF BOTH KNEES: ICD-10-CM

## 2023-08-15 DIAGNOSIS — M25.562 CHRONIC PAIN OF BOTH KNEES: ICD-10-CM

## 2023-08-15 NOTE — TELEPHONE ENCOUNTER
Marcy Leon  called requesting a refill of the below medication which has been pended for you:     Requested Prescriptions     Pending Prescriptions Disp Refills    oxyCODONE (ROXICODONE) 20 MG immediate release tablet 120 tablet 0     Sig: Take 1 tablet by mouth every 6 hours as needed for Pain for up to 30 days. Max Daily Amount: 80 mg    fentaNYL (DURAGESIC) 25 MCG/HR 10 patch 0     Sig: Place 1 patch onto the skin every 3 days for 30 days. Intended supply: 30 days       Allergies   Allergen Reactions    Codeine Nausea And Vomiting    Morphine Other (See Comments)     constipation             Last Drug Screen:  11-17-23  Last Appointment:  5/19/2023   Next Appointment:  8-18-23  OARRS Report checked for [place], [place], and [place]: Fentanyl patch 7-19-23  #10. Due 8-18-23. OARRS Report checked for [place], [place], and [place]: Roxicodone 20mg 7-19-23  #120. Due 8-18-23.

## 2023-08-16 ENCOUNTER — HOSPITAL ENCOUNTER (OUTPATIENT)
Age: 88
Setting detail: SPECIMEN
Discharge: HOME | End: 2023-08-16
Payer: MEDICARE

## 2023-08-16 PROCEDURE — 81001 URINALYSIS AUTO W/SCOPE: CPT

## 2023-08-16 PROCEDURE — 87086 URINE CULTURE/COLONY COUNT: CPT

## 2023-08-16 PROCEDURE — 87186 SC STD MICRODIL/AGAR DIL: CPT

## 2023-08-16 PROCEDURE — 87077 CULTURE AEROBIC IDENTIFY: CPT

## 2023-08-17 LAB
AMORPH SED URNS QL MICRO: ABNORMAL
BACTERIA URNS QL MICRO: ABNORMAL
BILIRUB UR QL STRIP: NEGATIVE
CLARITY UR: CLEAR
COLOR UR: YELLOW
EPI CELLS #/AREA URNS HPF: ABNORMAL /HPF (ref 0–5)
GLUCOSE UR STRIP-MCNC: NEGATIVE MG/DL
HGB UR QL STRIP.AUTO: NEGATIVE
KETONES UR STRIP-MCNC: NEGATIVE MG/DL
LEUKOCYTE ESTERASE UR QL STRIP: ABNORMAL
NITRITE UR QL STRIP: POSITIVE
PH UR STRIP: 6.5 [PH] (ref 5–6)
PROT UR STRIP-MCNC: NEGATIVE MG/DL
RBC #/AREA URNS HPF: ABNORMAL /HPF (ref 0–4)
SP GR UR STRIP: 1.01 (ref 1.01–1.02)
UROBILINOGEN UR STRIP-ACNC: NORMAL EU/DL (ref 0–1)
WBC #/AREA URNS HPF: ABNORMAL /HPF (ref 0–4)

## 2023-08-17 RX ORDER — OXYCODONE HYDROCHLORIDE 20 MG/1
20 TABLET ORAL EVERY 6 HOURS PRN
Qty: 120 TABLET | Refills: 0 | Status: SHIPPED | OUTPATIENT
Start: 2023-08-18 | End: 2023-09-17

## 2023-08-17 RX ORDER — FENTANYL 25 UG/1
1 PATCH TRANSDERMAL
Qty: 10 PATCH | Refills: 0 | Status: SHIPPED | OUTPATIENT
Start: 2023-08-18 | End: 2023-09-17

## 2023-08-18 ENCOUNTER — OFFICE VISIT (OUTPATIENT)
Dept: PAIN MANAGEMENT | Age: 88
End: 2023-08-18
Payer: MEDICARE

## 2023-08-18 VITALS
DIASTOLIC BLOOD PRESSURE: 52 MMHG | BODY MASS INDEX: 29.05 KG/M2 | OXYGEN SATURATION: 99 % | HEIGHT: 66 IN | SYSTOLIC BLOOD PRESSURE: 92 MMHG | HEART RATE: 60 BPM

## 2023-08-18 DIAGNOSIS — G89.29 CHRONIC PAIN OF BOTH KNEES: Primary | ICD-10-CM

## 2023-08-18 DIAGNOSIS — M25.561 CHRONIC PAIN OF BOTH KNEES: Primary | ICD-10-CM

## 2023-08-18 DIAGNOSIS — M25.562 CHRONIC PAIN OF BOTH KNEES: Primary | ICD-10-CM

## 2023-08-18 DIAGNOSIS — I89.0 LYMPHEDEMA OF BOTH LOWER EXTREMITIES: ICD-10-CM

## 2023-08-18 PROCEDURE — 99213 OFFICE O/P EST LOW 20 MIN: CPT | Performed by: NURSE PRACTITIONER

## 2023-08-18 PROCEDURE — 99214 OFFICE O/P EST MOD 30 MIN: CPT | Performed by: NURSE PRACTITIONER

## 2023-08-18 PROCEDURE — G8417 CALC BMI ABV UP PARAM F/U: HCPCS | Performed by: NURSE PRACTITIONER

## 2023-08-18 PROCEDURE — 1036F TOBACCO NON-USER: CPT | Performed by: NURSE PRACTITIONER

## 2023-08-18 PROCEDURE — G8427 DOCREV CUR MEDS BY ELIG CLIN: HCPCS | Performed by: NURSE PRACTITIONER

## 2023-08-18 PROCEDURE — 1123F ACP DISCUSS/DSCN MKR DOCD: CPT | Performed by: NURSE PRACTITIONER

## 2023-08-18 RX ORDER — SULFAMETHOXAZOLE AND TRIMETHOPRIM 400; 80 MG/1; MG/1
1 TABLET ORAL DAILY
Qty: 7 TABLET | Refills: 0 | Status: SHIPPED | OUTPATIENT
Start: 2023-08-18 | End: 2023-08-25

## 2023-08-18 ASSESSMENT — ENCOUNTER SYMPTOMS
GASTROINTESTINAL NEGATIVE: 1
BACK PAIN: 0
RESPIRATORY NEGATIVE: 1

## 2023-08-18 NOTE — PROGRESS NOTES
None    Years of education: None    Highest education level: None   Tobacco Use    Smoking status: Former     Packs/day: 1.00     Types: Cigars, Cigarettes     Quit date: 1998     Years since quittin.6    Smokeless tobacco: Never    Tobacco comments:     Former smoker (quit )- [facility] 3/20/17   Vaping Use    Vaping Use: Never used   Substance and Sexual Activity    Alcohol use: No     Alcohol/week: 0.0 standard drinks    Drug use: No     Social Determinants of Health     Financial Resource Strain: Low Risk     Difficulty of Paying Living Expenses: Not hard at all   Food Insecurity: No Food Insecurity    Worried About Running Out of Food in the Last Year: Never true    Ran Out of Food in the Last Year: Never true     Review of Systems   Constitutional:  Positive for activity change (limited due to right knee pain). Negative for appetite change. Respiratory: Negative. Cardiovascular: Negative. Gastrointestinal: Negative. Genitourinary: Negative. Johnson catheter in place   Musculoskeletal:  Positive for arthralgias (bilateral knee, right knee is majority of his pain) and gait problem. Negative for back pain, neck pain and neck stiffness. Neurological:  Negative for weakness and numbness. Psychiatric/Behavioral:  Positive for sleep disturbance. Objective:   Physical Exam  Vitals reviewed. Constitutional:       General: He is not in acute distress. Appearance: He is well-developed. He is not ill-appearing, toxic-appearing or diaphoretic. HENT:      Head: Normocephalic and atraumatic. Cardiovascular:      Rate and Rhythm: Normal rate. Pulmonary:      Effort: Pulmonary effort is normal. No respiratory distress. Musculoskeletal:      Right knee: Swelling present. Decreased range of motion. Skin:     General: Skin is warm and dry. Coloration: Skin is not pale. Nails: There is no clubbing.    Neurological:      Mental Status: He is alert and oriented to

## 2023-08-19 LAB
MICROORGANISM SPEC CULT: ABNORMAL
SPECIMEN DESCRIPTION: ABNORMAL

## 2023-08-30 ENCOUNTER — APPOINTMENT (OUTPATIENT)
Dept: INTERVENTIONAL RADIOLOGY/VASCULAR | Age: 88
End: 2023-08-30
Payer: MEDICARE

## 2023-08-30 ENCOUNTER — APPOINTMENT (OUTPATIENT)
Dept: CT IMAGING | Age: 88
End: 2023-08-30
Payer: MEDICARE

## 2023-08-30 ENCOUNTER — APPOINTMENT (OUTPATIENT)
Dept: GENERAL RADIOLOGY | Age: 88
End: 2023-08-30
Payer: MEDICARE

## 2023-08-30 ENCOUNTER — HOSPITAL ENCOUNTER (INPATIENT)
Age: 88
LOS: 1 days | Discharge: NURSING, REHAB OR LONG-TERM CARE | End: 2023-08-31
Attending: EMERGENCY MEDICINE | Admitting: INTERNAL MEDICINE
Payer: MEDICARE

## 2023-08-30 DIAGNOSIS — M79.89 LEFT LEG SWELLING: Primary | ICD-10-CM

## 2023-08-30 DIAGNOSIS — R55 NEAR SYNCOPE: ICD-10-CM

## 2023-08-30 DIAGNOSIS — E86.0 DEHYDRATION: ICD-10-CM

## 2023-08-30 DIAGNOSIS — G89.29 CHRONIC PAIN OF BOTH KNEES: ICD-10-CM

## 2023-08-30 DIAGNOSIS — M25.562 CHRONIC PAIN OF BOTH KNEES: ICD-10-CM

## 2023-08-30 DIAGNOSIS — M25.561 CHRONIC PAIN OF BOTH KNEES: ICD-10-CM

## 2023-08-30 PROBLEM — R42 DIZZINESS: Status: ACTIVE | Noted: 2023-08-30

## 2023-08-30 LAB
ALBUMIN SERPL-MCNC: 4 G/DL (ref 3.5–5.2)
ALBUMIN/GLOB SERPL: 1.5 {RATIO} (ref 1–2.5)
ALP SERPL-CCNC: 72 U/L (ref 40–129)
ALT SERPL-CCNC: 9 U/L (ref 5–41)
ANION GAP SERPL CALCULATED.3IONS-SCNC: 10 MMOL/L (ref 9–17)
AST SERPL-CCNC: 13 U/L
BASOPHILS # BLD: 0.03 K/UL (ref 0–0.2)
BASOPHILS NFR BLD: 0 % (ref 0–2)
BILIRUB SERPL-MCNC: 0.3 MG/DL (ref 0.3–1.2)
BILIRUB UR QL STRIP: NEGATIVE
BNP SERPL-MCNC: 282 PG/ML
BUN SERPL-MCNC: 41 MG/DL (ref 8–23)
BUN/CREAT SERPL: 22 (ref 9–20)
CALCIUM SERPL-MCNC: 9.4 MG/DL (ref 8.6–10.4)
CHLORIDE SERPL-SCNC: 98 MMOL/L (ref 98–107)
CLARITY UR: CLEAR
CO2 SERPL-SCNC: 27 MMOL/L (ref 20–31)
COLOR UR: YELLOW
COMMENT: ABNORMAL
CREAT SERPL-MCNC: 1.9 MG/DL (ref 0.7–1.2)
EOSINOPHIL # BLD: 0.13 K/UL (ref 0–0.44)
EOSINOPHILS RELATIVE PERCENT: 2 % (ref 1–4)
ERYTHROCYTE [DISTWIDTH] IN BLOOD BY AUTOMATED COUNT: 13.6 % (ref 11.8–14.4)
GFR SERPL CREATININE-BSD FRML MDRD: 32 ML/MIN/1.73M2
GLUCOSE SERPL-MCNC: 113 MG/DL (ref 70–99)
GLUCOSE UR STRIP-MCNC: NEGATIVE MG/DL
HCT VFR BLD AUTO: 33 % (ref 40.7–50.3)
HGB BLD-MCNC: 10.9 G/DL (ref 13–17)
HGB UR QL STRIP.AUTO: NEGATIVE
IMM GRANULOCYTES # BLD AUTO: <0.03 K/UL (ref 0–0.3)
IMM GRANULOCYTES NFR BLD: 0 %
INR PPP: 1
KETONES UR STRIP-MCNC: NEGATIVE MG/DL
LACTATE BLDV-SCNC: 1.5 MMOL/L (ref 0.5–1.9)
LEUKOCYTE ESTERASE UR QL STRIP: NEGATIVE
LYMPHOCYTES NFR BLD: 1.96 K/UL (ref 1.1–3.7)
LYMPHOCYTES RELATIVE PERCENT: 29 % (ref 24–43)
MCH RBC QN AUTO: 31.4 PG (ref 25.2–33.5)
MCHC RBC AUTO-ENTMCNC: 33 G/DL (ref 25.2–33.5)
MCV RBC AUTO: 95.1 FL (ref 82.6–102.9)
MONOCYTES NFR BLD: 0.6 K/UL (ref 0.1–1.2)
MONOCYTES NFR BLD: 9 % (ref 3–12)
NEUTROPHILS NFR BLD: 60 % (ref 36–65)
NEUTS SEG NFR BLD: 4.07 K/UL (ref 1.5–8.1)
NITRITE UR QL STRIP: NEGATIVE
NRBC BLD-RTO: 0 PER 100 WBC
PH UR STRIP: 7 [PH] (ref 5–6)
PLATELET # BLD AUTO: 293 K/UL (ref 138–453)
PMV BLD AUTO: 10.5 FL (ref 8.1–13.5)
POTASSIUM SERPL-SCNC: 4.3 MMOL/L (ref 3.7–5.3)
PROT SERPL-MCNC: 6.7 G/DL (ref 6.4–8.3)
PROT UR STRIP-MCNC: NEGATIVE MG/DL
PROTHROMBIN TIME: 12.6 SEC (ref 11.5–14.2)
RBC # BLD AUTO: 3.47 M/UL (ref 4.21–5.77)
SARS-COV-2 RDRP RESP QL NAA+PROBE: NOT DETECTED
SODIUM SERPL-SCNC: 135 MMOL/L (ref 135–144)
SP GR UR STRIP: 1.01 (ref 1.01–1.02)
SPECIMEN DESCRIPTION: NORMAL
TROPONIN I SERPL HS-MCNC: 53 NG/L (ref 0–22)
TROPONIN I SERPL HS-MCNC: 53 NG/L (ref 0–22)
TROPONIN I SERPL HS-MCNC: 58 NG/L (ref 0–22)
UROBILINOGEN UR STRIP-ACNC: NORMAL EU/DL (ref 0–1)
WBC OTHER # BLD: 6.8 K/UL (ref 3.5–11.3)

## 2023-08-30 PROCEDURE — 80053 COMPREHEN METABOLIC PANEL: CPT

## 2023-08-30 PROCEDURE — 85610 PROTHROMBIN TIME: CPT

## 2023-08-30 PROCEDURE — 87635 SARS-COV-2 COVID-19 AMP PRB: CPT

## 2023-08-30 PROCEDURE — 84484 ASSAY OF TROPONIN QUANT: CPT

## 2023-08-30 PROCEDURE — 87040 BLOOD CULTURE FOR BACTERIA: CPT

## 2023-08-30 PROCEDURE — 96360 HYDRATION IV INFUSION INIT: CPT

## 2023-08-30 PROCEDURE — 93971 EXTREMITY STUDY: CPT

## 2023-08-30 PROCEDURE — G0378 HOSPITAL OBSERVATION PER HR: HCPCS

## 2023-08-30 PROCEDURE — 81003 URINALYSIS AUTO W/O SCOPE: CPT

## 2023-08-30 PROCEDURE — 36415 COLL VENOUS BLD VENIPUNCTURE: CPT

## 2023-08-30 PROCEDURE — 2580000003 HC RX 258: Performed by: INTERNAL MEDICINE

## 2023-08-30 PROCEDURE — 99222 1ST HOSP IP/OBS MODERATE 55: CPT | Performed by: INTERNAL MEDICINE

## 2023-08-30 PROCEDURE — 83605 ASSAY OF LACTIC ACID: CPT

## 2023-08-30 PROCEDURE — 6360000002 HC RX W HCPCS: Performed by: INTERNAL MEDICINE

## 2023-08-30 PROCEDURE — 83880 ASSAY OF NATRIURETIC PEPTIDE: CPT

## 2023-08-30 PROCEDURE — 85025 COMPLETE CBC W/AUTO DIFF WBC: CPT

## 2023-08-30 PROCEDURE — 94760 N-INVAS EAR/PLS OXIMETRY 1: CPT

## 2023-08-30 PROCEDURE — 71045 X-RAY EXAM CHEST 1 VIEW: CPT

## 2023-08-30 PROCEDURE — 2580000003 HC RX 258: Performed by: EMERGENCY MEDICINE

## 2023-08-30 PROCEDURE — 2060000000 HC ICU INTERMEDIATE R&B

## 2023-08-30 PROCEDURE — 6370000000 HC RX 637 (ALT 250 FOR IP): Performed by: INTERNAL MEDICINE

## 2023-08-30 PROCEDURE — 70450 CT HEAD/BRAIN W/O DYE: CPT

## 2023-08-30 PROCEDURE — 93005 ELECTROCARDIOGRAM TRACING: CPT | Performed by: EMERGENCY MEDICINE

## 2023-08-30 PROCEDURE — 99285 EMERGENCY DEPT VISIT HI MDM: CPT

## 2023-08-30 RX ORDER — OXYCODONE HYDROCHLORIDE 5 MG/1
20 TABLET ORAL EVERY 6 HOURS PRN
Status: DISCONTINUED | OUTPATIENT
Start: 2023-08-30 | End: 2023-08-31 | Stop reason: HOSPADM

## 2023-08-30 RX ORDER — FENTANYL 25 UG/1
1 PATCH TRANSDERMAL
Status: DISCONTINUED | OUTPATIENT
Start: 2023-09-02 | End: 2023-08-31 | Stop reason: HOSPADM

## 2023-08-30 RX ORDER — PANTOPRAZOLE SODIUM 40 MG/1
40 TABLET, DELAYED RELEASE ORAL
Status: DISCONTINUED | OUTPATIENT
Start: 2023-08-31 | End: 2023-08-31 | Stop reason: HOSPADM

## 2023-08-30 RX ORDER — SODIUM CHLORIDE FOR INHALATION 0.9 %
3 VIAL, NEBULIZER (ML) INHALATION
Status: DISCONTINUED | OUTPATIENT
Start: 2023-08-30 | End: 2023-08-31 | Stop reason: HOSPADM

## 2023-08-30 RX ORDER — SODIUM CHLORIDE 0.9 % (FLUSH) 0.9 %
10 SYRINGE (ML) INJECTION PRN
Status: DISCONTINUED | OUTPATIENT
Start: 2023-08-30 | End: 2023-08-31 | Stop reason: HOSPADM

## 2023-08-30 RX ORDER — ALBUTEROL SULFATE 2.5 MG/3ML
2.5 SOLUTION RESPIRATORY (INHALATION)
Status: DISCONTINUED | OUTPATIENT
Start: 2023-08-30 | End: 2023-08-31 | Stop reason: HOSPADM

## 2023-08-30 RX ORDER — LANOLIN ALCOHOL/MO/W.PET/CERES
3 CREAM (GRAM) TOPICAL NIGHTLY
Status: DISCONTINUED | OUTPATIENT
Start: 2023-08-30 | End: 2023-08-31 | Stop reason: HOSPADM

## 2023-08-30 RX ORDER — DOCUSATE SODIUM 100 MG/1
100 CAPSULE, LIQUID FILLED ORAL DAILY
Status: DISCONTINUED | OUTPATIENT
Start: 2023-08-30 | End: 2023-08-31 | Stop reason: HOSPADM

## 2023-08-30 RX ORDER — CLOPIDOGREL BISULFATE 75 MG/1
75 TABLET ORAL DAILY
Status: DISCONTINUED | OUTPATIENT
Start: 2023-08-30 | End: 2023-08-31 | Stop reason: HOSPADM

## 2023-08-30 RX ORDER — IPRATROPIUM BROMIDE AND ALBUTEROL SULFATE 2.5; .5 MG/3ML; MG/3ML
1 SOLUTION RESPIRATORY (INHALATION)
Status: DISCONTINUED | OUTPATIENT
Start: 2023-08-30 | End: 2023-08-30

## 2023-08-30 RX ORDER — ACETAMINOPHEN 325 MG/1
650 TABLET ORAL EVERY 4 HOURS PRN
Status: DISCONTINUED | OUTPATIENT
Start: 2023-08-30 | End: 2023-08-31 | Stop reason: HOSPADM

## 2023-08-30 RX ORDER — LISINOPRIL 5 MG/1
5 TABLET ORAL DAILY
Status: DISCONTINUED | OUTPATIENT
Start: 2023-08-31 | End: 2023-08-31 | Stop reason: HOSPADM

## 2023-08-30 RX ORDER — SODIUM CHLORIDE 9 MG/ML
INJECTION, SOLUTION INTRAVENOUS PRN
Status: DISCONTINUED | OUTPATIENT
Start: 2023-08-30 | End: 2023-08-31 | Stop reason: HOSPADM

## 2023-08-30 RX ORDER — 0.9 % SODIUM CHLORIDE 0.9 %
500 INTRAVENOUS SOLUTION INTRAVENOUS ONCE
Status: COMPLETED | OUTPATIENT
Start: 2023-08-30 | End: 2023-08-30

## 2023-08-30 RX ORDER — ONDANSETRON 4 MG/1
4 TABLET, ORALLY DISINTEGRATING ORAL EVERY 8 HOURS PRN
Status: DISCONTINUED | OUTPATIENT
Start: 2023-08-30 | End: 2023-08-31 | Stop reason: HOSPADM

## 2023-08-30 RX ORDER — ONDANSETRON 2 MG/ML
4 INJECTION INTRAMUSCULAR; INTRAVENOUS EVERY 6 HOURS PRN
Status: DISCONTINUED | OUTPATIENT
Start: 2023-08-30 | End: 2023-08-31 | Stop reason: HOSPADM

## 2023-08-30 RX ORDER — SODIUM CHLORIDE 9 MG/ML
INJECTION, SOLUTION INTRAVENOUS CONTINUOUS
Status: DISCONTINUED | OUTPATIENT
Start: 2023-08-30 | End: 2023-08-31 | Stop reason: HOSPADM

## 2023-08-30 RX ORDER — ATORVASTATIN CALCIUM 10 MG/1
10 TABLET, FILM COATED ORAL DAILY
Status: DISCONTINUED | OUTPATIENT
Start: 2023-08-30 | End: 2023-08-30

## 2023-08-30 RX ORDER — ENOXAPARIN SODIUM 100 MG/ML
30 INJECTION SUBCUTANEOUS DAILY
Status: DISCONTINUED | OUTPATIENT
Start: 2023-08-30 | End: 2023-08-31 | Stop reason: HOSPADM

## 2023-08-30 RX ORDER — SODIUM CHLORIDE 0.9 % (FLUSH) 0.9 %
10 SYRINGE (ML) INJECTION EVERY 12 HOURS SCHEDULED
Status: DISCONTINUED | OUTPATIENT
Start: 2023-08-30 | End: 2023-08-31 | Stop reason: HOSPADM

## 2023-08-30 RX ADMIN — DOCUSATE SODIUM 100 MG: 100 CAPSULE, LIQUID FILLED ORAL at 18:33

## 2023-08-30 RX ADMIN — SODIUM CHLORIDE 500 ML: 9 INJECTION, SOLUTION INTRAVENOUS at 13:54

## 2023-08-30 RX ADMIN — ENOXAPARIN SODIUM 30 MG: 100 INJECTION SUBCUTANEOUS at 18:30

## 2023-08-30 RX ADMIN — CEFTRIAXONE 1000 MG: 1 INJECTION, POWDER, FOR SOLUTION INTRAMUSCULAR; INTRAVENOUS at 18:32

## 2023-08-30 RX ADMIN — CLOPIDOGREL BISULFATE 75 MG: 75 TABLET ORAL at 18:33

## 2023-08-30 RX ADMIN — SODIUM CHLORIDE: 9 INJECTION, SOLUTION INTRAVENOUS at 18:23

## 2023-08-30 RX ADMIN — MELATONIN 3 MG: 3 TAB ORAL at 20:07

## 2023-08-30 ASSESSMENT — ENCOUNTER SYMPTOMS
NAUSEA: 0
DIARRHEA: 0
BLOOD IN STOOL: 0
TROUBLE SWALLOWING: 0
BACK PAIN: 0
WHEEZING: 0
ABDOMINAL PAIN: 0
SHORTNESS OF BREATH: 0
VOMITING: 0
CONSTIPATION: 0
SORE THROAT: 0

## 2023-08-30 ASSESSMENT — LIFESTYLE VARIABLES
HOW MANY STANDARD DRINKS CONTAINING ALCOHOL DO YOU HAVE ON A TYPICAL DAY: PATIENT DOES NOT DRINK
HOW OFTEN DO YOU HAVE A DRINK CONTAINING ALCOHOL: NEVER

## 2023-08-30 ASSESSMENT — PAIN - FUNCTIONAL ASSESSMENT: PAIN_FUNCTIONAL_ASSESSMENT: NONE - DENIES PAIN

## 2023-08-30 NOTE — FLOWSHEET NOTE
08/30/23 1758   Vital Signs   Blood Pressure Lying 154/74   Pulse Lying 78 PER MINUTE   Blood Pressure Sitting 152/72   Pulse Sitting 74 PER MINUTE     Pt unable to tolerate standing for BP

## 2023-08-31 ENCOUNTER — APPOINTMENT (OUTPATIENT)
Age: 88
End: 2023-08-31
Attending: INTERNAL MEDICINE
Payer: MEDICARE

## 2023-08-31 VITALS
OXYGEN SATURATION: 98 % | RESPIRATION RATE: 18 BRPM | TEMPERATURE: 98.1 F | WEIGHT: 146 LBS | BODY MASS INDEX: 23.46 KG/M2 | HEART RATE: 68 BPM | DIASTOLIC BLOOD PRESSURE: 55 MMHG | HEIGHT: 66 IN | SYSTOLIC BLOOD PRESSURE: 156 MMHG

## 2023-08-31 LAB
ANION GAP SERPL CALCULATED.3IONS-SCNC: 10 MMOL/L (ref 9–17)
BASOPHILS # BLD: <0.03 K/UL (ref 0–0.2)
BASOPHILS NFR BLD: 0 % (ref 0–2)
BUN SERPL-MCNC: 35 MG/DL (ref 8–23)
BUN/CREAT SERPL: 23 (ref 9–20)
CALCIUM SERPL-MCNC: 8.9 MG/DL (ref 8.6–10.4)
CHLORIDE SERPL-SCNC: 105 MMOL/L (ref 98–107)
CO2 SERPL-SCNC: 26 MMOL/L (ref 20–31)
CREAT SERPL-MCNC: 1.5 MG/DL (ref 0.7–1.2)
ECHO AO ROOT DIAM: 3.5 CM
ECHO AO ROOT INDEX: 2 CM/M2
ECHO AV PEAK GRADIENT: 12 MMHG
ECHO AV PEAK VELOCITY: 1.7 M/S
ECHO AV VELOCITY RATIO: 0.47
ECHO BSA: 1.76 M2
ECHO EST RA PRESSURE: 8 MMHG
ECHO LA AREA 2C: 10.3 CM2
ECHO LA AREA 4C: 19.9 CM2
ECHO LA DIAMETER INDEX: 2.06 CM/M2
ECHO LA DIAMETER: 3.6 CM
ECHO LA MAJOR AXIS: 5.3 CM
ECHO LA MINOR AXIS: 4.3 CM
ECHO LA TO AORTIC ROOT RATIO: 1.03
ECHO LA VOL 2C: 20 ML (ref 18–58)
ECHO LA VOL 4C: 56 ML (ref 18–58)
ECHO LA VOL BP: 37 ML (ref 18–58)
ECHO LA VOL/BSA BIPLANE: 21 ML/M2 (ref 16–34)
ECHO LA VOLUME INDEX A2C: 11 ML/M2 (ref 16–34)
ECHO LA VOLUME INDEX A4C: 32 ML/M2 (ref 16–34)
ECHO LV E' LATERAL VELOCITY: 5 CM/S
ECHO LV E' SEPTAL VELOCITY: 6 CM/S
ECHO LV EJECTION FRACTION BIPLANE: 68 % (ref 55–100)
ECHO LV FRACTIONAL SHORTENING: 30 % (ref 28–44)
ECHO LV INTERNAL DIMENSION DIASTOLE INDEX: 2.86 CM/M2
ECHO LV INTERNAL DIMENSION DIASTOLIC: 5 CM (ref 4.2–5.9)
ECHO LV INTERNAL DIMENSION SYSTOLIC INDEX: 2 CM/M2
ECHO LV INTERNAL DIMENSION SYSTOLIC: 3.5 CM
ECHO LV IVSD: 1 CM (ref 0.6–1)
ECHO LV MASS 2D: 169.9 G (ref 88–224)
ECHO LV MASS INDEX 2D: 97.1 G/M2 (ref 49–115)
ECHO LV POSTERIOR WALL DIASTOLIC: 0.9 CM (ref 0.6–1)
ECHO LV RELATIVE WALL THICKNESS RATIO: 0.36
ECHO LVOT PEAK GRADIENT: 2 MMHG
ECHO LVOT PEAK VELOCITY: 0.8 M/S
ECHO MV A VELOCITY: 1.3 M/S
ECHO MV E DECELERATION TIME (DT): 324 MS
ECHO MV E VELOCITY: 1.07 M/S
ECHO MV E/A RATIO: 0.82
ECHO MV E/E' LATERAL: 21.4
ECHO MV E/E' RATIO (AVERAGED): 19.62
ECHO MV E/E' SEPTAL: 17.83
ECHO MV MAX VELOCITY: 1.7 M/S
ECHO MV PEAK GRADIENT: 11 MMHG
ECHO PV MAX VELOCITY: 1.3 M/S
ECHO PV PEAK GRADIENT: 7 MMHG
ECHO RIGHT VENTRICULAR SYSTOLIC PRESSURE (RVSP): 46 MMHG
ECHO TV PEAK GRADIENT: 3 MMHG
ECHO TV REGURGITANT MAX VELOCITY: 3.09 M/S
ECHO TV REGURGITANT PEAK GRADIENT: 38 MMHG
EOSINOPHIL # BLD: 0.11 K/UL (ref 0–0.44)
EOSINOPHILS RELATIVE PERCENT: 2 % (ref 1–4)
ERYTHROCYTE [DISTWIDTH] IN BLOOD BY AUTOMATED COUNT: 13.4 % (ref 11.8–14.4)
GFR SERPL CREATININE-BSD FRML MDRD: 42 ML/MIN/1.73M2
GLUCOSE SERPL-MCNC: 110 MG/DL (ref 70–99)
HCT VFR BLD AUTO: 30.1 % (ref 40.7–50.3)
HGB BLD-MCNC: 10 G/DL (ref 13–17)
IMM GRANULOCYTES # BLD AUTO: <0.03 K/UL (ref 0–0.3)
IMM GRANULOCYTES NFR BLD: 0 %
LYMPHOCYTES NFR BLD: 1.27 K/UL (ref 1.1–3.7)
LYMPHOCYTES RELATIVE PERCENT: 23 % (ref 24–43)
MCH RBC QN AUTO: 31.3 PG (ref 25.2–33.5)
MCHC RBC AUTO-ENTMCNC: 33.2 G/DL (ref 25.2–33.5)
MCV RBC AUTO: 94.4 FL (ref 82.6–102.9)
MONOCYTES NFR BLD: 0.62 K/UL (ref 0.1–1.2)
MONOCYTES NFR BLD: 11 % (ref 3–12)
NEUTROPHILS NFR BLD: 64 % (ref 36–65)
NEUTS SEG NFR BLD: 3.58 K/UL (ref 1.5–8.1)
NRBC BLD-RTO: 0 PER 100 WBC
PLATELET # BLD AUTO: 270 K/UL (ref 138–453)
PMV BLD AUTO: 10.3 FL (ref 8.1–13.5)
POTASSIUM SERPL-SCNC: 4.1 MMOL/L (ref 3.7–5.3)
RBC # BLD AUTO: 3.19 M/UL (ref 4.21–5.77)
SODIUM SERPL-SCNC: 141 MMOL/L (ref 135–144)
TROPONIN I SERPL HS-MCNC: 60 NG/L (ref 0–22)
WBC OTHER # BLD: 5.6 K/UL (ref 3.5–11.3)

## 2023-08-31 PROCEDURE — 93306 TTE W/DOPPLER COMPLETE: CPT

## 2023-08-31 PROCEDURE — 6370000000 HC RX 637 (ALT 250 FOR IP): Performed by: INTERNAL MEDICINE

## 2023-08-31 PROCEDURE — 36415 COLL VENOUS BLD VENIPUNCTURE: CPT

## 2023-08-31 PROCEDURE — 99223 1ST HOSP IP/OBS HIGH 75: CPT | Performed by: INTERNAL MEDICINE

## 2023-08-31 PROCEDURE — 80048 BASIC METABOLIC PNL TOTAL CA: CPT

## 2023-08-31 PROCEDURE — 84484 ASSAY OF TROPONIN QUANT: CPT

## 2023-08-31 PROCEDURE — 85025 COMPLETE CBC W/AUTO DIFF WBC: CPT

## 2023-08-31 PROCEDURE — 99238 HOSP IP/OBS DSCHRG MGMT 30/<: CPT | Performed by: INTERNAL MEDICINE

## 2023-08-31 PROCEDURE — 2580000003 HC RX 258: Performed by: INTERNAL MEDICINE

## 2023-08-31 PROCEDURE — 94760 N-INVAS EAR/PLS OXIMETRY 1: CPT

## 2023-08-31 PROCEDURE — 6360000002 HC RX W HCPCS: Performed by: INTERNAL MEDICINE

## 2023-08-31 RX ORDER — FUROSEMIDE 20 MG/1
40 TABLET ORAL DAILY
Qty: 15 TABLET | Refills: 3
Start: 2023-08-31

## 2023-08-31 RX ORDER — FUROSEMIDE 40 MG/1
40 TABLET ORAL DAILY
Status: DISCONTINUED | OUTPATIENT
Start: 2023-08-31 | End: 2023-08-31 | Stop reason: HOSPADM

## 2023-08-31 RX ORDER — LACTULOSE 10 G/15ML
SOLUTION ORAL
Qty: 946 ML | Refills: 0 | Status: SHIPPED | OUTPATIENT
Start: 2023-08-31

## 2023-08-31 RX ORDER — CHOLECALCIFEROL (VITAMIN D3) 125 MCG
1 CAPSULE ORAL 3 TIMES DAILY
Qty: 30 CAPSULE | Refills: 0 | COMMUNITY
Start: 2023-08-31 | End: 2023-09-10

## 2023-08-31 RX ORDER — CEFDINIR 300 MG/1
300 CAPSULE ORAL 2 TIMES DAILY
Qty: 12 CAPSULE | Refills: 0 | Status: SHIPPED | OUTPATIENT
Start: 2023-08-31 | End: 2023-09-06

## 2023-08-31 RX ORDER — OXYCODONE HYDROCHLORIDE 20 MG/1
20 TABLET ORAL EVERY 6 HOURS PRN
Qty: 12 TABLET | Refills: 0 | Status: SHIPPED | OUTPATIENT
Start: 2023-08-31 | End: 2023-09-03

## 2023-08-31 RX ORDER — FENTANYL 25 UG/1
1 PATCH TRANSDERMAL
Qty: 1 PATCH | Refills: 0 | Status: SHIPPED | OUTPATIENT
Start: 2023-08-31 | End: 2023-09-30

## 2023-08-31 RX ADMIN — FUROSEMIDE 40 MG: 40 TABLET ORAL at 09:55

## 2023-08-31 RX ADMIN — LISINOPRIL 5 MG: 5 TABLET ORAL at 09:55

## 2023-08-31 RX ADMIN — ENOXAPARIN SODIUM 30 MG: 100 INJECTION SUBCUTANEOUS at 09:55

## 2023-08-31 RX ADMIN — PANTOPRAZOLE SODIUM 40 MG: 40 TABLET, DELAYED RELEASE ORAL at 06:04

## 2023-08-31 RX ADMIN — SODIUM CHLORIDE: 9 INJECTION, SOLUTION INTRAVENOUS at 02:21

## 2023-08-31 RX ADMIN — DOCUSATE SODIUM 100 MG: 100 CAPSULE, LIQUID FILLED ORAL at 09:55

## 2023-08-31 RX ADMIN — OXYCODONE HYDROCHLORIDE 20 MG: 5 TABLET ORAL at 02:59

## 2023-08-31 RX ADMIN — SODIUM CHLORIDE: 9 INJECTION, SOLUTION INTRAVENOUS at 11:45

## 2023-08-31 RX ADMIN — OXYCODONE HYDROCHLORIDE 20 MG: 5 TABLET ORAL at 11:41

## 2023-08-31 ASSESSMENT — PAIN DESCRIPTION - ORIENTATION
ORIENTATION: LEFT;RIGHT
ORIENTATION: LEFT
ORIENTATION: LEFT

## 2023-08-31 ASSESSMENT — PAIN DESCRIPTION - LOCATION
LOCATION: LEG

## 2023-08-31 ASSESSMENT — PAIN SCALES - GENERAL
PAINLEVEL_OUTOF10: 8
PAINLEVEL_OUTOF10: 8
PAINLEVEL_OUTOF10: 7

## 2023-08-31 ASSESSMENT — PAIN DESCRIPTION - DESCRIPTORS
DESCRIPTORS: ACHING
DESCRIPTORS: ACHING;SHARP
DESCRIPTORS: ACHING;SHARP

## 2023-08-31 ASSESSMENT — PAIN - FUNCTIONAL ASSESSMENT
PAIN_FUNCTIONAL_ASSESSMENT: ACTIVITIES ARE NOT PREVENTED
PAIN_FUNCTIONAL_ASSESSMENT: ACTIVITIES ARE NOT PREVENTED

## 2023-08-31 NOTE — PLAN OF CARE
Problem: Discharge Planning  Goal: Discharge to home or other facility with appropriate resources  8/31/2023 0737 by Rizwana Cason RN  Outcome: Progressing  8/30/2023 2020 by Lauren Long RN  Outcome: Progressing  8/30/2023 1904 by Rizwana Cason RN  Outcome: Progressing     Problem: Safety - Adult  Goal: Free from fall injury  8/31/2023 0737 by Rizwana Cason RN  Outcome: Progressing  8/30/2023 2020 by Lauren Long RN  Outcome: Progressing  8/30/2023 1904 by Rizwana Cason RN  Outcome: Progressing     Problem: Skin/Tissue Integrity  Goal: Absence of new skin breakdown  Description: 1. Monitor for areas of redness and/or skin breakdown  2. Assess vascular access sites hourly  3. Every 4-6 hours minimum:  Change oxygen saturation probe site  4. Every 4-6 hours:  If on nasal continuous positive airway pressure, respiratory therapy assess nares and determine need for appliance change or resting period.   8/31/2023 0737 by Rizwana Cason RN  Outcome: Progressing  8/30/2023 2020 by Lauren Long RN  Outcome: Progressing  8/30/2023 1904 by Rizwana Cason RN  Outcome: Progressing     Problem: ABCDS Injury Assessment  Goal: Absence of physical injury  8/31/2023 0737 by Rizwana Cason RN  Outcome: Progressing  8/30/2023 2020 by Lauren Long RN  Outcome: Progressing  8/30/2023 1904 by Rizwana Cason RN  Outcome: Progressing

## 2023-08-31 NOTE — DISCHARGE INSTR - DIET

## 2023-08-31 NOTE — CARE COORDINATION
DISCHARGE BARRIERS       Reason for Referral:  SW completed a Psychosocial Assessment for evaluation of patient's mental health, social status, and functional capacity within the community. Cynthia Arce is a 80 y.o. male admitted due to Dizziness. Patient accompanied by child. SW provided supportive listening while patient discussed past medical history and events leading up to hospitalization. Mental Status:  Alert, oriented, and engaging during assessment. Decision Making:  Makes own decisions. Family/Social/Home Environment: lives in an assisted living facility    Support: Discussed a good social support network     Current Services:  [facility]     Current DMEs: wheelchair     PCP: Dudley Strauss MD and repots no issues affording medication.  status:   None     ADLs and means of transportation: Assisted in ADLs prior to hospitalization and unable to transport self. Food insecurity or needed financial assistance: Denies any food insecurity or financial concerns at this time. ACP and Code Status:  SW discussed an Advance Directive which included the patient's choices for care and treatment in the case of a health event that adversely affects decision-making abilities. SW provided education and resources. Cynthia Arce has no questions at this time and has agreed to keep me up-to-date should anything change. Cynthia Arce is a Full Code status and Power of  for healthcare on file. Collaborative List of SNF/ECF/HH were provided: offered, declined. Patient states he will [facility]. No discharge order at this time. Anticipated Needs/Discharge Plan:  Spoke with patient/family/representative about discharge plan. Patient/Family/Representative verbalizes understanding of the plan of care and denies discharge needs or further services at this time. SW provided business card.  SW will continue to monitor needs and assist as appropriate.          Electronically signed by LENNY Callahan on 8/31/2023 at 11:44 AM

## 2023-08-31 NOTE — PROGRESS NOTES
rounding on PCU. Assessment: The  could not get the patient to talk very much. The patient asked that [employer] be notified that he is here, and that was done. Intervention: Engaged in conversation and prayer. Patient expressed gratitude for visit and offer of continued prayer. Plan: Chaplains are available 24/7 to help with spiritual and emotional concerns.      08/31/23 0914   Encounter Summary   Encounter Overview/Reason  Volunteer Encounter   Service Provided For: Patient   Referral/Consult From:  [address] [facility]   Last Encounter  08/31/23   Complexity of Encounter Low   Begin Time 0900   End Time  0918   Total Time Calculated 18 min   Spiritual/Emotional needs   Type Spiritual Support   Assessment/Intervention/Outcome   Assessment Calm   Intervention Active listening;Prayer (assurance of)/[place];Sustaining Presence/Ministry of presence   Outcome Expressed Gratitude
Hospitalist Progress Note    Patient:  Velvet Henry     YOB: 1927    MRN: [id number]   Admit date: 8/30/2023     Acct: [de-identified]     PCP: Abbie Helm MD    CC--Interval History: Near syncope--dizziness--resolved--due to dehydration----has rec'd IVF--improved---2D ECHO--EF ~ 60-65%--mild-to-moderate MR--mild pulmonary HTN---diuretics being decreased to Lasix 40 mg PO daily and will then need to be titrated on the outpatient setting---seen by Cardiology---to [facility]---8.31.2023    Troponin elevation---\"flat peak\"---renal--non-cardiac    BLE--lymphedema--chronic--pitting---LLE DUS--no DVT    See orders     All other ROS negative except noted in HPI    Diet:  Diet NPO Exceptions are: Sips of Water with Meds    Medications:  Scheduled Meds:   sodium chloride flush  10 mL IntraVENous 2 times per day    enoxaparin  30 mg SubCUTAneous Daily    clopidogrel  75 mg Oral Daily    docusate sodium  100 mg Oral Daily    [START ON 9/2/2023] fentaNYL  1 patch TransDERmal Q3 Days    lisinopril  5 mg Oral Daily    melatonin  3 mg Oral Nightly    pantoprazole  40 mg Oral QAM AC    cefTRIAXone (ROCEPHIN) IV  1,000 mg IntraVENous Q24H     Continuous Infusions:   sodium chloride      sodium chloride 125 mL/hr at 08/31/23 0550     PRN Meds:sodium chloride flush, sodium chloride, ondansetron **OR** ondansetron, acetaminophen, sodium chloride nebulizer, albuterol, oxyCODONE    Objective:  Labs:  CBC with Differential:    Lab Results   Component Value Date/Time    WBC 5.6 08/31/2023 03:03 AM    RBC 3.19 08/31/2023 03:03 AM    HGB 10.0 08/31/2023 03:03 AM    HCT 30.1 08/31/2023 03:03 AM     08/31/2023 03:03 AM    MCV 94.4 08/31/2023 03:03 AM    MCH 31.3 08/31/2023 03:03 AM    MCHC 33.2 08/31/2023 03:03 AM    RDW 13.4 08/31/2023 03:03 AM    LYMPHOPCT 23 08/31/2023 03:03 AM    MONOPCT 11 08/31/2023 03:03 AM    BASOPCT 0 08/31/2023 03:03 AM    MONOSABS 0.62 08/31/2023 03:03 AM    LYMPHSABS 1.27 08/31/2023 03:03 AM
Report called to Alexia Romeo at [facility]
[phone]   [phone]   [phone] 348 364 [phone]   [phone]

## 2023-08-31 NOTE — PLAN OF CARE
Problem: Discharge Planning  Goal: Discharge to home or other facility with appropriate resources  8/30/2023 2020 by Gio Angulo RN  Outcome: Progressing  8/30/2023 1904 by Sergio Ma RN  Outcome: Progressing     Problem: Safety - Adult  Goal: Free from fall injury  8/30/2023 2020 by Gio Angulo RN  Outcome: Progressing  8/30/2023 1904 by Sergio Ma RN  Outcome: Progressing     Problem: Skin/Tissue Integrity  Goal: Absence of new skin breakdown  Description: 1. Monitor for areas of redness and/or skin breakdown  2. Assess vascular access sites hourly  3. Every 4-6 hours minimum:  Change oxygen saturation probe site  4. Every 4-6 hours:  If on nasal continuous positive airway pressure, respiratory therapy assess nares and determine need for appliance change or resting period.   8/30/2023 2020 by Gio Angulo RN  Outcome: Progressing  8/30/2023 1904 by Sergio Ma RN  Outcome: Progressing     Problem: ABCDS Injury Assessment  Goal: Absence of physical injury  8/30/2023 2020 by Gio Angulo RN  Outcome: Progressing  8/30/2023 1904 by Sergio Ma RN  Outcome: Progressing

## 2023-08-31 NOTE — CONSULTS
[facility]  CONSULT NOTE                  Date:   8/31/2023  Patient name: Fabio Jacobo  Date of admission:  8/30/2023 12:37 PM  MRN:   [id number]  YOB: 1927    Reason for Admission: near syncope     CHIEF COMPLAINT:   dizziness    History Obtained From:  Patient and chart review     HISTORY OF PRESENT ILLNESS:      Patient presented with a near syncopal event at [facility]. He felt dizzy, light headed and about to pass out. He has underlying hx of CAD/Chronic HFpEF and was on 80 mg bid of lasix. Patient denies any chest pain or dyspnea. 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 Past Surgical History:   has a past surgical history that includes Colonoscopy (08/29/12); Cystocopy (11/15/2007); Appendectomy; Cholecystectomy (01/12/1983); Knee fusion (Left); lipoma resection; Colonoscopy (05/27/09); Colonoscopy (04/30/2008); Prostate Biopsy (Bilateral, 06/23/2009); pr egd transoral biopsy single/multiple (N/A, 7/3/2017); Upper gastrointestinal endoscopy (02/2/2015); Upper gastrointestinal endoscopy (04/25/2016); Upper gastrointestinal endoscopy (07/03/2017); pr cystourethroscopy (N/A, 11/19/2018); and Leg Surgery (Left, 1/11/2020). Home Medications:    Prior to Admission medications    Medication Sig Start Date End Date Taking? Authorizing Provider   oxyCODONE (ROXICODONE) 20 MG immediate release tablet Take 1 tablet by mouth every 6 hours as needed for Pain for up to 30 days. Max Daily Amount: 80 mg 8/18/23 9/17/23  KEVIN Montoya CNP   fentaNYL (DURAGESIC) 25 MCG/HR Place 1 patch onto the skin every 3 days for 30 days.  Intended supply: 30 days 8/18/23 9/17/23  KEVIN Montoya CNP   furosemide (LASIX) 20 MG tablet Take 3 tablets by mouth 2 times daily    Historical Provider, MD   lisinopril (PRINIVIL;ZESTRIL) 5 MG tablet TAKE 1 TABLET DAILY 10/11/22   Andreea Brooks MD   clopidogrel (PLAVIX) 75 MG tablet TAKE 1 TABLET DAILY 2/23/22   Andreea Brooks MD   melatonin 3 MG TABS tablet Take 1 tablet by mouth nightly    Historical Provider, MD   docusate sodium (COLACE) 100 MG capsule Take 1 capsule by mouth daily    Historical Provider, MD   pantoprazole (PROTONIX) 40 MG tablet Take 1 tablet by mouth every morning (before breakfast) 1/23/19   Andreea Brooks MD   acetaminophen (TYLENOL) 325 MG tablet Take 2 tablets by mouth every 4 hours as needed for Pain or Fever 6/12/18   Brionna Rodriguez MD   Multiple Vitamins-Minerals (MULTIVITAMIN PO) Take 1 tablet by mouth daily. Historical Provider, MD       Allergies:  Codeine and Morphine    Social History:   reports that he quit smoking about 25 years ago. His smoking use included cigars and cigarettes. He smoked an average of 1 pack per day. He has never used smokeless tobacco. He reports that he does not drink alcohol and does not use drugs. Family History:    for early CAD    REVIEW OF SYSTEMS:    Constitutional: there has been no unanticipated weight loss. No change in functional capacity. Eyes: No visual changes or diplopia. ENT: No Headaches, hearing loss or vertigo. No mouth sores or sore throat. Cardiovascular: as in HPI   Respiratory: No hx of productive cough, pleuritic chest pain   Gastrointestinal: No abdominal pain, appetite loss, blood in stools. No change in bowel habits. Genitourinary: No dysuria, trouble voiding, or hematuria. Musculoskeletal:  No gait disturbance, No weakness or joint complaints. Integumentary: No rash or pruritis. Neurological: No headache, weakness, numbness or tingling. No change in gait, balance, coordination. Psychiatric: No anxiety, or depression. Endocrine: No temperature intolerance. No excessive thirst, fluid intake, or urination. No tremor.   Hematologic/Lymphatic: No abnormal bruising or

## 2023-09-01 LAB
EKG ATRIAL RATE: 63 BPM
EKG ATRIAL RATE: 65 BPM
EKG P AXIS: 90 DEGREES
EKG P AXIS: 91 DEGREES
EKG P-R INTERVAL: 180 MS
EKG P-R INTERVAL: 184 MS
EKG Q-T INTERVAL: 446 MS
EKG Q-T INTERVAL: 450 MS
EKG QRS DURATION: 132 MS
EKG QRS DURATION: 134 MS
EKG QTC CALCULATION (BAZETT): 456 MS
EKG QTC CALCULATION (BAZETT): 468 MS
EKG R AXIS: -40 DEGREES
EKG R AXIS: -43 DEGREES
EKG T AXIS: 29 DEGREES
EKG T AXIS: 35 DEGREES
EKG VENTRICULAR RATE: 63 BPM
EKG VENTRICULAR RATE: 65 BPM

## 2023-09-01 NOTE — DISCHARGE SUMMARY
[address]                 [address] [facility], [address]                               DISCHARGE SUMMARY    PATIENT NAME: Marietta Rodriguez                   :        1927  MED REC NO:   [id number]                             ROOM:       0211  ACCOUNT NO:   [de-identified]                           ADMIT DATE: 2023  PROVIDER:     Lise Canela. Shanell Oliveira MD                  DISCHARGE DATE:  2023    ATTENDING PHYSICIAN OF HOSPITALIZATION:  Antelmo Tiwari MD    PERSONAL PHYSICIAN:  Souleymane Jaeger, [facility], [facility]. The patient is a resident of [facility]. \"  The patient is seen  by [facility], [facility]. He has  been seen by Dr. Kush Jordan, Urology in the outpatient setting and not  during this hospitalization. DIAGNOSES:  1.  Near syncope with dizziness, 2023, lightheadedness, felt as if  his \"brain was scrambled,\" vision changes on focus and orthostatic  changes. Dehydration 2023, giving rise to the above. Arrhythmia,  PVCs, PACs, bradycardia. 2.  Chronic kidney disease, previously stage II, now stage IIIB. 3.  Lower extremity swelling, redness, early cellulitis. 4.  Bilateral lower extremity chronic lymphedema. 5.  Elevated troponin 2023, 58, 53, 53, 60.  6.  A 2D echo on 2023, normal wall motion, normal left ventricular  systolic function, normal right ventricular systolic function, mildly  calcified AV cusp, trace AR, MAC, mild-to-moderate MR, mild TR, RVSP 46  mmHg, mild pulmonary hypertension, normal AR 3.5 cm, decreased IVC  collapse with normal diameter IVC, grade 1 mild diastolic dysfunction,  LVEF 60% to 65%. EKG on 2023 sinus rhythm, rate 65, PVCs, LAD,  right bundle-branch block. EKG #1 of 2023, sinus rhythm, rate 63,  PACs, LAD, right bundle-branch block.   This is similar to previous EKGs  of 2023 and 2023 with right discussed in the chart and/or  dictation will be addressed and treated as an outpatient. The patient's medications have been reviewed including, but not limited  to, pre-hospital, hospital and discharge medications. The patient  and/or the patient's personal representatives were specifically advised  the only medications to be taken are those set forth in the discharge  orders and no other medications should be taken. Any prior medications  not on the discharge orders are specifically discontinued.         Maame Shaw MD    D: 08/31/2023 15:09:29       T: 08/31/2023 15:14:12     [email or web address]  Job#: [id number]     Doc#: [id number]    CC:

## 2023-09-11 RX ORDER — LISINOPRIL 5 MG/1
5 TABLET ORAL DAILY
Qty: 90 TABLET | Refills: 3 | Status: SHIPPED | OUTPATIENT
Start: 2023-09-11

## 2023-09-18 DIAGNOSIS — M25.561 CHRONIC PAIN OF BOTH KNEES: ICD-10-CM

## 2023-09-18 DIAGNOSIS — G89.29 CHRONIC PAIN OF BOTH KNEES: ICD-10-CM

## 2023-09-18 DIAGNOSIS — M25.562 CHRONIC PAIN OF BOTH KNEES: ICD-10-CM

## 2023-09-18 RX ORDER — FENTANYL 25 UG/1
1 PATCH TRANSDERMAL
Qty: 10 PATCH | Refills: 0 | Status: SHIPPED | OUTPATIENT
Start: 2023-09-18 | End: 2023-10-18

## 2023-09-18 RX ORDER — FENTANYL 25 UG/1
1 PATCH TRANSDERMAL
Qty: 10 PATCH | Refills: 0 | Status: CANCELLED | OUTPATIENT
Start: 2023-09-18 | End: 2023-10-18

## 2023-09-18 RX ORDER — OXYCODONE HYDROCHLORIDE 20 MG/1
20 TABLET ORAL EVERY 6 HOURS PRN
Qty: 12 TABLET | Refills: 0 | Status: SHIPPED | OUTPATIENT
Start: 2023-09-18 | End: 2023-09-21

## 2023-09-18 NOTE — TELEPHONE ENCOUNTER
Abraham Case called requesting a refill of the below medication which has been pended for you:     Requested Prescriptions     Pending Prescriptions Disp Refills    fentaNYL (DURAGESIC) 25 MCG/HR 1 patch 0     Sig: Place 1 patch onto the skin every 3 days for 30 days. Intended supply: 30 days    oxyCODONE (ROXICODONE) 20 MG immediate release tablet 12 tablet 0     Sig: Take 1 tablet by mouth every 6 hours as needed for Pain for up to 3 days. Max Daily Amount: 80 mg       Last Appointment Date: 8/18/2023  Next Appointment Date: 11/17/2023    Allergies   Allergen Reactions    Codeine Nausea And Vomiting    Morphine Other (See Comments)     constipation       Pharmacy:  [facility] Report checked for [place], [place], and [place]:Fentanyl 25 08/17/23 #10. Due NOW. OARRS Report checked for [place], [place], and [place]:Oxycodone 20 08/17/23 #120. Due NOW.

## 2023-09-20 ENCOUNTER — OFFICE VISIT (OUTPATIENT)
Dept: FAMILY MEDICINE CLINIC | Age: 88
End: 2023-09-20
Payer: MEDICARE

## 2023-09-20 VITALS
DIASTOLIC BLOOD PRESSURE: 60 MMHG | SYSTOLIC BLOOD PRESSURE: 110 MMHG | OXYGEN SATURATION: 97 % | BODY MASS INDEX: 28.12 KG/M2 | HEIGHT: 66 IN | HEART RATE: 61 BPM | WEIGHT: 175 LBS

## 2023-09-20 DIAGNOSIS — E86.0 DEHYDRATION: ICD-10-CM

## 2023-09-20 DIAGNOSIS — L03.116 CELLULITIS OF LEFT LOWER EXTREMITY: Primary | ICD-10-CM

## 2023-09-20 DIAGNOSIS — E86.1 HYPOVOLEMIA ASSOCIATED WITH DIURESIS: ICD-10-CM

## 2023-09-20 DIAGNOSIS — R55 NEAR SYNCOPE: ICD-10-CM

## 2023-09-20 DIAGNOSIS — R35.89 HYPOVOLEMIA ASSOCIATED WITH DIURESIS: ICD-10-CM

## 2023-09-20 PROCEDURE — G8417 CALC BMI ABV UP PARAM F/U: HCPCS | Performed by: FAMILY MEDICINE

## 2023-09-20 PROCEDURE — 99214 OFFICE O/P EST MOD 30 MIN: CPT | Performed by: FAMILY MEDICINE

## 2023-09-20 PROCEDURE — 1123F ACP DISCUSS/DSCN MKR DOCD: CPT | Performed by: FAMILY MEDICINE

## 2023-09-20 PROCEDURE — 1111F DSCHRG MED/CURRENT MED MERGE: CPT | Performed by: FAMILY MEDICINE

## 2023-09-20 PROCEDURE — G8427 DOCREV CUR MEDS BY ELIG CLIN: HCPCS | Performed by: FAMILY MEDICINE

## 2023-09-20 PROCEDURE — 1036F TOBACCO NON-USER: CPT | Performed by: FAMILY MEDICINE

## 2023-09-20 PROCEDURE — 99213 OFFICE O/P EST LOW 20 MIN: CPT | Performed by: FAMILY MEDICINE

## 2023-09-20 SDOH — ECONOMIC STABILITY: FOOD INSECURITY: WITHIN THE PAST 12 MONTHS, YOU WORRIED THAT YOUR FOOD WOULD RUN OUT BEFORE YOU GOT MONEY TO BUY MORE.: NEVER TRUE

## 2023-09-20 SDOH — ECONOMIC STABILITY: HOUSING INSECURITY
IN THE LAST 12 MONTHS, WAS THERE A TIME WHEN YOU DID NOT HAVE A STEADY PLACE TO SLEEP OR SLEPT IN A SHELTER (INCLUDING NOW)?: NO

## 2023-09-20 SDOH — ECONOMIC STABILITY: FOOD INSECURITY: WITHIN THE PAST 12 MONTHS, THE FOOD YOU BOUGHT JUST DIDN'T LAST AND YOU DIDN'T HAVE MONEY TO GET MORE.: NEVER TRUE

## 2023-09-20 SDOH — ECONOMIC STABILITY: INCOME INSECURITY: HOW HARD IS IT FOR YOU TO PAY FOR THE VERY BASICS LIKE FOOD, HOUSING, MEDICAL CARE, AND HEATING?: NOT HARD AT ALL

## 2023-09-20 NOTE — PROGRESS NOTES
Subjective:      Patient ID: Rikki Monahan is a 80 y.o. male. [facility]  hospital follow up. Dizziness and concerns for dehydration. Some leg swelling and early cellulitis. Event with orthostasis/light headed/off. Had pvc, pac, bradycardia. He felt he was not drinking enough, now drinking more on purpose. No acute concerns. Lymphedema in the left leg. Wheel chair today has a straight leg attachement on the left. No padding at the foot pedal.  Diuretic dose increased, likely the source of these events. Drinking more at present. Loves the apple juice.       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 2/2/15    EGD with biopsies    CAD (coronary artery disease)     Elevated PSA     Hyperlipidemia     Hypertension     Kidney stone     Osteoarthritis of knee     Venous insufficiency      Past Surgical History:   Procedure Laterality Date    APPENDECTOMY      CHOLECYSTECTOMY  01/12/1983    COLONOSCOPY  08/29/12    COLONOSCOPY  05/27/09    COLONOSCOPY  04/30/2008    CYSTOSCOPY  11/15/2007    with left ureteroscopy and dorsal slit     KNEE FUSION Left     LEG SURGERY Left 1/11/2020    I and D of Left Lower Extremitity performed by Pawan Mckee MD at [address] CYSTOURETHROSCOPY N/A 11/19/2018    CYSTO Photovaporization Prostate Greenlight Laser ([id number]) performed by Darwin Leigh MD at [address] EGD TRANSORAL BIOPSY SINGLE/MULTIPLE N/A 7/3/2017    EGD BIOPSY performed by Jessica Bess MD at [address] [place] [address] Bilateral 06/23/2009    Benign    UPPER GASTROINTESTINAL ENDOSCOPY  02/2/2015    Sepulveda's (Dr. Toña Renteria)    UPPER GASTROINTESTINAL ENDOSCOPY  04/25/2016    Lg hiatal hernia, polyp at duodenum, gastric polyp, Barretts Esophagus, GE 32    UPPER GASTROINTESTINAL ENDOSCOPY  07/03/2017    mild gastritis, Sepulveda's, Dr. Juancho Rivera 3 years     Current Outpatient Medications   Medication Sig Dispense Refill    oxyCODONE

## 2023-09-22 DIAGNOSIS — M25.562 CHRONIC PAIN OF BOTH KNEES: ICD-10-CM

## 2023-09-22 DIAGNOSIS — M25.561 CHRONIC PAIN OF BOTH KNEES: ICD-10-CM

## 2023-09-22 DIAGNOSIS — G89.29 CHRONIC PAIN OF BOTH KNEES: ICD-10-CM

## 2023-09-22 RX ORDER — OXYCODONE HYDROCHLORIDE 20 MG/1
20 TABLET ORAL EVERY 6 HOURS PRN
Qty: 120 TABLET | Refills: 0 | Status: SHIPPED | OUTPATIENT
Start: 2023-09-22 | End: 2023-10-22

## 2023-09-22 NOTE — TELEPHONE ENCOUNTER
Derrick Roque called requesting a refill of the below medication which has been pended for you:     Requested Prescriptions     Pending Prescriptions Disp Refills    oxyCODONE (ROXICODONE) 20 MG immediate release tablet 120 tablet 0     Sig: Take 1 tablet by mouth every 6 hours as needed for Pain for up to 30 days. Max Daily Amount: 80 mg       Last Appointment Date: 8/18/2023  Next Appointment Date: 11/17/2023    Allergies   Allergen Reactions    Codeine Nausea And Vomiting    Morphine Other (See Comments)     constipation       Pharmacy:  [facility]    OARRS Report checked for [place], [place], and [place]:Oxycodone 20 09/18/23 #12. Due NOW.     Only filled 12 tabs last fill

## 2023-09-28 ENCOUNTER — OFFICE VISIT (OUTPATIENT)
Dept: FAMILY MEDICINE CLINIC | Age: 88
End: 2023-09-28
Payer: MEDICARE

## 2023-09-28 VITALS
TEMPERATURE: 97.9 F | HEART RATE: 58 BPM | SYSTOLIC BLOOD PRESSURE: 116 MMHG | WEIGHT: 175 LBS | HEIGHT: 66 IN | OXYGEN SATURATION: 98 % | BODY MASS INDEX: 28.12 KG/M2 | DIASTOLIC BLOOD PRESSURE: 64 MMHG

## 2023-09-28 DIAGNOSIS — L72.3 INFLAMED SEBACEOUS CYST: Primary | ICD-10-CM

## 2023-09-28 PROCEDURE — 1111F DSCHRG MED/CURRENT MED MERGE: CPT | Performed by: FAMILY MEDICINE

## 2023-09-28 PROCEDURE — 1123F ACP DISCUSS/DSCN MKR DOCD: CPT | Performed by: FAMILY MEDICINE

## 2023-09-28 PROCEDURE — 99213 OFFICE O/P EST LOW 20 MIN: CPT | Performed by: FAMILY MEDICINE

## 2023-09-28 PROCEDURE — G8427 DOCREV CUR MEDS BY ELIG CLIN: HCPCS | Performed by: FAMILY MEDICINE

## 2023-09-28 PROCEDURE — 1036F TOBACCO NON-USER: CPT | Performed by: FAMILY MEDICINE

## 2023-09-28 PROCEDURE — G8417 CALC BMI ABV UP PARAM F/U: HCPCS | Performed by: FAMILY MEDICINE

## 2023-09-28 ASSESSMENT — ENCOUNTER SYMPTOMS
COUGH: 0
COLOR CHANGE: 1
SHORTNESS OF BREATH: 0
WHEEZING: 0
CHEST TIGHTNESS: 0

## 2023-09-28 NOTE — PROGRESS NOTES
[address]  [address] [facility] [address]  Dept: [phone]  Dept Fax: [phone]  Loc: [phone]    Bella Prince is a 80 y.o. male who presents today for his medical conditions/complaints as noted below. Bella Prince is c/o of   Chief Complaint   Patient presents with    Mass     Lump on Left shoulder        HPI:     HPI Here today for a lump on his shoulder. He has had a lump on his left shoulder for the past 2-3 months that has not changed. He does not think that it is growing. It is not painful, it is not draining. It is a little bit red but he is not sure how long it has been red. He is not sure that he would want it removed because it is not causing any mobility impairment and no pain so he doesn't think it would be worth an incision. 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 2/2/15    EGD with biopsies    CAD (coronary artery disease)     Elevated PSA     Hyperlipidemia     Hypertension     Kidney stone     Osteoarthritis of knee     Venous insufficiency           Social History     Tobacco Use    Smoking status: Former     Packs/day: 1     Types: Cigars, Cigarettes     Quit date: 1998     Years since quittin.7    Smokeless tobacco: Never    Tobacco comments:     Former smoker (quit )- ANN Torres CHANA 3/20/17   Substance Use Topics    Alcohol use: No     Alcohol/week: 0.0 standard drinks of alcohol     Current Outpatient Medications   Medication Sig Dispense Refill    oxyCODONE (ROXICODONE) 20 MG immediate release tablet Take 1 tablet by mouth every 6 hours as needed for Pain for up to 30 days. Max Daily Amount: 80 mg 120 tablet 0    lisinopril (PRINIVIL;ZESTRIL) 5 MG tablet Take 1 tablet by mouth daily 90 tablet 3    fentaNYL (DURAGESIC) 25 MCG/HR Place 1 patch onto the skin every 3 days for 30 days.  Intended supply: 30 days 1 patch 0

## 2023-09-29 PROBLEM — E86.0 DEHYDRATION: Status: RESOLVED | Noted: 2023-08-30 | Resolved: 2023-09-29

## 2023-10-02 ENCOUNTER — HOSPITAL ENCOUNTER (OUTPATIENT)
Dept: INTERVENTIONAL RADIOLOGY/VASCULAR | Age: 88
Discharge: HOME | End: 2023-10-04
Attending: FAMILY MEDICINE
Payer: MEDICARE

## 2023-10-02 DIAGNOSIS — L72.3 INFLAMED SEBACEOUS CYST: ICD-10-CM

## 2023-10-02 PROCEDURE — 76881 US COMPL JOINT R-T W/IMG: CPT

## 2023-10-05 DIAGNOSIS — M25.561 CHRONIC PAIN OF BOTH KNEES: ICD-10-CM

## 2023-10-05 DIAGNOSIS — G89.29 CHRONIC PAIN OF BOTH KNEES: ICD-10-CM

## 2023-10-05 DIAGNOSIS — M25.562 CHRONIC PAIN OF BOTH KNEES: ICD-10-CM

## 2023-10-06 RX ORDER — FENTANYL 25 UG/1
PATCH TRANSDERMAL
Qty: 10 PATCH | Refills: 0 | Status: SHIPPED | OUTPATIENT
Start: 2023-10-06 | End: 2023-11-03

## 2023-10-06 NOTE — TELEPHONE ENCOUNTER
Caleb Roca called requesting a refill of the below medication which has been pended for you:     Requested Prescriptions     Pending Prescriptions Disp Refills    fentaNYL ([facility]) 25 MCG/HR [Pharmacy Med Name: FENTANYL 25 MCG/HR PATCH] 10 patch 0     Sig: APPLY 1 PATCH TRANSDERMALLY EVERY 3 DAYS *REDUCE DOSES TAKEN AS PAIN BECOMES MANAGEABLE*       Last Appointment Date: 8/18/2023  Next Appointment Date: 11/17/2023    Allergies   Allergen Reactions    Codeine Nausea And Vomiting    Morphine Other (See Comments)     constipation       Pharmacy:  request electronically received. Last DS11 11/17/23. Please review. OARRS Report checked for [place], [place], and [place]: Fentanyl 25 mcg/hr  9/18/23   #4  . Due 10/18/23.

## 2023-10-17 DIAGNOSIS — M25.561 CHRONIC PAIN OF BOTH KNEES: ICD-10-CM

## 2023-10-17 DIAGNOSIS — G89.29 CHRONIC PAIN OF BOTH KNEES: ICD-10-CM

## 2023-10-17 DIAGNOSIS — M25.562 CHRONIC PAIN OF BOTH KNEES: ICD-10-CM

## 2023-10-17 NOTE — TELEPHONE ENCOUNTER
Paulina Cervantes  called requesting a refill of the below medication which has been pended for you:     Requested Prescriptions     Pending Prescriptions Disp Refills    oxyCODONE (ROXICODONE) 20 MG immediate release tablet [Pharmacy Med Name: OXYCODONE HCL (IR) 20 MG TAB] 120 tablet 0     Sig: TAKE 1 TABLET BY MOUTH EVERY 6 HOURS AS NEEDED FOR PAIN *REDUCE DOSES TAKEN AS PAIN BECOMES MANAGEABLE*       Allergies   Allergen Reactions    Codeine Nausea And Vomiting    Morphine Other (See Comments)     constipation             Last Drug Screen:  11/17/22  Last Appointment:  8/18/2023   Next Appointment:  11/17/2023     OARRS Report checked for [place], [place], and [place]: Roxicodone 20mg 9-22-23 #120. Due 10-21-23.

## 2023-10-19 RX ORDER — OXYCODONE HYDROCHLORIDE 20 MG/1
TABLET ORAL
Qty: 120 TABLET | Refills: 0 | Status: SHIPPED | OUTPATIENT
Start: 2023-10-21 | End: 2023-10-20 | Stop reason: SDUPTHER

## 2023-10-20 DIAGNOSIS — M25.561 CHRONIC PAIN OF BOTH KNEES: ICD-10-CM

## 2023-10-20 DIAGNOSIS — G89.29 CHRONIC PAIN OF BOTH KNEES: ICD-10-CM

## 2023-10-20 DIAGNOSIS — M25.562 CHRONIC PAIN OF BOTH KNEES: ICD-10-CM

## 2023-10-20 RX ORDER — OXYCODONE HYDROCHLORIDE 20 MG/1
20 TABLET ORAL EVERY 6 HOURS PRN
Qty: 120 TABLET | Refills: 0 | Status: SHIPPED | OUTPATIENT
Start: 2023-10-20 | End: 2023-11-19

## 2023-10-20 NOTE — TELEPHONE ENCOUNTER
Olga Nichols called requesting a refill of the below medication which has been pended for you:     Requested Prescriptions     Pending Prescriptions Disp Refills    oxyCODONE (ROXICODONE) 20 MG immediate release tablet 120 tablet 0       Last Appointment Date: 8/18/2023  Next Appointment Date: 11/17/2023    Allergies   Allergen Reactions    Codeine Nausea And Vomiting    Morphine Other (See Comments)     constipation       Pharmacy:  [facility]     No OARS due to being a nursing home.

## 2023-11-17 ENCOUNTER — OFFICE VISIT (OUTPATIENT)
Dept: PAIN MANAGEMENT | Age: 88
End: 2023-11-17
Payer: MEDICARE

## 2023-11-17 VITALS
WEIGHT: 175 LBS | DIASTOLIC BLOOD PRESSURE: 76 MMHG | HEART RATE: 80 BPM | BODY MASS INDEX: 28.12 KG/M2 | RESPIRATION RATE: 17 BRPM | SYSTOLIC BLOOD PRESSURE: 138 MMHG | OXYGEN SATURATION: 99 % | HEIGHT: 66 IN

## 2023-11-17 DIAGNOSIS — M17.12 PRIMARY OSTEOARTHRITIS OF LEFT KNEE: ICD-10-CM

## 2023-11-17 DIAGNOSIS — Z02.83 ENCOUNTER FOR DRUG SCREENING: Primary | ICD-10-CM

## 2023-11-17 DIAGNOSIS — M79.89 LEFT LEG SWELLING: ICD-10-CM

## 2023-11-17 DIAGNOSIS — M25.562 CHRONIC PAIN OF BOTH KNEES: ICD-10-CM

## 2023-11-17 DIAGNOSIS — I89.0 LYMPHEDEMA OF BOTH LOWER EXTREMITIES: ICD-10-CM

## 2023-11-17 DIAGNOSIS — G89.29 CHRONIC PAIN OF BOTH KNEES: ICD-10-CM

## 2023-11-17 DIAGNOSIS — Z79.891 ENCOUNTER FOR LONG-TERM OPIATE ANALGESIC USE: ICD-10-CM

## 2023-11-17 DIAGNOSIS — M25.561 CHRONIC PAIN OF BOTH KNEES: ICD-10-CM

## 2023-11-17 PROCEDURE — 99213 OFFICE O/P EST LOW 20 MIN: CPT | Performed by: NURSE PRACTITIONER

## 2023-11-17 RX ORDER — FENTANYL 25 UG/1
1 PATCH TRANSDERMAL
Qty: 10 PATCH | Refills: 0 | Status: SHIPPED | OUTPATIENT
Start: 2023-11-17 | End: 2023-12-15

## 2023-11-17 RX ORDER — OXYCODONE HYDROCHLORIDE 20 MG/1
20 TABLET ORAL EVERY 6 HOURS PRN
Qty: 120 TABLET | Refills: 0 | Status: SHIPPED | OUTPATIENT
Start: 2023-11-17 | End: 2023-12-17

## 2023-11-17 ASSESSMENT — ENCOUNTER SYMPTOMS
RESPIRATORY NEGATIVE: 1
BACK PAIN: 0
GASTROINTESTINAL NEGATIVE: 1

## 2023-11-17 NOTE — PROGRESS NOTES
[address] [facility]  [address]    Patient Name: Prabhjot Matt  MRN: [id number]  Encounter Date: 11/17/2023     SUBJECTIVE:  Prabhjot Matt is a 80 y.o., male being seen today regarding   Chief Complaint   Patient presents with    Knee Pain     left    and routine medication management. Functionality Assessment & Goals: On a scale of 0 (Does not Interfere) to 10 (Completely Interferes)  Which number describes how during the past week pain has interfered with the following:   A. General Activity: 10    B. Mood:  3   C. Walking Ability:  10   D. Normal Work (Includes both work outside the home and housework):  10   E. Relations with Other People:  3   F. Sleep:  3    G. Enjoyment of Life:  10  2. Patient prefers to Take their Pain Medications:   [x] On a regular basis   [] Only when necessary   [] Does not take pain medications  3. What are the Patient's Goals/ Expectations for Visiting Pain Management? [] Learn about my pain   [] Physical Therapy   [] Receive Injections   [] Deal with Anxiety and Stress   [x] Receive Medication   [] Treat Depression    [] Treat Sleep   [] Treat Opioid Dependence/ Addiction      Current Pain Assessment:         11/17/2023     2:16 PM   AMB PAIN ASSESSMENT   Location of Pain Knee   Location Modifiers Left   Severity of Pain 7   Quality of Pain Throbbing; Sharp;Dull;Aching   Duration of Pain Persistent   Frequency of Pain Constant   Aggravating Factors Other (Comment)   Limiting Behavior Yes   Relieving Factors Rest   Result of Injury No   Work-Related Injury No   Are there other pain locations you wish to document?  No        Current Medications & Allergies:   Current Outpatient Medications   Medication Instructions    acetaminophen (TYLENOL) 650 mg, Oral, EVERY 4 HOURS PRN    clopidogrel (PLAVIX) 75 MG tablet TAKE 1 TABLET DAILY    docusate sodium (COLACE) 100 mg, Oral, DAILY    furosemide (LASIX) 40 mg, Oral, DAILY

## 2023-12-07 DIAGNOSIS — M25.561 CHRONIC PAIN OF BOTH KNEES: ICD-10-CM

## 2023-12-07 DIAGNOSIS — G89.29 CHRONIC PAIN OF BOTH KNEES: ICD-10-CM

## 2023-12-07 DIAGNOSIS — M25.562 CHRONIC PAIN OF BOTH KNEES: ICD-10-CM

## 2023-12-07 RX ORDER — OXYCODONE HYDROCHLORIDE 20 MG/1
20 TABLET ORAL EVERY 6 HOURS PRN
Qty: 120 TABLET | Refills: 0 | Status: SHIPPED | OUTPATIENT
Start: 2023-12-07 | End: 2024-01-06

## 2023-12-07 NOTE — TELEPHONE ENCOUNTER
Giles Pilon called requesting a refill of the below medication which has been pended for you:     Requested Prescriptions     Pending Prescriptions Disp Refills    oxyCODONE (ROXICODONE) 20 MG immediate release tablet 120 tablet 0     Sig: Take 1 tablet by mouth every 6 hours as needed for Pain for up to 30 days. Max Daily Amount: 80 mg       Last Appointment Date: 9/20/2023  Next Appointment Date: Visit date not found    Allergies   Allergen Reactions    Codeine Nausea And Vomiting    Morphine Other (See Comments)     constipation       Pharmacy:  [facility]       No OARS due to being a nursing home.

## 2024-01-15 ENCOUNTER — OFFICE VISIT (OUTPATIENT)
Dept: CARDIOLOGY | Age: 89
End: 2024-01-15
Payer: MEDICARE

## 2024-01-15 VITALS
BODY MASS INDEX: 28.12 KG/M2 | SYSTOLIC BLOOD PRESSURE: 126 MMHG | WEIGHT: 175 LBS | HEIGHT: 66 IN | DIASTOLIC BLOOD PRESSURE: 70 MMHG

## 2024-01-15 DIAGNOSIS — E78.5 DYSLIPIDEMIA: ICD-10-CM

## 2024-01-15 DIAGNOSIS — I49.3 VENTRICULAR PREMATURE BEATS: ICD-10-CM

## 2024-01-15 DIAGNOSIS — I50.32 CHRONIC HEART FAILURE WITH PRESERVED EJECTION FRACTION (HFPEF) (HCC): Primary | ICD-10-CM

## 2024-01-15 DIAGNOSIS — R60.0 BILATERAL LEG EDEMA: ICD-10-CM

## 2024-01-15 DIAGNOSIS — I10 ESSENTIAL HYPERTENSION: ICD-10-CM

## 2024-01-15 DIAGNOSIS — I25.10 CORONARY ARTERY DISEASE INVOLVING NATIVE CORONARY ARTERY OF NATIVE HEART WITHOUT ANGINA PECTORIS: ICD-10-CM

## 2024-01-15 PROCEDURE — G8417 CALC BMI ABV UP PARAM F/U: HCPCS | Performed by: INTERNAL MEDICINE

## 2024-01-15 PROCEDURE — 1123F ACP DISCUSS/DSCN MKR DOCD: CPT | Performed by: INTERNAL MEDICINE

## 2024-01-15 PROCEDURE — 1036F TOBACCO NON-USER: CPT | Performed by: INTERNAL MEDICINE

## 2024-01-15 PROCEDURE — G8484 FLU IMMUNIZE NO ADMIN: HCPCS | Performed by: INTERNAL MEDICINE

## 2024-01-15 PROCEDURE — 99214 OFFICE O/P EST MOD 30 MIN: CPT | Performed by: INTERNAL MEDICINE

## 2024-01-15 PROCEDURE — 93010 ELECTROCARDIOGRAM REPORT: CPT | Performed by: INTERNAL MEDICINE

## 2024-01-15 PROCEDURE — 99212 OFFICE O/P EST SF 10 MIN: CPT | Performed by: INTERNAL MEDICINE

## 2024-01-15 PROCEDURE — G8427 DOCREV CUR MEDS BY ELIG CLIN: HCPCS | Performed by: INTERNAL MEDICINE

## 2024-01-15 PROCEDURE — 93005 ELECTROCARDIOGRAM TRACING: CPT | Performed by: INTERNAL MEDICINE

## 2024-01-15 RX ORDER — METOPROLOL SUCCINATE 25 MG/1
25 TABLET, EXTENDED RELEASE ORAL DAILY
Qty: 90 TABLET | Refills: 3 | Status: SHIPPED | OUTPATIENT
Start: 2024-01-15

## 2024-01-15 NOTE — PROGRESS NOTES
nodes.  Allergic/Immunologic: No nasal congestion or hives.    Physical Exam:  /70   Ht 1.664 m (5' 5.5\")   Wt 79.4 kg (175 lb)   BMI 28.68 kg/m²    Physical Exam  Constitutional:       Appearance: Normal appearance.   HENT:      Head: Normocephalic and atraumatic.   Cardiovascular:      Rate and Rhythm: Normal rate and regular rhythm.      Pulses: Normal pulses.      Heart sounds: Normal heart sounds. No murmur heard.  Pulmonary:      Effort: Pulmonary effort is normal. No respiratory distress.      Breath sounds: Normal breath sounds. No stridor.   Abdominal:      General: There is no distension.      Palpations: There is no mass.   Musculoskeletal:         General: No swelling or tenderness.      Right lower leg: No edema.      Left lower leg: No edema.   Skin:     Capillary Refill: Capillary refill takes less than 2 seconds.      Coloration: Skin is not jaundiced or pale.   Neurological:      General: No focal deficit present.      Mental Status: He is alert and oriented to person, place, and time.      Cranial Nerves: No cranial nerve deficit.      Sensory: No sensory deficit.   Psychiatric:         Mood and Affect: Mood normal.         Behavior: Behavior normal.       Labs:     Lab Results   Component Value Date    CHOL 110 06/03/2021    TRIG 94 06/03/2021    HDL 38 (L) 06/03/2021    LDLCHOLESTEROL 53 06/03/2021    VLDL NOT REPORTED 06/03/2021    CHOLHDLRATIO 2.9 06/03/2021       Lab Results   Component Value Date     08/31/2023    K 4.1 08/31/2023     08/31/2023    CO2 26 08/31/2023    BUN 35 (H) 08/31/2023    CREATININE 1.5 (H) 08/31/2023    GLUCOSE 110 (H) 08/31/2023    CALCIUM 8.9 08/31/2023    PROT 6.7 08/30/2023    LABALBU 4.0 08/30/2023    BILITOT 0.3 08/30/2023    ALKPHOS 72 08/30/2023    AST 13 08/30/2023    ALT 9 08/30/2023    LABGLOM 42 (L) 08/31/2023    GFRAA >60 07/14/2022    GLOB 2.7 06/17/2023     Cardiac Data:  ECG:  Sinus Rhythm -Frequent pvcs -ventricular bigeminy

## 2024-01-16 DIAGNOSIS — G89.29 CHRONIC PAIN OF BOTH KNEES: ICD-10-CM

## 2024-01-16 DIAGNOSIS — M25.562 CHRONIC PAIN OF BOTH KNEES: ICD-10-CM

## 2024-01-16 DIAGNOSIS — M25.561 CHRONIC PAIN OF BOTH KNEES: ICD-10-CM

## 2024-01-16 RX ORDER — FENTANYL 25 UG/1
1 PATCH TRANSDERMAL
Qty: 10 PATCH | Refills: 0 | Status: SHIPPED | OUTPATIENT
Start: 2024-01-16 | End: 2024-02-13

## 2024-01-16 NOTE — TELEPHONE ENCOUNTER
Fentanyl 25 mcg/hr requested per [facility] to [facility].    Nursing Home resident--    Last Appt:  11/17/2023  Next Appt:   2/15/2024  Med verified in Epic    Last drug screen-11/17/2022  Last written 12/18/23.

## 2024-01-19 DIAGNOSIS — M25.562 CHRONIC PAIN OF BOTH KNEES: ICD-10-CM

## 2024-01-19 DIAGNOSIS — G89.29 CHRONIC PAIN OF BOTH KNEES: ICD-10-CM

## 2024-01-19 DIAGNOSIS — M25.561 CHRONIC PAIN OF BOTH KNEES: ICD-10-CM

## 2024-01-19 RX ORDER — OXYCODONE HYDROCHLORIDE 20 MG/1
20 TABLET ORAL EVERY 6 HOURS PRN
Qty: 120 TABLET | Refills: 0 | Status: SHIPPED | OUTPATIENT
Start: 2024-01-19 | End: 2024-02-19 | Stop reason: SDUPTHER

## 2024-01-19 NOTE — TELEPHONE ENCOUNTER
Memo called requesting a refill of the below medication which has been pended for you:     Requested Prescriptions     Pending Prescriptions Disp Refills    oxyCODONE (ROXICODONE) 20 MG immediate release tablet [Pharmacy Med Name: OXYCODONE HCL (IR) 20 MG TAB] 120 tablet 0     Sig: TAKE ONE TABLET BY MOUTH EVERY 6 HOURS FOR PAIN *NTE 80MG/24HRS*       Last Appt:  11/17/2023  Next Appt:   2/15/2024        Allergies   Allergen Reactions    Codeine Nausea And Vomiting    Morphine Other (See Comments)     constipation       Pharmacy:  [facility]    No OARRS due to nursing home patient

## 2024-02-15 ENCOUNTER — OFFICE VISIT (OUTPATIENT)
Dept: PAIN MANAGEMENT | Age: 89
End: 2024-02-15
Payer: MEDICARE

## 2024-02-15 VITALS
OXYGEN SATURATION: 99 % | SYSTOLIC BLOOD PRESSURE: 136 MMHG | RESPIRATION RATE: 17 BRPM | HEART RATE: 57 BPM | BODY MASS INDEX: 28.12 KG/M2 | DIASTOLIC BLOOD PRESSURE: 64 MMHG | HEIGHT: 66 IN | WEIGHT: 175 LBS

## 2024-02-15 DIAGNOSIS — I89.0 LYMPHEDEMA OF BOTH LOWER EXTREMITIES: ICD-10-CM

## 2024-02-15 DIAGNOSIS — G89.29 CHRONIC PAIN OF BOTH KNEES: ICD-10-CM

## 2024-02-15 DIAGNOSIS — M17.12 PRIMARY OSTEOARTHRITIS OF LEFT KNEE: ICD-10-CM

## 2024-02-15 DIAGNOSIS — R26.81 GAIT INSTABILITY: ICD-10-CM

## 2024-02-15 DIAGNOSIS — M25.562 CHRONIC PAIN OF BOTH KNEES: ICD-10-CM

## 2024-02-15 DIAGNOSIS — M79.89 LEFT LEG SWELLING: ICD-10-CM

## 2024-02-15 DIAGNOSIS — G89.29 CHRONIC PAIN OF LEFT KNEE: Primary | ICD-10-CM

## 2024-02-15 DIAGNOSIS — M25.561 CHRONIC PAIN OF BOTH KNEES: ICD-10-CM

## 2024-02-15 DIAGNOSIS — M25.562 CHRONIC PAIN OF LEFT KNEE: Primary | ICD-10-CM

## 2024-02-15 PROCEDURE — G8417 CALC BMI ABV UP PARAM F/U: HCPCS | Performed by: NURSE PRACTITIONER

## 2024-02-15 PROCEDURE — 1123F ACP DISCUSS/DSCN MKR DOCD: CPT | Performed by: NURSE PRACTITIONER

## 2024-02-15 PROCEDURE — 99214 OFFICE O/P EST MOD 30 MIN: CPT | Performed by: NURSE PRACTITIONER

## 2024-02-15 PROCEDURE — 99213 OFFICE O/P EST LOW 20 MIN: CPT | Performed by: NURSE PRACTITIONER

## 2024-02-15 PROCEDURE — G8427 DOCREV CUR MEDS BY ELIG CLIN: HCPCS | Performed by: NURSE PRACTITIONER

## 2024-02-15 PROCEDURE — 1036F TOBACCO NON-USER: CPT | Performed by: NURSE PRACTITIONER

## 2024-02-15 PROCEDURE — G8484 FLU IMMUNIZE NO ADMIN: HCPCS | Performed by: NURSE PRACTITIONER

## 2024-02-15 NOTE — PROGRESS NOTES
[facility]  [address] [place]. [address]    Patient Name: Memo Bustillo  MRN: [id number]  Encounter Date: 2/15/2024     SUBJECTIVE:  Memo Bustillo is a 89 y.o., male being seen today regarding   Chief Complaint   Patient presents with    Knee Pain     left    and routine medication management.    Functionality Assessment & Goals:  On a scale of 0 (Does not Interfere) to 10 (Completely Interferes)  Which number describes how during the past week pain has interfered with the following:   A.  General Activity: 10    B.  Mood:  3   C.  Walking Ability:  10   D.  Normal Work (Includes both work outside the home and housework):  10   E.  Relations with Other People:  3   F.  Sleep:  3    G.  Enjoyment of Life:  10  2.  Patient prefers to Take their Pain Medications:   [x] On a regular basis   [] Only when necessary   [] Does not take pain medications  3.  What are the Patient's Goals/ Expectations for Visiting Pain Management?   [] Learn about my pain   [] Physical Therapy   [] Receive Injections   [] Deal with Anxiety and Stress   [x] Receive Medication   [] Treat Depression    [] Treat Sleep   [] Treat Opioid Dependence/ Addiction      Current Pain Assessment:         11/17/2023     2:16 PM   AMB PAIN ASSESSMENT   Location of Pain Knee   Location Modifiers Left   Severity of Pain 7   Quality of Pain Throbbing;Sharp;Dull;Aching   Duration of Pain Persistent   Frequency of Pain Constant   Aggravating Factors Other (Comment)   Limiting Behavior Yes   Relieving Factors Rest   Result of Injury No   Work-Related Injury No   Are there other pain locations you wish to document? No        Current Medications & Allergies:   Current Outpatient Medications   Medication Instructions    acetaminophen (TYLENOL) 650 mg, Oral, EVERY 4 HOURS PRN    clopidogrel (PLAVIX) 75 MG tablet TAKE 1 TABLET DAILY    docusate sodium (COLACE) 100 mg, Oral, DAILY    furosemide (LASIX) 40 mg, Oral, DAILY

## 2024-02-19 DIAGNOSIS — M25.561 CHRONIC PAIN OF BOTH KNEES: ICD-10-CM

## 2024-02-19 DIAGNOSIS — M25.562 CHRONIC PAIN OF BOTH KNEES: ICD-10-CM

## 2024-02-19 DIAGNOSIS — G89.29 CHRONIC PAIN OF BOTH KNEES: ICD-10-CM

## 2024-02-19 RX ORDER — FENTANYL 25 UG/1
1 PATCH TRANSDERMAL
Qty: 10 PATCH | Refills: 0 | Status: SHIPPED | OUTPATIENT
Start: 2024-02-19 | End: 2024-03-18

## 2024-02-19 RX ORDER — OXYCODONE HYDROCHLORIDE 20 MG/1
20 TABLET ORAL EVERY 6 HOURS PRN
Qty: 120 TABLET | Refills: 0 | Status: SHIPPED | OUTPATIENT
Start: 2024-02-19 | End: 2024-03-20

## 2024-02-19 NOTE — TELEPHONE ENCOUNTER
Memo called requesting a refill of the below medication which has been pended for you:     Requested Prescriptions     Pending Prescriptions Disp Refills    oxyCODONE (ROXICODONE) 20 MG immediate release tablet 120 tablet 0     Sig: Take 1 tablet by mouth every 6 hours as needed for Pain for up to 30 days. Max Daily Amount: 80 mg    fentaNYL (DURAGESIC) 25 MCG/HR 10 patch 0     Sig: Place 1 patch onto the skin every 72 hours for 28 days. Max Daily Amount: 1 patch       Last Appointment Date: 2/15/2024  Next Appointment Date: 5/16/2024    Allergies   Allergen Reactions    Codeine Nausea And Vomiting    Morphine Other (See Comments)     constipation       Pharmacy:  [facility]    No OARRS due to nursing home pt

## 2024-03-11 DIAGNOSIS — G89.29 CHRONIC PAIN OF BOTH KNEES: ICD-10-CM

## 2024-03-11 DIAGNOSIS — M25.562 CHRONIC PAIN OF BOTH KNEES: ICD-10-CM

## 2024-03-11 DIAGNOSIS — M25.561 CHRONIC PAIN OF BOTH KNEES: ICD-10-CM

## 2024-03-11 RX ORDER — FENTANYL 25 UG/1
1 PATCH TRANSDERMAL
Qty: 10 PATCH | Refills: 0 | Status: SHIPPED | OUTPATIENT
Start: 2024-03-11 | End: 2024-04-08

## 2024-03-11 RX ORDER — OXYCODONE HYDROCHLORIDE 20 MG/1
20 TABLET ORAL EVERY 6 HOURS PRN
Qty: 120 TABLET | Refills: 0 | Status: SHIPPED | OUTPATIENT
Start: 2024-03-11 | End: 2024-04-10

## 2024-03-11 NOTE — TELEPHONE ENCOUNTER
Memo called requesting a refill of the below medication which has been pended for you:     Requested Prescriptions     Pending Prescriptions Disp Refills    fentaNYL (DURAGESIC) 25 MCG/HR 10 patch 0     Sig: Place 1 patch onto the skin every 72 hours for 28 days. Max Daily Amount: 1 patch    oxyCODONE (ROXICODONE) 20 MG immediate release tablet 120 tablet 0     Sig: Take 1 tablet by mouth every 6 hours as needed for Pain for up to 30 days. Max Daily Amount: 80 mg       Last Appointment Date: 2/15/2024  Next Appointment Date: 5/16/2024    Allergies   Allergen Reactions    Codeine Nausea And Vomiting    Morphine Other (See Comments)     constipation       Pharmacy:  [facility]    NO OARRS DUE TO NURSING HOME PT

## 2024-04-01 NOTE — PROGRESS NOTES
THE [facility]      Betty Taylor is a 80 y.o. male resident of [facility]. HPI:     HPI  Patient presents for evaluation of nausea and vomiting. Known prior diagnosis of COVID. He has been having significant nausea since starting cephalexin for urinary tract infection. He has been having intermittent vomiting for 2 days. Has not tried anything to treat at this time. Overall COVID symptoms are improving, denies chest pain or dyspnea. Asymptomatic for urinary tract infection. Afebrile. Current Outpatient Medications   Medication Sig Dispense Refill    ondansetron (ZOFRAN) 4 MG tablet Take 1 tablet by mouth daily as needed for Nausea or Vomiting 30 tablet 0    cephALEXin (KEFLEX) 500 MG capsule Take 1 capsule by mouth 3 times daily for 10 days 30 capsule 0    oxyCODONE (OXY-IR) 15 MG immediate release tablet Take 1 tablet by mouth every 6 hours as needed for Pain for up to 30 days. Max Daily Amount: 60 mg 120 tablet 0    fentaNYL (DURAGESIC) 25 MCG/HR Place 1 patch onto the skin every 3 days for 30 days.  Intended supply: 30 days 10 patch 0    furosemide (LASIX) 40 MG tablet Take 2 tablets by mouth 2 times daily 15 tablet 3    lactulose encephalopathy 10 GM/15ML SOLN solution GIVE 30 ML BY MOUTH ONCE DAILY AS NEEDED 473 mL 1    permethrin (ELIMITE) 5 % cream Thoroughly massage cream (30 g for average adult) from head to soles of feet; leave on for 8 to 14 hours before removing (shower or bath) 60 g 0    lisinopril (PRINIVIL;ZESTRIL) 5 MG tablet TAKE 1 TABLET DAILY 90 tablet 3    metoprolol tartrate (LOPRESSOR) 25 MG tablet Take 1 tablet by mouth 2 times daily 180 tablet 1    atorvastatin (LIPITOR) 10 MG tablet TAKE 1 TABLET DAILY 90 tablet 3    clopidogrel (PLAVIX) 75 MG tablet TAKE 1 TABLET DAILY 90 tablet 3    melatonin 3 MG TABS tablet Take 3 mg by mouth nightly      docusate sodium (COLACE) 100 MG capsule Take 100 mg by mouth daily      pantoprazole (PROTONIX) 40 MG tablet Take 1 tablet by mouth every morning (before breakfast) 90 tablet 3    acetaminophen (TYLENOL) 325 MG tablet Take 2 tablets by mouth every 4 hours as needed for Pain or Fever 120 tablet 0    Multiple Vitamins-Minerals (MULTIVITAMIN PO) Take 1 tablet by mouth daily. No current facility-administered medications for this visit. Allergies   Allergen Reactions    Codeine Nausea And Vomiting    Morphine Other (See Comments)     constipation       Health Maintenance   Topic Date Due    Annual Wellness Visit (AWV)  Never done    Prostate Specific Antigen (PSA) Screening or Monitoring  06/06/2020    Lipids  06/03/2022    Flu vaccine (1) 10/04/2023 (Originally 8/1/2022)    COVID-19 Vaccine (5 - Booster for Pfizer series) 12/26/2029 (Originally 11/22/2022)    Depression Screen  10/04/2023    DTaP/Tdap/Td vaccine (2 - Td or Tdap) 02/28/2027    Pneumococcal 65+ years Vaccine  Completed    Hepatitis A vaccine  Aged Out    Hib vaccine  Aged Out    Meningococcal (ACWY) vaccine  Aged Out       Subjective:      Review of Systems   Constitutional:  Negative for chills and fever. HENT:  Negative for congestion and rhinorrhea. Respiratory:  Negative for cough and wheezing. Gastrointestinal:  Positive for nausea and vomiting. Negative for diarrhea. Neurological:  Negative for dizziness and headaches. Objective:     Vitals:    02/07/23 1106   BP: (!) 101/57   Pulse: 54   Resp: 16   Temp: 98.9 °F (37.2 °C)   SpO2: 98%     Physical Exam  Vitals reviewed. Constitutional:       General: He is not in acute distress. Appearance: He is not ill-appearing. HENT:      Head: Normocephalic and atraumatic. Right Ear: External ear normal.      Left Ear: External ear normal.   Eyes:      Extraocular Movements: Extraocular movements intact. Conjunctiva/sclera: Conjunctivae normal.   Cardiovascular:      Rate and Rhythm: Normal rate and regular rhythm.    Pulmonary:      Effort: Pulmonary effort is normal. No respiratory Is This A New Presentation, Or A Follow-Up?: Scars distress. Breath sounds: Normal breath sounds. Neurological:      General: No focal deficit present. Mental Status: He is alert. Mental status is at baseline. Psychiatric:         Mood and Affect: Mood normal.         Behavior: Behavior normal.       Assessment/Plan:        1. Nausea and vomiting, unspecified vomiting type  - Start zofran as noted below. Check BMP  - ondansetron (ZOFRAN) 4 MG tablet; Take 1 tablet by mouth daily as needed for Nausea or Vomiting  Dispense: 30 tablet; Refill: 0  - Basic Metabolic Panel; Future      Return if symptoms worsen or fail to improve.     Orders Placed This Encounter   Procedures    Basic Metabolic Panel     Standing Status:   Future     Standing Expiration Date:   2/7/2024     Orders Placed This Encounter   Medications    ondansetron (ZOFRAN) 4 MG tablet     Sig: Take 1 tablet by mouth daily as needed for Nausea or Vomiting     Dispense:  30 tablet     Refill:  0               Electronically signed by KEVIN Kline CNP on 2/7/2023 at 4:31 PM Additional History: Occurred after breast implants removed

## 2024-04-28 ENCOUNTER — HOSPITAL ENCOUNTER (EMERGENCY)
Age: 89
Discharge: HOME | End: 2024-04-28
Attending: EMERGENCY MEDICINE
Payer: MEDICARE

## 2024-04-28 ENCOUNTER — APPOINTMENT (OUTPATIENT)
Dept: CT IMAGING | Age: 89
End: 2024-04-28
Payer: MEDICARE

## 2024-04-28 VITALS
HEART RATE: 67 BPM | RESPIRATION RATE: 16 BRPM | OXYGEN SATURATION: 98 % | TEMPERATURE: 97.9 F | DIASTOLIC BLOOD PRESSURE: 72 MMHG | SYSTOLIC BLOOD PRESSURE: 156 MMHG

## 2024-04-28 DIAGNOSIS — R42 LIGHTHEADEDNESS: Primary | ICD-10-CM

## 2024-04-28 DIAGNOSIS — E86.0 DEHYDRATION: ICD-10-CM

## 2024-04-28 LAB
ALBUMIN SERPL-MCNC: 3.6 G/DL (ref 3.5–5.2)
ALBUMIN/GLOB SERPL: 1.3 {RATIO} (ref 1–2.5)
ALP SERPL-CCNC: 93 U/L (ref 40–129)
ALT SERPL-CCNC: 12 U/L (ref 5–41)
ANION GAP SERPL CALCULATED.3IONS-SCNC: 12 MMOL/L (ref 9–17)
AST SERPL-CCNC: 18 U/L
BASOPHILS # BLD: 0.03 K/UL (ref 0–0.2)
BASOPHILS NFR BLD: 0 % (ref 0–2)
BILIRUB SERPL-MCNC: 0.3 MG/DL (ref 0.3–1.2)
BILIRUB UR QL STRIP: NEGATIVE
BUN SERPL-MCNC: 20 MG/DL (ref 8–23)
BUN/CREAT SERPL: 18 (ref 9–20)
CALCIUM SERPL-MCNC: 8.9 MG/DL (ref 8.6–10.4)
CHLORIDE SERPL-SCNC: 101 MMOL/L (ref 98–107)
CLARITY UR: CLEAR
CO2 SERPL-SCNC: 28 MMOL/L (ref 20–31)
COLOR UR: YELLOW
COMMENT: ABNORMAL
CREAT SERPL-MCNC: 1.1 MG/DL (ref 0.7–1.2)
EOSINOPHIL # BLD: 0.11 K/UL (ref 0–0.44)
EOSINOPHILS RELATIVE PERCENT: 2 % (ref 1–4)
ERYTHROCYTE [DISTWIDTH] IN BLOOD BY AUTOMATED COUNT: 14.1 % (ref 11.8–14.4)
GFR SERPL CREATININE-BSD FRML MDRD: 61 ML/MIN/1.73M2
GLUCOSE SERPL-MCNC: 99 MG/DL (ref 70–99)
GLUCOSE UR STRIP-MCNC: NEGATIVE MG/DL
HCT VFR BLD AUTO: 37.4 % (ref 40.7–50.3)
HGB BLD-MCNC: 12.3 G/DL (ref 13–17)
HGB UR QL STRIP.AUTO: NEGATIVE
IMM GRANULOCYTES # BLD AUTO: 0.03 K/UL (ref 0–0.3)
IMM GRANULOCYTES NFR BLD: 0 %
INR PPP: 1.1
KETONES UR STRIP-MCNC: NEGATIVE MG/DL
LACTATE BLDV-SCNC: 2.6 MMOL/L (ref 0.5–1.9)
LEUKOCYTE ESTERASE UR QL STRIP: NEGATIVE
LYMPHOCYTES NFR BLD: 1.66 K/UL (ref 1.1–3.7)
LYMPHOCYTES RELATIVE PERCENT: 23 % (ref 24–43)
MCH RBC QN AUTO: 30.5 PG (ref 25.2–33.5)
MCHC RBC AUTO-ENTMCNC: 32.9 G/DL (ref 25.2–33.5)
MCV RBC AUTO: 92.8 FL (ref 82.6–102.9)
MONOCYTES NFR BLD: 0.77 K/UL (ref 0.1–1.2)
MONOCYTES NFR BLD: 11 % (ref 3–12)
NEUTROPHILS NFR BLD: 64 % (ref 36–65)
NEUTS SEG NFR BLD: 4.55 K/UL (ref 1.5–8.1)
NITRITE UR QL STRIP: NEGATIVE
NRBC BLD-RTO: 0 PER 100 WBC
PH UR STRIP: 7 [PH] (ref 5–6)
PLATELET # BLD AUTO: 297 K/UL (ref 138–453)
PMV BLD AUTO: 11.3 FL (ref 8.1–13.5)
POTASSIUM SERPL-SCNC: 4 MMOL/L (ref 3.7–5.3)
PROT SERPL-MCNC: 6.3 G/DL (ref 6.4–8.3)
PROT UR STRIP-MCNC: NEGATIVE MG/DL
PROTHROMBIN TIME: 13.5 SEC (ref 11.5–14.2)
RBC # BLD AUTO: 4.03 M/UL (ref 4.21–5.77)
SODIUM SERPL-SCNC: 141 MMOL/L (ref 135–144)
SP GR UR STRIP: 1.01 (ref 1.01–1.02)
TROPONIN I SERPL HS-MCNC: 38 NG/L (ref 0–22)
TROPONIN I SERPL HS-MCNC: 42 NG/L (ref 0–22)
UROBILINOGEN UR STRIP-ACNC: NORMAL EU/DL (ref 0–1)
WBC OTHER # BLD: 7.2 K/UL (ref 3.5–11.3)

## 2024-04-28 PROCEDURE — 70450 CT HEAD/BRAIN W/O DYE: CPT

## 2024-04-28 PROCEDURE — 83605 ASSAY OF LACTIC ACID: CPT

## 2024-04-28 PROCEDURE — 36415 COLL VENOUS BLD VENIPUNCTURE: CPT

## 2024-04-28 PROCEDURE — 80053 COMPREHEN METABOLIC PANEL: CPT

## 2024-04-28 PROCEDURE — 85025 COMPLETE CBC W/AUTO DIFF WBC: CPT

## 2024-04-28 PROCEDURE — 96360 HYDRATION IV INFUSION INIT: CPT

## 2024-04-28 PROCEDURE — 2580000003 HC RX 258: Performed by: EMERGENCY MEDICINE

## 2024-04-28 PROCEDURE — 81003 URINALYSIS AUTO W/O SCOPE: CPT

## 2024-04-28 PROCEDURE — 93005 ELECTROCARDIOGRAM TRACING: CPT | Performed by: EMERGENCY MEDICINE

## 2024-04-28 PROCEDURE — 84484 ASSAY OF TROPONIN QUANT: CPT

## 2024-04-28 PROCEDURE — 99284 EMERGENCY DEPT VISIT MOD MDM: CPT

## 2024-04-28 PROCEDURE — 87040 BLOOD CULTURE FOR BACTERIA: CPT

## 2024-04-28 PROCEDURE — 85610 PROTHROMBIN TIME: CPT

## 2024-04-28 RX ORDER — 0.9 % SODIUM CHLORIDE 0.9 %
500 INTRAVENOUS SOLUTION INTRAVENOUS ONCE
Status: COMPLETED | OUTPATIENT
Start: 2024-04-28 | End: 2024-04-28

## 2024-04-28 RX ADMIN — SODIUM CHLORIDE 500 ML: 9 INJECTION, SOLUTION INTRAVENOUS at 14:16

## 2024-04-28 ASSESSMENT — LIFESTYLE VARIABLES: HOW OFTEN DO YOU HAVE A DRINK CONTAINING ALCOHOL: NEVER

## 2024-04-28 NOTE — ED PROVIDER NOTES
[facility]  eMERGENCY dEPARTMENT eNCOUnter      Pt Name: Memo Bustillo  MRN: [id number]  Birthdate 6/28/1927  Date of evaluation: 4/28/2024      CHIEF COMPLAINT       Chief Complaint   Patient presents with    Dizziness         HISTORY OF PRESENT ILLNESS    Memo Bustillo is a 89 y.o. male who presents with a chief complaint of lightheadedness and just feeling dizzy cannot find the no chest pain no shortness of breath no fevers or chills no cough no trauma      REVIEW OF SYSTEMS       Review of Systems   Constitutional:  Negative for chills and fever.   HENT:  Negative for congestion, dental problem, sore throat and trouble swallowing.    Eyes:  Negative for visual disturbance.   Respiratory:  Negative for shortness of breath and wheezing.    Cardiovascular:  Negative for chest pain, palpitations and leg swelling.   Gastrointestinal:  Negative for abdominal pain, blood in stool, constipation, diarrhea, nausea and vomiting.   Genitourinary:  Negative for difficulty urinating, dysuria and testicular pain.   Musculoskeletal:  Negative for back pain, joint swelling and neck pain.   Skin:  Negative for rash.   Neurological:  Positive for light-headedness. Negative for dizziness, syncope, weakness and headaches.   Hematological:  Negative for adenopathy. Does not bruise/bleed easily.   Psychiatric/Behavioral:  Negative for confusion and suicidal ideas.          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    SURGICAL HISTORY      has a past surgical history that includes Colonoscopy (08/29/12); Cystocopy (11/15/2007); Appendectomy; Cholecystectomy (01/12/1983); Knee fusion (Left); lipoma resection; Colonoscopy (05/27/09); Colonoscopy (04/30/2008); Prostate Biopsy (Bilateral, 06/23/2009); pr egd transoral biopsy

## 2024-04-29 LAB
EKG ATRIAL RATE: 53 BPM
EKG ATRIAL RATE: 53 BPM
EKG P AXIS: 58 DEGREES
EKG P AXIS: 62 DEGREES
EKG P-R INTERVAL: 172 MS
EKG P-R INTERVAL: 178 MS
EKG Q-T INTERVAL: 452 MS
EKG Q-T INTERVAL: 472 MS
EKG QRS DURATION: 138 MS
EKG QRS DURATION: 140 MS
EKG QTC CALCULATION (BAZETT): 424 MS
EKG QTC CALCULATION (BAZETT): 442 MS
EKG R AXIS: -49 DEGREES
EKG R AXIS: -59 DEGREES
EKG T AXIS: 46 DEGREES
EKG T AXIS: 53 DEGREES
EKG VENTRICULAR RATE: 53 BPM
EKG VENTRICULAR RATE: 53 BPM

## 2024-04-30 ENCOUNTER — TELEPHONE (OUTPATIENT)
Dept: FAMILY MEDICINE CLINIC | Age: 89
End: 2024-04-30

## 2024-04-30 NOTE — TELEPHONE ENCOUNTER
Spoke with Ely and advised UA was addressed in ER note, abnormal pH of 7.0 but otherwise normal. She also inquired regarding blood culture results. Writer advised they are still pending. Ely verbalized understanding.

## 2024-04-30 NOTE — TELEPHONE ENCOUNTER
Ely at [facility] calling to get results of pt's UA culture that was done at , please advise at [phone]

## 2024-05-03 LAB
MICROORGANISM SPEC CULT: NORMAL
MICROORGANISM SPEC CULT: NORMAL
SERVICE CMNT-IMP: NORMAL
SERVICE CMNT-IMP: NORMAL
SPECIMEN DESCRIPTION: NORMAL
SPECIMEN DESCRIPTION: NORMAL

## 2024-05-07 ENCOUNTER — TELEPHONE (OUTPATIENT)
Dept: PAIN MANAGEMENT | Age: 89
End: 2024-05-07

## 2024-05-07 DIAGNOSIS — M25.562 CHRONIC PAIN OF BOTH KNEES: ICD-10-CM

## 2024-05-07 DIAGNOSIS — G89.29 CHRONIC PAIN OF BOTH KNEES: ICD-10-CM

## 2024-05-07 DIAGNOSIS — M25.561 CHRONIC PAIN OF BOTH KNEES: ICD-10-CM

## 2024-05-07 NOTE — TELEPHONE ENCOUNTER
The patients son and POA stopped to have the patients Oxycodone and Fentanyl sent to [facility] pharmacy. He stated that the patient is in [facility] and he needs to be able to  the scripts for his dad and take it to his nurse at the assisted living facility.     OAR[place] Report checked for [place], [place], and [place]: 3/18/24 Oxycodone 20mg #120. Due NOW.          3/14/24 #Fentanyl 25mcg/hr. Due NOW.    Last Appt:  2/15/2024  Next Appt:   5/16/2024  Med verified in Epic

## 2024-05-08 RX ORDER — OXYCODONE HYDROCHLORIDE 20 MG/1
20 TABLET ORAL EVERY 6 HOURS PRN
Qty: 120 TABLET | Refills: 0 | Status: SHIPPED | OUTPATIENT
Start: 2024-05-08 | End: 2024-05-08 | Stop reason: SDUPTHER

## 2024-05-08 RX ORDER — FENTANYL 25 UG/1
1 PATCH TRANSDERMAL
Qty: 10 PATCH | Refills: 0 | Status: SHIPPED | OUTPATIENT
Start: 2024-05-08 | End: 2024-06-05

## 2024-05-08 RX ORDER — OXYCODONE HYDROCHLORIDE 20 MG/1
20 TABLET ORAL EVERY 6 HOURS PRN
Qty: 50 TABLET | Refills: 0 | Status: SHIPPED | OUTPATIENT
Start: 2024-05-24 | End: 2024-06-05

## 2024-05-08 NOTE — TELEPHONE ENCOUNTER
Only has 70 at [facility] will order other 50 when get them in will fill   PT DAILY TREATMENT NOTE     Patient Name: Cale Hall  Date:10/28/2021  : 1972  [x]  Patient  Verified  Payor: Carlin Dominique / [facility]POS / Product Type: HMO /    In time:1200  Out time:1245  Total Treatment Time (min): 45  Visit #: 4 of 10    Treatment Area: Low back pain [M54.50]    SUBJECTIVE  Pain Level (0-10 scale): 0  Any medication changes, allergies to medications, adverse drug reactions, diagnosis change, or new procedure performed?: [x] No    [] Yes (see summary sheet for update)  Subjective functional status/changes:   [] No changes reported  Pt reports no pain today. States his knee is feeling better as well. OBJECTIVE    10 min Therapeutic Exercise:  [x] See flow sheet :   Rationale: increase ROM and increase strength to improve the patients ability to perform ADLs    12 min Therapeutic Activity:  [x]  See flow sheet : functional LE strength, RDLs   Rationale: increase strength and improve coordination  to improve the patients ability to perform work duties     23 min Neuromuscular Re-education:  [x]  See flow sheet : core stabilization    Rationale: increase strength, improve coordination, improve balance and increase proprioception  to improve the patients ability to tolerate sustained postures          With   [] TE   [] TA   [] neuro   [] other: Patient Education: [x] Review HEP    [] Progressed/Changed HEP based on:   [] positioning   [] body mechanics   [] transfers   [] heat/ice application    [] other:      Other Objective/Functional Measures: progressed exercises per flow sheet      Pain Level (0-10 scale) post treatment: 0    ASSESSMENT/Changes in Function: Pt tolerated progression of exercises with out increase in symptoms. Pt did well with RDL mechanics and was able to progress to SL today. Challenged with bird dogs, required cuing to reduce lumbar extension substitutions.  Continue to progress functional exercises as pt can tolerate. Patient will continue to benefit from skilled PT services to modify and progress therapeutic interventions, address functional mobility deficits, address ROM deficits, address strength deficits, analyze and address soft tissue restrictions, analyze and cue movement patterns, analyze and modify body mechanics/ergonomics, assess and modify postural abnormalities, address imbalance/dizziness and instruct in home and community integration to attain remaining goals. []  See Plan of Care  []  See progress note/recertification  []  See Discharge Summary         Progress towards goals / Updated goals:  Short Term Goals: To be accomplished in 2 treatments:   1. Patient will become proficient in their HEP and will be compliant in performing that program.               Evaluation: HEP established.               Reports 2x/daily compliance     Long Term Goals: To be accomplished in 10 treatments:   1. Patient's pain level will be 0-1/10 with activity in order to improve patient's ability to perform normal ADLs.                Evaluation: 2-5/10 with activity               No significant change at this time, 3/10 upon arrival  2. Patient will increase FOTO score to >/= 74 points to indicate increased functional mobility.               Evaluation: 64 points               To be reassessed at end of POC  3. Patient will demonstrate right hip abd/ext MMT >/= 4+/5 to perform recreational tasks at [facility]              Evaluation: right hip abd/ext MMT: 3+/5              Tolerating progression of exercises  4. Patient will demonstrate pain free B lumbar Rotation AROM WNL to increase ease of ADLs.                 Evaluation: >50% limited with B lumbar rotation w/ report of pain at end range   Rotation improving with decreased pain  5. Patient will report ability to sit >/= 90 minutes to increase functional activity level with recreational participation.                Evaluation: Standing tolerance <30 minutes.    Performed >30mins of standing functional activity today     PLAN  [x]  Upgrade activities as tolerated     [x]  Continue plan of care  []  Update interventions per flow sheet       []  Discharge due to:_  []  Other:_      Phyllis Dickinson, PT 10/28/2021  12:11 PM    Future Appointments   Date Time Provider Irina Pulido   11/1/2021 12:00 PM [facility]   11/4/2021 12:00 PM Eston Staff [facility]   11/8/2021 12:00 PM Elva Morris, PT [facility]   11/11/2021 12:00 PM Elva Morris, PT [facility]

## 2024-05-13 ENCOUNTER — TELEPHONE (OUTPATIENT)
Dept: FAMILY MEDICINE CLINIC | Age: 89
End: 2024-05-13

## 2024-05-13 NOTE — TELEPHONE ENCOUNTER
Son stopped by to ask for all patients medications be sent [facility] in [place]. Patient is no longer staying at [facility] he is now living at [facility] room #1219.

## 2024-05-13 NOTE — TELEPHONE ENCOUNTER
Attempted to contact pt's son, spoke with pt's daughter-in-law. Advised pt will need to be seen for med check, have not seen pt in several months, needing updated med list. Dr. Galan has not prescribed pt's medications in a couple years, need to know what they're requesting.

## 2024-05-16 ENCOUNTER — TELEPHONE (OUTPATIENT)
Dept: PAIN MANAGEMENT | Age: 89
End: 2024-05-16

## 2024-05-16 ENCOUNTER — OFFICE VISIT (OUTPATIENT)
Dept: PAIN MANAGEMENT | Age: 89
End: 2024-05-16
Payer: MEDICARE

## 2024-05-16 VITALS
DIASTOLIC BLOOD PRESSURE: 60 MMHG | HEART RATE: 75 BPM | WEIGHT: 175 LBS | SYSTOLIC BLOOD PRESSURE: 114 MMHG | BODY MASS INDEX: 29.16 KG/M2 | HEIGHT: 65 IN | RESPIRATION RATE: 12 BRPM | OXYGEN SATURATION: 96 %

## 2024-05-16 DIAGNOSIS — I89.0 LYMPHEDEMA OF BOTH LOWER EXTREMITIES: Primary | ICD-10-CM

## 2024-05-16 DIAGNOSIS — M25.561 CHRONIC PAIN OF BOTH KNEES: ICD-10-CM

## 2024-05-16 DIAGNOSIS — M17.0 PRIMARY OSTEOARTHRITIS OF KNEES, BILATERAL: ICD-10-CM

## 2024-05-16 DIAGNOSIS — G89.29 CHRONIC PAIN OF BOTH KNEES: ICD-10-CM

## 2024-05-16 DIAGNOSIS — M25.562 CHRONIC PAIN OF BOTH KNEES: ICD-10-CM

## 2024-05-16 PROCEDURE — G8427 DOCREV CUR MEDS BY ELIG CLIN: HCPCS | Performed by: NURSE PRACTITIONER

## 2024-05-16 PROCEDURE — 1036F TOBACCO NON-USER: CPT | Performed by: NURSE PRACTITIONER

## 2024-05-16 PROCEDURE — G8417 CALC BMI ABV UP PARAM F/U: HCPCS | Performed by: NURSE PRACTITIONER

## 2024-05-16 PROCEDURE — 99214 OFFICE O/P EST MOD 30 MIN: CPT | Performed by: NURSE PRACTITIONER

## 2024-05-16 PROCEDURE — 99213 OFFICE O/P EST LOW 20 MIN: CPT | Performed by: NURSE PRACTITIONER

## 2024-05-16 PROCEDURE — 1123F ACP DISCUSS/DSCN MKR DOCD: CPT | Performed by: NURSE PRACTITIONER

## 2024-05-16 ASSESSMENT — ENCOUNTER SYMPTOMS
GASTROINTESTINAL NEGATIVE: 1
RESPIRATORY NEGATIVE: 1

## 2024-05-16 NOTE — TELEPHONE ENCOUNTER
Please reach out to [facility], he is no longer there, but I need you to find out out what kind of sock they used under his leg wrap. It has a hole in it and son needs to find a new one. Please find out and let son know what/where he needs to buy

## 2024-05-16 NOTE — PATIENT INSTRUCTIONS
Pain Management Plan:  Continue current medications    Pain clinic phone numbers  Refills  - Call [phone]. Please leave your name, a working phone number, date of birth, the name, dose, quantity, and last refill date of the prescription, and the pharmacy.  Appointment or General Questions - Call  [phone]. This is the direct line the  [facility] .  You can also use [employer]. Is your MyChart Activated?      How to dispose of unused pain medications safely:  Flush all unused pain medications. This is the FDA's recommended way of disposing these medications.   All other medications can be disposed in the trash mixed in undesirable contents (used coffee grounds, used alton litter, etc).

## 2024-05-16 NOTE — PROGRESS NOTES
is alert and oriented to person, place, and time. He is not disoriented.      GCS: GCS eye subscore is 4. GCS verbal subscore is 5. GCS motor subscore is 6.      Cranial Nerves: No cranial nerve deficit.      Motor: No tremor or seizure activity.   Psychiatric:         Behavior: Behavior normal. Behavior is cooperative.          ASSESSMENT:    ICD-10-CM    1. Lymphedema of both lower extremities  I89.0       2. Chronic pain of both knees  M25.561     M25.562     G89.29       3. Primary osteoarthritis of knees, bilateral  M17.0            I have reviewed the chief complaint and the history of present illness, vitals and all subjective documentation by [facility] clinical staff.    PLAN:  Memo Bustillo is here for a recheck of chronic pain and medication management.  Medications:  The current medication plan   [x] is controlling pain and providing improvement with quality of life and function and will continue current regimen    The patient shows no evidence of misuse or diversion of medications and denies use of alcohol or illegal substances.  A urine drug screen has been appropriate as shown with the most recent screen.    Controlled Substance Monitoring:    Acute and Chronic Pain Monitoring:   RX Monitoring Periodic Controlled Substance Monitoring   5/16/2024   2:08 PM No signs of potential drug abuse or diversion identified.;Obtaining appropriate analgesic effect of treatment.       Follow-up Plan:  Schedule patient to follow up with our office for recheck in 3 months       KEVIN Rivera - CNP  Pain Management  [facility]

## 2024-05-20 ENCOUNTER — OUTSIDE SERVICES (OUTPATIENT)
Dept: INTERNAL MEDICINE | Age: 89
End: 2024-05-20
Payer: MEDICARE

## 2024-05-20 ENCOUNTER — HOSPITAL ENCOUNTER (OUTPATIENT)
Dept: INTERVENTIONAL RADIOLOGY/VASCULAR | Age: 89
Discharge: HOME | End: 2024-05-22
Payer: MEDICARE

## 2024-05-20 DIAGNOSIS — L03.116 CELLULITIS OF LEFT LOWER EXTREMITY: ICD-10-CM

## 2024-05-20 DIAGNOSIS — R60.0 EDEMA OF LEFT LOWER EXTREMITY: Primary | ICD-10-CM

## 2024-05-20 DIAGNOSIS — R60.0 EDEMA OF LEFT LOWER EXTREMITY: ICD-10-CM

## 2024-05-20 DIAGNOSIS — R46.89 BEHAVIORAL CHANGE: ICD-10-CM

## 2024-05-20 PROCEDURE — 99347 HOME/RES VST EST SF MDM 20: CPT | Performed by: NURSE PRACTITIONER

## 2024-05-20 PROCEDURE — 93971 EXTREMITY STUDY: CPT

## 2024-05-20 RX ORDER — CEPHALEXIN 500 MG/1
500 CAPSULE ORAL 4 TIMES DAILY
Qty: 28 CAPSULE | Refills: 0 | Status: SHIPPED | OUTPATIENT
Start: 2024-05-20 | End: 2024-05-27

## 2024-05-20 ASSESSMENT — ENCOUNTER SYMPTOMS
RHINORRHEA: 0
DIARRHEA: 0
COUGH: 0
NAUSEA: 0
WHEEZING: 0
VOMITING: 0

## 2024-05-20 NOTE — PROGRESS NOTES
lower leg: Edema present.      Comments: 2+ BLE edema   Skin:     Comments: Anterior portion of LLE is erythematous and warm per nurse on site. No exudate noted.   Neurological:      Mental Status: He is alert.         Assessment/Plan:        1. Edema of left lower extremity  - US negative for DVT  - Vascular duplex lower extremity venous left; Future    2. Behavioral change  - UA C&S  - Urinalysis with Reflex to Culture; Future    3. Cellulitis of left lower extremity  - Start cephalexin as noted below. Monitor for ongoing improvement, patient has follow up with Dr. Galan tomorrow.  - cephALEXin (KEFLEX) 500 MG capsule; Take 1 capsule by mouth 4 times daily for 7 days  Dispense: 28 capsule; Refill: 0        Return if symptoms worsen or fail to improve.    Orders Placed This Encounter   Procedures    Urinalysis with Reflex to Culture     Standing Status:   Future     Standing Expiration Date:   5/20/2025     Order Specific Question:   SPECIFY(EX-CATH,MIDSTREAM,CYSTO,ETC)?     Answer:   clean catch    Vascular duplex lower extremity venous left     Standing Status:   Future     Number of Occurrences:   1     Standing Expiration Date:   5/20/2025     Orders Placed This Encounter   Medications    cephALEXin (KEFLEX) 500 MG capsule     Sig: Take 1 capsule by mouth 4 times daily for 7 days     Dispense:  28 capsule     Refill:  0               Electronically signed by KEVIN Carpenter CNP on 5/21/2024 at 8:29 AM       Memo Bustillo, was evaluated through a synchronous (real-time) audio-video encounter. The patient (or guardian if applicable) is aware that this is a billable service, which includes applicable co-pays. This Virtual Visit was conducted with patient's (and/or legal guardian's) consent. Patient identification was verified, and a caregiver was present when appropriate.   The patient was located at [facility], [place]  Provider was located at Facility (Appt Dept):

## 2024-05-21 ENCOUNTER — OFFICE VISIT (OUTPATIENT)
Dept: FAMILY MEDICINE CLINIC | Age: 89
End: 2024-05-21
Payer: MEDICARE

## 2024-05-21 VITALS
SYSTOLIC BLOOD PRESSURE: 110 MMHG | DIASTOLIC BLOOD PRESSURE: 70 MMHG | WEIGHT: 175 LBS | BODY MASS INDEX: 29.16 KG/M2 | HEART RATE: 65 BPM | HEIGHT: 65 IN | OXYGEN SATURATION: 99 %

## 2024-05-21 DIAGNOSIS — I25.119 CORONARY ARTERY DISEASE INVOLVING NATIVE CORONARY ARTERY OF NATIVE HEART WITH ANGINA PECTORIS (HCC): ICD-10-CM

## 2024-05-21 DIAGNOSIS — M25.562 CHRONIC PAIN OF BOTH KNEES: Primary | ICD-10-CM

## 2024-05-21 DIAGNOSIS — N40.1 BENIGN PROSTATIC HYPERPLASIA WITH INCOMPLETE BLADDER EMPTYING: ICD-10-CM

## 2024-05-21 DIAGNOSIS — R39.14 BENIGN PROSTATIC HYPERPLASIA WITH INCOMPLETE BLADDER EMPTYING: ICD-10-CM

## 2024-05-21 DIAGNOSIS — G89.29 CHRONIC PAIN OF BOTH KNEES: Primary | ICD-10-CM

## 2024-05-21 DIAGNOSIS — L03.116 CELLULITIS OF LEFT LOWER EXTREMITY: ICD-10-CM

## 2024-05-21 DIAGNOSIS — Z99.3 WHEELCHAIR DEPENDENCE: ICD-10-CM

## 2024-05-21 DIAGNOSIS — E78.5 HYPERLIPIDEMIA, UNSPECIFIED HYPERLIPIDEMIA TYPE: ICD-10-CM

## 2024-05-21 DIAGNOSIS — M25.561 CHRONIC PAIN OF BOTH KNEES: Primary | ICD-10-CM

## 2024-05-21 PROCEDURE — 99213 OFFICE O/P EST LOW 20 MIN: CPT | Performed by: FAMILY MEDICINE

## 2024-05-21 PROCEDURE — G8427 DOCREV CUR MEDS BY ELIG CLIN: HCPCS | Performed by: FAMILY MEDICINE

## 2024-05-21 PROCEDURE — 1036F TOBACCO NON-USER: CPT | Performed by: FAMILY MEDICINE

## 2024-05-21 PROCEDURE — 99214 OFFICE O/P EST MOD 30 MIN: CPT | Performed by: FAMILY MEDICINE

## 2024-05-21 PROCEDURE — 1123F ACP DISCUSS/DSCN MKR DOCD: CPT | Performed by: FAMILY MEDICINE

## 2024-05-21 PROCEDURE — G8417 CALC BMI ABV UP PARAM F/U: HCPCS | Performed by: FAMILY MEDICINE

## 2024-05-21 ASSESSMENT — PATIENT HEALTH QUESTIONNAIRE - PHQ9
SUM OF ALL RESPONSES TO PHQ QUESTIONS 1-9: 0
1. LITTLE INTEREST OR PLEASURE IN DOING THINGS: NOT AT ALL
SUM OF ALL RESPONSES TO PHQ9 QUESTIONS 1 & 2: 0
2. FEELING DOWN, DEPRESSED OR HOPELESS: NOT AT ALL

## 2024-05-21 NOTE — PROGRESS NOTES
Subjective:      Patient ID: Memo Bustillo is a 89 y.o. male.    HPI  visit for medication check.  Living at [facility].  Had a bad week.  Stopped his meds, had some near falls or ended up on the floor at least once.  Transitioning to the [facility] for more full time care.  No left knee after accident.  Keeps the leg straight out in the wheel chair. Followed by pain management as well.  Memory slipping some per family.  Lymph edema /swelling better with legs elevated. Antibiotic started for a leg wound per family, yesterday.        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 2/2/15    EGD with biopsies    CAD (coronary artery disease)     Elevated PSA     Hyperlipidemia     Hypertension     Kidney stone     Osteoarthritis of knee     Venous insufficiency      Past Surgical History:   Procedure Laterality Date    APPENDECTOMY      CHOLECYSTECTOMY  01/12/1983    COLONOSCOPY  08/29/12    COLONOSCOPY  05/27/09    COLONOSCOPY  04/30/2008    CYSTOSCOPY  11/15/2007    with left ureteroscopy and dorsal slit     KNEE FUSION Left     LEG SURGERY Left 1/11/2020    I and D of Left Lower Extremitity performed by Cruz Mcghee MD at [facility] OR    LIPOMA RESECTION      TN CYSTOURETHROSCOPY N/A 11/19/2018    CYSTO Photovaporization Prostate Greenlight Laser ([id number]#[id number]) performed by Fabian Sarmiento MD at [facility] OR    TN EGD TRANSORAL BIOPSY SINGLE/MULTIPLE N/A 7/3/2017    EGD BIOPSY performed by Hang Montilla MD at [facility] OR    PROSTATE BIOPSY Bilateral 06/23/2009    Benign    UPPER GASTROINTESTINAL ENDOSCOPY  02/2/2015    Sepulveda's (Dr. Montilla)    UPPER GASTROINTESTINAL ENDOSCOPY  04/25/2016    Lg hiatal hernia, polyp at duodenum, gastric polyp, Barretts Esophagus, GE 32    UPPER GASTROINTESTINAL ENDOSCOPY  07/03/2017    mild gastritis, Sepulveda's, Dr. Montilla-recheck 3 years     Current Outpatient Medications   Medication Sig Dispense Refill    cephALEXin (KEFLEX) 500 MG capsule Take 1 capsule by

## 2024-05-22 DIAGNOSIS — M25.561 CHRONIC PAIN OF BOTH KNEES: ICD-10-CM

## 2024-05-22 DIAGNOSIS — M25.562 CHRONIC PAIN OF BOTH KNEES: ICD-10-CM

## 2024-05-22 DIAGNOSIS — G89.29 CHRONIC PAIN OF BOTH KNEES: ICD-10-CM

## 2024-05-22 RX ORDER — OXYCODONE HYDROCHLORIDE 20 MG/1
20 TABLET ORAL EVERY 6 HOURS PRN
Qty: 120 TABLET | Refills: 0 | Status: SHIPPED | OUTPATIENT
Start: 2024-05-24 | End: 2024-06-23

## 2024-05-22 RX ORDER — FENTANYL 25 UG/1
1 PATCH TRANSDERMAL
Qty: 10 PATCH | Refills: 0 | Status: SHIPPED | OUTPATIENT
Start: 2024-05-22 | End: 2024-06-21

## 2024-05-23 ENCOUNTER — OUTSIDE SERVICES (OUTPATIENT)
Dept: INTERNAL MEDICINE | Age: 89
End: 2024-05-23
Payer: MEDICARE

## 2024-05-23 DIAGNOSIS — L03.116 CELLULITIS OF LEFT LOWER LEG: Primary | ICD-10-CM

## 2024-05-23 DIAGNOSIS — I25.119 CORONARY ARTERY DISEASE INVOLVING NATIVE CORONARY ARTERY OF NATIVE HEART WITH ANGINA PECTORIS (HCC): ICD-10-CM

## 2024-05-23 DIAGNOSIS — G47.00 INSOMNIA, UNSPECIFIED TYPE: ICD-10-CM

## 2024-05-23 DIAGNOSIS — K22.70 BARRETT'S ESOPHAGUS WITHOUT DYSPLASIA: ICD-10-CM

## 2024-05-23 DIAGNOSIS — F03.B0 MODERATE DEMENTIA, UNSPECIFIED DEMENTIA TYPE, UNSPECIFIED WHETHER BEHAVIORAL, PSYCHOTIC, OR MOOD DISTURBANCE OR ANXIETY (HCC): ICD-10-CM

## 2024-05-23 DIAGNOSIS — I25.10 CORONARY ARTERY DISEASE INVOLVING NATIVE CORONARY ARTERY OF NATIVE HEART WITHOUT ANGINA PECTORIS: ICD-10-CM

## 2024-05-23 DIAGNOSIS — I50.32 CHRONIC HEART FAILURE WITH PRESERVED EJECTION FRACTION (HFPEF) (HCC): ICD-10-CM

## 2024-05-23 PROCEDURE — 99308 SBSQ NF CARE LOW MDM 20: CPT | Performed by: NURSE PRACTITIONER

## 2024-05-23 NOTE — PROGRESS NOTES
05/23/24  Memo Bustillo  6/28/1927    Patient Resident of [facility]    Chief Complaint  1. Cellulitis of left lower leg    2. Chronic heart failure with preserved ejection fraction (HFpEF) ([facility])    3. Coronary artery disease involving native coronary artery of native heart without angina pectoris    4. Coronary artery disease involving native coronary artery of native heart with angina pectoris (HCC)    5. 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 Insomnia, unspecified type    7. Moderate dementia, unspecified dementia type, unspecified whether behavioral, psychotic, or mood disturbance or anxiety ([facility])        HPI:  Patient being seen for follow-up of cellulitis/transfer into [place]CF from [place].  Staff state he has been more confused.  Having exit seeking tendencies so wander guard was placed.  He does continue on the Keflex for cellulitis to his lower extremities.  Denies any fevers.  Staff state vitals stable.  Patient states leg pain is chronic follows with pain management.  History of CAD.  Denies any chest pain no shortness of breath.  Edema to lower extremities stable.      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 02/02/2015    EGD with biopsies    CAD (coronary artery disease)     Elevated PSA     Hyperlipidemia     Hypertension     Kidney stone     Memory loss     Osteoarthritis of knee     Venous insufficiency        Past Surgical History:   Procedure Laterality Date    APPENDECTOMY      CHOLECYSTECTOMY  01/12/1983    COLONOSCOPY  08/29/12    COLONOSCOPY  05/27/09    COLONOSCOPY  04/30/2008    CYSTOSCOPY  11/15/2007    with left ureteroscopy and dorsal slit     KNEE FUSION Left     LEG SURGERY Left 1/11/2020    I and D of Left Lower Extremitity performed by Cruz Mcghee MD at [facility] OR    LIPOMA RESECTION      MI CYSTOURETHROSCOPY N/A

## 2024-06-18 DIAGNOSIS — G89.29 CHRONIC PAIN OF BOTH KNEES: ICD-10-CM

## 2024-06-18 DIAGNOSIS — M25.561 CHRONIC PAIN OF BOTH KNEES: ICD-10-CM

## 2024-06-18 DIAGNOSIS — M25.562 CHRONIC PAIN OF BOTH KNEES: ICD-10-CM

## 2024-06-18 RX ORDER — FENTANYL 25 UG/1
1 PATCH TRANSDERMAL
Qty: 10 PATCH | Refills: 0 | Status: SHIPPED | OUTPATIENT
Start: 2024-06-18 | End: 2024-07-18

## 2024-07-15 ENCOUNTER — OUTSIDE SERVICES (OUTPATIENT)
Dept: INTERNAL MEDICINE | Age: 89
End: 2024-07-15
Payer: MEDICARE
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
## 2024-07-15 DIAGNOSIS — I10 ESSENTIAL HYPERTENSION, BENIGN: ICD-10-CM

## 2024-07-15 DIAGNOSIS — L03.116 CELLULITIS OF LEFT LOWER LEG: ICD-10-CM

## 2024-07-15 DIAGNOSIS — F03.B0 MODERATE DEMENTIA, UNSPECIFIED DEMENTIA TYPE, UNSPECIFIED WHETHER BEHAVIORAL, PSYCHOTIC, OR MOOD DISTURBANCE OR ANXIETY (HCC): ICD-10-CM

## 2024-07-15 PROCEDURE — 99308 SBSQ NF CARE LOW MDM 20: CPT | Performed by: NURSE PRACTITIONER

## 2024-07-16 NOTE — PROGRESS NOTES
07/15/24  Memo Bustillo  6/28/1927    Patient Resident of [facility]    Chief Complaint  1. Fall, initial encounter    2. Cellulitis of left lower leg    3. Moderate dementia, unspecified dementia type, unspecified whether behavioral, psychotic, or mood disturbance or anxiety (HCC)    4. Essential hypertension, benign        HPI:  89-year-old patient with history of moderate dementia hypertension with recent fall being seen at the request of the nursing staff.  They state left leg is looking worse.  Wanted to let me know that she started him on Keflex 3 times daily for 10 days along with adding Pro-Stat.  States he slid out of his wheelchair in the dining room.  Was a witnessed fall.  No injury to head.  Patient denies any increased pain.  No headache or blurred vision.  Review of blood pressure shows elevated blood pressures.  Currently taking metoprolol 25 mg daily and Lasix 40 mg daily.    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 02/02/2015    EGD with biopsies    CAD (coronary artery disease)     Elevated PSA     Hyperlipidemia     Hypertension     Kidney stone     Memory loss     Osteoarthritis of knee     Venous insufficiency        Past Surgical History:   Procedure Laterality Date    APPENDECTOMY      CHOLECYSTECTOMY  01/12/1983    COLONOSCOPY  08/29/12    COLONOSCOPY  05/27/09    COLONOSCOPY  04/30/2008    CYSTOSCOPY  11/15/2007    with left ureteroscopy and dorsal slit     KNEE FUSION Left     LEG SURGERY Left 1/11/2020    I and D of Left Lower Extremitity performed by Cruz Mcghee MD at [facility] OR    LIPOMA RESECTION      IA CYSTOURETHROSCOPY N/A 11/19/2018    CYSTO Photovaporization Prostate Greenlight Laser ([facility]#[id number]) performed by Fabian Sarmiento MD at [facility] OR    IA EGD TRANSORAL BIOPSY SINGLE/MULTIPLE N/A 7/3/2017    EGD

## 2024-07-17 ENCOUNTER — HOSPITAL ENCOUNTER (OUTPATIENT)
Age: 89
Setting detail: SPECIMEN
Discharge: HOME | End: 2024-07-17
Payer: MEDICARE

## 2024-07-17 LAB
BASOPHILS # BLD: <0.03 K/UL (ref 0–0.2)
BASOPHILS NFR BLD: 0 % (ref 0–2)
CRP SERPL HS-MCNC: 19.8 MG/L (ref 0–5)
EOSINOPHIL # BLD: 0.24 K/UL (ref 0–0.44)
EOSINOPHILS RELATIVE PERCENT: 3 % (ref 1–4)
ERYTHROCYTE [DISTWIDTH] IN BLOOD BY AUTOMATED COUNT: 15 % (ref 11.8–14.4)
ERYTHROCYTE [SEDIMENTATION RATE] IN BLOOD BY PHOTOMETRIC METHOD: 18 MM/HR (ref 0–20)
HCT VFR BLD AUTO: 31.3 % (ref 40.7–50.3)
HGB BLD-MCNC: 10 G/DL (ref 13–17)
IMM GRANULOCYTES # BLD AUTO: 0.04 K/UL (ref 0–0.3)
IMM GRANULOCYTES NFR BLD: 0 %
LYMPHOCYTES NFR BLD: 1.93 K/UL (ref 1.1–3.7)
LYMPHOCYTES RELATIVE PERCENT: 22 % (ref 24–43)
MCH RBC QN AUTO: 30 PG (ref 25.2–33.5)
MCHC RBC AUTO-ENTMCNC: 31.9 G/DL (ref 25.2–33.5)
MCV RBC AUTO: 94 FL (ref 82.6–102.9)
MONOCYTES NFR BLD: 0.85 K/UL (ref 0.1–1.2)
MONOCYTES NFR BLD: 10 % (ref 3–12)
NEUTROPHILS NFR BLD: 65 % (ref 36–65)
NEUTS SEG NFR BLD: 5.89 K/UL (ref 1.5–8.1)
NRBC BLD-RTO: 0 PER 100 WBC
PLATELET # BLD AUTO: 311 K/UL (ref 138–453)
PMV BLD AUTO: 11.4 FL (ref 8.1–13.5)
RBC # BLD AUTO: 3.33 M/UL (ref 4.21–5.77)
RBC # BLD: ABNORMAL 10*6/UL
WBC OTHER # BLD: 9 K/UL (ref 3.5–11.3)

## 2024-07-17 PROCEDURE — 85652 RBC SED RATE AUTOMATED: CPT

## 2024-07-17 PROCEDURE — 36415 COLL VENOUS BLD VENIPUNCTURE: CPT

## 2024-07-17 PROCEDURE — 85025 COMPLETE CBC W/AUTO DIFF WBC: CPT

## 2024-07-17 PROCEDURE — 86140 C-REACTIVE PROTEIN: CPT

## 2024-07-26 DIAGNOSIS — M25.561 CHRONIC PAIN OF BOTH KNEES: ICD-10-CM

## 2024-07-26 DIAGNOSIS — G89.29 CHRONIC PAIN OF BOTH KNEES: ICD-10-CM

## 2024-07-26 DIAGNOSIS — M25.562 CHRONIC PAIN OF BOTH KNEES: ICD-10-CM

## 2024-07-26 RX ORDER — OXYCODONE HYDROCHLORIDE 20 MG/1
20 TABLET ORAL EVERY 6 HOURS PRN
Qty: 120 TABLET | Refills: 0 | Status: SHIPPED | OUTPATIENT
Start: 2024-07-26 | End: 2024-08-25

## 2024-07-26 NOTE — TELEPHONE ENCOUNTER
Waldo  requesting a refill of the below medication which has been pended for you:     Requested Prescriptions     Pending Prescriptions Disp Refills    oxyCODONE (ROXICODONE) 20 MG immediate release tablet 120 tablet 0     Sig: Take 1 tablet by mouth every 6 hours as needed for Pain for up to 30 days. Max Daily Amount: 80 mg       Last Appointment Date: Visit date not found  Next Appointment Date: Visit date not found    Allergies   Allergen Reactions    Codeine Nausea And Vomiting    Morphine Other (See Comments)     constipation

## 2024-08-01 ENCOUNTER — OUTSIDE SERVICES (OUTPATIENT)
Dept: INTERNAL MEDICINE | Age: 89
End: 2024-08-01

## 2024-08-01 DIAGNOSIS — F03.B0 MODERATE DEMENTIA, UNSPECIFIED DEMENTIA TYPE, UNSPECIFIED WHETHER BEHAVIORAL, PSYCHOTIC, OR MOOD DISTURBANCE OR ANXIETY (HCC): ICD-10-CM

## 2024-08-01 DIAGNOSIS — G89.29 CHRONIC PAIN OF BOTH KNEES: Primary | ICD-10-CM

## 2024-08-01 DIAGNOSIS — I50.32 CHRONIC HEART FAILURE WITH PRESERVED EJECTION FRACTION (HFPEF) (HCC): ICD-10-CM

## 2024-08-01 DIAGNOSIS — M25.561 CHRONIC PAIN OF BOTH KNEES: Primary | ICD-10-CM

## 2024-08-01 DIAGNOSIS — I10 ESSENTIAL HYPERTENSION, BENIGN: ICD-10-CM

## 2024-08-01 DIAGNOSIS — M25.562 CHRONIC PAIN OF BOTH KNEES: Primary | ICD-10-CM

## 2024-08-02 NOTE — PROGRESS NOTES
08/01/24  Memo Bustillo  6/28/1927    Patient Resident of [facility]    Chief Complaint  1. Chronic pain of both knees    2. Moderate dementia, unspecified dementia type, unspecified whether behavioral, psychotic, or mood disturbance or anxiety (HCC)    3. Essential hypertension, benign    4. Chronic heart failure with preserved ejection fraction (HFpEF) (HCC)        HPI:  89-year-old patient with progressive dementia, hypertension CHF and bilateral knee pain being seen for a face-to-face for wheelchair.  Patient wheelchair-bound due to severity of knee pain.  Unable to ambulate.  Current wheelchair poor fitting unable to elevate his legs appropriately.  His knee pain has been well-controlled with current regimen.  Weight stable.  Requires assistance for all ADLs.  Staff deny any recent behavioral outburst      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 02/02/2015    EGD with biopsies    CAD (coronary artery disease)     Elevated PSA     Hyperlipidemia     Hypertension     Kidney stone     Memory loss     Osteoarthritis of knee     Venous insufficiency        Past Surgical History:   Procedure Laterality Date    APPENDECTOMY      CHOLECYSTECTOMY  01/12/1983    COLONOSCOPY  08/29/12    COLONOSCOPY  05/27/09    COLONOSCOPY  04/30/2008    CYSTOSCOPY  11/15/2007    with left ureteroscopy and dorsal slit     KNEE FUSION Left     LEG SURGERY Left 1/11/2020    I and D of Left Lower Extremitity performed by Cruz Mcghee MD at [facility] OR    LIPOMA RESECTION      HI CYSTOURETHROSCOPY N/A 11/19/2018    CYSTO Photovaporization Prostate Greenlight Laser ([facility]#[id number]) performed by Fabian Sarmiento MD at [facility] OR    HI EGD TRANSORAL BIOPSY SINGLE/MULTIPLE N/A 7/3/2017    EGD BIOPSY performed by Hang Montilla MD at [facility] OR    PROSTATE BIOPSY Bilateral 06/23/2009

## 2024-08-09 DIAGNOSIS — M25.562 CHRONIC PAIN OF BOTH KNEES: ICD-10-CM

## 2024-08-09 DIAGNOSIS — M25.561 CHRONIC PAIN OF BOTH KNEES: ICD-10-CM

## 2024-08-09 DIAGNOSIS — G89.29 CHRONIC PAIN OF BOTH KNEES: ICD-10-CM

## 2024-08-09 RX ORDER — OXYCODONE HYDROCHLORIDE 20 MG/1
20 TABLET ORAL EVERY 6 HOURS PRN
Qty: 120 TABLET | Refills: 0 | Status: SHIPPED | OUTPATIENT
Start: 2024-08-09 | End: 2024-09-08

## 2024-08-22 ENCOUNTER — OUTSIDE SERVICES (OUTPATIENT)
Dept: INTERNAL MEDICINE | Age: 89
End: 2024-08-22

## 2024-08-22 VITALS
TEMPERATURE: 97.7 F | BODY MASS INDEX: 23.36 KG/M2 | RESPIRATION RATE: 16 BRPM | HEART RATE: 75 BPM | SYSTOLIC BLOOD PRESSURE: 110 MMHG | DIASTOLIC BLOOD PRESSURE: 57 MMHG | OXYGEN SATURATION: 100 % | WEIGHT: 140.4 LBS

## 2024-08-22 DIAGNOSIS — Z00.8 ENCOUNTER FOR OTHER GENERAL EXAMINATION: Primary | ICD-10-CM

## 2024-08-22 ASSESSMENT — PATIENT HEALTH QUESTIONNAIRE - PHQ9
SUM OF ALL RESPONSES TO PHQ QUESTIONS 1-9: 0
SUM OF ALL RESPONSES TO PHQ9 QUESTIONS 1 & 2: 0
1. LITTLE INTEREST OR PLEASURE IN DOING THINGS: NOT AT ALL
DEPRESSION UNABLE TO ASSESS: FUNCTIONAL CAPACITY MOTIVATION LIMITS ACCURACY
SUM OF ALL RESPONSES TO PHQ QUESTIONS 1-9: 0
SUM OF ALL RESPONSES TO PHQ QUESTIONS 1-9: 0
2. FEELING DOWN, DEPRESSED OR HOPELESS: NOT AT ALL
SUM OF ALL RESPONSES TO PHQ QUESTIONS 1-9: 0

## 2024-08-22 NOTE — PROGRESS NOTES
Medicare Annual Wellness Visit    Memo Bustillo is here for Medicare AWV    Assessment & Plan   Encounter for other general examination  Recommendations for Preventive Services Due: see orders and patient instructions/AVS.  Recommended screening schedule for the next 5-10 years is provided to the patient in written form: see Patient Instructions/AVS.     No follow-ups on file.     Subjective     Patient's complete Health Risk Assessment and screening values have been reviewed and are found in Flowsheets. The following problems were reviewed today and where indicated follow up appointments were made and/or referrals ordered.    Positive Risk Factor Screenings with Interventions:    Fall Risk:  Do you feel unsteady or are you worried about falling? : (!) yes  2 or more falls in past year?: (!) yes  Fall with injury in past year?: (!) yes     Interventions:    Reviewed medications, home hazards, visual acuity, and co-morbidities that can increase risk for falls  Working with PT     Cognitive:   Clock Drawing Test (CDT):  (Decline clock)  Words recalled: 0 Words Recalled     Total Score Interpretation: Abnormal Mini-Cog  Interventions:  Patient declines any further evaluation or treatment    Depression:  Depression Unable to Assess: Functional capacity motivation limits accuracy  PHQ-2 Score: 0  PHQ-9 Total Score: 0          Controlled Medication Review:    Today's Pain Level: Pain Score: Four     Opioid Risk: (Low risk score <55) Opioid risk score: 51    Patient is low risk for opioid use disorder or overdose.    Last PDMP Richie as Reviewed:  Review User Review Instant Review Result   MARISSA COTTRELL 5/16/2024  2:08 PM     Reviewed PDMP [1]     Last Controlled Substance Monitoring Documentation      Flowsheet Row Office Visit from 5/16/2024 in [facility] department of [facility]   Periodic Controlled Substance Monitoring No signs of potential drug abuse or diversion identified.,

## 2024-08-27 ENCOUNTER — OFFICE VISIT (OUTPATIENT)
Dept: CARDIOLOGY | Age: 89
End: 2024-08-27
Payer: MEDICARE

## 2024-08-27 VITALS
WEIGHT: 140 LBS | BODY MASS INDEX: 23.32 KG/M2 | HEIGHT: 65 IN | SYSTOLIC BLOOD PRESSURE: 120 MMHG | DIASTOLIC BLOOD PRESSURE: 60 MMHG | HEART RATE: 46 BPM

## 2024-08-27 DIAGNOSIS — E78.5 DYSLIPIDEMIA: ICD-10-CM

## 2024-08-27 DIAGNOSIS — E78.00 PURE HYPERCHOLESTEROLEMIA: ICD-10-CM

## 2024-08-27 DIAGNOSIS — Z98.890 S/P CARDIAC CATH: ICD-10-CM

## 2024-08-27 DIAGNOSIS — Z95.5 S/P CORONARY ARTERY STENT PLACEMENT: ICD-10-CM

## 2024-08-27 DIAGNOSIS — R00.1 BRADYCARDIA: ICD-10-CM

## 2024-08-27 DIAGNOSIS — I10 HYPERTENSION, ESSENTIAL: ICD-10-CM

## 2024-08-27 DIAGNOSIS — I25.10 CORONARY ARTERY DISEASE INVOLVING NATIVE CORONARY ARTERY OF NATIVE HEART WITHOUT ANGINA PECTORIS: ICD-10-CM

## 2024-08-27 DIAGNOSIS — I50.32 CHRONIC HEART FAILURE WITH PRESERVED EJECTION FRACTION (HFPEF) (HCC): Primary | ICD-10-CM

## 2024-08-27 DIAGNOSIS — I49.3 PVC'S (PREMATURE VENTRICULAR CONTRACTIONS): ICD-10-CM

## 2024-08-27 PROCEDURE — 1123F ACP DISCUSS/DSCN MKR DOCD: CPT | Performed by: INTERNAL MEDICINE

## 2024-08-27 PROCEDURE — G8420 CALC BMI NORM PARAMETERS: HCPCS | Performed by: INTERNAL MEDICINE

## 2024-08-27 PROCEDURE — 99213 OFFICE O/P EST LOW 20 MIN: CPT | Performed by: INTERNAL MEDICINE

## 2024-08-27 PROCEDURE — 99214 OFFICE O/P EST MOD 30 MIN: CPT | Performed by: INTERNAL MEDICINE

## 2024-08-27 PROCEDURE — G8427 DOCREV CUR MEDS BY ELIG CLIN: HCPCS | Performed by: INTERNAL MEDICINE

## 2024-08-27 PROCEDURE — 1036F TOBACCO NON-USER: CPT | Performed by: INTERNAL MEDICINE

## 2024-08-27 NOTE — PROGRESS NOTES
Today's Date: 8/27/2024  Patient's Name: Memo Bustillo  Patient's age: 89 y.o., 6/28/1927    CC: follow for CAD    HPI:  The patient is a 89 y.o.  male is in the office for CAD.   Lives at NH.   No cp.   No sob.   No palpitations.   In wheelchair most of the time. Below average functional capacity  some le edema.   BP improved.   HR improved.        Past Medical History:   has a past medical history of 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, CAD (coronary artery disease), Elevated PSA, Hyperlipidemia, Hypertension, Kidney stone, Memory loss, Osteoarthritis of knee, and Venous insufficiency.    Past Surgical History:   has a past surgical history that includes Colonoscopy (08/29/12); Cystocopy (11/15/2007); Appendectomy; Cholecystectomy (01/12/1983); Knee fusion (Left); lipoma resection; Colonoscopy (05/27/09); Colonoscopy (04/30/2008); Prostate Biopsy (Bilateral, 06/23/2009); pr egd transoral biopsy single/multiple (N/A, 7/3/2017); Upper gastrointestinal endoscopy (02/2/2015); Upper gastrointestinal endoscopy (04/25/2016); Upper gastrointestinal endoscopy (07/03/2017); pr cystourethroscopy (N/A, 11/19/2018); and Leg Surgery (Left, 1/11/2020).    Home Medications:  Prior to Admission medications    Medication Sig Start Date End Date Taking? Authorizing Provider   oxyCODONE (ROXICODONE) 20 MG immediate release tablet Take 1 tablet by mouth every 6 hours as needed for Pain for up to 30 days. Max Daily Amount: 80 mg 8/9/24 9/8/24 Yes Henry Solomon DO   metoprolol succinate (TOPROL XL) 25 MG extended release tablet Take 1 tablet by mouth daily  Patient taking differently: Take 2 tablets by mouth daily 1/15/24  Yes Tyron Cornejo DO   furosemide (LASIX) 20 MG tablet Take 2 tablets by mouth daily 8/31/23  Yes Hang Purcell MD   lactulose (CHRONULAC) 10 GM/15ML solution Take 30 mL by mouth once daily as needed 8/31/23  Yes Hang Purcell MD   clopidogrel (PLAVIX) 75 MG tablet TAKE 1 TABLET DAILY 2/23/22

## 2024-09-13 ENCOUNTER — HOSPITAL ENCOUNTER (OUTPATIENT)
Age: 89
Setting detail: SPECIMEN
Discharge: HOME | End: 2024-09-13
Payer: MEDICARE

## 2024-09-13 LAB
ANION GAP SERPL CALCULATED.3IONS-SCNC: 8 MMOL/L (ref 9–17)
BUN SERPL-MCNC: 64 MG/DL (ref 8–23)
BUN/CREAT SERPL: 38 (ref 9–20)
CALCIUM SERPL-MCNC: 8.7 MG/DL (ref 8.6–10.4)
CHLORIDE SERPL-SCNC: 106 MMOL/L (ref 98–107)
CO2 SERPL-SCNC: 28 MMOL/L (ref 20–31)
CREAT SERPL-MCNC: 1.7 MG/DL (ref 0.7–1.2)
ERYTHROCYTE [DISTWIDTH] IN BLOOD BY AUTOMATED COUNT: 14.1 % (ref 11.8–14.4)
GFR, ESTIMATED: 36 ML/MIN/1.73M2
GLUCOSE SERPL-MCNC: 91 MG/DL (ref 70–99)
HCT VFR BLD AUTO: 28.2 % (ref 40.7–50.3)
HGB BLD-MCNC: 9.1 G/DL (ref 13–17)
MCH RBC QN AUTO: 30.6 PG (ref 25.2–33.5)
MCHC RBC AUTO-ENTMCNC: 32.3 G/DL (ref 25.2–33.5)
MCV RBC AUTO: 94.9 FL (ref 82.6–102.9)
NRBC BLD-RTO: 0 PER 100 WBC
PLATELET # BLD AUTO: 241 K/UL (ref 138–453)
PMV BLD AUTO: 11.7 FL (ref 8.1–13.5)
POTASSIUM SERPL-SCNC: 5 MMOL/L (ref 3.7–5.3)
RBC # BLD AUTO: 2.97 M/UL (ref 4.21–5.77)
SODIUM SERPL-SCNC: 142 MMOL/L (ref 135–144)
WBC OTHER # BLD: 6.3 K/UL (ref 3.5–11.3)

## 2024-09-13 PROCEDURE — 36415 COLL VENOUS BLD VENIPUNCTURE: CPT

## 2024-09-13 PROCEDURE — 85027 COMPLETE CBC AUTOMATED: CPT

## 2024-09-13 PROCEDURE — 80048 BASIC METABOLIC PNL TOTAL CA: CPT

## 2024-09-16 ENCOUNTER — HOSPITAL ENCOUNTER (OUTPATIENT)
Age: 89
Setting detail: SPECIMEN
Discharge: HOME | End: 2024-09-16
Payer: MEDICARE

## 2024-09-16 LAB
ANION GAP SERPL CALCULATED.3IONS-SCNC: 12 MMOL/L (ref 9–17)
BUN SERPL-MCNC: 60 MG/DL (ref 8–23)
BUN/CREAT SERPL: 38 (ref 9–20)
CALCIUM SERPL-MCNC: 9.2 MG/DL (ref 8.6–10.4)
CHLORIDE SERPL-SCNC: 106 MMOL/L (ref 98–107)
CO2 SERPL-SCNC: 25 MMOL/L (ref 20–31)
CREAT SERPL-MCNC: 1.6 MG/DL (ref 0.7–1.2)
GFR, ESTIMATED: 39 ML/MIN/1.73M2
GLUCOSE SERPL-MCNC: 94 MG/DL (ref 70–99)
POTASSIUM SERPL-SCNC: 4.5 MMOL/L (ref 3.7–5.3)
SODIUM SERPL-SCNC: 143 MMOL/L (ref 135–144)

## 2024-09-16 PROCEDURE — 80048 BASIC METABOLIC PNL TOTAL CA: CPT

## 2024-09-16 PROCEDURE — 36415 COLL VENOUS BLD VENIPUNCTURE: CPT

## 2024-09-20 ENCOUNTER — HOSPITAL ENCOUNTER (OUTPATIENT)
Age: 89
Setting detail: SPECIMEN
Discharge: HOME | End: 2024-09-20
Payer: MEDICARE

## 2024-09-20 LAB
ANION GAP SERPL CALCULATED.3IONS-SCNC: 11 MMOL/L (ref 9–17)
BUN SERPL-MCNC: 74 MG/DL (ref 8–23)
BUN/CREAT SERPL: 41 (ref 9–20)
CALCIUM SERPL-MCNC: 8.9 MG/DL (ref 8.6–10.4)
CHLORIDE SERPL-SCNC: 107 MMOL/L (ref 98–107)
CO2 SERPL-SCNC: 26 MMOL/L (ref 20–31)
CREAT SERPL-MCNC: 1.8 MG/DL (ref 0.7–1.2)
ERYTHROCYTE [DISTWIDTH] IN BLOOD BY AUTOMATED COUNT: 13.8 % (ref 11.8–14.4)
GFR, ESTIMATED: 34 ML/MIN/1.73M2
GLUCOSE SERPL-MCNC: 93 MG/DL (ref 70–99)
HCT VFR BLD AUTO: 27.9 % (ref 40.7–50.3)
HGB BLD-MCNC: 9.1 G/DL (ref 13–17)
MCH RBC QN AUTO: 30.8 PG (ref 25.2–33.5)
MCHC RBC AUTO-ENTMCNC: 32.6 G/DL (ref 25.2–33.5)
MCV RBC AUTO: 94.6 FL (ref 82.6–102.9)
NRBC BLD-RTO: 0 PER 100 WBC
PLATELET # BLD AUTO: 251 K/UL (ref 138–453)
PMV BLD AUTO: 11.2 FL (ref 8.1–13.5)
POTASSIUM SERPL-SCNC: 5 MMOL/L (ref 3.7–5.3)
RBC # BLD AUTO: 2.95 M/UL (ref 4.21–5.77)
SODIUM SERPL-SCNC: 144 MMOL/L (ref 135–144)
WBC OTHER # BLD: 6.6 K/UL (ref 3.5–11.3)

## 2024-09-20 PROCEDURE — 85027 COMPLETE CBC AUTOMATED: CPT

## 2024-09-20 PROCEDURE — 36415 COLL VENOUS BLD VENIPUNCTURE: CPT

## 2024-09-20 PROCEDURE — 80048 BASIC METABOLIC PNL TOTAL CA: CPT

## 2024-10-29 ENCOUNTER — OUTSIDE SERVICES (OUTPATIENT)
Dept: INTERNAL MEDICINE | Age: 89
End: 2024-10-29

## 2024-10-29 DIAGNOSIS — I25.10 CORONARY ARTERY DISEASE INVOLVING NATIVE CORONARY ARTERY OF NATIVE HEART WITHOUT ANGINA PECTORIS: ICD-10-CM

## 2024-10-29 DIAGNOSIS — K21.9 GASTROESOPHAGEAL REFLUX DISEASE WITHOUT ESOPHAGITIS: ICD-10-CM

## 2024-10-29 DIAGNOSIS — M25.562 CHRONIC PAIN OF BOTH KNEES: ICD-10-CM

## 2024-10-29 DIAGNOSIS — I10 ESSENTIAL HYPERTENSION, BENIGN: ICD-10-CM

## 2024-10-29 DIAGNOSIS — F11.20 UNCOMPLICATED OPIOID DEPENDENCE (HCC): ICD-10-CM

## 2024-10-29 DIAGNOSIS — M25.561 CHRONIC PAIN OF BOTH KNEES: ICD-10-CM

## 2024-10-29 DIAGNOSIS — K22.70 BARRETT'S ESOPHAGUS WITHOUT DYSPLASIA: ICD-10-CM

## 2024-10-29 DIAGNOSIS — G89.29 CHRONIC PAIN OF BOTH KNEES: ICD-10-CM

## 2024-10-29 DIAGNOSIS — F03.B0 MODERATE DEMENTIA, UNSPECIFIED DEMENTIA TYPE, UNSPECIFIED WHETHER BEHAVIORAL, PSYCHOTIC, OR MOOD DISTURBANCE OR ANXIETY (HCC): Primary | ICD-10-CM

## 2024-10-29 DIAGNOSIS — I50.32 CHRONIC HEART FAILURE WITH PRESERVED EJECTION FRACTION (HFPEF) (HCC): ICD-10-CM

## 2024-10-29 PROBLEM — E66.3 OVERWEIGHT: Status: RESOLVED | Noted: 2018-12-26 | Resolved: 2024-10-29

## 2024-10-29 PROBLEM — F11.99 OPIOID USE, UNSPECIFIED WITH UNSPECIFIED OPIOID-INDUCED DISORDER (HCC): Status: RESOLVED | Noted: 2021-06-24 | Resolved: 2024-10-29

## 2024-10-29 PROBLEM — F11.29 OPIOID DEPENDENCE WITH UNSPECIFIED OPIOID-INDUCED DISORDER (HCC): Status: RESOLVED | Noted: 2021-06-24 | Resolved: 2024-10-29

## 2024-10-30 NOTE — PROGRESS NOTES
DR. GORDON - [facility] NEW PATIENT HISTORY & PHYSICAL EXAM    DATE OF SERVICE: 6/18/24    NURSING HOME: [facility]    CHRONIC/ACTIVE PROBLEM LIST:     Patient Active Problem List   Diagnosis    Dermatophytosis of nail    Essential hypertension, benign    Obesity    Hyperlipidemia    PVC (premature ventricular contraction)    Osteoarthritis of knee    Duodenal ulcer    CAD (coronary artery disease)    Venous insufficiency    Kidney stone    Elevated PSA    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    Chronic pain of both knees    Encounter for chronic pain management    Colitis    Urinary retention    BPH (benign prostatic hyperplasia)    Gait instability    ST elevation myocardial infarction (STEMI) ([facility])    UTI due to extended-spectrum beta lactamase (ESBL) producing Escherichia coli    Gastroesophageal reflux disease    Overweight    Chronic idiopathic constipation    Cellulitis of left lower extremity    Cellulitis of left lower leg    Pulmonary edema cardiac cause ([facility])    Chronic heart failure with preserved ejection fraction (HFpEF) ([facility])    Coronary artery disease involving native coronary artery of native heart with angina pectoris ([facility])    Opioid use, unspecified with unspecified opioid-induced disorder    Opioid dependence with unspecified opioid-induced disorder    Opioid dependence, uncomplicated    Lymphedema of both lower extremities    Left leg swelling    Dizziness    Near syncope    Primary osteoarthritis of knees, bilateral       CHIEF COMPLAINT: Here for long-term care    HISTORY OF CHIEF COMPLAINT: This 89 y.o.  male was admitted to the [facility] as a transfer from [facility] for long-term care due to him needing a higher level of care than what can be provided there. He does have a history of cellulitis of the left lower extremity which has been stable. He has coronary artery disease, congestive heart failure, and hypertension and

## 2024-11-03 ENCOUNTER — OUTSIDE SERVICES (OUTPATIENT)
Dept: INTERNAL MEDICINE | Age: 89
End: 2024-11-03

## 2024-11-03 DIAGNOSIS — K22.70 BARRETT'S ESOPHAGUS WITHOUT DYSPLASIA: ICD-10-CM

## 2024-11-03 DIAGNOSIS — M25.561 CHRONIC PAIN OF BOTH KNEES: ICD-10-CM

## 2024-11-03 DIAGNOSIS — I50.32 CHRONIC HEART FAILURE WITH PRESERVED EJECTION FRACTION (HFPEF) (HCC): ICD-10-CM

## 2024-11-03 DIAGNOSIS — K21.9 GASTROESOPHAGEAL REFLUX DISEASE WITHOUT ESOPHAGITIS: ICD-10-CM

## 2024-11-03 DIAGNOSIS — I10 ESSENTIAL HYPERTENSION, BENIGN: Primary | ICD-10-CM

## 2024-11-03 DIAGNOSIS — M25.562 CHRONIC PAIN OF BOTH KNEES: ICD-10-CM

## 2024-11-03 DIAGNOSIS — F03.B0 MODERATE DEMENTIA, UNSPECIFIED DEMENTIA TYPE, UNSPECIFIED WHETHER BEHAVIORAL, PSYCHOTIC, OR MOOD DISTURBANCE OR ANXIETY (HCC): ICD-10-CM

## 2024-11-03 DIAGNOSIS — F11.20 UNCOMPLICATED OPIOID DEPENDENCE (HCC): ICD-10-CM

## 2024-11-03 DIAGNOSIS — G89.29 CHRONIC PAIN OF BOTH KNEES: ICD-10-CM

## 2024-11-03 DIAGNOSIS — I25.10 CORONARY ARTERY DISEASE INVOLVING NATIVE CORONARY ARTERY OF NATIVE HEART WITHOUT ANGINA PECTORIS: ICD-10-CM

## 2024-11-07 DIAGNOSIS — M25.561 CHRONIC PAIN OF BOTH KNEES: ICD-10-CM

## 2024-11-07 DIAGNOSIS — Z51.5 HOSPICE CARE PATIENT: Primary | ICD-10-CM

## 2024-11-07 DIAGNOSIS — M25.562 CHRONIC PAIN OF BOTH KNEES: ICD-10-CM

## 2024-11-07 DIAGNOSIS — G89.29 CHRONIC PAIN OF BOTH KNEES: ICD-10-CM

## 2024-11-07 RX ORDER — OXYCODONE HYDROCHLORIDE 20 MG/1
20 TABLET ORAL EVERY 4 HOURS PRN
Qty: 180 TABLET | Refills: 0 | Status: SHIPPED | OUTPATIENT
Start: 2024-11-07 | End: 2024-12-07

## 2024-11-07 RX ORDER — OXYCODONE HYDROCHLORIDE 20 MG/1
20 TABLET ORAL 3 TIMES DAILY PRN
Qty: 90 TABLET | Refills: 0 | Status: SHIPPED | OUTPATIENT
Start: 2024-11-07 | End: 2024-12-07

## 2024-11-07 NOTE — TELEPHONE ENCOUNTER
Waldo requesting a refill of the below medication which has been pended for you:     Requested Prescriptions     Pending Prescriptions Disp Refills    oxyCODONE (ROXICODONE) 20 MG immediate release tablet 90 tablet 0     Sig: Take 1 tablet by mouth 3 times daily as needed for Pain for up to 30 days. Max Daily Amount: 60 mg    oxyCODONE (ROXICODONE) 20 MG immediate release tablet 180 tablet 0     Sig: Take 1 tablet by mouth every 4 hours as needed for Pain for up to 30 days. Max Daily Amount: 120 mg       Allergies   Allergen Reactions    Codeine Nausea And Vomiting    Morphine Other (See Comments)     constipation

## 2024-11-10 ENCOUNTER — OUTSIDE SERVICES (OUTPATIENT)
Dept: INTERNAL MEDICINE | Age: 89
End: 2024-11-10

## 2024-11-10 DIAGNOSIS — F03.B0 MODERATE DEMENTIA, UNSPECIFIED DEMENTIA TYPE, UNSPECIFIED WHETHER BEHAVIORAL, PSYCHOTIC, OR MOOD DISTURBANCE OR ANXIETY (HCC): ICD-10-CM

## 2024-11-10 DIAGNOSIS — K22.70 BARRETT'S ESOPHAGUS WITHOUT DYSPLASIA: ICD-10-CM

## 2024-11-10 DIAGNOSIS — M25.562 CHRONIC PAIN OF BOTH KNEES: ICD-10-CM

## 2024-11-10 DIAGNOSIS — I25.10 CORONARY ARTERY DISEASE INVOLVING NATIVE CORONARY ARTERY OF NATIVE HEART WITHOUT ANGINA PECTORIS: ICD-10-CM

## 2024-11-10 DIAGNOSIS — K21.9 GASTROESOPHAGEAL REFLUX DISEASE WITHOUT ESOPHAGITIS: ICD-10-CM

## 2024-11-10 DIAGNOSIS — I50.32 CHRONIC HEART FAILURE WITH PRESERVED EJECTION FRACTION (HFPEF) (HCC): ICD-10-CM

## 2024-11-10 DIAGNOSIS — F11.20 UNCOMPLICATED OPIOID DEPENDENCE (HCC): ICD-10-CM

## 2024-11-10 DIAGNOSIS — G89.29 CHRONIC PAIN OF BOTH KNEES: ICD-10-CM

## 2024-11-10 DIAGNOSIS — I10 ESSENTIAL HYPERTENSION, BENIGN: Primary | ICD-10-CM

## 2024-11-10 DIAGNOSIS — M25.561 CHRONIC PAIN OF BOTH KNEES: ICD-10-CM

## 2024-11-19 PROCEDURE — 99309 SBSQ NF CARE MODERATE MDM 30: CPT | Performed by: INTERNAL MEDICINE

## 2024-12-10 ENCOUNTER — OUTSIDE SERVICES (OUTPATIENT)
Dept: INTERNAL MEDICINE | Age: 88
End: 2024-12-10

## 2024-12-10 DIAGNOSIS — F03.B0 MODERATE DEMENTIA, UNSPECIFIED DEMENTIA TYPE, UNSPECIFIED WHETHER BEHAVIORAL, PSYCHOTIC, OR MOOD DISTURBANCE OR ANXIETY (HCC): ICD-10-CM

## 2024-12-10 DIAGNOSIS — I25.10 CORONARY ARTERY DISEASE INVOLVING NATIVE CORONARY ARTERY OF NATIVE HEART WITHOUT ANGINA PECTORIS: ICD-10-CM

## 2024-12-10 DIAGNOSIS — I50.32 CHRONIC HEART FAILURE WITH PRESERVED EJECTION FRACTION (HFPEF) (HCC): ICD-10-CM

## 2024-12-10 DIAGNOSIS — G89.29 CHRONIC PAIN OF BOTH KNEES: ICD-10-CM

## 2024-12-10 DIAGNOSIS — I10 ESSENTIAL HYPERTENSION, BENIGN: Primary | ICD-10-CM

## 2024-12-10 DIAGNOSIS — F11.20 UNCOMPLICATED OPIOID DEPENDENCE (HCC): ICD-10-CM

## 2024-12-10 DIAGNOSIS — K22.70 BARRETT'S ESOPHAGUS WITHOUT DYSPLASIA: ICD-10-CM

## 2024-12-10 DIAGNOSIS — K21.9 GASTROESOPHAGEAL REFLUX DISEASE WITHOUT ESOPHAGITIS: ICD-10-CM

## 2024-12-10 DIAGNOSIS — M25.561 CHRONIC PAIN OF BOTH KNEES: ICD-10-CM

## 2024-12-10 DIAGNOSIS — M25.562 CHRONIC PAIN OF BOTH KNEES: ICD-10-CM

## 2024-12-10 NOTE — PROGRESS NOTES
DR. GORDON - NURSING HOME VISIT    DATE OF SERVICE: 9/22/24    NURSING HOME: [facility]    CHIEF COMPLAINT/HISTORY OF CHIEF COMPLAINT: This patient is being seen for ongoing evaluation and management of his dementia, hypertension, coronary artery disease, congestive heart failure, gastroesophageal reflux disease with Sepulveda's esophagus, and chronic pain. He seems to be doing about the same lately. His family would like for him to have a Hospice consult. He has coronary artery disease, congestive heart failure, and hypertension and he sees the cardiologists here for those conditions. He takes Plavix, Lasix, and Toprol XL for them. He is on Protonix for gastroesophageal reflux disease with Sepulveda's esophagus. He does have dementia. He does have chronic pain and he gets Oxy IR for that. There are no other complaints at this time.    ALLERGIES:   Allergies   Allergen Reactions    Codeine Nausea And Vomiting    Morphine Other (See Comments)     constipation       MEDICATIONS: As noted on the [facility], referenced and incorporated herein.    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 02/02/2015    EGD with biopsies    CAD (coronary artery disease)     Elevated PSA     Hyperlipidemia     Hypertension     Kidney stone     Memory loss     Obesity 01/24/2014    Osteoarthritis of knee     Overweight 12/26/2018    ST elevation myocardial infarction (STEMI) (HCC)     Venous insufficiency        PAST SURGICAL HISTORY:   Past Surgical History:   Procedure Laterality Date    APPENDECTOMY      CHOLECYSTECTOMY  01/12/1983    COLONOSCOPY  08/29/12    COLONOSCOPY  05/27/09    COLONOSCOPY  04/30/2008    CYSTOSCOPY  11/15/2007    with left ureteroscopy and dorsal slit     KNEE FUSION Left     LEG SURGERY Left 1/11/2020    I and D of Left Lower Extremitity performed by Cruz Mcghee MD at [facility] OR    LIPOMA RESECTION      [place]

## 2024-12-15 ENCOUNTER — OUTSIDE SERVICES (OUTPATIENT)
Dept: INTERNAL MEDICINE | Age: 88
End: 2024-12-15

## 2024-12-15 DIAGNOSIS — Z51.5 HOSPICE CARE PATIENT: ICD-10-CM

## 2024-12-15 DIAGNOSIS — K22.70 BARRETT'S ESOPHAGUS WITHOUT DYSPLASIA: ICD-10-CM

## 2024-12-15 DIAGNOSIS — G89.29 CHRONIC PAIN OF BOTH KNEES: ICD-10-CM

## 2024-12-15 DIAGNOSIS — I25.10 CORONARY ARTERY DISEASE INVOLVING NATIVE CORONARY ARTERY OF NATIVE HEART WITHOUT ANGINA PECTORIS: ICD-10-CM

## 2024-12-15 DIAGNOSIS — I10 ESSENTIAL HYPERTENSION, BENIGN: Primary | ICD-10-CM

## 2024-12-15 DIAGNOSIS — M25.562 CHRONIC PAIN OF BOTH KNEES: ICD-10-CM

## 2024-12-15 DIAGNOSIS — F11.20 UNCOMPLICATED OPIOID DEPENDENCE (HCC): ICD-10-CM

## 2024-12-15 DIAGNOSIS — K21.9 GASTROESOPHAGEAL REFLUX DISEASE WITHOUT ESOPHAGITIS: ICD-10-CM

## 2024-12-15 DIAGNOSIS — F03.B0 MODERATE DEMENTIA, UNSPECIFIED DEMENTIA TYPE, UNSPECIFIED WHETHER BEHAVIORAL, PSYCHOTIC, OR MOOD DISTURBANCE OR ANXIETY (HCC): ICD-10-CM

## 2024-12-15 DIAGNOSIS — M25.561 CHRONIC PAIN OF BOTH KNEES: ICD-10-CM

## 2024-12-15 DIAGNOSIS — I50.32 CHRONIC HEART FAILURE WITH PRESERVED EJECTION FRACTION (HFPEF) (HCC): ICD-10-CM

## 2024-12-23 NOTE — ED NOTES
Johnson irrigated with saline . Strong smell of urine . Not able to palpated bladder. Patient with redness to base of penis.      Antwan Greene RN  10/14/18 Joanne Ayers
Urine draining into leg bag since irrigation     Paz Prado, WENDY  10/14/18 [id number]
Denies family hx of malignant hyperthermia/none

## 2024-12-25 ENCOUNTER — OUTSIDE SERVICES (OUTPATIENT)
Dept: INTERNAL MEDICINE | Age: 88
End: 2024-12-25
Payer: MEDICARE

## 2024-12-25 DIAGNOSIS — K21.9 GASTROESOPHAGEAL REFLUX DISEASE WITHOUT ESOPHAGITIS: ICD-10-CM

## 2024-12-25 DIAGNOSIS — I50.32 CHRONIC HEART FAILURE WITH PRESERVED EJECTION FRACTION (HFPEF) (HCC): ICD-10-CM

## 2024-12-25 DIAGNOSIS — M25.561 CHRONIC PAIN OF BOTH KNEES: ICD-10-CM

## 2024-12-25 DIAGNOSIS — M25.562 CHRONIC PAIN OF BOTH KNEES: ICD-10-CM

## 2024-12-25 DIAGNOSIS — I25.10 CORONARY ARTERY DISEASE INVOLVING NATIVE CORONARY ARTERY OF NATIVE HEART WITHOUT ANGINA PECTORIS: ICD-10-CM

## 2024-12-25 DIAGNOSIS — K22.70 BARRETT'S ESOPHAGUS WITHOUT DYSPLASIA: ICD-10-CM

## 2024-12-25 DIAGNOSIS — F03.B0 MODERATE DEMENTIA, UNSPECIFIED DEMENTIA TYPE, UNSPECIFIED WHETHER BEHAVIORAL, PSYCHOTIC, OR MOOD DISTURBANCE OR ANXIETY (HCC): ICD-10-CM

## 2024-12-25 DIAGNOSIS — G89.29 CHRONIC PAIN OF BOTH KNEES: ICD-10-CM

## 2024-12-25 DIAGNOSIS — F11.20 UNCOMPLICATED OPIOID DEPENDENCE (HCC): ICD-10-CM

## 2024-12-25 DIAGNOSIS — I10 ESSENTIAL HYPERTENSION, BENIGN: Primary | ICD-10-CM

## 2024-12-25 DIAGNOSIS — Z51.5 HOSPICE CARE PATIENT: ICD-10-CM

## 2024-12-26 NOTE — PROGRESS NOTES
DR. GORDON - NURSING HOME VISIT    DATE OF SERVICE: 11/19/24    NURSING HOME: [facility]    CHIEF COMPLAINT/HISTORY OF CHIEF COMPLAINT: This patient is being seen for ongoing evaluation and management of his dementia, hypertension, coronary artery disease, congestive heart failure, gastroesophageal reflux disease with Sepulveda's esophagus, and chronic pain. He continues on Hospice care. He has coronary artery disease, congestive heart failure, and hypertension and he sees the cardiologists here for those conditions. He takes Plavix, Lasix, and Toprol XL for them. He is on Protonix for gastroesophageal reflux disease with Sepulveda's esophagus. He does have dementia. He does have chronic pain and he gets Oxy IR for that. There are no new complaints at this time.    ALLERGIES:   Allergies   Allergen Reactions    Codeine Nausea And Vomiting    Morphine Other (See Comments)     constipation       MEDICATIONS: As noted on the [facility], referenced and incorporated herein.    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 02/02/2015    EGD with biopsies    CAD (coronary artery disease)     Elevated PSA     Hyperlipidemia     Hypertension     Kidney stone     Memory loss     Obesity 01/24/2014    Osteoarthritis of knee     Overweight 12/26/2018    ST elevation myocardial infarction (STEMI) (HCC)     Venous insufficiency        PAST SURGICAL HISTORY:   Past Surgical History:   Procedure Laterality Date    APPENDECTOMY      CHOLECYSTECTOMY  01/12/1983    COLONOSCOPY  08/29/12    COLONOSCOPY  05/27/09    COLONOSCOPY  04/30/2008    CYSTOSCOPY  11/15/2007    with left ureteroscopy and dorsal slit     KNEE FUSION Left     LEG SURGERY Left 1/11/2020    I and D of Left Lower Extremitity performed by Cruz Mcghee MD at [facility] OR    LIPOMA RESECTION      CO CYSTOURETHROSCOPY N/A 11/19/2018    CYSTO Photovaporization Prostate Greenlight Laser

## 2025-01-01 PROCEDURE — 99309 SBSQ NF CARE MODERATE MDM 30: CPT | Performed by: INTERNAL MEDICINE

## 2025-02-02 ENCOUNTER — OUTSIDE SERVICES (OUTPATIENT)
Dept: INTERNAL MEDICINE | Age: 89
End: 2025-02-02

## 2025-02-02 DIAGNOSIS — Z51.5 HOSPICE CARE PATIENT: ICD-10-CM

## 2025-02-02 DIAGNOSIS — I25.10 CORONARY ARTERY DISEASE INVOLVING NATIVE CORONARY ARTERY OF NATIVE HEART WITHOUT ANGINA PECTORIS: ICD-10-CM

## 2025-02-02 DIAGNOSIS — K22.70 BARRETT'S ESOPHAGUS WITHOUT DYSPLASIA: ICD-10-CM

## 2025-02-02 DIAGNOSIS — F03.B0 MODERATE DEMENTIA, UNSPECIFIED DEMENTIA TYPE, UNSPECIFIED WHETHER BEHAVIORAL, PSYCHOTIC, OR MOOD DISTURBANCE OR ANXIETY (HCC): ICD-10-CM

## 2025-02-02 DIAGNOSIS — I50.32 CHRONIC HEART FAILURE WITH PRESERVED EJECTION FRACTION (HFPEF) (HCC): ICD-10-CM

## 2025-02-02 DIAGNOSIS — K21.9 GASTROESOPHAGEAL REFLUX DISEASE WITHOUT ESOPHAGITIS: ICD-10-CM

## 2025-02-02 DIAGNOSIS — M25.562 CHRONIC PAIN OF BOTH KNEES: ICD-10-CM

## 2025-02-02 DIAGNOSIS — G89.29 CHRONIC PAIN OF BOTH KNEES: ICD-10-CM

## 2025-02-02 DIAGNOSIS — F11.20 UNCOMPLICATED OPIOID DEPENDENCE (HCC): ICD-10-CM

## 2025-02-02 DIAGNOSIS — I10 ESSENTIAL HYPERTENSION, BENIGN: Primary | ICD-10-CM

## 2025-02-02 DIAGNOSIS — M25.561 CHRONIC PAIN OF BOTH KNEES: ICD-10-CM

## 2025-02-10 DIAGNOSIS — G89.29 CHRONIC PAIN OF BOTH KNEES: ICD-10-CM

## 2025-02-10 DIAGNOSIS — M25.561 CHRONIC PAIN OF BOTH KNEES: ICD-10-CM

## 2025-02-10 DIAGNOSIS — M25.562 CHRONIC PAIN OF BOTH KNEES: ICD-10-CM

## 2025-02-10 DIAGNOSIS — Z51.5 HOSPICE CARE PATIENT: ICD-10-CM

## 2025-02-10 RX ORDER — OXYCODONE HYDROCHLORIDE 20 MG/1
20 TABLET ORAL 3 TIMES DAILY PRN
Qty: 90 TABLET | Refills: 0 | Status: SHIPPED | OUTPATIENT
Start: 2025-02-10 | End: 2025-03-12

## 2025-02-10 NOTE — TELEPHONE ENCOUNTER
[facility]do requesting a refill of the below medication which has been pended for you:     Requested Prescriptions     Pending Prescriptions Disp Refills    oxyCODONE (ROXICODONE) 20 MG immediate release tablet 90 tablet 0     Sig: Take 1 tablet by mouth 3 times daily as needed for Pain for up to 30 days. Max Daily Amount: 60 mg           Allergies   Allergen Reactions    Codeine Nausea And Vomiting    Morphine Other (See Comments)     constipation

## 2025-02-25 PROCEDURE — 99309 SBSQ NF CARE MODERATE MDM 30: CPT | Performed by: INTERNAL MEDICINE

## 2025-02-26 ENCOUNTER — OUTSIDE SERVICES (OUTPATIENT)
Dept: INTERNAL MEDICINE | Age: 89
End: 2025-02-26

## 2025-02-26 DIAGNOSIS — I10 ESSENTIAL HYPERTENSION, BENIGN: Primary | ICD-10-CM

## 2025-02-26 DIAGNOSIS — G89.29 CHRONIC PAIN OF BOTH KNEES: ICD-10-CM

## 2025-02-26 DIAGNOSIS — K21.9 GASTROESOPHAGEAL REFLUX DISEASE WITHOUT ESOPHAGITIS: ICD-10-CM

## 2025-02-26 DIAGNOSIS — K22.70 BARRETT'S ESOPHAGUS WITHOUT DYSPLASIA: ICD-10-CM

## 2025-02-26 DIAGNOSIS — I25.10 CORONARY ARTERY DISEASE INVOLVING NATIVE CORONARY ARTERY OF NATIVE HEART WITHOUT ANGINA PECTORIS: ICD-10-CM

## 2025-02-26 DIAGNOSIS — M25.561 CHRONIC PAIN OF BOTH KNEES: ICD-10-CM

## 2025-02-26 DIAGNOSIS — I50.32 CHRONIC HEART FAILURE WITH PRESERVED EJECTION FRACTION (HFPEF) (HCC): ICD-10-CM

## 2025-02-26 DIAGNOSIS — F03.B0 MODERATE DEMENTIA, UNSPECIFIED DEMENTIA TYPE, UNSPECIFIED WHETHER BEHAVIORAL, PSYCHOTIC, OR MOOD DISTURBANCE OR ANXIETY (HCC): ICD-10-CM

## 2025-02-26 DIAGNOSIS — Z51.5 HOSPICE CARE PATIENT: ICD-10-CM

## 2025-02-26 DIAGNOSIS — F11.20 UNCOMPLICATED OPIOID DEPENDENCE (HCC): ICD-10-CM

## 2025-02-26 DIAGNOSIS — M25.562 CHRONIC PAIN OF BOTH KNEES: ICD-10-CM

## 2025-02-26 NOTE — PROGRESS NOTES
DR. GORDON - NURSING HOME VISIT    DATE OF SERVICE: 1/26/25    NURSING HOME: [facility]    CHIEF COMPLAINT/HISTORY OF CHIEF COMPLAINT: This patient is being seen for ongoing evaluation and management of his dementia, hypertension, coronary artery disease, congestive heart failure, gastroesophageal reflux disease with Sepulveda's esophagus, and chronic pain. He continues on Hospice care. He has coronary artery disease, congestive heart failure, and hypertension and he sees the cardiologists here for those conditions. He takes Plavix, Lasix, and Toprol XL for them. He is on Protonix for gastroesophageal reflux disease with Sepulveda's esophagus. He does have dementia. He does have chronic pain and he gets Oxy IR for that. There are no new complaints at this time.    ALLERGIES:   Allergies   Allergen Reactions    Codeine Nausea And Vomiting    Morphine Other (See Comments)     constipation       MEDICATIONS: As noted on the [facility], referenced and incorporated herein.    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 02/02/2015    EGD with biopsies    CAD (coronary artery disease)     Elevated PSA     Hyperlipidemia     Hypertension     Kidney stone     Memory loss     Obesity 01/24/2014    Osteoarthritis of knee     Overweight 12/26/2018    ST elevation myocardial infarction (STEMI) (HCC)     Venous insufficiency        PAST SURGICAL HISTORY:   Past Surgical History:   Procedure Laterality Date    APPENDECTOMY      CHOLECYSTECTOMY  01/12/1983    COLONOSCOPY  08/29/12    COLONOSCOPY  05/27/09    COLONOSCOPY  04/30/2008    CYSTOSCOPY  11/15/2007    with left ureteroscopy and dorsal slit     KNEE FUSION Left     LEG SURGERY Left 1/11/2020    I and D of Left Lower Extremitity performed by Cruz Mcghee MD at [facility] OR    LIPOMA RESECTION      VT CYSTOURETHROSCOPY N/A 11/19/2018    CYSTO Photovaporization Prostate Greenlight Laser

## 2025-03-12 ENCOUNTER — HOSPITAL ENCOUNTER (OUTPATIENT)
Age: 89
Setting detail: SPECIMEN
Discharge: HOME | End: 2025-03-12
Payer: MEDICARE

## 2025-03-12 LAB
FLUAV AG SPEC QL: NEGATIVE
FLUBV AG SPEC QL: NEGATIVE

## 2025-03-12 PROCEDURE — 87804 INFLUENZA ASSAY W/OPTIC: CPT

## 2025-03-14 DIAGNOSIS — Z51.5 HOSPICE CARE PATIENT: ICD-10-CM

## 2025-03-14 DIAGNOSIS — M25.561 CHRONIC PAIN OF BOTH KNEES: ICD-10-CM

## 2025-03-14 DIAGNOSIS — G89.29 CHRONIC PAIN OF BOTH KNEES: ICD-10-CM

## 2025-03-14 DIAGNOSIS — M25.562 CHRONIC PAIN OF BOTH KNEES: ICD-10-CM

## 2025-03-14 RX ORDER — OXYCODONE HYDROCHLORIDE 20 MG/1
20 TABLET ORAL 3 TIMES DAILY PRN
Qty: 90 TABLET | Refills: 0 | Status: SHIPPED | OUTPATIENT
Start: 2025-03-14 | End: 2025-04-13

## 2025-03-14 NOTE — TELEPHONE ENCOUNTER
Pharmacy requesting a refill of the below medication which has been pended for you:     Requested Prescriptions     Pending Prescriptions Disp Refills    oxyCODONE (ROXICODONE) 20 MG immediate release tablet 90 tablet 0     Sig: Take 1 tablet by mouth 3 times daily as needed for Pain for up to 30 days. Max Daily Amount: 60 mg       Allergies   Allergen Reactions    Codeine Nausea And Vomiting    Morphine Other (See Comments)     constipation

## 2025-03-25 PROCEDURE — 99309 SBSQ NF CARE MODERATE MDM 30: CPT | Performed by: INTERNAL MEDICINE

## 2025-03-31 ENCOUNTER — OUTSIDE SERVICES (OUTPATIENT)
Dept: INTERNAL MEDICINE | Age: 89
End: 2025-03-31
Payer: MEDICARE

## 2025-03-31 DIAGNOSIS — K22.70 BARRETT'S ESOPHAGUS WITHOUT DYSPLASIA: ICD-10-CM

## 2025-03-31 DIAGNOSIS — K21.9 GASTROESOPHAGEAL REFLUX DISEASE WITHOUT ESOPHAGITIS: ICD-10-CM

## 2025-03-31 DIAGNOSIS — I25.10 CORONARY ARTERY DISEASE INVOLVING NATIVE CORONARY ARTERY OF NATIVE HEART WITHOUT ANGINA PECTORIS: ICD-10-CM

## 2025-03-31 DIAGNOSIS — M25.562 CHRONIC PAIN OF BOTH KNEES: ICD-10-CM

## 2025-03-31 DIAGNOSIS — I50.32 CHRONIC HEART FAILURE WITH PRESERVED EJECTION FRACTION (HFPEF) (HCC): ICD-10-CM

## 2025-03-31 DIAGNOSIS — G89.29 CHRONIC PAIN OF BOTH KNEES: ICD-10-CM

## 2025-03-31 DIAGNOSIS — F11.20 UNCOMPLICATED OPIOID DEPENDENCE (HCC): ICD-10-CM

## 2025-03-31 DIAGNOSIS — Z51.5 HOSPICE CARE PATIENT: ICD-10-CM

## 2025-03-31 DIAGNOSIS — I10 ESSENTIAL HYPERTENSION, BENIGN: Primary | ICD-10-CM

## 2025-03-31 DIAGNOSIS — M25.561 CHRONIC PAIN OF BOTH KNEES: ICD-10-CM

## 2025-03-31 DIAGNOSIS — F03.B0 MODERATE DEMENTIA, UNSPECIFIED DEMENTIA TYPE, UNSPECIFIED WHETHER BEHAVIORAL, PSYCHOTIC, OR MOOD DISTURBANCE OR ANXIETY (HCC): ICD-10-CM

## 2025-03-31 NOTE — PROGRESS NOTES
DR. GORDON - NURSING HOME VISIT    DATE OF SERVICE: 2/25/25    NURSING HOME: [facility]    CHIEF COMPLAINT/HISTORY OF CHIEF COMPLAINT: This patient is being seen for ongoing evaluation and management of his dementia, hypertension, coronary artery disease, congestive heart failure, gastroesophageal reflux disease with Sepulveda's esophagus, and chronic pain. He is still receiving Hospice care. He has coronary artery disease, congestive heart failure, and hypertension and he sees the cardiologists here for those conditions. He takes Plavix, Lasix, and Toprol XL for them. He is on Protonix for gastroesophageal reflux disease with Sepulveda's esophagus. He does have dementia. He does have chronic pain and he gets Oxy IR for that. There are no new complaints.    ALLERGIES:   Allergies   Allergen Reactions    Codeine Nausea And Vomiting    Morphine Other (See Comments)     constipation       MEDICATIONS: As noted on the [facility], referenced and incorporated herein.    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 02/02/2015    EGD with biopsies    CAD (coronary artery disease)     Elevated PSA     Hyperlipidemia     Hypertension     Kidney stone     Memory loss     Obesity 01/24/2014    Osteoarthritis of knee     Overweight 12/26/2018    ST elevation myocardial infarction (STEMI) (HCC)     Venous insufficiency        PAST SURGICAL HISTORY:   Past Surgical History:   Procedure Laterality Date    APPENDECTOMY      CHOLECYSTECTOMY  01/12/1983    COLONOSCOPY  08/29/12    COLONOSCOPY  05/27/09    COLONOSCOPY  04/30/2008    CYSTOSCOPY  11/15/2007    with left ureteroscopy and dorsal slit     KNEE FUSION Left     LEG SURGERY Left 1/11/2020    I and D of Left Lower Extremitity performed by Cruz Mcghee MD at [facility] OR    LIPOMA RESECTION      CT CYSTOURETHROSCOPY N/A 11/19/2018    CYSTO Photovaporization Prostate Greenlight Laser

## 2025-04-22 PROCEDURE — 99309 SBSQ NF CARE MODERATE MDM 30: CPT | Performed by: INTERNAL MEDICINE

## 2025-05-28 ENCOUNTER — OUTSIDE SERVICES (OUTPATIENT)
Dept: INTERNAL MEDICINE | Age: 89
End: 2025-05-28
Payer: MEDICARE

## 2025-05-28 DIAGNOSIS — I25.10 CORONARY ARTERY DISEASE INVOLVING NATIVE CORONARY ARTERY OF NATIVE HEART WITHOUT ANGINA PECTORIS: ICD-10-CM

## 2025-05-28 DIAGNOSIS — Z51.5 HOSPICE CARE PATIENT: ICD-10-CM

## 2025-05-28 DIAGNOSIS — M25.561 CHRONIC PAIN OF BOTH KNEES: ICD-10-CM

## 2025-05-28 DIAGNOSIS — M25.562 CHRONIC PAIN OF BOTH KNEES: ICD-10-CM

## 2025-05-28 DIAGNOSIS — K22.70 BARRETT'S ESOPHAGUS WITHOUT DYSPLASIA: ICD-10-CM

## 2025-05-28 DIAGNOSIS — F11.20 UNCOMPLICATED OPIOID DEPENDENCE (HCC): ICD-10-CM

## 2025-05-28 DIAGNOSIS — I10 ESSENTIAL HYPERTENSION, BENIGN: Primary | ICD-10-CM

## 2025-05-28 DIAGNOSIS — G89.29 CHRONIC PAIN OF BOTH KNEES: ICD-10-CM

## 2025-05-28 DIAGNOSIS — F03.B0 MODERATE DEMENTIA, UNSPECIFIED DEMENTIA TYPE, UNSPECIFIED WHETHER BEHAVIORAL, PSYCHOTIC, OR MOOD DISTURBANCE OR ANXIETY (HCC): ICD-10-CM

## 2025-05-28 DIAGNOSIS — I50.32 CHRONIC HEART FAILURE WITH PRESERVED EJECTION FRACTION (HFPEF) (HCC): ICD-10-CM

## 2025-05-28 DIAGNOSIS — K21.9 GASTROESOPHAGEAL REFLUX DISEASE WITHOUT ESOPHAGITIS: ICD-10-CM

## 2025-05-28 NOTE — PROGRESS NOTES
DR. GORDON - NURSING HOME VISIT    DATE OF SERVICE: 3/25/25    NURSING HOME: [facility]    CHIEF COMPLAINT/HISTORY OF CHIEF COMPLAINT: This patient is being seen for ongoing evaluation and management of his dementia, hypertension, coronary artery disease, congestive heart failure, gastroesophageal reflux disease with Sepulveda's esophagus, and chronic pain. He is still receiving Hospice care. He has coronary artery disease, congestive heart failure, and hypertension and he sees the cardiologists here for those conditions. He takes Plavix, Lasix, and Toprol XL for them. He is on Protonix for gastroesophageal reflux disease with Sepulveda's esophagus. He does have dementia. He does have chronic pain and he gets Oxy IR for that. There are no new complaints at this time.      ALLERGIES:   Allergies   Allergen Reactions    Codeine Nausea And Vomiting    Morphine Other (See Comments)     constipation       MEDICATIONS: As noted on the [facility], referenced and incorporated herein.    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 02/02/2015    EGD with biopsies    CAD (coronary artery disease)     Elevated PSA     Hyperlipidemia     Hypertension     Kidney stone     Memory loss     Obesity 01/24/2014    Osteoarthritis of knee     Overweight 12/26/2018    ST elevation myocardial infarction (STEMI) (HCC)     Venous insufficiency        PAST SURGICAL HISTORY:   Past Surgical History:   Procedure Laterality Date    APPENDECTOMY      CHOLECYSTECTOMY  01/12/1983    COLONOSCOPY  08/29/12    COLONOSCOPY  05/27/09    COLONOSCOPY  04/30/2008    CYSTOSCOPY  11/15/2007    with left ureteroscopy and dorsal slit     KNEE FUSION Left     LEG SURGERY Left 1/11/2020    I and D of Left Lower Extremitity performed by Cruz Mcghee MD at [facility] OR    LIPOMA RESECTION      RI CYSTOURETHROSCOPY N/A 11/19/2018    CYSTO Photovaporization Prostate Greenlight

## 2025-05-30 ENCOUNTER — OUTSIDE SERVICES (OUTPATIENT)
Dept: INTERNAL MEDICINE | Age: 89
End: 2025-05-30
Payer: MEDICARE

## 2025-05-30 DIAGNOSIS — F03.B0 MODERATE DEMENTIA, UNSPECIFIED DEMENTIA TYPE, UNSPECIFIED WHETHER BEHAVIORAL, PSYCHOTIC, OR MOOD DISTURBANCE OR ANXIETY (HCC): ICD-10-CM

## 2025-05-30 DIAGNOSIS — I50.32 CHRONIC HEART FAILURE WITH PRESERVED EJECTION FRACTION (HFPEF) (HCC): ICD-10-CM

## 2025-05-30 DIAGNOSIS — F11.20 UNCOMPLICATED OPIOID DEPENDENCE (HCC): ICD-10-CM

## 2025-05-30 DIAGNOSIS — I10 ESSENTIAL HYPERTENSION, BENIGN: Primary | ICD-10-CM

## 2025-05-30 DIAGNOSIS — Z51.5 HOSPICE CARE PATIENT: ICD-10-CM

## 2025-05-30 DIAGNOSIS — M25.562 CHRONIC PAIN OF BOTH KNEES: ICD-10-CM

## 2025-05-30 DIAGNOSIS — K21.9 GASTROESOPHAGEAL REFLUX DISEASE WITHOUT ESOPHAGITIS: ICD-10-CM

## 2025-05-30 DIAGNOSIS — G89.29 CHRONIC PAIN OF BOTH KNEES: ICD-10-CM

## 2025-05-30 DIAGNOSIS — I25.10 CORONARY ARTERY DISEASE INVOLVING NATIVE CORONARY ARTERY OF NATIVE HEART WITHOUT ANGINA PECTORIS: ICD-10-CM

## 2025-05-30 DIAGNOSIS — K22.70 BARRETT'S ESOPHAGUS WITHOUT DYSPLASIA: ICD-10-CM

## 2025-05-30 DIAGNOSIS — M25.561 CHRONIC PAIN OF BOTH KNEES: ICD-10-CM

## 2025-07-07 ENCOUNTER — OUTSIDE SERVICES (OUTPATIENT)
Dept: INTERNAL MEDICINE | Age: 89
End: 2025-07-07
Payer: MEDICARE

## 2025-07-07 ENCOUNTER — OUTSIDE SERVICES (OUTPATIENT)
Dept: INTERNAL MEDICINE | Age: 89
End: 2025-07-07

## 2025-07-07 DIAGNOSIS — F11.20 UNCOMPLICATED OPIOID DEPENDENCE (HCC): ICD-10-CM

## 2025-07-07 DIAGNOSIS — G89.29 CHRONIC PAIN OF BOTH KNEES: ICD-10-CM

## 2025-07-07 DIAGNOSIS — K22.70 BARRETT'S ESOPHAGUS WITHOUT DYSPLASIA: ICD-10-CM

## 2025-07-07 DIAGNOSIS — I50.32 CHRONIC HEART FAILURE WITH PRESERVED EJECTION FRACTION (HFPEF) (HCC): ICD-10-CM

## 2025-07-07 DIAGNOSIS — F03.B0 MODERATE DEMENTIA, UNSPECIFIED DEMENTIA TYPE, UNSPECIFIED WHETHER BEHAVIORAL, PSYCHOTIC, OR MOOD DISTURBANCE OR ANXIETY (HCC): ICD-10-CM

## 2025-07-07 DIAGNOSIS — M25.562 CHRONIC PAIN OF BOTH KNEES: ICD-10-CM

## 2025-07-07 DIAGNOSIS — M25.561 CHRONIC PAIN OF BOTH KNEES: ICD-10-CM

## 2025-07-07 DIAGNOSIS — I10 ESSENTIAL HYPERTENSION, BENIGN: Primary | ICD-10-CM

## 2025-07-07 DIAGNOSIS — I25.10 CORONARY ARTERY DISEASE INVOLVING NATIVE CORONARY ARTERY OF NATIVE HEART WITHOUT ANGINA PECTORIS: ICD-10-CM

## 2025-07-07 DIAGNOSIS — K21.9 GASTROESOPHAGEAL REFLUX DISEASE WITHOUT ESOPHAGITIS: ICD-10-CM

## 2025-07-07 DIAGNOSIS — Z51.5 HOSPICE CARE PATIENT: ICD-10-CM

## 2025-07-08 NOTE — PROGRESS NOTES
DR. GORDON - NURSING HOME VISIT    DATE OF SERVICE: 5/25/25    NURSING HOME: [facility]    CHIEF COMPLAINT/HISTORY OF CHIEF COMPLAINT: This patient is being seen for ongoing evaluation and management of his dementia, hypertension, coronary artery disease, congestive heart failure, gastroesophageal reflux disease with Sepulveda's esophagus, and chronic pain. He remains under the care of Hospice. He has coronary artery disease, congestive heart failure, and hypertension and he sees the cardiologists here for those conditions. He takes Plavix, Lasix, and Toprol XL for them. He is on Protonix for gastroesophageal reflux disease with Sepulveda's esophagus. He does have dementia. He does have chronic pain and he gets Oxy IR for that. There are no new complaints at this time.      ALLERGIES:   Allergies   Allergen Reactions    Codeine Nausea And Vomiting    Morphine Other (See Comments)     constipation       MEDICATIONS: As noted on the [facility], referenced and incorporated herein.    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 02/02/2015    EGD with biopsies    CAD (coronary artery disease)     Elevated PSA     Hyperlipidemia     Hypertension     Kidney stone     Memory loss     Obesity 01/24/2014    Osteoarthritis of knee     Overweight 12/26/2018    ST elevation myocardial infarction (STEMI) (HCC)     Venous insufficiency        PAST SURGICAL HISTORY:   Past Surgical History:   Procedure Laterality Date    APPENDECTOMY      CHOLECYSTECTOMY  01/12/1983    COLONOSCOPY  08/29/12    COLONOSCOPY  05/27/09    COLONOSCOPY  04/30/2008    CYSTOSCOPY  11/15/2007    with left ureteroscopy and dorsal slit     KNEE FUSION Left     LEG SURGERY Left 1/11/2020    I and D of Left Lower Extremitity performed by Cruz Mcghee MD at [facility] OR    LIPOMA RESECTION      DE CYSTOURETHROSCOPY N/A 11/19/2018    CYSTO Photovaporization Prostate Greenlight

## 2025-07-08 NOTE — PROGRESS NOTES
([facility]#[id number]) performed by Fabian Sarmiento MD at [facility] OR    WA EGD TRANSORAL BIOPSY SINGLE/MULTIPLE N/A 7/3/2017    EGD BIOPSY performed by Hang Montilla MD at [facility] OR    PROSTATE BIOPSY Bilateral 2009    Benign    UPPER GASTROINTESTINAL ENDOSCOPY  2015    Sepulveda's (Dr. Montilla)    UPPER GASTROINTESTINAL ENDOSCOPY  2016    Lg hiatal hernia, polyp at duodenum, gastric polyp, Barretts Esophagus, GE 32    UPPER GASTROINTESTINAL ENDOSCOPY  2017    mild gastritis, Sepulveda's, Dr. Montilla-recheck 3 years       SOCIAL HISTORY:     Tobacco:   Social History     Tobacco Use   Smoking Status Former    Current packs/day: 0.00    Types: Cigars, Cigarettes    Quit date: 1998    Years since quittin.5   Smokeless Tobacco Never   Tobacco Comments    Former smoker (quit )- [facility] 3/20/17     Alcohol:   Social History     Substance and Sexual Activity   Alcohol Use No    Alcohol/week: 0.0 standard drinks of alcohol     Drugs:   Social History     Substance and Sexual Activity   Drug Use No       FAMILY HISTORY: family history includes Cancer in his father; Heart Disease in his paternal grandfather.    REVIEW OF SYSTEMS:     Please see history of chief complaint above; otherwise no new problems with respect to General, HEENT, Cardiovascular, Respiratory, Gastrointestinal, Genitourinary, Endocrinologic, Musculoskeletal, or Neuropsychiatric complaints.       PHYSICAL EXAMINATION:    Vitals: Temp: 97.5 deg F. Pulse: 74. Resp: 16. BP: 124/75.  General: A 89 y.o.  male. Alert and oriented to person and place and questionably oriented to time. He  does not appear to be in any acute distress.  Skin: Skin color, texture, turgor normal. No rashes or lesions.  HEENT/Neck: Essentially unremarkable  Lungs: Normal - CTA without rales, rhonchi, or wheezing.  Heart: regular rate and rhythm, S1, S2 normal, no murmur, click, rub or gallop No S3 or S4.  Abdomen: Non-obese soft, non-distended,

## (undated) DEVICE — STRAP,CATHETER,ELASTIC,HOOK&LOOP: Brand: MEDLINE

## (undated) DEVICE — FORCEPS BX L240CM JAW DIA2.4MM ORNG L CAP W/ NDL DISP RAD

## (undated) DEVICE — 9165 UNIVERSAL PATIENT PLATE: Brand: 3M™

## (undated) DEVICE — GLOVE ORANGE PI 7 1/2   MSG9075

## (undated) DEVICE — GLOVE ORANGE PI 8   MSG9080

## (undated) DEVICE — 1200CC GUARDIAN II: Brand: GUARDIAN

## (undated) DEVICE — 4-PORT MANIFOLD: Brand: NEPTUNE 2

## (undated) DEVICE — CONTAINER,SPECIMEN,OR STERILE,4OZ: Brand: MEDLINE

## (undated) DEVICE — BITE BLOCK W/VELCRO STRAP

## (undated) DEVICE — LINE SAMP O2 6.5FT W/FEMALE CONN F/ADULT CAPNOLINE PLUS

## (undated) DEVICE — PLUG,CATHETER,DRAINAGE PROTECTOR,TUBE: Brand: MEDLINE

## (undated) DEVICE — CATHETER,FOLEY,3-WAY,22FR,30ML,100%SILI: Brand: MEDLINE

## (undated) DEVICE — POUCH DRNGE FLX BND INTEGR RAIL CLMP DISP EZ CTCH

## (undated) DEVICE — JELLY LUBRICATING 4OZ FLIP TOP TB E Z

## (undated) DEVICE — Device

## (undated) DEVICE — SUTURE VCRL SZ 2-0 L27IN ABSRB VLT L26MM SH 1/2 CIR J317H

## (undated) DEVICE — PAD,ABDOMINAL,5"X9",ST,LF,25/BX: Brand: MEDLINE INDUSTRIES, INC.

## (undated) DEVICE — MARKER W/PRE PRINTED CUSTOM LABEL

## (undated) DEVICE — CYSTO/BLADDER IRRIGATION SET, REGULATING CLAMP

## (undated) DEVICE — SOLUTION SCRB 4OZ 4% CHG CLN BASE FOR PT SKIN ANTISEPSIS

## (undated) DEVICE — SOLUTION IV IRRIG WATER 1000ML POUR BRL 2F7114

## (undated) DEVICE — TOWEL,OR,DSP,ST,BLUE,STD,4/PK,20PK/CS: Brand: MEDLINE

## (undated) DEVICE — GLOVE SURG SZ 65 THK91MIL LTX FREE SYN POLYISOPRENE

## (undated) DEVICE — STERILE HOOK LOCK LATEX FREE ELASTIC BANDAGE 6INX5YD: Brand: HOOK LOCK™

## (undated) DEVICE — DISCONTINUED USE 419147 KIT ENDOSCOPY CUSTOM PACK

## (undated) DEVICE — SOLUTION IV IRRIG POUR BRL 0.9% SODIUM CHL 2F7124

## (undated) DEVICE — TUBE IRRIG HNDPC HI FLO TP INTRPULS W/SUCTION TUBE

## (undated) DEVICE — Z INACTIVE USE 2660664 SOLUTION IRRIG 3000ML 0.9% SOD CHL USP UROMATIC PLAS CONT

## (undated) DEVICE — GOWN,AURORA,NON-REINFORCED,2XL: Brand: MEDLINE

## (undated) DEVICE — Z DUP USE 2565107 PACK SURG PROC LEG CYSTO T-DRAPE REINF TBL CVR HND TWL

## (undated) DEVICE — SYRINGE,EAR/ULCER, 2 OZ, STERILE: Brand: MEDLINE

## (undated) DEVICE — STERILE COTTON TIPPED APPLICATORS: Brand: PURITAN

## (undated) DEVICE — GAUZE,SPONGE,4"X4",12PLY,STERILE,LF,2'S: Brand: MEDLINE

## (undated) DEVICE — SUTURE VCRL SZ 3-0 L27IN ABSRB VLT L26MM SH 1/2 CIR J316H

## (undated) DEVICE — TUBING, SUCTION, 3/16" X 20', STRAIGHT: Brand: MEDLINE

## (undated) DEVICE — CONNECTOR TBNG AUX H2O JET DISP FOR OLY 160/180 SER

## (undated) DEVICE — Z DISCONTINUED BY MEDLINE USE 2711682 TRAY SKIN PREP DRY W/ PREM GLV

## (undated) DEVICE — GARMENT,MEDLINE,DVT,INT,CALF,MED, GEN2: Brand: MEDLINE

## (undated) DEVICE — SUPER SPONGES,MEDIUM: Brand: KERLIX

## (undated) DEVICE — PACK,LAPAROTOMY,PK IV,AURORA: Brand: MEDLINE

## (undated) DEVICE — BASIN SET: Brand: MEDLINE INDUSTRIES, INC.

## (undated) DEVICE — SODIUM CHL 09% SOL EXCEL

## (undated) DEVICE — GLOVE SURG SZ 6 THK91MIL LTX FREE SYN POLYISOPRENE ANTI